# Patient Record
Sex: FEMALE | Race: BLACK OR AFRICAN AMERICAN | NOT HISPANIC OR LATINO | URBAN - METROPOLITAN AREA
[De-identification: names, ages, dates, MRNs, and addresses within clinical notes are randomized per-mention and may not be internally consistent; named-entity substitution may affect disease eponyms.]

---

## 2017-01-17 ENCOUNTER — OUTPATIENT (OUTPATIENT)
Dept: OUTPATIENT SERVICES | Facility: HOSPITAL | Age: 77
LOS: 1 days | Discharge: HOME | End: 2017-01-17
Payer: MEDICARE

## 2017-01-17 VITALS
RESPIRATION RATE: 18 BRPM | SYSTOLIC BLOOD PRESSURE: 123 MMHG | TEMPERATURE: 97 F | WEIGHT: 139.99 LBS | OXYGEN SATURATION: 99 % | HEIGHT: 67 IN | DIASTOLIC BLOOD PRESSURE: 51 MMHG | HEART RATE: 83 BPM

## 2017-01-17 VITALS
SYSTOLIC BLOOD PRESSURE: 140 MMHG | DIASTOLIC BLOOD PRESSURE: 74 MMHG | OXYGEN SATURATION: 100 % | RESPIRATION RATE: 18 BRPM | HEART RATE: 94 BPM

## 2017-01-17 DIAGNOSIS — Z98.890 OTHER SPECIFIED POSTPROCEDURAL STATES: Chronic | ICD-10-CM

## 2017-01-17 PROCEDURE — 88305 TISSUE EXAM BY PATHOLOGIST: CPT | Mod: 26

## 2017-01-17 RX ORDER — SODIUM CHLORIDE 9 MG/ML
1000 INJECTION, SOLUTION INTRAVENOUS
Qty: 0 | Refills: 0 | Status: DISCONTINUED | OUTPATIENT
Start: 2017-01-17 | End: 2017-01-17

## 2017-01-17 RX ADMIN — SODIUM CHLORIDE 50 MILLILITER(S): 9 INJECTION, SOLUTION INTRAVENOUS at 11:24

## 2017-01-18 LAB — SURGICAL PATHOLOGY FINAL REPORT - CH: SIGNIFICANT CHANGE UP

## 2017-01-19 DIAGNOSIS — D12.4 BENIGN NEOPLASM OF DESCENDING COLON: ICD-10-CM

## 2017-01-19 DIAGNOSIS — D12.0 BENIGN NEOPLASM OF CECUM: ICD-10-CM

## 2017-01-19 DIAGNOSIS — E78.00 PURE HYPERCHOLESTEROLEMIA, UNSPECIFIED: ICD-10-CM

## 2017-01-19 DIAGNOSIS — I10 ESSENTIAL (PRIMARY) HYPERTENSION: ICD-10-CM

## 2017-01-19 DIAGNOSIS — K64.8 OTHER HEMORRHOIDS: ICD-10-CM

## 2017-01-19 DIAGNOSIS — Z79.4 LONG TERM (CURRENT) USE OF INSULIN: ICD-10-CM

## 2017-01-19 DIAGNOSIS — Z79.82 LONG TERM (CURRENT) USE OF ASPIRIN: ICD-10-CM

## 2017-01-19 DIAGNOSIS — E11.9 TYPE 2 DIABETES MELLITUS WITHOUT COMPLICATIONS: ICD-10-CM

## 2017-01-19 DIAGNOSIS — K57.30 DIVERTICULOSIS OF LARGE INTESTINE WITHOUT PERFORATION OR ABSCESS WITHOUT BLEEDING: ICD-10-CM

## 2018-02-06 ENCOUNTER — EMERGENCY (EMERGENCY)
Facility: HOSPITAL | Age: 78
LOS: 1 days | Discharge: HOME | End: 2018-02-06
Attending: EMERGENCY MEDICINE | Admitting: EMERGENCY MEDICINE
Payer: MEDICARE

## 2018-02-06 VITALS
OXYGEN SATURATION: 100 % | RESPIRATION RATE: 18 BRPM | DIASTOLIC BLOOD PRESSURE: 81 MMHG | SYSTOLIC BLOOD PRESSURE: 205 MMHG | HEART RATE: 73 BPM

## 2018-02-06 VITALS
TEMPERATURE: 98 F | DIASTOLIC BLOOD PRESSURE: 78 MMHG | OXYGEN SATURATION: 99 % | RESPIRATION RATE: 19 BRPM | SYSTOLIC BLOOD PRESSURE: 162 MMHG | HEART RATE: 89 BPM

## 2018-02-06 DIAGNOSIS — Z98.890 OTHER SPECIFIED POSTPROCEDURAL STATES: Chronic | ICD-10-CM

## 2018-02-06 LAB
ALBUMIN SERPL ELPH-MCNC: 3.5 G/DL — SIGNIFICANT CHANGE UP (ref 3.3–5)
ALP SERPL-CCNC: 66 U/L — SIGNIFICANT CHANGE UP (ref 40–120)
ALT FLD-CCNC: 12 U/L — SIGNIFICANT CHANGE UP (ref 4–33)
AST SERPL-CCNC: 28 U/L — SIGNIFICANT CHANGE UP (ref 4–32)
BASE EXCESS BLDV CALC-SCNC: -1.5 MMOL/L — SIGNIFICANT CHANGE UP
BASOPHILS # BLD AUTO: 0.02 K/UL — SIGNIFICANT CHANGE UP (ref 0–0.2)
BASOPHILS NFR BLD AUTO: 0.3 % — SIGNIFICANT CHANGE UP (ref 0–2)
BILIRUB SERPL-MCNC: < 0.2 MG/DL — LOW (ref 0.2–1.2)
BLOOD GAS VENOUS - CREATININE: 3.83 MG/DL — HIGH (ref 0.5–1.3)
BUN SERPL-MCNC: 29 MG/DL — HIGH (ref 7–23)
CALCIUM SERPL-MCNC: 7.6 MG/DL — LOW (ref 8.4–10.5)
CHLORIDE BLDV-SCNC: 106 MMOL/L — SIGNIFICANT CHANGE UP (ref 96–108)
CHLORIDE SERPL-SCNC: 103 MMOL/L — SIGNIFICANT CHANGE UP (ref 98–107)
CK MB BLD-MCNC: 1.3 — SIGNIFICANT CHANGE UP (ref 0–2.5)
CK MB BLD-MCNC: 2.19 NG/ML — SIGNIFICANT CHANGE UP (ref 1–4.7)
CK SERPL-CCNC: 164 U/L — SIGNIFICANT CHANGE UP (ref 25–170)
CO2 SERPL-SCNC: 23 MMOL/L — SIGNIFICANT CHANGE UP (ref 22–31)
CREAT SERPL-MCNC: 3.76 MG/DL — HIGH (ref 0.5–1.3)
EOSINOPHIL # BLD AUTO: 0.13 K/UL — SIGNIFICANT CHANGE UP (ref 0–0.5)
EOSINOPHIL NFR BLD AUTO: 2.1 % — SIGNIFICANT CHANGE UP (ref 0–6)
GAS PNL BLDV: 134 MMOL/L — LOW (ref 136–146)
GLUCOSE BLDV-MCNC: 161 — HIGH (ref 70–99)
GLUCOSE SERPL-MCNC: 148 MG/DL — HIGH (ref 70–99)
HCO3 BLDV-SCNC: 22 MMOL/L — SIGNIFICANT CHANGE UP (ref 20–27)
HCT VFR BLD CALC: 29.8 % — LOW (ref 34.5–45)
HCT VFR BLDV CALC: 27.3 % — LOW (ref 34.5–45)
HGB BLD-MCNC: 8.8 G/DL — LOW (ref 11.5–15.5)
HGB BLDV-MCNC: 8.8 G/DL — LOW (ref 11.5–15.5)
IMM GRANULOCYTES # BLD AUTO: 0.03 # — SIGNIFICANT CHANGE UP
IMM GRANULOCYTES NFR BLD AUTO: 0.5 % — SIGNIFICANT CHANGE UP (ref 0–1.5)
LACTATE BLDV-MCNC: 1.4 MMOL/L — SIGNIFICANT CHANGE UP (ref 0.5–2)
LYMPHOCYTES # BLD AUTO: 0.93 K/UL — LOW (ref 1–3.3)
LYMPHOCYTES # BLD AUTO: 15 % — SIGNIFICANT CHANGE UP (ref 13–44)
MCHC RBC-ENTMCNC: 21.6 PG — LOW (ref 27–34)
MCHC RBC-ENTMCNC: 29.5 % — LOW (ref 32–36)
MCV RBC AUTO: 73.2 FL — LOW (ref 80–100)
MONOCYTES # BLD AUTO: 0.26 K/UL — SIGNIFICANT CHANGE UP (ref 0–0.9)
MONOCYTES NFR BLD AUTO: 4.2 % — SIGNIFICANT CHANGE UP (ref 2–14)
NEUTROPHILS # BLD AUTO: 4.83 K/UL — SIGNIFICANT CHANGE UP (ref 1.8–7.4)
NEUTROPHILS NFR BLD AUTO: 77.9 % — HIGH (ref 43–77)
NRBC # FLD: 0 — SIGNIFICANT CHANGE UP
PCO2 BLDV: 52 MMHG — HIGH (ref 41–51)
PH BLDV: 7.29 PH — LOW (ref 7.32–7.43)
PLATELET # BLD AUTO: 234 K/UL — SIGNIFICANT CHANGE UP (ref 150–400)
PMV BLD: 10.9 FL — SIGNIFICANT CHANGE UP (ref 7–13)
PO2 BLDV: < 24 MMHG — LOW (ref 35–40)
POTASSIUM BLDV-SCNC: 7.8 MMOL/L — CRITICAL HIGH (ref 3.4–4.5)
POTASSIUM SERPL-MCNC: 4.1 MMOL/L — SIGNIFICANT CHANGE UP (ref 3.5–5.3)
POTASSIUM SERPL-SCNC: 4.1 MMOL/L — SIGNIFICANT CHANGE UP (ref 3.5–5.3)
PROT SERPL-MCNC: 7.8 G/DL — SIGNIFICANT CHANGE UP (ref 6–8.3)
RBC # BLD: 4.07 M/UL — SIGNIFICANT CHANGE UP (ref 3.8–5.2)
RBC # FLD: 14.9 % — HIGH (ref 10.3–14.5)
SAO2 % BLDV: 25.4 % — LOW (ref 60–85)
SODIUM SERPL-SCNC: 140 MMOL/L — SIGNIFICANT CHANGE UP (ref 135–145)
TROPONIN T SERPL-MCNC: 0.11 NG/ML — HIGH (ref 0–0.06)
WBC # BLD: 6.2 K/UL — SIGNIFICANT CHANGE UP (ref 3.8–10.5)
WBC # FLD AUTO: 6.2 K/UL — SIGNIFICANT CHANGE UP (ref 3.8–10.5)

## 2018-02-06 PROCEDURE — 99285 EMERGENCY DEPT VISIT HI MDM: CPT

## 2018-02-06 PROCEDURE — 71046 X-RAY EXAM CHEST 2 VIEWS: CPT | Mod: 26

## 2018-02-06 RX ORDER — MECLIZINE HCL 12.5 MG
1 TABLET ORAL
Qty: 0 | Refills: 0 | COMMUNITY
Start: 2018-02-06 | End: 2018-02-10

## 2018-02-06 RX ORDER — MECLIZINE HCL 12.5 MG
1 TABLET ORAL
Qty: 10 | Refills: 0 | OUTPATIENT
Start: 2018-02-06 | End: 2018-02-10

## 2018-02-06 RX ORDER — MECLIZINE HCL 12.5 MG
25 TABLET ORAL ONCE
Qty: 0 | Refills: 0 | Status: COMPLETED | OUTPATIENT
Start: 2018-02-06 | End: 2018-02-06

## 2018-02-06 RX ADMIN — Medication 25 MILLIGRAM(S): at 11:04

## 2018-02-06 NOTE — ED ADULT TRIAGE NOTE - CHIEF COMPLAINT QUOTE
Patient brought to ER by EMS for intermittent vertigo for the past 6 months. Started about 6 this morning and she vomited times one.

## 2018-02-06 NOTE — ED PROVIDER NOTE - MEDICAL DECISION MAKING DETAILS
77 yoF with dizziness x hours. Likely BPPV  also cough x 1 week. will r/o pna.  Plan: ekg, cxr, cbc, cmp, bren, vbg, meclizine for symptom control, reassess.

## 2018-02-06 NOTE — ED PROVIDER NOTE - ATTENDING CONTRIBUTION TO CARE
NING Attending Note - Dr. Hua  77 yoF with PMHx of HTN, DM, CKD, h/o vertigo intermittently x 1 year previous treated with meclizine presents to the ED complaining of dizziness ( room spinning/ also mildly lightheaded in nature) occurred at 6 am this associated with nausea and 2 episodes of vomiting.   PE: pt is alert and oriented, perrl, ent normal, membranes are moist, neck supple. no lymphadenopathy or thyroid enlargement, No JVD.  Chest clear to P&A, Heart- reg rhythm without murmur, rubs or gallops, radial pulses equal bilaterally.  Abd is soft, non-tender, Bowel sounds are active. no mass or organomegaly. : No CVA tenderness. Neuro:  Pt alert and oriented x 3. Perrl    Distal neurosensory is intact. Motor function is 5/5 strength bilaterally.  No focal deficits. Extremities:  No edema.  Skin: warm and dry. Pt was asymptomatic with negative kavin-hallpike test.  Vertigo was evoked by Nylan-Barany test.   Impression:  Vertigo  Plan: labs, meclizine.

## 2018-02-06 NOTE — ED PROVIDER NOTE - OBJECTIVE STATEMENT
77 yoF with PMHx of HTN, DM, CKD, h/o vertigo intermittently x 1 year previous treated with meclizine presents to the ED complaining of dizziness ( room spinning/ also mildly lightheaded in nature) occurred at 6 am this associated with nausea and 2 episodes of vomiting. Pt reports that over the past few day shes had a mild non productive cough. Pt denies fever, chills, nasal congestion, sorethroat, chest pain, sob, abdominal pain, diarrhea, constipation, urinary symptoms, swelling in legs, change in medications, recent travel, change in mental status, weakness, confusion, syncope, trouble speaking, headache, social hx.   As per son at bedside pt has been having vertigo previously more frequent, has not had these symptoms in a while, but these are similar to previous symptoms.    PCP: Dr Susy Arevalo-[facility] [phone] Pt reports that over the past few day shes had a mild non productive cough. Pt denies fever, chills, nasal congestion, sorethroat, chest pain, sob, abdominal pain, diarrhea, constipation, urinary symptoms, swelling in legs, change in medications, recent travel, change in mental status, weakness, confusion, syncope, trouble speaking, headache, social hx.   As per son at bedside pt has been having vertigo previously more frequent, has not had these symptoms in a while, but these are similar to previous symptoms.    PCP: Dr Susy Arevalo-\[facility]\"" [phone] 77 yoF with PMHx of HTN, DM, CKD, h/o vertigo intermittently x 1 year previous treated with meclizine presents to the ED complaining of dizziness ( room spinning/ also mildly lightheaded in nature) occurred at 6 am this associated with nausea and 2 episodes of vomiting. Pt reports that over the past few day shes had a mild non productive cough. Pt denies fever, chills, nasal congestion, sorethroat, chest pain, sob, abdominal pain, diarrhea, constipation, urinary symptoms, swelling in legs, change in medications, recent travel, change in mental status, weakness, confusion, syncope, trouble speaking, headache, social hx.   As per son at bedside pt has been having vertigo previously more frequent, has not had these symptoms in a while, but these are similar to previous symptoms.    PCP: Dr Susy Arevalo-[facility] [phone]

## 2018-02-06 NOTE — ED PROVIDER NOTE - CARE PLAN
Principal Discharge DX:	Vertigo, benign paroxysmal  Assessment and plan of treatment:	Follow up with your Primary Medical Doctor Dr. Clayton Garcia within 2-3days/ week. Follow up with your nephrologist within the week. If results or reports were given to you, show copies of your reports given to you. Take all of your medications as previously prescribed. If worsening dizziness, difficulty walking/ speaking, numbness/ tingling, fever, uncontrolled vomiting, or any other new or concerning symptoms return to the ED.  Secondary Diagnosis:	Vomiting  Secondary Diagnosis:	Dizziness

## 2018-02-06 NOTE — ED PROVIDER NOTE - PLAN OF CARE
Follow up with your Primary Medical Doctor Dr. Clayton Garcia within 2-3days/ week. Follow up with your nephrologist within the week. If results or reports were given to you, show copies of your reports given to you. Take all of your medications as previously prescribed. If worsening dizziness, difficulty walking/ speaking, numbness/ tingling, fever, uncontrolled vomiting, or any other new or concerning symptoms return to the ED.

## 2018-02-06 NOTE — ED ADULT NURSE NOTE - OBJECTIVE STATEMENT
Pt presents to ED R# 3 AOx4, in NAD, c/o dizziness- room spinning- this AM followed by 1 episode vomiting. As per son, episodes of dizziness followed by vomiting occur intermittently, pt diagnosed with vertigo. As per son, pt has CKD, not yet on HD. Son also reports increased confusion and unsteadiness of gait, ambulates independently at baseline. Pt denies dizziness/ N/V/ SOB/ CP/ abd pain/ fevers/ chills/ other acute complaints at this time. Son denies recent falls/ syncope. IV inserted, BW collected and sent to lab. Pt is in NAD. VSS. Respirations appear even and unlabored. Bed in locked position with side rails up, call bell within reach. Awaiting test results. Family at bedside. Will continue to monitor.

## 2018-02-06 NOTE — ED PROVIDER NOTE - PROGRESS NOTE DETAILS
Call to patient nephrologist, Dr. Julius Black. at [phone] message left with name and number, awaiting call back to discuss labs results. MALINDA Vasquez:Call to patient nephrologist, Dr. Julius Black. at [phone] message left with name and number, awaiting call back to discuss labs results. MALINDA Vasquez: Spoke with Pt pcp Dr. Susy Garcia, previoud lab work done in Nov 2017 was significant for anemia which patient has chronically Hemoglobin was 8.8 and Creatinine was 3.4 MALINDA Vasquez: Spoke with Pt pcp Dr. Susy Garcia, previous lab work done in Nov 2017 was significant for anemia which patient has chronically Hemoglobin was 8.8 and Creatinine was 3.4, pt was then referred to Dr. uJlius Black. Dr. Garcia states pt can follow up with her upon dc. Waiting call back from Dr. Black. Pt feels well. no complaints of dizziness. no current complaints.

## 2018-12-05 ENCOUNTER — INPATIENT (INPATIENT)
Facility: HOSPITAL | Age: 78
LOS: 14 days | Discharge: HOME | End: 2018-12-20
Attending: INTERNAL MEDICINE | Admitting: INTERNAL MEDICINE
Payer: MEDICARE

## 2018-12-05 VITALS
HEART RATE: 82 BPM | RESPIRATION RATE: 22 BRPM | TEMPERATURE: 98 F | OXYGEN SATURATION: 98 % | DIASTOLIC BLOOD PRESSURE: 86 MMHG | SYSTOLIC BLOOD PRESSURE: 187 MMHG

## 2018-12-05 DIAGNOSIS — I77.0 ARTERIOVENOUS FISTULA, ACQUIRED: Chronic | ICD-10-CM

## 2018-12-05 DIAGNOSIS — D64.9 ANEMIA, UNSPECIFIED: ICD-10-CM

## 2018-12-05 DIAGNOSIS — E78.00 PURE HYPERCHOLESTEROLEMIA, UNSPECIFIED: ICD-10-CM

## 2018-12-05 DIAGNOSIS — E11.9 TYPE 2 DIABETES MELLITUS WITHOUT COMPLICATIONS: ICD-10-CM

## 2018-12-05 DIAGNOSIS — N18.9 CHRONIC KIDNEY DISEASE, UNSPECIFIED: ICD-10-CM

## 2018-12-05 DIAGNOSIS — J81.1 CHRONIC PULMONARY EDEMA: ICD-10-CM

## 2018-12-05 DIAGNOSIS — N17.9 ACUTE KIDNEY FAILURE, UNSPECIFIED: ICD-10-CM

## 2018-12-05 DIAGNOSIS — R06.01 ORTHOPNEA: ICD-10-CM

## 2018-12-05 DIAGNOSIS — Z29.9 ENCOUNTER FOR PROPHYLACTIC MEASURES, UNSPECIFIED: ICD-10-CM

## 2018-12-05 DIAGNOSIS — Z98.890 OTHER SPECIFIED POSTPROCEDURAL STATES: Chronic | ICD-10-CM

## 2018-12-05 DIAGNOSIS — I10 ESSENTIAL (PRIMARY) HYPERTENSION: ICD-10-CM

## 2018-12-05 LAB
ALBUMIN SERPL ELPH-MCNC: 3.7 G/DL — SIGNIFICANT CHANGE UP (ref 3.3–5)
ALBUMIN SERPL ELPH-MCNC: 4.1 G/DL — SIGNIFICANT CHANGE UP (ref 3.3–5)
ALP SERPL-CCNC: 60 U/L — SIGNIFICANT CHANGE UP (ref 40–120)
ALP SERPL-CCNC: 65 U/L — SIGNIFICANT CHANGE UP (ref 40–120)
ALT FLD-CCNC: 7 U/L — SIGNIFICANT CHANGE UP (ref 4–33)
ALT FLD-CCNC: 9 U/L — SIGNIFICANT CHANGE UP (ref 4–33)
AST SERPL-CCNC: 18 U/L — SIGNIFICANT CHANGE UP (ref 4–32)
AST SERPL-CCNC: 19 U/L — SIGNIFICANT CHANGE UP (ref 4–32)
BASOPHILS # BLD AUTO: 0.03 K/UL — SIGNIFICANT CHANGE UP (ref 0–0.2)
BASOPHILS NFR BLD AUTO: 0.4 % — SIGNIFICANT CHANGE UP (ref 0–2)
BILIRUB SERPL-MCNC: 0.7 MG/DL — SIGNIFICANT CHANGE UP (ref 0.2–1.2)
BILIRUB SERPL-MCNC: < 0.2 MG/DL — LOW (ref 0.2–1.2)
BLD GP AB SCN SERPL QL: NEGATIVE — SIGNIFICANT CHANGE UP
BUN SERPL-MCNC: 34 MG/DL — HIGH (ref 7–23)
BUN SERPL-MCNC: 36 MG/DL — HIGH (ref 7–23)
CALCIUM SERPL-MCNC: 6.8 MG/DL — LOW (ref 8.4–10.5)
CALCIUM SERPL-MCNC: 6.9 MG/DL — LOW (ref 8.4–10.5)
CHLORIDE SERPL-SCNC: 107 MMOL/L — SIGNIFICANT CHANGE UP (ref 98–107)
CHLORIDE SERPL-SCNC: 109 MMOL/L — HIGH (ref 98–107)
CK SERPL-CCNC: 571 U/L — HIGH (ref 25–170)
CO2 SERPL-SCNC: 13 MMOL/L — LOW (ref 22–31)
CO2 SERPL-SCNC: 16 MMOL/L — LOW (ref 22–31)
CREAT SERPL-MCNC: 6.26 MG/DL — HIGH (ref 0.5–1.3)
CREAT SERPL-MCNC: 6.46 MG/DL — HIGH (ref 0.5–1.3)
EOSINOPHIL # BLD AUTO: 0.1 K/UL — SIGNIFICANT CHANGE UP (ref 0–0.5)
EOSINOPHIL NFR BLD AUTO: 1.4 % — SIGNIFICANT CHANGE UP (ref 0–6)
FERRITIN SERPL-MCNC: 120.3 NG/ML — SIGNIFICANT CHANGE UP (ref 15–150)
GLUCOSE BLDC GLUCOMTR-MCNC: 109 MG/DL — HIGH (ref 70–99)
GLUCOSE BLDC GLUCOMTR-MCNC: 178 MG/DL — HIGH (ref 70–99)
GLUCOSE SERPL-MCNC: 134 MG/DL — HIGH (ref 70–99)
GLUCOSE SERPL-MCNC: 181 MG/DL — HIGH (ref 70–99)
HCT VFR BLD CALC: 23.2 % — LOW (ref 34.5–45)
HCT VFR BLD CALC: 25.2 % — LOW (ref 34.5–45)
HGB BLD-MCNC: 6.8 G/DL — CRITICAL LOW (ref 11.5–15.5)
HGB BLD-MCNC: 7.8 G/DL — LOW (ref 11.5–15.5)
IMM GRANULOCYTES # BLD AUTO: 0.04 # — SIGNIFICANT CHANGE UP
IMM GRANULOCYTES NFR BLD AUTO: 0.6 % — SIGNIFICANT CHANGE UP (ref 0–1.5)
IRON SATN MFR SERPL: 232 UG/DL — SIGNIFICANT CHANGE UP (ref 140–530)
IRON SATN MFR SERPL: 34 UG/DL — SIGNIFICANT CHANGE UP (ref 30–160)
LYMPHOCYTES # BLD AUTO: 0.65 K/UL — LOW (ref 1–3.3)
LYMPHOCYTES # BLD AUTO: 9.3 % — LOW (ref 13–44)
MAGNESIUM SERPL-MCNC: 1.8 MG/DL — SIGNIFICANT CHANGE UP (ref 1.6–2.6)
MCHC RBC-ENTMCNC: 21.7 PG — LOW (ref 27–34)
MCHC RBC-ENTMCNC: 23.5 PG — LOW (ref 27–34)
MCHC RBC-ENTMCNC: 29.3 % — LOW (ref 32–36)
MCHC RBC-ENTMCNC: 31 % — LOW (ref 32–36)
MCV RBC AUTO: 74.1 FL — LOW (ref 80–100)
MCV RBC AUTO: 75.9 FL — LOW (ref 80–100)
MONOCYTES # BLD AUTO: 0.24 K/UL — SIGNIFICANT CHANGE UP (ref 0–0.9)
MONOCYTES NFR BLD AUTO: 3.4 % — SIGNIFICANT CHANGE UP (ref 2–14)
NEUTROPHILS # BLD AUTO: 5.93 K/UL — SIGNIFICANT CHANGE UP (ref 1.8–7.4)
NEUTROPHILS NFR BLD AUTO: 84.9 % — HIGH (ref 43–77)
NRBC # FLD: 0 — SIGNIFICANT CHANGE UP
NRBC # FLD: 0 — SIGNIFICANT CHANGE UP
NT-PROBNP SERPL-SCNC: SIGNIFICANT CHANGE UP PG/ML
OB PNL STL: NEGATIVE — SIGNIFICANT CHANGE UP
PHOSPHATE SERPL-MCNC: 4.9 MG/DL — HIGH (ref 2.5–4.5)
PLATELET # BLD AUTO: 178 K/UL — SIGNIFICANT CHANGE UP (ref 150–400)
PLATELET # BLD AUTO: 182 K/UL — SIGNIFICANT CHANGE UP (ref 150–400)
PMV BLD: SIGNIFICANT CHANGE UP FL (ref 7–13)
PMV BLD: SIGNIFICANT CHANGE UP FL (ref 7–13)
POTASSIUM SERPL-MCNC: 3.5 MMOL/L — SIGNIFICANT CHANGE UP (ref 3.5–5.3)
POTASSIUM SERPL-MCNC: 3.6 MMOL/L — SIGNIFICANT CHANGE UP (ref 3.5–5.3)
POTASSIUM SERPL-SCNC: 3.5 MMOL/L — SIGNIFICANT CHANGE UP (ref 3.5–5.3)
POTASSIUM SERPL-SCNC: 3.6 MMOL/L — SIGNIFICANT CHANGE UP (ref 3.5–5.3)
PROT SERPL-MCNC: 7.2 G/DL — SIGNIFICANT CHANGE UP (ref 6–8.3)
PROT SERPL-MCNC: 7.7 G/DL — SIGNIFICANT CHANGE UP (ref 6–8.3)
RBC # BLD: 3.13 M/UL — LOW (ref 3.8–5.2)
RBC # BLD: 3.32 M/UL — LOW (ref 3.8–5.2)
RBC # FLD: 16 % — HIGH (ref 10.3–14.5)
RBC # FLD: 17.1 % — HIGH (ref 10.3–14.5)
RH IG SCN BLD-IMP: POSITIVE — SIGNIFICANT CHANGE UP
RH IG SCN BLD-IMP: POSITIVE — SIGNIFICANT CHANGE UP
SODIUM SERPL-SCNC: 140 MMOL/L — SIGNIFICANT CHANGE UP (ref 135–145)
SODIUM SERPL-SCNC: 141 MMOL/L — SIGNIFICANT CHANGE UP (ref 135–145)
TROPONIN T, HIGH SENSITIVITY: 58 NG/L — CRITICAL HIGH (ref ?–14)
TROPONIN T, HIGH SENSITIVITY: 61 NG/L — CRITICAL HIGH (ref ?–14)
TROPONIN T, HIGH SENSITIVITY: 63 NG/L — CRITICAL HIGH (ref ?–14)
UIBC SERPL-MCNC: 197.6 UG/DL — SIGNIFICANT CHANGE UP (ref 110–370)
WBC # BLD: 5.07 K/UL — SIGNIFICANT CHANGE UP (ref 3.8–10.5)
WBC # BLD: 6.99 K/UL — SIGNIFICANT CHANGE UP (ref 3.8–10.5)
WBC # FLD AUTO: 5.07 K/UL — SIGNIFICANT CHANGE UP (ref 3.8–10.5)
WBC # FLD AUTO: 6.99 K/UL — SIGNIFICANT CHANGE UP (ref 3.8–10.5)

## 2018-12-05 PROCEDURE — 71045 X-RAY EXAM CHEST 1 VIEW: CPT | Mod: 26

## 2018-12-05 RX ORDER — MECLIZINE HCL 12.5 MG
25 TABLET ORAL DAILY
Qty: 0 | Refills: 0 | Status: DISCONTINUED | OUTPATIENT
Start: 2018-12-05 | End: 2018-12-09

## 2018-12-05 RX ORDER — FUROSEMIDE 40 MG
40 TABLET ORAL
Qty: 0 | Refills: 0 | Status: DISCONTINUED | OUTPATIENT
Start: 2018-12-05 | End: 2018-12-06

## 2018-12-05 RX ORDER — SODIUM CHLORIDE 9 MG/ML
1000 INJECTION, SOLUTION INTRAVENOUS
Qty: 0 | Refills: 0 | Status: DISCONTINUED | OUTPATIENT
Start: 2018-12-05 | End: 2018-12-20

## 2018-12-05 RX ORDER — ONDANSETRON 8 MG/1
4 TABLET, FILM COATED ORAL EVERY 6 HOURS
Qty: 0 | Refills: 0 | Status: DISCONTINUED | OUTPATIENT
Start: 2018-12-05 | End: 2018-12-20

## 2018-12-05 RX ORDER — ASPIRIN/CALCIUM CARB/MAGNESIUM 324 MG
81 TABLET ORAL DAILY
Qty: 0 | Refills: 0 | Status: DISCONTINUED | OUTPATIENT
Start: 2018-12-05 | End: 2018-12-07

## 2018-12-05 RX ORDER — FUROSEMIDE 40 MG
20 TABLET ORAL ONCE
Qty: 0 | Refills: 0 | Status: COMPLETED | OUTPATIENT
Start: 2018-12-05 | End: 2018-12-05

## 2018-12-05 RX ORDER — DEXTROSE 50 % IN WATER 50 %
12.5 SYRINGE (ML) INTRAVENOUS ONCE
Qty: 0 | Refills: 0 | Status: DISCONTINUED | OUTPATIENT
Start: 2018-12-05 | End: 2018-12-20

## 2018-12-05 RX ORDER — DEXTROSE 50 % IN WATER 50 %
15 SYRINGE (ML) INTRAVENOUS ONCE
Qty: 0 | Refills: 0 | Status: DISCONTINUED | OUTPATIENT
Start: 2018-12-05 | End: 2018-12-20

## 2018-12-05 RX ORDER — INSULIN LISPRO 100/ML
VIAL (ML) SUBCUTANEOUS
Qty: 0 | Refills: 0 | Status: DISCONTINUED | OUTPATIENT
Start: 2018-12-05 | End: 2018-12-20

## 2018-12-05 RX ORDER — SODIUM BICARBONATE 1 MEQ/ML
1300 SYRINGE (ML) INTRAVENOUS
Qty: 0 | Refills: 0 | Status: DISCONTINUED | OUTPATIENT
Start: 2018-12-05 | End: 2018-12-10

## 2018-12-05 RX ORDER — HEPARIN SODIUM 5000 [USP'U]/ML
5000 INJECTION INTRAVENOUS; SUBCUTANEOUS EVERY 8 HOURS
Qty: 0 | Refills: 0 | Status: DISCONTINUED | OUTPATIENT
Start: 2018-12-05 | End: 2018-12-20

## 2018-12-05 RX ORDER — SERTRALINE 25 MG/1
50 TABLET, FILM COATED ORAL DAILY
Qty: 0 | Refills: 0 | Status: DISCONTINUED | OUTPATIENT
Start: 2018-12-05 | End: 2018-12-20

## 2018-12-05 RX ORDER — AMLODIPINE BESYLATE 2.5 MG/1
10 TABLET ORAL DAILY
Qty: 0 | Refills: 0 | Status: DISCONTINUED | OUTPATIENT
Start: 2018-12-05 | End: 2018-12-09

## 2018-12-05 RX ORDER — DEXTROSE 50 % IN WATER 50 %
25 SYRINGE (ML) INTRAVENOUS ONCE
Qty: 0 | Refills: 0 | Status: DISCONTINUED | OUTPATIENT
Start: 2018-12-05 | End: 2018-12-20

## 2018-12-05 RX ORDER — PANTOPRAZOLE SODIUM 20 MG/1
40 TABLET, DELAYED RELEASE ORAL
Qty: 0 | Refills: 0 | Status: DISCONTINUED | OUTPATIENT
Start: 2018-12-05 | End: 2018-12-20

## 2018-12-05 RX ORDER — FENOFIBRATE,MICRONIZED 130 MG
145 CAPSULE ORAL DAILY
Qty: 0 | Refills: 0 | Status: DISCONTINUED | OUTPATIENT
Start: 2018-12-05 | End: 2018-12-20

## 2018-12-05 RX ORDER — GLUCAGON INJECTION, SOLUTION 0.5 MG/.1ML
1 INJECTION, SOLUTION SUBCUTANEOUS ONCE
Qty: 0 | Refills: 0 | Status: DISCONTINUED | OUTPATIENT
Start: 2018-12-05 | End: 2018-12-20

## 2018-12-05 RX ORDER — ERYTHROPOIETIN 10000 [IU]/ML
10000 INJECTION, SOLUTION INTRAVENOUS; SUBCUTANEOUS
Qty: 0 | Refills: 0 | Status: DISCONTINUED | OUTPATIENT
Start: 2018-12-05 | End: 2018-12-08

## 2018-12-05 RX ORDER — ATORVASTATIN CALCIUM 80 MG/1
80 TABLET, FILM COATED ORAL AT BEDTIME
Qty: 0 | Refills: 0 | Status: DISCONTINUED | OUTPATIENT
Start: 2018-12-05 | End: 2018-12-20

## 2018-12-05 RX ORDER — HYDRALAZINE HCL 50 MG
50 TABLET ORAL THREE TIMES A DAY
Qty: 0 | Refills: 0 | Status: DISCONTINUED | OUTPATIENT
Start: 2018-12-05 | End: 2018-12-11

## 2018-12-05 RX ADMIN — AMLODIPINE BESYLATE 10 MILLIGRAM(S): 2.5 TABLET ORAL at 12:30

## 2018-12-05 RX ADMIN — HEPARIN SODIUM 5000 UNIT(S): 5000 INJECTION INTRAVENOUS; SUBCUTANEOUS at 22:18

## 2018-12-05 RX ADMIN — PANTOPRAZOLE SODIUM 40 MILLIGRAM(S): 20 TABLET, DELAYED RELEASE ORAL at 12:30

## 2018-12-05 RX ADMIN — SERTRALINE 50 MILLIGRAM(S): 25 TABLET, FILM COATED ORAL at 12:30

## 2018-12-05 RX ADMIN — HEPARIN SODIUM 5000 UNIT(S): 5000 INJECTION INTRAVENOUS; SUBCUTANEOUS at 14:25

## 2018-12-05 RX ADMIN — Medication 81 MILLIGRAM(S): at 12:30

## 2018-12-05 RX ADMIN — Medication 1300 MILLIGRAM(S): at 22:18

## 2018-12-05 RX ADMIN — Medication 40 MILLIGRAM(S): at 17:46

## 2018-12-05 RX ADMIN — ONDANSETRON 4 MILLIGRAM(S): 8 TABLET, FILM COATED ORAL at 15:29

## 2018-12-05 RX ADMIN — Medication 50 MILLIGRAM(S): at 14:25

## 2018-12-05 RX ADMIN — ATORVASTATIN CALCIUM 80 MILLIGRAM(S): 80 TABLET, FILM COATED ORAL at 22:20

## 2018-12-05 RX ADMIN — Medication 1: at 18:40

## 2018-12-05 RX ADMIN — Medication 50 MILLIGRAM(S): at 22:18

## 2018-12-05 RX ADMIN — Medication 145 MILLIGRAM(S): at 12:30

## 2018-12-05 RX ADMIN — Medication 20 MILLIGRAM(S): at 10:13

## 2018-12-05 NOTE — H&P ADULT - NSHPSOCIALHISTORY_GEN_ALL_CORE
Never smoker. Denies etoh or illicit drug use. Lives in a house with her grandson in [facility]. No HHA. Well rounded diet. Never smoker. Denies etoh or illicit drug use. Lives in a house with her grandson in [place]. No HHA. Well rounded diet.

## 2018-12-05 NOTE — H&P ADULT - HISTORY OF PRESENT ILLNESS
77 y/o  Female, with a PMHx of CKD (fistula June 2018, s/p 2 balloons, last one 2 weeks ago, no dialysis yet), DM, HTN, HLD, anemia presents to the [facility] ED with constant, nonpleuritic SOB, dry cough and clear rhinorrhea x 2 days. Pt reports SOB that worsens when laying down (uses 3-4 pillows with no relief). Pt also reports dyspnea on exertion (after 1/2 block) although pt states she can still go up the stairs in her home without taking a break (13 steps). Pt states she has not slept the last 2 days. Denies having this before. Pt states she has had decreased appetite for the last 6 months, associated with a 50lb weight loss (currently weighs 134lbs.) Pt states she has had a little leg swelling "recently." Denies any calf tenderness or pain. Pt reports 1 episode of vomiting earlier today while in the hospital as well as darkening of stool. Stool sample taken in ED (negative). Pt also reports increased urinary frequency although family reports she only used the bathroom once since 12:30am. Denies chest pain, palpitations, fever, chills, nausea, diarrhea, constipation, visual changes, hearing changes, HA or abdominal pain. 77 y/o  Female, with a PMHx of CKD (fistula placed June 2018, s/p 2 balloons, last one 2 weeks ago, not on dialysis yet), DM, HTN, HLD, anemia presents to the [facility] ED with constant  SOB, dry cough and clear rhinorrhea x 2 days. Pt reports SOB that worsens when laying down (uses 3-4 pillows with no relief). Pt states she has not slept the last 2 days. Pt also reports dyspnea on exertion (after 1/2 block) although pt states she can still go up the stairs in her home without taking a break (13 steps). Denies having these symptoms before. Pt states she has had decreased appetite for the last 6 months, associated with a 50lb weight loss. Pt states she has had a little leg swelling "recently." Denies any calf tenderness or pain. Pt reports 1 episode of vomiting earlier today while in the hospital as well as darkening of stool recently. Stool sample taken in ED (negative). Pt also reports increased urinary frequency although family reports she only used the bathroom once since 12:30am. Denies chest pain, pleuritic CP, palpitations, fever, chills, nausea, diarrhea, constipation, visual changes, hearing changes, HA or abdominal pain.

## 2018-12-05 NOTE — ED ADULT TRIAGE NOTE - CHIEF COMPLAINT QUOTE
pt. c/o trouble breathing while laying down, states she woke up coughing and was unable to return to sleep.  Denies CP/SOB.  Hypertensive in triage, respirations even and unlabored, pt. appears anxious.  L AV fistula, not yet on dialysis.  PMHx HTN, DM, HLD, CKD. pt. c/o trouble breathing while laying down, states she woke up coughing and was unable to return to sleep.  Denies CP/SOB.  Hypertensive and tachypneic in triage, respirations even and unlabored, pt. appears anxious.  L AV fistula, not yet on dialysis.  PMHx HTN, DM, HLD, CKD.  .

## 2018-12-05 NOTE — ED ADULT NURSE NOTE - CHIEF COMPLAINT QUOTE
pt. c/o trouble breathing while laying down, states she woke up coughing and was unable to return to sleep.  Denies CP/SOB.  Hypertensive and tachypneic in triage, respirations even and unlabored, pt. appears anxious.  L AV fistula, not yet on dialysis.  PMHx HTN, DM, HLD, CKD.  .

## 2018-12-05 NOTE — H&P ADULT - NEGATIVE GASTROINTESTINAL SYMPTOMS
no constipation/no diarrhea/no abdominal pain/no hematochezia/no nausea no diarrhea/no constipation/no hematochezia/no nausea/no abdominal pain/no melena

## 2018-12-05 NOTE — ED PROVIDER NOTE - PROGRESS NOTE DETAILS
pt found to have a positive trop and elevated BNP however in the setting of CKD and Cr of 6.  WIll repeat.  Not consistent with an ACS.  Could be an element of CHF in presentation.  HgB is 2 points lower than baseline.  Will transfuse.  Consent obtained and placed in chart.  Discussed with hospitalist.  Will admit to tele on their service.  They are request 20mg lasix post transfusion

## 2018-12-05 NOTE — H&P ADULT - NEGATIVE NEUROLOGICAL SYMPTOMS
no loss of consciousness/no headache/no vertigo/no loss of sensation/no transient paralysis/no weakness/no paresthesias/no generalized seizures/no syncope/no focal seizures/no difficulty walking/no tremors

## 2018-12-05 NOTE — PHYSICAL THERAPY INITIAL EVALUATION ADULT - ADDITIONAL COMMENTS
Patient lives with grandson in a home with bedroom on 2nd floor; patient uses a rolling walker or Single Jackson Cane prior to admission

## 2018-12-05 NOTE — H&P ADULT - NEUROLOGICAL DETAILS
normal strength/responds to verbal commands/alert and oriented x 3/no spontaneous movement/cranial nerves intact

## 2018-12-05 NOTE — ED PROVIDER NOTE - PHYSICAL EXAMINATION
Gen: Well appearing in NAD  Head: NC/AT  Neck: trachea midline  Resp:  No distress CTAB  CV: RRR  Ext: no deformities no edema  Neuro:  A&O appears non focal  Skin:  Warm and dry as visualized

## 2018-12-05 NOTE — H&P ADULT - RS GEN PE MLT RESP DETAILS PC
normal/breath sounds equal/no rhonchi/clear to auscultation bilaterally/no wheezes/no chest wall tenderness/no rales no rhonchi/no wheezes/normal/rales/clear to auscultation bilaterally/good air movement/diminished breath sounds, R/breath sounds equal/no chest wall tenderness/diminished breath sounds, L

## 2018-12-05 NOTE — H&P ADULT - PROBLEM SELECTOR PLAN 1
CXR reviewed. Fluid overload, Continue Lasix as prescribed.  Monitor BMP. Nephrology consult. CE x 2 ( 63 --> 58), TTE Admit to Telemetry, serial CE's, EKG's, FLP, continue IV Lasix, monitor daily weights, strict I's & O's. F/U MD note, Renal c/s, Echo

## 2018-12-05 NOTE — H&P ADULT - NEGATIVE OPHTHALMOLOGIC SYMPTOMS
no blurred vision R/no diplopia/no blurred vision L/no pain L/no pain R/no loss of vision L/no loss of vision R/no photophobia

## 2018-12-05 NOTE — ED ADULT NURSE NOTE - NSIMPLEMENTINTERV_GEN_ALL_ED
Implemented All Universal Safety Interventions:  [place] to call system. Call bell, personal items and telephone within reach. Instruct patient to call for assistance. Room bathroom lighting operational. Non-slip footwear when patient is off stretcher. Physically safe environment: no spills, clutter or unnecessary equipment. Stretcher in lowest position, wheels locked, appropriate side rails in place.

## 2018-12-05 NOTE — H&P ADULT - NEGATIVE ALLERGY TYPES
no reactions to animals/no reactions to insect bites/no outdoor environmental allergies/no indoor environmental allergies/no reactions to medicines/no reactions to food

## 2018-12-05 NOTE — H&P ADULT - NSHPOUTPATIENTPROVIDERS_GEN_ALL_CORE
PCP: Dr. Perez   Nephrologist: Dr. Martin Black   Vascular:    Cardiologist: Dr. Mike Amador PCP: Dr. Perez   Nephrologist: Dr. Martin Black   Vascular: Dr. Garcia  Cardiologist: Dr. Tahir Greenberg

## 2018-12-05 NOTE — H&P ADULT - ASSESSMENT
79 y/o  Female, with a PMHx of CKD (fistula June 2018, s/p 2 balloons, last one 2 weeks ago, no dialysis yet), DM, HTN, HLD, anemia presents to the [facility] ED constant, nonpleuritic SOB, dry cough and clear rhinorrhea x 2 days associated with one episode of vomiting, darken stools and increased urinary frequency admitted to telemetry for SOB. 77 y/o  Female, with a PMHx of CKD (fistula June 2018, s/p 2 balloons, last one 2 weeks ago, no dialysis yet), DM, HTN, HLD, anemia presents to the [facility] ED constant SOB, dry cough and clear rhinorrhea x 2 days associated with one episode of vomiting, darken stools and increased urinary frequency admitted to telemetry for Pulmonary Edema/ Anemia/ Acute on Chronic Renal Failure.    EKG- NSR @ 84 TWI I, L, v5-6  Tele monitor- run of SVT @ 168

## 2018-12-05 NOTE — H&P ADULT - PROBLEM SELECTOR PLAN 3
Monitor BMP, Continue Lasix as prescribed, Strict I & O's, Fluid restriction, Nephrology consult GFR 7%, Monitor BMP's, F/U Renal c/s

## 2018-12-05 NOTE — H&P ADULT - NEGATIVE ENMT SYMPTOMS
no ear pain/no tinnitus/no vertigo/no throat pain/no sinus symptoms/no nasal congestion/no hearing difficulty

## 2018-12-05 NOTE — ED ADULT NURSE REASSESSMENT NOTE - NS ED NURSE REASSESS COMMENT FT1
Pt reports nausea and is actively vomiting.  Tele PA made aware.  no further interventions at current time.  will continue to monitor.

## 2018-12-05 NOTE — ED ADULT NURSE NOTE - OBJECTIVE STATEMENT
A&Ox3 complaining of SOB when laying flat. R arm fistula present, not matured or in use at this time. Placed on cardiac monitor, no pain or fevers, +dry cough "for a few days." No other complaints, respirations even and unlabored, speaking clearly in full sentences, will continue to monitor

## 2018-12-05 NOTE — H&P ADULT - PSH
AV fistula  June 2018, 2 ballooning (last one 2 weeks ago), following up on Dec 15th  H/O colonoscopy with polypectomy    Status post right foot surgery  hammer toe repair

## 2018-12-05 NOTE — ED PROVIDER NOTE - OBJECTIVE STATEMENT
77 yo with CKD, HTN and DM presenting with a few days of progressive SOB worse when laying flat.  Last night so bad could not sleep.  Also now with a non productive cough for two days.  No CP and no fever.  Worsening BOLAÑOS last few days as well.    No history of same.  Reports a recent ECHO at cardiologist office but not sure when or findings

## 2018-12-05 NOTE — ED PROVIDER NOTE - MEDICAL DECISION MAKING DETAILS
pt with orthopnea and non productive cough.  Need to consider new onset CHF or fluid overload in setting of known CKD.  WIll get labs and xray.  Will likely require admission for further work up

## 2018-12-05 NOTE — ED ADULT NURSE REASSESSMENT NOTE - NS ED NURSE REASSESS COMMENT FT1
0550 Patient awake and alert, HR found to be in 160s - TELE PA notified, MD at bedside, TELE PA at bedside, patient states "I do not feel good." Began vomiting, HR returned to 70s BPM after approx 30 seconds, re-peat EKG complete. Will continue to monitor

## 2018-12-05 NOTE — H&P ADULT - FAMILY HISTORY
Family history of diabetes mellitus     Family history of malignant neoplasm of gastrointestinal tract     Sibling  Still living? Unknown  Family history of hypertension, Age at diagnosis: Age Unknown  Family history of lung cancer, Age at diagnosis: Age Unknown

## 2018-12-05 NOTE — H&P ADULT - PMH
Anemia    CKD (chronic kidney disease)    DM (diabetes mellitus)    HTN (hypertension)    Hypercholesteremia

## 2018-12-06 LAB
ALBUMIN SERPL ELPH-MCNC: 3.5 G/DL — SIGNIFICANT CHANGE UP (ref 3.3–5)
ALP SERPL-CCNC: 57 U/L — SIGNIFICANT CHANGE UP (ref 40–120)
ALT FLD-CCNC: 6 U/L — SIGNIFICANT CHANGE UP (ref 4–33)
AST SERPL-CCNC: 16 U/L — SIGNIFICANT CHANGE UP (ref 4–32)
BASOPHILS # BLD AUTO: 0.01 K/UL — SIGNIFICANT CHANGE UP (ref 0–0.2)
BASOPHILS NFR BLD AUTO: 0.2 % — SIGNIFICANT CHANGE UP (ref 0–2)
BILIRUB SERPL-MCNC: 0.4 MG/DL — SIGNIFICANT CHANGE UP (ref 0.2–1.2)
BUN SERPL-MCNC: 39 MG/DL — HIGH (ref 7–23)
CALCIUM SERPL-MCNC: 7.2 MG/DL — LOW (ref 8.4–10.5)
CHLORIDE SERPL-SCNC: 108 MMOL/L — HIGH (ref 98–107)
CHOLEST SERPL-MCNC: 167 MG/DL — SIGNIFICANT CHANGE UP (ref 120–199)
CO2 SERPL-SCNC: 15 MMOL/L — LOW (ref 22–31)
CREAT SERPL-MCNC: 7.17 MG/DL — HIGH (ref 0.5–1.3)
EOSINOPHIL # BLD AUTO: 0.01 K/UL — SIGNIFICANT CHANGE UP (ref 0–0.5)
EOSINOPHIL NFR BLD AUTO: 0.2 % — SIGNIFICANT CHANGE UP (ref 0–6)
GLUCOSE BLDC GLUCOMTR-MCNC: 137 MG/DL — HIGH (ref 70–99)
GLUCOSE BLDC GLUCOMTR-MCNC: 149 MG/DL — HIGH (ref 70–99)
GLUCOSE BLDC GLUCOMTR-MCNC: 155 MG/DL — HIGH (ref 70–99)
GLUCOSE BLDC GLUCOMTR-MCNC: 94 MG/DL — SIGNIFICANT CHANGE UP (ref 70–99)
GLUCOSE SERPL-MCNC: 100 MG/DL — HIGH (ref 70–99)
HBA1C BLD-MCNC: 5.7 % — HIGH (ref 4–5.6)
HCT VFR BLD CALC: 23.9 % — LOW (ref 34.5–45)
HDLC SERPL-MCNC: 77 MG/DL — HIGH (ref 45–65)
HGB BLD-MCNC: 7.4 G/DL — LOW (ref 11.5–15.5)
IMM GRANULOCYTES # BLD AUTO: 0.04 # — SIGNIFICANT CHANGE UP
IMM GRANULOCYTES NFR BLD AUTO: 0.6 % — SIGNIFICANT CHANGE UP (ref 0–1.5)
LIPID PNL WITH DIRECT LDL SERPL: 77 MG/DL — SIGNIFICANT CHANGE UP
LYMPHOCYTES # BLD AUTO: 0.72 K/UL — LOW (ref 1–3.3)
LYMPHOCYTES # BLD AUTO: 11.7 % — LOW (ref 13–44)
MAGNESIUM SERPL-MCNC: 1.9 MG/DL — SIGNIFICANT CHANGE UP (ref 1.6–2.6)
MCHC RBC-ENTMCNC: 23 PG — LOW (ref 27–34)
MCHC RBC-ENTMCNC: 31 % — LOW (ref 32–36)
MCV RBC AUTO: 74.2 FL — LOW (ref 80–100)
MONOCYTES # BLD AUTO: 0.28 K/UL — SIGNIFICANT CHANGE UP (ref 0–0.9)
MONOCYTES NFR BLD AUTO: 4.5 % — SIGNIFICANT CHANGE UP (ref 2–14)
NEUTROPHILS # BLD AUTO: 5.1 K/UL — SIGNIFICANT CHANGE UP (ref 1.8–7.4)
NEUTROPHILS NFR BLD AUTO: 82.8 % — HIGH (ref 43–77)
NRBC # FLD: 0 — SIGNIFICANT CHANGE UP
PHOSPHATE SERPL-MCNC: 5.6 MG/DL — HIGH (ref 2.5–4.5)
PLATELET # BLD AUTO: 196 K/UL — SIGNIFICANT CHANGE UP (ref 150–400)
PMV BLD: 12.5 FL — SIGNIFICANT CHANGE UP (ref 7–13)
POTASSIUM SERPL-MCNC: 3.6 MMOL/L — SIGNIFICANT CHANGE UP (ref 3.5–5.3)
POTASSIUM SERPL-SCNC: 3.6 MMOL/L — SIGNIFICANT CHANGE UP (ref 3.5–5.3)
PROT SERPL-MCNC: 7 G/DL — SIGNIFICANT CHANGE UP (ref 6–8.3)
PTH-INTACT SERPL-MCNC: 483.5 PG/ML — HIGH (ref 15–65)
RBC # BLD: 3.22 M/UL — LOW (ref 3.8–5.2)
RBC # FLD: 16.5 % — HIGH (ref 10.3–14.5)
SODIUM SERPL-SCNC: 141 MMOL/L — SIGNIFICANT CHANGE UP (ref 135–145)
TRIGL SERPL-MCNC: 83 MG/DL — SIGNIFICANT CHANGE UP (ref 10–149)
WBC # BLD: 6.16 K/UL — SIGNIFICANT CHANGE UP (ref 3.8–10.5)
WBC # FLD AUTO: 6.16 K/UL — SIGNIFICANT CHANGE UP (ref 3.8–10.5)

## 2018-12-06 PROCEDURE — 93306 TTE W/DOPPLER COMPLETE: CPT | Mod: 26

## 2018-12-06 PROCEDURE — 99223 1ST HOSP IP/OBS HIGH 75: CPT

## 2018-12-06 PROCEDURE — 93990 DOPPLER FLOW TESTING: CPT | Mod: 26

## 2018-12-06 RX ORDER — LANOLIN ALCOHOL/MO/W.PET/CERES
3 CREAM (GRAM) TOPICAL AT BEDTIME
Qty: 0 | Refills: 0 | Status: DISCONTINUED | OUTPATIENT
Start: 2018-12-06 | End: 2018-12-20

## 2018-12-06 RX ORDER — FUROSEMIDE 40 MG
80 TABLET ORAL
Qty: 0 | Refills: 0 | Status: DISCONTINUED | OUTPATIENT
Start: 2018-12-06 | End: 2018-12-07

## 2018-12-06 RX ADMIN — ERYTHROPOIETIN 10000 UNIT(S): 10000 INJECTION, SOLUTION INTRAVENOUS; SUBCUTANEOUS at 13:01

## 2018-12-06 RX ADMIN — HEPARIN SODIUM 5000 UNIT(S): 5000 INJECTION INTRAVENOUS; SUBCUTANEOUS at 22:06

## 2018-12-06 RX ADMIN — Medication 145 MILLIGRAM(S): at 13:01

## 2018-12-06 RX ADMIN — Medication 1300 MILLIGRAM(S): at 17:44

## 2018-12-06 RX ADMIN — Medication 80 MILLIGRAM(S): at 17:43

## 2018-12-06 RX ADMIN — Medication 50 MILLIGRAM(S): at 22:06

## 2018-12-06 RX ADMIN — AMLODIPINE BESYLATE 10 MILLIGRAM(S): 2.5 TABLET ORAL at 05:22

## 2018-12-06 RX ADMIN — HEPARIN SODIUM 5000 UNIT(S): 5000 INJECTION INTRAVENOUS; SUBCUTANEOUS at 13:01

## 2018-12-06 RX ADMIN — Medication 50 MILLIGRAM(S): at 05:22

## 2018-12-06 RX ADMIN — Medication 3 MILLIGRAM(S): at 22:26

## 2018-12-06 RX ADMIN — Medication 40 MILLIGRAM(S): at 05:22

## 2018-12-06 RX ADMIN — Medication 1: at 17:43

## 2018-12-06 RX ADMIN — SERTRALINE 50 MILLIGRAM(S): 25 TABLET, FILM COATED ORAL at 13:01

## 2018-12-06 RX ADMIN — Medication 1300 MILLIGRAM(S): at 05:22

## 2018-12-06 RX ADMIN — HEPARIN SODIUM 5000 UNIT(S): 5000 INJECTION INTRAVENOUS; SUBCUTANEOUS at 05:22

## 2018-12-06 RX ADMIN — PANTOPRAZOLE SODIUM 40 MILLIGRAM(S): 20 TABLET, DELAYED RELEASE ORAL at 05:22

## 2018-12-06 RX ADMIN — Medication 50 MILLIGRAM(S): at 13:01

## 2018-12-06 RX ADMIN — Medication 81 MILLIGRAM(S): at 13:01

## 2018-12-06 RX ADMIN — ATORVASTATIN CALCIUM 80 MILLIGRAM(S): 80 TABLET, FILM COATED ORAL at 22:06

## 2018-12-06 NOTE — PROGRESS NOTE ADULT - SUBJECTIVE AND OBJECTIVE BOX
Patient is a 78y old  Female who presents with a chief complaint of Orthopnea x 2 days (06 Dec 2018 13:58)      SUBJECTIVE / OVERNIGHT EVENTS:    Events noted.  Feels better.  CONSTITUTIONAL: No fever,  or fatigue  NECK: No pain or stiffness  RESPIRATORY: No cough, wheezing, chills or hemoptysis; No shortness of breath  CARDIOVASCULAR: No chest pain, palpitations, dizziness, or leg swelling  GASTROINTESTINAL: No abdominal or epigastric pain. No nausea, vomiting, or hematemesis; No diarrhea or constipation.   NEUROLOGICAL: No headaches,     MEDICATIONS  (STANDING):  amLODIPine   Tablet 10 milliGRAM(s) Oral daily  aspirin enteric coated 81 milliGRAM(s) Oral daily  atorvastatin 80 milliGRAM(s) Oral at bedtime  dextrose 5%. 1000 milliLiter(s) (50 mL/Hr) IV Continuous <Continuous>  dextrose 50% Injectable 12.5 Gram(s) IV Push once  dextrose 50% Injectable 25 Gram(s) IV Push once  dextrose 50% Injectable 25 Gram(s) IV Push once  epoetin raheem Injectable 50227 Unit(s) SubCutaneous every 7 days  fenofibrate Tablet 145 milliGRAM(s) Oral daily  furosemide   Injectable 80 milliGRAM(s) IV Push two times a day  heparin  Injectable 5000 Unit(s) SubCutaneous every 8 hours  hydrALAZINE 50 milliGRAM(s) Oral three times a day  insulin lispro (HumaLOG) corrective regimen sliding scale   SubCutaneous three times a day before meals  pantoprazole    Tablet 40 milliGRAM(s) Oral before breakfast  sertraline 50 milliGRAM(s) Oral daily  sodium bicarbonate 1300 milliGRAM(s) Oral two times a day    MEDICATIONS  (PRN):  dextrose 40% Gel 15 Gram(s) Oral once PRN Blood Glucose LESS THAN 70 milliGRAM(s)/deciliter  glucagon  Injectable 1 milliGRAM(s) IntraMuscular once PRN Glucose LESS THAN 70 milligrams/deciliter  meclizine 25 milliGRAM(s) Oral daily PRN for dizziness  melatonin 3 milliGRAM(s) Oral at bedtime PRN Insomnia  ondansetron Injectable 4 milliGRAM(s) IV Push every 6 hours PRN Nausea and/or Vomiting        CAPILLARY BLOOD GLUCOSE      POCT Blood Glucose.: 94 mg/dL (06 Dec 2018 21:35)  POCT Blood Glucose.: 155 mg/dL (06 Dec 2018 17:40)  POCT Blood Glucose.: 149 mg/dL (06 Dec 2018 12:56)  POCT Blood Glucose.: 137 mg/dL (06 Dec 2018 08:57)    I&O's Summary    05 Dec 2018 07:01  -  06 Dec 2018 07:00  --------------------------------------------------------  IN: 240 mL / OUT: 100 mL / NET: 140 mL    06 Dec 2018 07:01  -  07 Dec 2018 01:12  --------------------------------------------------------  IN: 120 mL / OUT: 125 mL / NET: -5 mL        PHYSICAL EXAM:  GENERAL: NAD  NECK: Supple, No JVD  CHEST/LUNG: Clear to auscultation bilaterally; No wheezing.  HEART: Regular rate and rhythm; No murmurs, rubs, or gallops  ABDOMEN: Soft, Nontender, Nondistended; Bowel sounds present  EXTREMITIES:   No clubbing, cyanosis, or edema  NEUROLOGY: AAO X 3      LABS:                        7.4    6.16  )-----------( 196      ( 06 Dec 2018 05:10 )             23.9     12-06    141  |  108<H>  |  39<H>  ----------------------------<  100<H>  3.6   |  15<L>  |  7.17<H>    Ca    7.2<L>      06 Dec 2018 05:10  Phos  5.6     12-06  Mg     1.9     12-06    TPro  7.0  /  Alb  3.5  /  TBili  0.4  /  DBili  x   /  AST  16  /  ALT  6   /  AlkPhos  57  12-06      CARDIAC MARKERS ( 05 Dec 2018 16:35 )  x     / x     / 571 u/L / x     / x            CAPILLARY BLOOD GLUCOSE      POCT Blood Glucose.: 94 mg/dL (06 Dec 2018 21:35)  POCT Blood Glucose.: 155 mg/dL (06 Dec 2018 17:40)  POCT Blood Glucose.: 149 mg/dL (06 Dec 2018 12:56)  POCT Blood Glucose.: 137 mg/dL (06 Dec 2018 08:57)                RADIOLOGY & ADDITIONAL TESTS:    Imaging Personally Reviewed:    Consultant(s) Notes Reviewed:      Care Discussed with Consultants/Other Providers:

## 2018-12-06 NOTE — PROGRESS NOTE ADULT - SUBJECTIVE AND OBJECTIVE BOX
Nephrology Followup Note - [phone] - Dr Hicks / Dr Hughes / Dr Storey / Dr Avina / Dr Patel / Dr Brannon / Dr Madrigal / Dr Kolb  Pt seen and examined at bedside  No acute events overnight. Pt with poor appetite. Denies nausea. Still with dyspnea while laying flat. Otherwise feeling well, no SOB while at rest or BOLAÑOS.     Allergies:  No Known Allergies    Hospital Medications:   MEDICATIONS  (STANDING):  amLODIPine   Tablet 10 milliGRAM(s) Oral daily  aspirin enteric coated 81 milliGRAM(s) Oral daily  atorvastatin 80 milliGRAM(s) Oral at bedtime  dextrose 5%. 1000 milliLiter(s) (50 mL/Hr) IV Continuous <Continuous>  dextrose 50% Injectable 12.5 Gram(s) IV Push once  dextrose 50% Injectable 25 Gram(s) IV Push once  dextrose 50% Injectable 25 Gram(s) IV Push once  epoetin raheem Injectable 29233 Unit(s) SubCutaneous every 7 days  fenofibrate Tablet 145 milliGRAM(s) Oral daily  furosemide   Injectable 40 milliGRAM(s) IV Push two times a day  heparin  Injectable 5000 Unit(s) SubCutaneous every 8 hours  hydrALAZINE 50 milliGRAM(s) Oral three times a day  insulin lispro (HumaLOG) corrective regimen sliding scale   SubCutaneous three times a day before meals  pantoprazole    Tablet 40 milliGRAM(s) Oral before breakfast  sertraline 50 milliGRAM(s) Oral daily  sodium bicarbonate 1300 milliGRAM(s) Oral two times a day      VITALS:  T(F): 97.7 (12-06-18 @ 05:19), Max: 97.7 (12-06-18 @ 05:19)  HR: 88 (12-06-18 @ 05:19)  BP: 159/61 (12-06-18 @ 05:19)  RR: 17 (12-06-18 @ 05:19)  SpO2: 100% (12-06-18 @ 05:19)  Wt(kg): --    12-05 @ 07:01  -  12-06 @ 07:00  --------------------------------------------------------  IN: 240 mL / OUT: 100 mL / NET: 140 mL        PHYSICAL EXAM:  Constitutional: NAD  HEENT: anicteric sclera, oropharynx clear, MMM  Neck: No JVD  Respiratory: CTAB, no wheezes, rales or rhonchi  Cardiovascular: S1, S2, RRR, no rub  Gastrointestinal: BS+, soft, NT/ND  Extremities: No cyanosis or clubbing. No peripheral edema  Neurological: A/O x 3, no focal deficits, no axsterixis.   Psychiatric: Normal mood, normal affect  : No CVA tenderness. No calvin.   Skin: No rashes  Vascular Access: left wrist avf +thrill and bruit     LABS:  12-06    141  |  108<H>  |  39<H>  ----------------------------<  100<H>  3.6   |  15<L>  |  7.17<H>    Ca    7.2<L>      06 Dec 2018 05:10  Phos  5.6     12-06  Mg     1.9     12-06    TPro  7.0  /  Alb  3.5  /  TBili  0.4  /  DBili      /  AST  16  /  ALT  6   /  AlkPhos  57  12-06    Creatinine Trend: 7.17 <--, 6.46 <--, 6.26 <--                        7.4    6.16  )-----------( 196      ( 06 Dec 2018 05:10 )             23.9     Urine Studies:      RADIOLOGY & ADDITIONAL STUDIES:

## 2018-12-06 NOTE — PROGRESS NOTE ADULT - PROBLEM SELECTOR PLAN 1
Advanced renal failure in setting of CKD, unclear baseline cr, but as pt with AV access, suggestive of pt nearing HD initiation.   Rising cr noted, with acidosis, malaise and loss of appetite. K acceptable level.   Discussed extensively with pt, her son, and sister regarding starting HD at this time.   Discussed risks and benefits of HD, and while pts family agreeable to starting HD, the pt is hesitant. Tried reaching out to pts primary nephrologist Dr Julius Black, but he is away until next week.   Recommend vasc consult to evaluate AVF, if adequate for cannulation.   Otherwise pt will need catheter placement.     Can treat with IV lasix, increase dose to 80mg BID   Strict I/O.  Continue Na Bicarb for CKD related acidosis.

## 2018-12-06 NOTE — CONSULT NOTE ADULT - ATTENDING COMMENTS
Patient was seen and examined,interim events noted,labs and radiology studies reviewed.  Mumtaz Martin MD,FACC.  [address] [place] [address].  [facility] [id number].  [phone]

## 2018-12-07 DIAGNOSIS — I31.3 PERICARDIAL EFFUSION (NONINFLAMMATORY): ICD-10-CM

## 2018-12-07 LAB
APTT BLD: 35.6 SEC — SIGNIFICANT CHANGE UP (ref 27.5–36.3)
BLD GP AB SCN SERPL QL: NEGATIVE — SIGNIFICANT CHANGE UP
BUN SERPL-MCNC: 47 MG/DL — HIGH (ref 7–23)
CALCIUM SERPL-MCNC: 7.7 MG/DL — LOW (ref 8.4–10.5)
CHLORIDE SERPL-SCNC: 107 MMOL/L — SIGNIFICANT CHANGE UP (ref 98–107)
CO2 SERPL-SCNC: 16 MMOL/L — LOW (ref 22–31)
CREAT SERPL-MCNC: 7.91 MG/DL — HIGH (ref 0.5–1.3)
GLUCOSE BLDC GLUCOMTR-MCNC: 105 MG/DL — HIGH (ref 70–99)
GLUCOSE BLDC GLUCOMTR-MCNC: 111 MG/DL — HIGH (ref 70–99)
GLUCOSE BLDC GLUCOMTR-MCNC: 136 MG/DL — HIGH (ref 70–99)
GLUCOSE SERPL-MCNC: 97 MG/DL — SIGNIFICANT CHANGE UP (ref 70–99)
GRAM STN WND: SIGNIFICANT CHANGE UP
GRAM STN WND: SIGNIFICANT CHANGE UP
HCT VFR BLD CALC: 26.2 % — LOW (ref 34.5–45)
HGB BLD-MCNC: 7.9 G/DL — LOW (ref 11.5–15.5)
INR BLD: 0.99 — SIGNIFICANT CHANGE UP (ref 0.88–1.17)
MAGNESIUM SERPL-MCNC: 2.1 MG/DL — SIGNIFICANT CHANGE UP (ref 1.6–2.6)
MCHC RBC-ENTMCNC: 22.4 PG — LOW (ref 27–34)
MCHC RBC-ENTMCNC: 30.2 % — LOW (ref 32–36)
MCV RBC AUTO: 74.2 FL — LOW (ref 80–100)
NRBC # FLD: 0.02 — SIGNIFICANT CHANGE UP
PLATELET # BLD AUTO: 222 K/UL — SIGNIFICANT CHANGE UP (ref 150–400)
PMV BLD: SIGNIFICANT CHANGE UP FL (ref 7–13)
POTASSIUM SERPL-MCNC: 3.6 MMOL/L — SIGNIFICANT CHANGE UP (ref 3.5–5.3)
POTASSIUM SERPL-SCNC: 3.6 MMOL/L — SIGNIFICANT CHANGE UP (ref 3.5–5.3)
PROTHROM AB SERPL-ACNC: 11.3 SEC — SIGNIFICANT CHANGE UP (ref 9.8–13.1)
RBC # BLD: 3.53 M/UL — LOW (ref 3.8–5.2)
RBC # FLD: 17 % — HIGH (ref 10.3–14.5)
RH IG SCN BLD-IMP: POSITIVE — SIGNIFICANT CHANGE UP
SODIUM SERPL-SCNC: 143 MMOL/L — SIGNIFICANT CHANGE UP (ref 135–145)
SPECIMEN SOURCE: SIGNIFICANT CHANGE UP
SPECIMEN SOURCE: SIGNIFICANT CHANGE UP
WBC # BLD: 5.83 K/UL — SIGNIFICANT CHANGE UP (ref 3.8–10.5)
WBC # FLD AUTO: 5.83 K/UL — SIGNIFICANT CHANGE UP (ref 3.8–10.5)

## 2018-12-07 PROCEDURE — 33025 INCISION OF HEART SAC: CPT

## 2018-12-07 PROCEDURE — 88305 TISSUE EXAM BY PATHOLOGIST: CPT | Mod: 26,59

## 2018-12-07 PROCEDURE — 71045 X-RAY EXAM CHEST 1 VIEW: CPT | Mod: 26

## 2018-12-07 PROCEDURE — 88305 TISSUE EXAM BY PATHOLOGIST: CPT | Mod: 26

## 2018-12-07 PROCEDURE — 88112 CYTOPATH CELL ENHANCE TECH: CPT | Mod: 26

## 2018-12-07 PROCEDURE — 71250 CT THORAX DX C-: CPT | Mod: 26

## 2018-12-07 RX ORDER — HYDROMORPHONE HYDROCHLORIDE 2 MG/ML
0.5 INJECTION INTRAMUSCULAR; INTRAVENOUS; SUBCUTANEOUS
Qty: 0 | Refills: 0 | Status: DISCONTINUED | OUTPATIENT
Start: 2018-12-07 | End: 2018-12-08

## 2018-12-07 RX ORDER — SODIUM CHLORIDE 9 MG/ML
1000 INJECTION INTRAMUSCULAR; INTRAVENOUS; SUBCUTANEOUS
Qty: 0 | Refills: 0 | Status: DISCONTINUED | OUTPATIENT
Start: 2018-12-07 | End: 2018-12-13

## 2018-12-07 RX ORDER — NALOXONE HYDROCHLORIDE 4 MG/.1ML
0.1 SPRAY NASAL
Qty: 0 | Refills: 0 | Status: DISCONTINUED | OUTPATIENT
Start: 2018-12-07 | End: 2018-12-10

## 2018-12-07 RX ORDER — HYDROMORPHONE HYDROCHLORIDE 2 MG/ML
30 INJECTION INTRAMUSCULAR; INTRAVENOUS; SUBCUTANEOUS
Qty: 0 | Refills: 0 | Status: DISCONTINUED | OUTPATIENT
Start: 2018-12-07 | End: 2018-12-08

## 2018-12-07 RX ORDER — ONDANSETRON 8 MG/1
4 TABLET, FILM COATED ORAL EVERY 6 HOURS
Qty: 0 | Refills: 0 | Status: DISCONTINUED | OUTPATIENT
Start: 2018-12-07 | End: 2018-12-09

## 2018-12-07 RX ADMIN — HYDROMORPHONE HYDROCHLORIDE 30 MILLILITER(S): 2 INJECTION INTRAMUSCULAR; INTRAVENOUS; SUBCUTANEOUS at 17:33

## 2018-12-07 RX ADMIN — HEPARIN SODIUM 5000 UNIT(S): 5000 INJECTION INTRAVENOUS; SUBCUTANEOUS at 05:43

## 2018-12-07 RX ADMIN — Medication 80 MILLIGRAM(S): at 05:43

## 2018-12-07 RX ADMIN — AMLODIPINE BESYLATE 10 MILLIGRAM(S): 2.5 TABLET ORAL at 05:42

## 2018-12-07 RX ADMIN — SODIUM CHLORIDE 10 MILLILITER(S): 9 INJECTION INTRAMUSCULAR; INTRAVENOUS; SUBCUTANEOUS at 16:44

## 2018-12-07 RX ADMIN — PANTOPRAZOLE SODIUM 40 MILLIGRAM(S): 20 TABLET, DELAYED RELEASE ORAL at 05:44

## 2018-12-07 RX ADMIN — Medication 50 MILLIGRAM(S): at 21:43

## 2018-12-07 RX ADMIN — Medication 81 MILLIGRAM(S): at 12:16

## 2018-12-07 RX ADMIN — HEPARIN SODIUM 5000 UNIT(S): 5000 INJECTION INTRAVENOUS; SUBCUTANEOUS at 21:34

## 2018-12-07 RX ADMIN — Medication 50 MILLIGRAM(S): at 13:44

## 2018-12-07 RX ADMIN — Medication 145 MILLIGRAM(S): at 12:16

## 2018-12-07 RX ADMIN — Medication 1300 MILLIGRAM(S): at 05:42

## 2018-12-07 RX ADMIN — Medication 50 MILLIGRAM(S): at 05:42

## 2018-12-07 RX ADMIN — Medication 1300 MILLIGRAM(S): at 21:45

## 2018-12-07 RX ADMIN — ATORVASTATIN CALCIUM 80 MILLIGRAM(S): 80 TABLET, FILM COATED ORAL at 21:34

## 2018-12-07 RX ADMIN — HYDROMORPHONE HYDROCHLORIDE 30 MILLILITER(S): 2 INJECTION INTRAMUSCULAR; INTRAVENOUS; SUBCUTANEOUS at 23:05

## 2018-12-07 RX ADMIN — SERTRALINE 50 MILLIGRAM(S): 25 TABLET, FILM COATED ORAL at 13:44

## 2018-12-07 NOTE — DIETITIAN INITIAL EVALUATION ADULT. - PROBLEM SELECTOR PLAN 1
Admit to Telemetry, serial CE's, EKG's, FLP, continue IV Lasix, monitor daily weights, strict I's & O's. F/U MD note, Renal c/s, Echo

## 2018-12-07 NOTE — CHART NOTE - NSCHARTNOTEFT_GEN_A_CORE
Age: 78  Gender: Female  Procedure: charles   Diagnose: ANNIE with acidosis, continued worsen Cr  Interventional Radiologist: Dr Huynh  Ordering Physician; Dr Hicks  Pertinent medical history: 77 yo F c/o orthopnea x few days, found to be anemic and pulmonary edema, worsen renal function now require HD   Pertinent labs:                          7.9    5.83  )-----------( 222      ( 07 Dec 2018 06:20 )             26.2   12-07    143  |  107  |  47<H>  ----------------------------<  97  3.6   |  16<L>  |  7.91<H>    Ca    7.7<L>      07 Dec 2018 06:20  Phos  5.6     12-06  Mg     2.1     12-07    TPro  7.0  /  Alb  3.5  /  TBili  0.4  /  DBili  x   /  AST  16  /  ALT  6   /  AlkPhos  57  12-06  PT/INR - ( 07 Dec 2018 06:20 )   PT: 11.3 SEC;   INR: 0.99          PTT - ( 07 Dec 2018 06:20 )  PTT:35.6 SEC    Patient and Family aware? Yes    Contact: Miguel PETTY, pager [phone]  12/7/18 5:00PM

## 2018-12-07 NOTE — DIETITIAN INITIAL EVALUATION ADULT. - PERTINENT LABORATORY DATA
(12/7) H/H 7.9/26.2 L BUN 47 H, Creat 7.91 H;             (12/6) Albumin 3.5, HDL 77 H, HbA1c 5.7% H, phosphorus 5.6 H

## 2018-12-07 NOTE — PROGRESS NOTE ADULT - SUBJECTIVE AND OBJECTIVE BOX
PRESENTING CC:Dyspnea    SUBJ: 77 y/o  Female, with a PMHx of CKD (fistula placed June 2018, s/p 2 balloons, last one 2 weeks ago, not on dialysis yet), T2DM, HTN, HLD, anemia presents to the [facility] ED with constant  SOB, dry cough An echocardiogram showed a large pericardial effusion with signs of early tamponade.Scheduled for pericardial window today-Dr Bullard      [facility] -reviewed admission note, no change since admission  Heart failure: acute [ ] chronic [ ] acute or chronic [ ] diastolic [ ] systolic [ ] combined systolic and diastolic[ ]  ANNIE: ATN[ ] renal medullary necrosis [ ] CKD I [ ]CKDII [ ]CKD III [ ]CKD IV [ ]CKD V [ ]Other pathological lesions [ ]    MEDICATIONS  (STANDING):  amLODIPine   Tablet 10 milliGRAM(s) Oral daily  aspirin enteric coated 81 milliGRAM(s) Oral daily  atorvastatin 80 milliGRAM(s) Oral at bedtime  epoetin raheem Injectable 46874 Unit(s) SubCutaneous every 7 days  fenofibrate Tablet 145 milliGRAM(s) Oral daily  furosemide   Injectable 80 milliGRAM(s) IV Push two times a day  heparin  Injectable 5000 Unit(s) SubCutaneous every 8 hours  hydrALAZINE 50 milliGRAM(s) Oral three times a day  insulin lispro (HumaLOG) corrective regimen sliding scale   SubCutaneous three times a day before meals  pantoprazole    Tablet 40 milliGRAM(s) Oral before breakfast  sertraline 50 milliGRAM(s) Oral daily  sodium bicarbonate 1300 milliGRAM(s) Oral two times a day    MEDICATIONS  (PRN):  dextrose 40% Gel 15 Gram(s) Oral once PRN Blood Glucose LESS THAN 70 milliGRAM(s)/deciliter  glucagon  Injectable 1 milliGRAM(s) IntraMuscular once PRN Glucose LESS THAN 70 milligrams/deciliter  meclizine 25 milliGRAM(s) Oral daily PRN for dizziness  melatonin 3 milliGRAM(s) Oral at bedtime PRN Insomnia  ondansetron Injectable 4 milliGRAM(s) IV Push every 6 hours PRN Nausea and/or Vomiting          FAMILY HISTORY:  Family history of malignant neoplasm of gastrointestinal tract  Family history of lung cancer (Sibling)  Family history of diabetes mellitus  Family history of hypertension (Sibling)    No family history of premature coronary artery disease or sudden cardiac death      REVIEW OF SYSTEMS:  Constitutional: [ ] fever, [ ]weight loss,  [ ]fatigue  Eyes: [ ] visual changes  Respiratory: [x ]shortness of breath;  [ ] cough, [ ]wheezing, [ ]chills, [ ]hemoptysis  Cardiovascular: [ ] chest pain, [ ]palpitations, [ ]dizziness,  [ ]leg swelling[x ]orthopnea[ ]PND  Gastrointestinal: [ ] abdominal pain, [ ]nausea, [ ]vomiting,  [ ]diarrhea   Genitourinary: [ ] dysuria, [ ] hematuria  Neurologic: [ ] headaches [ ] tremors[ ]weakness  Skin: [ ] itching, [ ]burning, [ ] rashes  Endocrine: [ ] heat or cold intolerance  Musculoskeletal: [ ] joint pain or swelling; [ ] muscle, back, or extremity pain  Psychiatric: [ ] depression, [ ]anxiety, [ ]mood swings, or [ ]difficulty sleeping  Hematologic: [ ] easy bruising, [ ] bleeding gums    [x] All remaining systems negative except as per above.   [ ]Unable to obtain.    Vital Signs Last 24 Hrs  T(C): 36.7 (07 Dec 2018 05:40), Max: 37.1 (06 Dec 2018 12:37)  T(F): 98.1 (07 Dec 2018 05:40), Max: 98.8 (06 Dec 2018 12:37)  HR: 82 (07 Dec 2018 05:40) (78 - 86)  BP: 158/64 (07 Dec 2018 05:40) (148/57 - 158/64)  RR: 17 (07 Dec 2018 05:40) (16 - 18)  SpO2: 100% (07 Dec 2018 05:40) (99% - 100%)  I&O's Summary    06 Dec 2018 07:01  -  07 Dec 2018 07:00  --------------------------------------------------------  IN: 120 mL / OUT: 125 mL / NET: -5 mL    07 Dec 2018 07:01  -  07 Dec 2018 08:33  --------------------------------------------------------  IN: 0 mL / OUT: 150 mL / NET: -150 mL        PHYSICAL EXAM:  General: No acute distress BMI-21  HEENT: EOMI, PERRL  Neck: Supple, [x] JVD  Lungs: Equal air entry bilaterally; [ ] rales [ ] wheezing [ ] rhonchi  Heart: Regular rate and rhythm; [x ] murmur   2/6 [x ] systolic [ ] diastolic [ ] radiation[ ] rubs [ ]  gallops  Abdomen: Nontender, bowel sounds present  Extremities: No clubbing, cyanosis, [x ] edema  Nervous system:  Alert & Oriented X3, no focal deficits  Psychiatric: Normal affect  Skin: No rashes or lesions    LABS:  12-07    143  |  107  |  47<H>  ----------------------------<  97  3.6   |  16<L>  |  7.91<H>    Ca    7.7<L>      07 Dec 2018 06:20  Phos  5.6     12-06  Mg     2.1     12-07    TPro  7.0  /  Alb  3.5  /  TBili  0.4  /  DBili  x   /  AST  16  /  ALT  6   /  AlkPhos  57  12-06    Creatinine Trend: 7.91<--, 7.17<--, 6.46<--, 6.26<--                        7.9    5.83  )-----------( 222      ( 07 Dec 2018 06:20 )             26.2     PT/INR - ( 07 Dec 2018 06:20 )   PT: 11.3 SEC;   INR: 0.99          PTT - ( 07 Dec 2018 06:20 )  PTT:35.6 SEC  Lipid Panel:   Cardiac Enzymes: CARDIAC MARKERS ( 05 Dec 2018 16:35 )  x     / x     / 571 u/L / x     / x                RADIOLOGY: XR CHEST APFINDINGS: -Small bilateral pleural effusions.   Moderate interstitial edema is present.  The cardiomediastinal silhouette is limited in evaluation     ECG [my interpretation]:Normal sinus rhythm  at 84 BPM  Nonspecific T wave abnormality    TELEMETRY:Sinus rhythm no arrhythmias    ECHO:Study Date: 12/6/2018  Normal left ventricular systolic function.  EF-65 %  No segmental wall motion abnormalities.   Moderate concentric left ventricular hypertrophy.  Mild mitral regurgitation.  Mildly dilated left atrium.  Mild aortic stenosis.  Large pericardial effusion, measuring about  3.1 cm inferolateral to the LV.    Large pericardial effusion (about  2.7 cm) lateral to the LV.    Moderate pericardial effusion (about  1.7 cm) posterior to the left ventricle.   Significant respirophasic variability in the transmitral and transtricuspid spectral Doppler signals isseen.    These findings may be consistent with early tamponade physiology.      IMPRESSION AND PLAN:    77 y/o  Female, with a PMHx of CKD (fistula placed June 2018, s/p 2 balloons, last one 2 weeks ago, not on dialysis yet), DM, HTN, HLD, anemia presents to the [facility] ED with constant  SOB, dry cough and clear rhinorrhea x 2 days.   Problem/Plan - 1:  ·  Problem: Pulmonary edema.  Plan:Likely fluid overload 2/2 ESRD  Continue diuresis  TTE-Large pericardial effusion with early tamponade physiology-For pericardial window today.    Problem/Plan - 2:  ·  Problem: Symptomatic anemia.  Plan: s/p 1 unit PRBC, monitor H/H, anemia w/u.     Problem/Plan - 3:  ·  Problem: Acute on chronic renal failure.  Plan: GFR 7%, Eventual HD  Hold diuretics.    Problem/Plan - 4:  ·  Problem: DM (diabetes mellitus).  Plan: RISS    Problem/Plan - 5:  ·  Problem: HTN (hypertension).  Plan: Monitor BPs, continue home medications, Amlodipine, Hydralazine,     Problem/Plan - 6:  Problem: Hypercholesteremia. Plan: Continue home medication, Rosuvastatin, Fenofibrate as prescribed.  Lipid Profile in AM (12.06.18 @ 05:10)    Cholesterol, Serum: 167 mg/dL    Triglycerides, Serum: 83 mg/dL    HDL Cholesterol, Serum: 77 mg/dL    Direct LDL: 77:         Problem/Plan - 7:  ·  Problem: Need for prophylactic measure.  Plan: Heparin SC TID.

## 2018-12-07 NOTE — CONSULT NOTE ADULT - ASSESSMENT
Pericardial effusion; early tamponade
77 y/o  Female, with a PMHx of CKD (fistula placed June 2018, s/p 2 balloons, last one 2 weeks ago, not on dialysis yet), DM, HTN, HLD, anemia presents to the [facility] ED with constant  SOB, dry cough and clear rhinorrhea x 2 days.   Problem/Plan - 1:  ·  Problem: Pulmonary edema.  Plan:Likely fluid overload 2/2 ESRD  Continue diuresis  TTE.  Will need ischemic work-up    Problem/Plan - 2:  ·  Problem: Symptomatic anemia.  Plan: s/p 1 unit PRBC, monitor H/H, anemia w/u.     Problem/Plan - 3:  ·  Problem: Acute on chronic renal failure.  Plan: GFR 7%, Monitor BMP's,     Problem/Plan - 4:  ·  Problem: DM (diabetes mellitus).  Plan: RISS    Problem/Plan - 5:  ·  Problem: HTN (hypertension).  Plan: Monitor BPs, continue home medications, Amlodipine, Hydralazine, Furosemide as prescribed.     Problem/Plan - 6:  Problem: Hypercholesteremia. Plan: FLP, continue home medication, Rosuvastatin, Fenofibrate as prescribed.    Problem/Plan - 7:  ·  Problem: Need for prophylactic measure.  Plan: Heparin SC TID.
77 yo woman with a PMH of CKD stage V s/p left radiocephalic fistula placement in June 2018 s/p 2 balloon angioplasties (most recently 2 weeks ago to 6 mm). Vascular surgery consulted to assess readiness of fistula for access.    RECS:  -please obtain [facility] duplex of left radiocephalic fistula  -depending on fistula diameter, the patient may be able to undergo HD via the fistula or may require temporary HD access via permacath by IR  -care per primary team  -C team will cont to follow, [phone]    Patient discussed with Dr. Garcia
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

## 2018-12-07 NOTE — PRE-OP CHECKLIST - SELECT TESTS ORDERED
Results in MD note INR/111 @ 13 :00/CXR/CBC/EKG/POCT Blood Glucose/PT/PTT/Results in MD note/CMP/Type and Cross/Type and Screen

## 2018-12-07 NOTE — PROGRESS NOTE ADULT - SUBJECTIVE AND OBJECTIVE BOX
Nephrology Followup Note - [phone] - Dr Hicks / Dr Hughes / Dr Storey / Dr Avina / Dr Patel / Dr Brannon / Dr Madrigal / Dr Kolb  Pt seen and examined at bedside  Pt feeling comfortable, denies SOB. Pericardial effusion seen on ECHO yesterday.     Allergies:  No Known Allergies    Hospital Medications:   MEDICATIONS  (STANDING):  amLODIPine   Tablet 10 milliGRAM(s) Oral daily  aspirin enteric coated 81 milliGRAM(s) Oral daily  atorvastatin 80 milliGRAM(s) Oral at bedtime  dextrose 5%. 1000 milliLiter(s) (50 mL/Hr) IV Continuous <Continuous>  dextrose 50% Injectable 12.5 Gram(s) IV Push once  dextrose 50% Injectable 25 Gram(s) IV Push once  dextrose 50% Injectable 25 Gram(s) IV Push once  epoetin raheem Injectable 92814 Unit(s) SubCutaneous every 7 days  fenofibrate Tablet 145 milliGRAM(s) Oral daily  heparin  Injectable 5000 Unit(s) SubCutaneous every 8 hours  hydrALAZINE 50 milliGRAM(s) Oral three times a day  insulin lispro (HumaLOG) corrective regimen sliding scale   SubCutaneous three times a day before meals  pantoprazole    Tablet 40 milliGRAM(s) Oral before breakfast  sertraline 50 milliGRAM(s) Oral daily  sodium bicarbonate 1300 milliGRAM(s) Oral two times a day    VITALS:  T(F): 98.1 (12-07-18 @ 05:40), Max: 98.5 (12-06-18 @ 22:03)  HR: 82 (12-07-18 @ 05:40)  BP: 158/64 (12-07-18 @ 05:40)  RR: 17 (12-07-18 @ 05:40)  SpO2: 100% (12-07-18 @ 05:40)  Wt(kg): --    12-06 @ 07:01  -  12-07 @ 07:00  --------------------------------------------------------  IN: 120 mL / OUT: 125 mL / NET: -5 mL    12-07 @ 07:01  -  12-07 @ 13:14  --------------------------------------------------------  IN: 0 mL / OUT: 150 mL / NET: -150 mL    PHYSICAL EXAM:  Constitutional: NAD  HEENT: anicteric sclera, oropharynx clear, MMM  Neck: No JVD  Respiratory: CTAB, no wheezes, rales or rhonchi  Cardiovascular: S1, S2, RRR  Gastrointestinal: BS+, soft, NT/ND  Extremities: No cyanosis or clubbing. No peripheral edema  Neurological: A/O x 3, no focal deficits  Psychiatric: Normal mood, normal affect  : No CVA tenderness. No calvin.   Skin: No rashes  Vascular Access: L wrist AVF +thrill     LABS:  12-07    143  |  107  |  47<H>  ----------------------------<  97  3.6   |  16<L>  |  7.91<H>    Ca    7.7<L>      07 Dec 2018 06:20  Phos  5.6     12-06  Mg     2.1     12-07    TPro  7.0  /  Alb  3.5  /  TBili  0.4  /  DBili      /  AST  16  /  ALT  6   /  AlkPhos  57  12-06    Creatinine Trend: 7.91 <--, 7.17 <--, 6.46 <--, 6.26 <--                        7.9    5.83  )-----------( 222      ( 07 Dec 2018 06:20 )             26.2     Urine Studies:      RADIOLOGY & ADDITIONAL STUDIES:  < from: CT Chest No Cont (12.07.18 @ 09:40) >  IMPRESSION:    Large pericardial effusion, increased since 12/3/2015.    Moderate right and small left layering pleural effusions. Mild pulmonary   edema.    Mid to upper lungs peribronchovascular fibrosis and tractional   bronchiectasis associated with mildly enlarged calcified mediastinal and   pericardial lymph nodes suggestive of sarcoidosis.    < end of copied text >

## 2018-12-07 NOTE — CONSULT NOTE ADULT - PROBLEM SELECTOR RECOMMENDATION 9
CT chest non-contrast  Subxiphoid pericardial window 12/7
Advanced renal failure in setting of CKD, unclear baseline cr, but as pt with AV access, suggestive of pt nearing HD initiation.   K not elevated. No absolute indication for HD at this time.  Can treat with IV lasix and monitor for now.   Strict I/O.  Start Na Bicarb for CKD related acidosis.

## 2018-12-07 NOTE — DIETITIAN INITIAL EVALUATION ADULT. - NS AS NUTRI INTERV MEALS SNACK
Other (specify)/1. Suggest: Once/when clinically indicated resume PO diet: Renal Replacement (no prot restr, no conc K, no conc phos, low sodium); Consistent Carbohydrate (no snacks); PO supplement: Nepro 1 can x 2 daily (provides additional ~850 Kcal, ~38 gm Protein);           2. Once PO diet resumed encourage & assist Pt with meals; Monitor PO diet tolerance; Honor food preferences;                 3. Monitor labs, daily weights, hydration status;/Diets modified for specific foods and ingredients

## 2018-12-07 NOTE — PROGRESS NOTE ADULT - PROBLEM SELECTOR PLAN 1
ANNIE in setting of  advanced CKD.  Rising cr noted, with acidosis, malaise and loss of appetite. K acceptable level.   Again discussed with pt, her son, and sister regarding starting HD at this time.   Discussed risks and benefits of HD, Pt agreeable to starting HD.   Also discussed with Dr Julius Black (outpt nephrologist) yesterday, who agrees with plan.   Vasc consult appreciated. AV access doppler suggestive of immature AVF.   Pt will need HD cath placement.   IV lasix held due to pericardial effusion.  Strict I/O.  Continue Na Bicarb for CKD related acidosis. ANNIE in setting of  advanced CKD.  Rising cr noted, with acidosis, malaise and loss of appetite. K acceptable level.   Again discussed with pt, her son, and sister regarding starting HD at this time.   Discussed risks and benefits of HD, Pt agreeable to starting HD.   Also discussed with Dr Julius Black (outpt nephrologist) yesterday, who agrees with plan.   Vasc consult appreciated. AV access doppler suggestive of immature AVF.   Pt will need HD cath placement. Due to acidosis, markedly elevated cr, and uremia related effusion, would recommend HD today.   IV lasix held due to pericardial effusion.  Strict I/O.  Continue Na Bicarb for CKD related acidosis.

## 2018-12-07 NOTE — BRIEF OPERATIVE NOTE - PROCEDURE
<<-----Click on this checkbox to enter Procedure Pericardial window operation  12/07/2018  Subxyphoid Pericardial Window with drainage of pericardial effusion  Active  MANFRED

## 2018-12-07 NOTE — PRE-OP CHECKLIST - 2.
Vital Signs done in ASU T-       ,P-          ,O2-          ,BP-       /         and R-       . Vital Signs done in ASU T-  37     ,P-   82       ,O2-   98       ,BP-   139    /    36     and R-       .16

## 2018-12-07 NOTE — PROGRESS NOTE ADULT - ASSESSMENT
· Assessment	  77 y/o  Female, with a PMHx of CKD (fistula June 2018, s/p 2 balloons, last one 2 weeks ago, no dialysis yet), DM, HTN, HLD, anemia presents to the [facility] ED constant SOB, dry cough and clear rhinorrhea x 2 days associated with one episode of vomiting, darken stools and increased urinary frequency admitted to telemetry for Pulmonary Edema/ Anemia/ Acute on Chronic Renal Failure.    EKG- NSR @ 84 TWI I, L, v5-6  Tele monitor- run of SVT @ 168      Problem/Plan - 1:  ·  Problem: Pericardial Effusion:    S/p pericardial window/ 1000 cc fluids removed.     Problem/Plan - 2:  ·  Problem: Symptomatic anemia.  Plan: Hb 7.9.     Problem/Plan - 3:  ·  Problem: ANNIE on Advanced CKD:   Plan: For HD. Nephro f/up noted.     Problem/Plan - 4:  ·  Problem: DM (diabetes mellitus).  Plan: FSSS     Problem/Plan - 5:  ·  Problem: HTN (hypertension).  Plan:  Cont. Amlodipine, Hydralazine, Furosemide.      Problem/Plan - 6:  Problem: Hypercholesteremia. Plan:  Continue home medication, Rosuvastatin, Fenofibrate.    Dw family.

## 2018-12-07 NOTE — PROGRESS NOTE ADULT - ASSESSMENT
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 Found to have large pericardial effusion. Planning for pericardial window.

## 2018-12-07 NOTE — DIETITIAN INITIAL EVALUATION ADULT. - OTHER INFO
Nutrition Consult X Registered Dietitian. Pt 77 yo female with CKD, may need to start on dialysis. Pt asleep @ time of visit; Pt's son @ bed side answered questions during interview. At time of visit, Pt NPO for scheduled procedure today. Per son Pt's appetite has not been well for a while. Son also stated Pt lost weight ~20# in ~1 year. Pt may benefit from adding PO supplement: Nepro – 2x daily (once PO diet resumed). No report of chewing/swallowing difficulty; no report of nausea/vomiting/diarrhea @ present. RDN answered questions/concerns related to diet; Better food choices discussed. RDN remains available, Pt’s son made aware. Case discussed with nurse.

## 2018-12-07 NOTE — PROGRESS NOTE ADULT - SUBJECTIVE AND OBJECTIVE BOX
Patient is a 78y old  Female who presents with a chief complaint of Orthopnea x 2 days (07 Dec 2018 13:14)      SUBJECTIVE / OVERNIGHT EVENTS:    Events noted.  S/p pericardial window - 1000 cc of fluid removed.        MEDICATIONS  (STANDING):  amLODIPine   Tablet 10 milliGRAM(s) Oral daily  atorvastatin 80 milliGRAM(s) Oral at bedtime  dextrose 5%. 1000 milliLiter(s) (50 mL/Hr) IV Continuous <Continuous>  dextrose 50% Injectable 12.5 Gram(s) IV Push once  dextrose 50% Injectable 25 Gram(s) IV Push once  dextrose 50% Injectable 25 Gram(s) IV Push once  epoetin raheem Injectable 15032 Unit(s) SubCutaneous every 7 days  fenofibrate Tablet 145 milliGRAM(s) Oral daily  heparin  Injectable 5000 Unit(s) SubCutaneous every 8 hours  hydrALAZINE 50 milliGRAM(s) Oral three times a day  HYDROmorphone PCA (1 mG/mL) 30 milliLiter(s) PCA Continuous PCA Continuous  insulin lispro (HumaLOG) corrective regimen sliding scale   SubCutaneous three times a day before meals  pantoprazole    Tablet 40 milliGRAM(s) Oral before breakfast  sertraline 50 milliGRAM(s) Oral daily  sodium bicarbonate 1300 milliGRAM(s) Oral two times a day  sodium chloride 0.9%. 1000 milliLiter(s) (10 mL/Hr) IV Continuous <Continuous>    MEDICATIONS  (PRN):  dextrose 40% Gel 15 Gram(s) Oral once PRN Blood Glucose LESS THAN 70 milliGRAM(s)/deciliter  glucagon  Injectable 1 milliGRAM(s) IntraMuscular once PRN Glucose LESS THAN 70 milligrams/deciliter  HYDROmorphone PCA (1 mG/mL) Rescue Clinician Bolus 0.5 milliGRAM(s) IV Push every 15 minutes PRN for Pain Scale GREATER THAN 6  meclizine 25 milliGRAM(s) Oral daily PRN for dizziness  melatonin 3 milliGRAM(s) Oral at bedtime PRN Insomnia  naloxone Injectable 0.1 milliGRAM(s) IV Push every 3 minutes PRN For ANY of the following changes in patient status:  A. RR LESS THAN 10 breaths per minute, B. Oxygen saturation LESS THAN 90%, C. Sedation score of 6  ondansetron Injectable 4 milliGRAM(s) IV Push every 6 hours PRN Nausea  ondansetron Injectable 4 milliGRAM(s) IV Push every 6 hours PRN Nausea and/or Vomiting        CAPILLARY BLOOD GLUCOSE      POCT Blood Glucose.: 136 mg/dL (07 Dec 2018 16:23)  POCT Blood Glucose.: 111 mg/dL (07 Dec 2018 13:15)  POCT Blood Glucose.: 105 mg/dL (07 Dec 2018 07:22)  POCT Blood Glucose.: 94 mg/dL (06 Dec 2018 21:35)    I&O's Summary    06 Dec 2018 07:01  -  07 Dec 2018 07:00  --------------------------------------------------------  IN: 120 mL / OUT: 125 mL / NET: -5 mL    07 Dec 2018 07:01  -  07 Dec 2018 21:33  --------------------------------------------------------  IN: 420 mL / OUT: 256 mL / NET: 164 mL        PHYSICAL EXAM:  GENERAL: NAD  NECK: Supple, No JVD  CHEST/LUNG: Clear to auscultation bilaterally; No wheezing.  HEART: Regular rate and rhythm; No murmurs, rubs, or gallops  ABDOMEN: Soft, Nontender, Nondistended; Bowel sounds present  EXTREMITIES:   No clubbing, cyanosis, or edema  NEUROLOGY: AAO       LABS:                        7.9    5.83  )-----------( 222      ( 07 Dec 2018 06:20 )             26.2     12-07    143  |  107  |  47<H>  ----------------------------<  97  3.6   |  16<L>  |  7.91<H>    Ca    7.7<L>      07 Dec 2018 06:20  Phos  5.6     12-06  Mg     2.1     12-07    TPro  7.0  /  Alb  3.5  /  TBili  0.4  /  DBili  x   /  AST  16  /  ALT  6   /  AlkPhos  57  12-06    PT/INR - ( 07 Dec 2018 06:20 )   PT: 11.3 SEC;   INR: 0.99          PTT - ( 07 Dec 2018 06:20 )  PTT:35.6 SEC        CAPILLARY BLOOD GLUCOSE      POCT Blood Glucose.: 136 mg/dL (07 Dec 2018 16:23)  POCT Blood Glucose.: 111 mg/dL (07 Dec 2018 13:15)  POCT Blood Glucose.: 105 mg/dL (07 Dec 2018 07:22)  POCT Blood Glucose.: 94 mg/dL (06 Dec 2018 21:35)    12-07 @ 16:28  Culture-urine --  Culture results --  method type --  Organism --  Organism Identification --  Specimen source OTHER  12-07 @ 16:23  Culture-urine --  Culture results --  method type --  Organism --  Organism Identification --  Specimen source PERICARDIAL FLUID      12-07 @ 16:28  Culture-CSF --  Culture results --  Gram stain --  Gram stain spinal fluid --  Method type --  Organism --  Organism identification --  Specimen source OTHER  12-07 @ 16:23  Culture-CSF --  Culture results --  Gram stain --  Gram stain spinal fluid --  Method type --  Organism --  Organism identification --  Specimen source PERICARDIAL FLUID       12-07 @ 16:28  Culture blood --  Culture results --  Gram stain --  Gram stain blood --  Method type --  Organism --  Organism identification --  Specimen source OTHER   12-07 @ 16:23  Culture blood --  Culture results --  Gram stain --  Gram stain blood --  Method type --  Organism --  Organism identification --  Specimen source PERICARDIAL FLUID      RADIOLOGY & ADDITIONAL TESTS:    Imaging Personally Reviewed:    Consultant(s) Notes Reviewed:      Care Discussed with Consultants/Other Providers:

## 2018-12-07 NOTE — CHART NOTE - NSCHARTNOTEFT_GEN_A_CORE
Spoke with Dr Hicks pt would require HD today, Spoke with IR and vascular for shiley. Per IR they will place shiley post pericardial window procedure today. Recommended by IR to dc asa today in prep of permacath next week. no HSQ 1 day prior to procedure. Per Dr Mario lobo to dc asa. will continue to monitor

## 2018-12-08 LAB
BUN SERPL-MCNC: 64 MG/DL — HIGH (ref 7–23)
CALCIUM SERPL-MCNC: 7.6 MG/DL — LOW (ref 8.4–10.5)
CHLORIDE SERPL-SCNC: 106 MMOL/L — SIGNIFICANT CHANGE UP (ref 98–107)
CO2 SERPL-SCNC: 14 MMOL/L — LOW (ref 22–31)
CREAT SERPL-MCNC: 9.14 MG/DL — HIGH (ref 0.5–1.3)
CULTURE - ACID FAST SMEAR CONCENTRATED: SIGNIFICANT CHANGE UP
CULTURE - ACID FAST SMEAR CONCENTRATED: SIGNIFICANT CHANGE UP
GLUCOSE BLDC GLUCOMTR-MCNC: 120 MG/DL — HIGH (ref 70–99)
GLUCOSE BLDC GLUCOMTR-MCNC: 140 MG/DL — HIGH (ref 70–99)
GLUCOSE BLDC GLUCOMTR-MCNC: 143 MG/DL — HIGH (ref 70–99)
GLUCOSE BLDC GLUCOMTR-MCNC: 159 MG/DL — HIGH (ref 70–99)
GLUCOSE BLDC GLUCOMTR-MCNC: 227 MG/DL — HIGH (ref 70–99)
GLUCOSE SERPL-MCNC: 228 MG/DL — HIGH (ref 70–99)
HBV SURFACE AG SER-ACNC: NEGATIVE — SIGNIFICANT CHANGE UP
HCT VFR BLD CALC: 24.6 % — LOW (ref 34.5–45)
HCT VFR BLD CALC: 26.7 % — LOW (ref 34.5–45)
HGB BLD-MCNC: 7.2 G/DL — LOW (ref 11.5–15.5)
HGB BLD-MCNC: 8.1 G/DL — LOW (ref 11.5–15.5)
MAGNESIUM SERPL-MCNC: 2.1 MG/DL — SIGNIFICANT CHANGE UP (ref 1.6–2.6)
MCHC RBC-ENTMCNC: 22.9 PG — LOW (ref 27–34)
MCHC RBC-ENTMCNC: 22.9 PG — LOW (ref 27–34)
MCHC RBC-ENTMCNC: 29.3 % — LOW (ref 32–36)
MCHC RBC-ENTMCNC: 30.3 % — LOW (ref 32–36)
MCV RBC AUTO: 75.6 FL — LOW (ref 80–100)
MCV RBC AUTO: 78.3 FL — LOW (ref 80–100)
NRBC # FLD: 0 — SIGNIFICANT CHANGE UP
NRBC # FLD: 0 — SIGNIFICANT CHANGE UP
PLATELET # BLD AUTO: 207 K/UL — SIGNIFICANT CHANGE UP (ref 150–400)
PLATELET # BLD AUTO: 231 K/UL — SIGNIFICANT CHANGE UP (ref 150–400)
PMV BLD: 11.9 FL — SIGNIFICANT CHANGE UP (ref 7–13)
PMV BLD: SIGNIFICANT CHANGE UP FL (ref 7–13)
POTASSIUM SERPL-MCNC: 4 MMOL/L — SIGNIFICANT CHANGE UP (ref 3.5–5.3)
POTASSIUM SERPL-SCNC: 4 MMOL/L — SIGNIFICANT CHANGE UP (ref 3.5–5.3)
RBC # BLD: 3.14 M/UL — LOW (ref 3.8–5.2)
RBC # BLD: 3.53 M/UL — LOW (ref 3.8–5.2)
RBC # FLD: 17.1 % — HIGH (ref 10.3–14.5)
RBC # FLD: 17.4 % — HIGH (ref 10.3–14.5)
SODIUM SERPL-SCNC: 144 MMOL/L — SIGNIFICANT CHANGE UP (ref 135–145)
SPECIMEN SOURCE: SIGNIFICANT CHANGE UP
SPECIMEN SOURCE: SIGNIFICANT CHANGE UP
WBC # BLD: 11.28 K/UL — HIGH (ref 3.8–10.5)
WBC # BLD: 12.82 K/UL — HIGH (ref 3.8–10.5)
WBC # FLD AUTO: 11.28 K/UL — HIGH (ref 3.8–10.5)
WBC # FLD AUTO: 12.82 K/UL — HIGH (ref 3.8–10.5)

## 2018-12-08 PROCEDURE — 36556 INSERT NON-TUNNEL CV CATH: CPT | Mod: RT

## 2018-12-08 PROCEDURE — 76937 US GUIDE VASCULAR ACCESS: CPT | Mod: 26

## 2018-12-08 PROCEDURE — 71045 X-RAY EXAM CHEST 1 VIEW: CPT | Mod: 26

## 2018-12-08 PROCEDURE — 77001 FLUOROGUIDE FOR VEIN DEVICE: CPT | Mod: 26

## 2018-12-08 RX ORDER — ACETAMINOPHEN 500 MG
650 TABLET ORAL EVERY 6 HOURS
Qty: 0 | Refills: 0 | Status: DISCONTINUED | OUTPATIENT
Start: 2018-12-08 | End: 2018-12-20

## 2018-12-08 RX ORDER — ERYTHROPOIETIN 10000 [IU]/ML
20000 INJECTION, SOLUTION INTRAVENOUS; SUBCUTANEOUS
Qty: 0 | Refills: 0 | Status: DISCONTINUED | OUTPATIENT
Start: 2018-12-08 | End: 2018-12-20

## 2018-12-08 RX ADMIN — Medication 1300 MILLIGRAM(S): at 21:17

## 2018-12-08 RX ADMIN — Medication 1300 MILLIGRAM(S): at 11:03

## 2018-12-08 RX ADMIN — ATORVASTATIN CALCIUM 80 MILLIGRAM(S): 80 TABLET, FILM COATED ORAL at 21:18

## 2018-12-08 RX ADMIN — HEPARIN SODIUM 5000 UNIT(S): 5000 INJECTION INTRAVENOUS; SUBCUTANEOUS at 14:24

## 2018-12-08 RX ADMIN — Medication 650 MILLIGRAM(S): at 19:56

## 2018-12-08 RX ADMIN — PANTOPRAZOLE SODIUM 40 MILLIGRAM(S): 20 TABLET, DELAYED RELEASE ORAL at 05:32

## 2018-12-08 RX ADMIN — Medication 50 MILLIGRAM(S): at 21:18

## 2018-12-08 RX ADMIN — Medication 1: at 17:38

## 2018-12-08 RX ADMIN — SERTRALINE 50 MILLIGRAM(S): 25 TABLET, FILM COATED ORAL at 12:11

## 2018-12-08 RX ADMIN — Medication 50 MILLIGRAM(S): at 14:24

## 2018-12-08 RX ADMIN — HEPARIN SODIUM 5000 UNIT(S): 5000 INJECTION INTRAVENOUS; SUBCUTANEOUS at 05:32

## 2018-12-08 RX ADMIN — Medication 650 MILLIGRAM(S): at 20:40

## 2018-12-08 RX ADMIN — HEPARIN SODIUM 5000 UNIT(S): 5000 INJECTION INTRAVENOUS; SUBCUTANEOUS at 21:18

## 2018-12-08 RX ADMIN — Medication 145 MILLIGRAM(S): at 12:11

## 2018-12-08 NOTE — PROGRESS NOTE ADULT - SUBJECTIVE AND OBJECTIVE BOX
Patient is a 78y old  Female who presents with a chief complaint of Orthopnea x 2 days (08 Dec 2018 09:06)      SUBJECTIVE / OVERNIGHT EVENTS:    Events noted.  Feels better.  CONSTITUTIONAL: No fever,  or fatigue  NECK: No pain or stiffness  RESPIRATORY: No cough, wheezing, chills or hemoptysis; No shortness of breath  CARDIOVASCULAR: No chest pain, palpitations, dizziness, or leg swelling  GASTROINTESTINAL: No abdominal or epigastric pain. No nausea, vomiting, or hematemesis; No diarrhea or constipation.   NEUROLOGICAL: No headaches,     MEDICATIONS  (STANDING):  amLODIPine   Tablet 10 milliGRAM(s) Oral daily  atorvastatin 80 milliGRAM(s) Oral at bedtime  dextrose 5%. 1000 milliLiter(s) (50 mL/Hr) IV Continuous <Continuous>  dextrose 50% Injectable 12.5 Gram(s) IV Push once  dextrose 50% Injectable 25 Gram(s) IV Push once  dextrose 50% Injectable 25 Gram(s) IV Push once  epoetin raheem Injectable 07210 Unit(s) SubCutaneous every 7 days  fenofibrate Tablet 145 milliGRAM(s) Oral daily  heparin  Injectable 5000 Unit(s) SubCutaneous every 8 hours  hydrALAZINE 50 milliGRAM(s) Oral three times a day  insulin lispro (HumaLOG) corrective regimen sliding scale   SubCutaneous three times a day before meals  pantoprazole    Tablet 40 milliGRAM(s) Oral before breakfast  sertraline 50 milliGRAM(s) Oral daily  sodium bicarbonate 1300 milliGRAM(s) Oral two times a day  sodium chloride 0.9%. 1000 milliLiter(s) (10 mL/Hr) IV Continuous <Continuous>    MEDICATIONS  (PRN):  dextrose 40% Gel 15 Gram(s) Oral once PRN Blood Glucose LESS THAN 70 milliGRAM(s)/deciliter  glucagon  Injectable 1 milliGRAM(s) IntraMuscular once PRN Glucose LESS THAN 70 milligrams/deciliter  meclizine 25 milliGRAM(s) Oral daily PRN for dizziness  melatonin 3 milliGRAM(s) Oral at bedtime PRN Insomnia  naloxone Injectable 0.1 milliGRAM(s) IV Push every 3 minutes PRN For ANY of the following changes in patient status:  A. RR LESS THAN 10 breaths per minute, B. Oxygen saturation LESS THAN 90%, C. Sedation score of 6  ondansetron Injectable 4 milliGRAM(s) IV Push every 6 hours PRN Nausea  ondansetron Injectable 4 milliGRAM(s) IV Push every 6 hours PRN Nausea and/or Vomiting        CAPILLARY BLOOD GLUCOSE      POCT Blood Glucose.: 120 mg/dL (08 Dec 2018 12:24)  POCT Blood Glucose.: 143 mg/dL (08 Dec 2018 08:55)  POCT Blood Glucose.: 227 mg/dL (08 Dec 2018 05:45)  POCT Blood Glucose.: 136 mg/dL (07 Dec 2018 16:23)    I&O's Summary    07 Dec 2018 07:01  -  08 Dec 2018 07:00  --------------------------------------------------------  IN: 525 mL / OUT: 450 mL / NET: 75 mL    08 Dec 2018 07:01  -  08 Dec 2018 14:17  --------------------------------------------------------  IN: 510 mL / OUT: 570 mL / NET: -60 mL        PHYSICAL EXAM:  GENERAL: NAD  NECK: Supple, No JVD  CHEST/LUNG: Clear to auscultation bilaterally; No wheezing.  HEART: Regular rate and rhythm; No murmurs, rubs, or gallops  ABDOMEN: Soft, Nontender, Nondistended; Bowel sounds present  EXTREMITIES:   No clubbing, cyanosis, or edema  NEUROLOGY: AAO X 3      LABS:                        7.2    11.28 )-----------( 231      ( 08 Dec 2018 03:10 )             24.6     12-08    144  |  106  |  64<H>  ----------------------------<  228<H>  4.0   |  14<L>  |  9.14<H>    Ca    7.6<L>      08 Dec 2018 03:10  Mg     2.1     12-08      PT/INR - ( 07 Dec 2018 06:20 )   PT: 11.3 SEC;   INR: 0.99          PTT - ( 07 Dec 2018 06:20 )  PTT:35.6 SEC        CAPILLARY BLOOD GLUCOSE      POCT Blood Glucose.: 120 mg/dL (08 Dec 2018 12:24)  POCT Blood Glucose.: 143 mg/dL (08 Dec 2018 08:55)  POCT Blood Glucose.: 227 mg/dL (08 Dec 2018 05:45)  POCT Blood Glucose.: 136 mg/dL (07 Dec 2018 16:23)    12-07 @ 16:28  Culture-urine --  Culture results --  method type --  Organism --  Organism Identification --  Specimen source OTHER  12-07 @ 16:23  Culture-urine --  Culture results --  method type --  Organism --  Organism Identification --  Specimen source PERICARDIAL FLUID      12-07 @ 16:28  Culture-CSF --  Culture results --  Gram stain --  Gram stain spinal fluid --  Method type --  Organism --  Organism identification --  Specimen source OTHER  12-07 @ 16:23  Culture-CSF --  Culture results --  Gram stain --  Gram stain spinal fluid --  Method type --  Organism --  Organism identification --  Specimen source PERICARDIAL FLUID       12-07 @ 16:28  Culture blood --  Culture results --  Gram stain --  Gram stain blood --  Method type --  Organism --  Organism identification --  Specimen source OTHER   12-07 @ 16:23  Culture blood --  Culture results --  Gram stain --  Gram stain blood --  Method type --  Organism --  Organism identification --  Specimen source PERICARDIAL FLUID      RADIOLOGY & ADDITIONAL TESTS:    Imaging Personally Reviewed:    Consultant(s) Notes Reviewed:      Care Discussed with Consultants/Other Providers:

## 2018-12-08 NOTE — PROGRESS NOTE ADULT - PROBLEM SELECTOR PLAN 1
babak on advanced ckd now likely esrd with dialysis dependence  s/p 1st HD session today via right IJ charles- flowsheet reviewed- tolerated well; hemodynamically stable  plan for 2nd session monday and 3rd session tuesday- will inc bfr and time accordingly  f/u vascular for immature avf  output hd set up during weekday  monitor

## 2018-12-08 NOTE — PROGRESS NOTE ADULT - ASSESSMENT
· Assessment	  77 y/o  Female, with a PMHx of CKD (fistula June 2018, s/p 2 balloons, last one 2 weeks ago, no dialysis yet), DM, HTN, HLD, anemia presents to the [facility] ED constant SOB, dry cough and clear rhinorrhea x 2 days associated with one episode of vomiting, darken stools and increased urinary frequency admitted to telemetry for Pulmonary Edema/ Anemia/ Acute on Chronic Renal Failure.    EKG- NSR @ 84 TWI I, L, v5-6  Tele monitor- run of SVT @ 168      Problem/Plan - 1:  ·  Problem: Pericardial Effusion:    S/p pericardial window/ 1000 cc fluids removed.     Problem/Plan - 2:  ·  Problem: Symptomatic anemia.  Plan: Hb 7.9.     Problem/Plan - 3:  ·  Problem: ANNIE on Advanced CKD:   Plan: For HD. Nephro f/up noted.     Problem/Plan - 4:  ·  Problem: DM (diabetes mellitus).  Plan: FSSS     Problem/Plan - 5:  ·  Problem: HTN (hypertension).  Plan:  Cont. Amlodipine, Hydralazine, Furosemide.      Problem/Plan - 6:  Problem: Hypercholesteremia. Plan:  Continue home medication, Rosuvastatin, Fenofibrate.    Dw family.

## 2018-12-08 NOTE — PROGRESS NOTE ADULT - SUBJECTIVE AND OBJECTIVE BOX
Pt seen and examined this morning. Resting in bed. Receiving HD. Family at bedside  Pt denies CP or SOB. Feels better since OR yesterday.     Vital Signs Last 24 Hrs  T(C): 36.6 (08 Dec 2018 16:11), Max: 37.1 (07 Dec 2018 22:00)  T(F): 97.9 (08 Dec 2018 16:11), Max: 98.8 (07 Dec 2018 22:00)  HR: 81 (08 Dec 2018 16:11) (80 - 99)  BP: 152/50 (08 Dec 2018 16:11) (112/41 - 170/80)  BP(mean): 64 (07 Dec 2018 22:00) (62 - 66)  RR: 18 (08 Dec 2018 16:11) (10 - 19)  SpO2: 100% (08 Dec 2018 16:11) (92% - 100%)    A+O x 3  Getting HD  Lungs CTA  Subxyphoid incision c/d/i  Fernando 170cc since OR, on suction.     CXR stable    A/P 79yo F with large pericardial effusion s/p Subxyphoid window by Dr. Bullard POD #1    -Cont Fernando to suction. Will remove once output meets criteria  -Cont HD  -Care per primary team  -OOB, ambulation, pulm toilet  Will cont to follow

## 2018-12-08 NOTE — PROGRESS NOTE ADULT - ASSESSMENT
77 yo woman with a PMH of CKD stage V s/p left radiocephalic fistula placement in June 2018 s/p 2 balloon angioplasties (most recently 2 weeks ago to 6 mm). Duplex showing immature AVF.     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 Duplex showing immature AVF.     - Continue HD through [facility] for now, recommend switching to tunneled catheter (permacath by IR) when able  - Patient will need a fistulogram as an outpatient. Please have pt follow up with Dr. Garcia after discharge. - [phone] for an appointment. No plans for vascular surgery intervention on this admission.  - Please call back with any questions or acute changes in clinical status  - Discussed with attending Dr. Jose Sosa PGY2  Vascular surgery  [id number]

## 2018-12-08 NOTE — PROGRESS NOTE ADULT - SUBJECTIVE AND OBJECTIVE BOX
Patient seen and examined  no complaints  s/p shiley- with 1st HD today    No Known Allergies    Hospital Medications:   MEDICATIONS  (STANDING):  amLODIPine   Tablet 10 milliGRAM(s) Oral daily  atorvastatin 80 milliGRAM(s) Oral at bedtime  dextrose 5%. 1000 milliLiter(s) (50 mL/Hr) IV Continuous <Continuous>  dextrose 50% Injectable 12.5 Gram(s) IV Push once  dextrose 50% Injectable 25 Gram(s) IV Push once  dextrose 50% Injectable 25 Gram(s) IV Push once  epoetin raheem Injectable 09399 Unit(s) SubCutaneous every 7 days  fenofibrate Tablet 145 milliGRAM(s) Oral daily  heparin  Injectable 5000 Unit(s) SubCutaneous every 8 hours  hydrALAZINE 50 milliGRAM(s) Oral three times a day  insulin lispro (HumaLOG) corrective regimen sliding scale   SubCutaneous three times a day before meals  pantoprazole    Tablet 40 milliGRAM(s) Oral before breakfast  sertraline 50 milliGRAM(s) Oral daily  sodium bicarbonate 1300 milliGRAM(s) Oral two times a day  sodium chloride 0.9%. 1000 milliLiter(s) (10 mL/Hr) IV Continuous <Continuous>    REVIEW OF SYSTEMS:  CONSTITUTIONAL: No weakness, fevers or chills  EYES/ENT: No visual changes;  No vertigo or throat pain   NECK: No pain or stiffness  RESPIRATORY: No cough, wheezing, hemoptysis; No shortness of breath  CARDIOVASCULAR: No chest pain or palpitations.  GASTROINTESTINAL: No abdominal or epigastric pain. No nausea, vomiting, or hematemesis; No diarrhea or constipation. No melena or hematochezia.  GENITOURINARY: No dysuria, frequency, foamy urine, urinary urgency, incontinence or hematuria  NEUROLOGICAL: No numbness or weakness  SKIN: No itching, burning, rashes, or lesions   VASCULAR: No bilateral lower extremity edema.   All other review of systems is negative unless indicated above.    VITALS:  T(F): 97.9 (12-08-18 @ 16:11), Max: 98.8 (12-07-18 @ 22:00)  HR: 81 (12-08-18 @ 16:11)  BP: 152/50 (12-08-18 @ 16:11)  RR: 18 (12-08-18 @ 16:11)  SpO2: 100% (12-08-18 @ 16:11)  Wt(kg): --    12-07 @ 07:01  -  12-08 @ 07:00  --------------------------------------------------------  IN: 525 mL / OUT: 450 mL / NET: 75 mL    12-08 @ 07:01  -  12-08 @ 16:30  --------------------------------------------------------  IN: 690 mL / OUT: 610 mL / NET: 80 mL      PHYSICAL EXAM:  Constitutional: NAD  HEENT: anicteric sclera, oropharynx clear, MMM  Neck: No JVD  Respiratory: CTAB, no wheezes, rales or rhonchi  Cardiovascular: S1, S2, RRR  Gastrointestinal: BS+, soft, NT/ND  Extremities: No cyanosis or clubbing. No peripheral edema  Neurological: A/O x 3, no focal deficits  Psychiatric: Normal mood, normal affect  : No CVA tenderness. No calvin.   Skin: No rashes  Vascular Access: L wrist AVF +thrill ; right IJ [facility]    LABS:  12-08    144  |  106  |  64<H>  ----------------------------<  228<H>  4.0   |  14<L>  |  9.14<H>    Ca    7.6<L>      08 Dec 2018 03:10  Mg     2.1     12-08      Creatinine Trend: 9.14 <--, 7.91 <--, 7.17 <--, 6.46 <--, 6.26 <--                        7.2    11.28 )-----------( 231      ( 08 Dec 2018 03:10 )             24.6     Urine Studies:      RADIOLOGY & ADDITIONAL STUDIES:

## 2018-12-08 NOTE — PROGRESS NOTE ADULT - SUBJECTIVE AND OBJECTIVE BOX
Anesthesia Pain Management Service    SUBJECTIVE: Patient is doing well with IV PCA and no significant problems reported.    Pain Scale Score	At rest: ___ 	With Activity: ___ 	[X ] Refer to charted pain scores    THERAPY:    [ ] IV PCA Morphine		[ ] 5 mg/mL	[ ] 1 mg/mL  [X ] IV PCA Hydromorphone	[ ] 5 mg/mL	[X ] 1 mg/mL  [ ] IV PCA Fentanyl		[ ] 50 micrograms/mL    Demand dose __0.2_ lockout __6_ (minutes) Continuous Rate _0__ Total: 0.2mg___  Daily      MEDICATIONS  (STANDING):  amLODIPine   Tablet 10 milliGRAM(s) Oral daily  atorvastatin 80 milliGRAM(s) Oral at bedtime  dextrose 5%. 1000 milliLiter(s) (50 mL/Hr) IV Continuous <Continuous>  dextrose 50% Injectable 12.5 Gram(s) IV Push once  dextrose 50% Injectable 25 Gram(s) IV Push once  dextrose 50% Injectable 25 Gram(s) IV Push once  epoetin raheem Injectable 96629 Unit(s) SubCutaneous every 7 days  fenofibrate Tablet 145 milliGRAM(s) Oral daily  heparin  Injectable 5000 Unit(s) SubCutaneous every 8 hours  hydrALAZINE 50 milliGRAM(s) Oral three times a day  HYDROmorphone PCA (1 mG/mL) 30 milliLiter(s) PCA Continuous PCA Continuous  insulin lispro (HumaLOG) corrective regimen sliding scale   SubCutaneous three times a day before meals  pantoprazole    Tablet 40 milliGRAM(s) Oral before breakfast  sertraline 50 milliGRAM(s) Oral daily  sodium bicarbonate 1300 milliGRAM(s) Oral two times a day  sodium chloride 0.9%. 1000 milliLiter(s) (10 mL/Hr) IV Continuous <Continuous>    MEDICATIONS  (PRN):  dextrose 40% Gel 15 Gram(s) Oral once PRN Blood Glucose LESS THAN 70 milliGRAM(s)/deciliter  glucagon  Injectable 1 milliGRAM(s) IntraMuscular once PRN Glucose LESS THAN 70 milligrams/deciliter  HYDROmorphone PCA (1 mG/mL) Rescue Clinician Bolus 0.5 milliGRAM(s) IV Push every 15 minutes PRN for Pain Scale GREATER THAN 6  meclizine 25 milliGRAM(s) Oral daily PRN for dizziness  melatonin 3 milliGRAM(s) Oral at bedtime PRN Insomnia  naloxone Injectable 0.1 milliGRAM(s) IV Push every 3 minutes PRN For ANY of the following changes in patient status:  A. RR LESS THAN 10 breaths per minute, B. Oxygen saturation LESS THAN 90%, C. Sedation score of 6  ondansetron Injectable 4 milliGRAM(s) IV Push every 6 hours PRN Nausea  ondansetron Injectable 4 milliGRAM(s) IV Push every 6 hours PRN Nausea and/or Vomiting      OBJECTIVE:    Sedation Score:	[ X] Alert	[ ] Drowsy 	[ ] Arousable	[ ] Asleep	[ ] Unresponsive    Side Effects:	[X ] None	[ ] Nausea	[ ] Vomiting	[ ] Pruritus  		[ ] Other:    Vital Signs Last 24 Hrs  T(C): 36.3 (08 Dec 2018 06:35), Max: 37.1 (07 Dec 2018 13:43)  T(F): 97.3 (08 Dec 2018 06:35), Max: 98.8 (07 Dec 2018 13:43)  HR: 83 (08 Dec 2018 06:35) (83 - 99)  BP: 137/72 (08 Dec 2018 06:35) (112/41 - 153/55)  BP(mean): 64 (07 Dec 2018 22:00) (62 - 79)  RR: 18 (08 Dec 2018 06:35) (10 - 24)  SpO2: 100% (08 Dec 2018 05:31) (92% - 100%)    ASSESSMENT/ PLAN    Therapy to  be:	[ X] Continue   [ ] Discontinued   [ ] Change to prn Analgesics    Documentation and Verification of current medications:   [X] Done	[ ] Not done, not elligible    Comments:

## 2018-12-08 NOTE — PROGRESS NOTE ADULT - SUBJECTIVE AND OBJECTIVE BOX
ANESTHESIA POSTOP CHECK    78y Female POSTOP DAY 1 S/P     [ X] NO APPARENT ANESTHESIA COMPLICATIONS      Comments:

## 2018-12-08 NOTE — CHART NOTE - NSCHARTNOTEFT_GEN_A_CORE
Patient is a 78y old  Female who presents with a chief complaint of Orthopnea x 2 days (07 Dec 2018 18:32). Pt feels better after drain. No acute issues      Vital Signs Last 24 Hrs  T(C): 36.9 (08 Dec 2018 01:26), Max: 37.1 (07 Dec 2018 13:43)  T(F): 98.4 (08 Dec 2018 01:26), Max: 98.8 (07 Dec 2018 13:43)  HR: 88 (08 Dec 2018 01:26) (82 - 99)  BP: 116/47 (08 Dec 2018 01:26) (112/41 - 158/64)  BP(mean): 64 (07 Dec 2018 22:00) (62 - 79)  RR: 18 (08 Dec 2018 01:26) (10 - 24)  SpO2: 100% (08 Dec 2018 01:26) (92% - 100%)    General: WN/WD NAD  Neurology: A&Ox3, nonfocal, CUTLER x 4  Respiratory: CTA B/L  CV: RRR, S1S2, no murmurs, rubs or gallops  Abdominal: Soft, NT, ND +BS, Last BM  Extremities: No edema, + peripheral pulses  Incisions:   Tubes: andres to sxn                          7.9    5.83  )-----------( 222      ( 07 Dec 2018 06:20 )             26.2     12-07    143  |  107  |  47<H>  ----------------------------<  97  3.6   |  16<L>  |  7.91<H>    Ca    7.7<L>      07 Dec 2018 06:20  Phos  5.6     12-06  Mg     2.1     12-07    TPro  7.0  /  Alb  3.5  /  TBili  0.4  /  DBili  x   /  AST  16  /  ALT  6   /  AlkPhos  57  12-06    PT/INR - ( 07 Dec 2018 06:20 )   PT: 11.3 SEC;   INR: 0.99          PTT - ( 07 Dec 2018 06:20 )  PTT:35.6 SEC    CXR    A/P  -c/w drain to suction

## 2018-12-08 NOTE — PROGRESS NOTE ADULT - SUBJECTIVE AND OBJECTIVE BOX
VASCULAR SURGERY PROGRESS NOTE      Subjective:  Patient underwent pericardial window overnight. Also s/p R IJ shiley placement by IR. Recieving HD via shiley this morning.         Objective:    PE:  GEN: NAD, resting quietly  PULM: symmetric chest rise bilaterally, no increased WOB  CV: regular rate, peripheral pulses intact  ABD: soft, NTND  EXTR: left radiocephalic fistula with palpable thrill, radial pulses 2+ bilaterally      Vital Signs Last 24 Hrs  T(C): 36.3 (08 Dec 2018 06:35), Max: 37.1 (07 Dec 2018 13:43)  T(F): 97.3 (08 Dec 2018 06:35), Max: 98.8 (07 Dec 2018 13:43)  HR: 83 (08 Dec 2018 06:35) (83 - 99)  BP: 137/72 (08 Dec 2018 06:35) (112/41 - 153/55)  BP(mean): 64 (07 Dec 2018 22:00) (62 - 79)  RR: 18 (08 Dec 2018 06:35) (10 - 24)  SpO2: 100% (08 Dec 2018 05:31) (92% - 100%)    I&O's Detail    07 Dec 2018 07:01  -  08 Dec 2018 07:00  --------------------------------------------------------  IN:    Oral Fluid: 375 mL    sodium chloride 0.9%.: 150 mL  Total IN: 525 mL    OUT:    Chest Tube: 300 mL    Voided: 150 mL  Total OUT: 450 mL    Total NET: 75 mL          Daily Height in cm: 170.18 (07 Dec 2018 13:50)    Daily Weight in k (08 Dec 2018 06:35)    MEDICATIONS  (STANDING):  amLODIPine   Tablet 10 milliGRAM(s) Oral daily  atorvastatin 80 milliGRAM(s) Oral at bedtime  dextrose 5%. 1000 milliLiter(s) (50 mL/Hr) IV Continuous <Continuous>  dextrose 50% Injectable 12.5 Gram(s) IV Push once  dextrose 50% Injectable 25 Gram(s) IV Push once  dextrose 50% Injectable 25 Gram(s) IV Push once  epoetin raheem Injectable 12480 Unit(s) SubCutaneous every 7 days  fenofibrate Tablet 145 milliGRAM(s) Oral daily  heparin  Injectable 5000 Unit(s) SubCutaneous every 8 hours  hydrALAZINE 50 milliGRAM(s) Oral three times a day  HYDROmorphone PCA (1 mG/mL) 30 milliLiter(s) PCA Continuous PCA Continuous  insulin lispro (HumaLOG) corrective regimen sliding scale   SubCutaneous three times a day before meals  pantoprazole    Tablet 40 milliGRAM(s) Oral before breakfast  sertraline 50 milliGRAM(s) Oral daily  sodium bicarbonate 1300 milliGRAM(s) Oral two times a day  sodium chloride 0.9%. 1000 milliLiter(s) (10 mL/Hr) IV Continuous <Continuous>    MEDICATIONS  (PRN):  dextrose 40% Gel 15 Gram(s) Oral once PRN Blood Glucose LESS THAN 70 milliGRAM(s)/deciliter  glucagon  Injectable 1 milliGRAM(s) IntraMuscular once PRN Glucose LESS THAN 70 milligrams/deciliter  HYDROmorphone PCA (1 mG/mL) Rescue Clinician Bolus 0.5 milliGRAM(s) IV Push every 15 minutes PRN for Pain Scale GREATER THAN 6  meclizine 25 milliGRAM(s) Oral daily PRN for dizziness  melatonin 3 milliGRAM(s) Oral at bedtime PRN Insomnia  naloxone Injectable 0.1 milliGRAM(s) IV Push every 3 minutes PRN For ANY of the following changes in patient status:  A. RR LESS THAN 10 breaths per minute, B. Oxygen saturation LESS THAN 90%, C. Sedation score of 6  ondansetron Injectable 4 milliGRAM(s) IV Push every 6 hours PRN Nausea  ondansetron Injectable 4 milliGRAM(s) IV Push every 6 hours PRN Nausea and/or Vomiting      LABS:                        7.2    11.28 )-----------( 231      ( 08 Dec 2018 03:10 )             24.6     12-08    144  |  106  |  64<H>  ----------------------------<  228<H>  4.0   |  14<L>  |  9.14<H>    Ca    7.6<L>      08 Dec 2018 03:10  Mg     2.1     12-08      PT/INR - ( 07 Dec 2018 06:20 )   PT: 11.3 SEC;   INR: 0.99          PTT - ( 07 Dec 2018 06:20 )  PTT:35.6 SEC      RADIOLOGY & ADDITIONAL STUDIES:    VA Duplex Hemodialysis Access, Left. (18 @ 15:12)  Patent leftupper extremity radiocephalic arteriovenous  fistula. Patent inflow and outflow vessels.  However, the maximal volume flow velocity obtained within  the fistula is 220 ml/min within the mid segment,  suggesting that this is an immature AVF.

## 2018-12-08 NOTE — PROGRESS NOTE ADULT - SUBJECTIVE AND OBJECTIVE BOX
ANESTHESIA POSTOP CHECK    78y Female POSTOP DAY 1 S/P Subxiphoid pericardial window    Vital Signs Last 24 Hrs  T(C): 36.3 (08 Dec 2018 06:35), Max: 37.1 (07 Dec 2018 13:43)  T(F): 97.3 (08 Dec 2018 06:35), Max: 98.8 (07 Dec 2018 13:43)  HR: 83 (08 Dec 2018 06:35) (83 - 99)  BP: 137/72 (08 Dec 2018 06:35) (112/41 - 153/55)  BP(mean): 64 (07 Dec 2018 22:00) (62 - 79)  RR: 18 (08 Dec 2018 06:35) (10 - 24)  SpO2: 100% (08 Dec 2018 05:31) (92% - 100%)  I&O's Summary    07 Dec 2018 07:01  -  08 Dec 2018 07:00  --------------------------------------------------------  IN: 525 mL / OUT: 450 mL / NET: 75 mL        [X] NO APPARENT ANESTHESIA COMPLICATIONS

## 2018-12-09 LAB
APTT BLD: 29.6 SEC — SIGNIFICANT CHANGE UP (ref 27.5–36.3)
BUN SERPL-MCNC: 48 MG/DL — HIGH (ref 7–23)
CALCIUM SERPL-MCNC: 8.1 MG/DL — LOW (ref 8.4–10.5)
CHLORIDE SERPL-SCNC: 102 MMOL/L — SIGNIFICANT CHANGE UP (ref 98–107)
CO2 SERPL-SCNC: 20 MMOL/L — LOW (ref 22–31)
CREAT SERPL-MCNC: 7.02 MG/DL — HIGH (ref 0.5–1.3)
GLUCOSE BLDC GLUCOMTR-MCNC: 116 MG/DL — HIGH (ref 70–99)
GLUCOSE BLDC GLUCOMTR-MCNC: 118 MG/DL — HIGH (ref 70–99)
GLUCOSE BLDC GLUCOMTR-MCNC: 147 MG/DL — HIGH (ref 70–99)
GLUCOSE BLDC GLUCOMTR-MCNC: 169 MG/DL — HIGH (ref 70–99)
GLUCOSE SERPL-MCNC: 132 MG/DL — HIGH (ref 70–99)
HCT VFR BLD CALC: 26.3 % — LOW (ref 34.5–45)
HGB BLD-MCNC: 8 G/DL — LOW (ref 11.5–15.5)
INR BLD: 1.07 — SIGNIFICANT CHANGE UP (ref 0.88–1.17)
MAGNESIUM SERPL-MCNC: 2.2 MG/DL — SIGNIFICANT CHANGE UP (ref 1.6–2.6)
MCHC RBC-ENTMCNC: 22.6 PG — LOW (ref 27–34)
MCHC RBC-ENTMCNC: 30.4 % — LOW (ref 32–36)
MCV RBC AUTO: 74.3 FL — LOW (ref 80–100)
NRBC # FLD: 0 — SIGNIFICANT CHANGE UP
PLATELET # BLD AUTO: 223 K/UL — SIGNIFICANT CHANGE UP (ref 150–400)
PMV BLD: SIGNIFICANT CHANGE UP FL (ref 7–13)
POTASSIUM SERPL-MCNC: 3.4 MMOL/L — LOW (ref 3.5–5.3)
POTASSIUM SERPL-SCNC: 3.4 MMOL/L — LOW (ref 3.5–5.3)
PROTHROM AB SERPL-ACNC: 12.2 SEC — SIGNIFICANT CHANGE UP (ref 9.8–13.1)
RBC # BLD: 3.54 M/UL — LOW (ref 3.8–5.2)
RBC # FLD: 16.8 % — HIGH (ref 10.3–14.5)
SODIUM SERPL-SCNC: 143 MMOL/L — SIGNIFICANT CHANGE UP (ref 135–145)
WBC # BLD: 10.23 K/UL — SIGNIFICANT CHANGE UP (ref 3.8–10.5)
WBC # FLD AUTO: 10.23 K/UL — SIGNIFICANT CHANGE UP (ref 3.8–10.5)

## 2018-12-09 PROCEDURE — 93010 ELECTROCARDIOGRAM REPORT: CPT

## 2018-12-09 RX ORDER — METOPROLOL TARTRATE 50 MG
25 TABLET ORAL
Qty: 0 | Refills: 0 | Status: DISCONTINUED | OUTPATIENT
Start: 2018-12-09 | End: 2018-12-09

## 2018-12-09 RX ORDER — POTASSIUM CHLORIDE 20 MEQ
10 PACKET (EA) ORAL ONCE
Qty: 0 | Refills: 0 | Status: COMPLETED | OUTPATIENT
Start: 2018-12-09 | End: 2018-12-09

## 2018-12-09 RX ORDER — METOPROLOL TARTRATE 50 MG
50 TABLET ORAL
Qty: 0 | Refills: 0 | Status: DISCONTINUED | OUTPATIENT
Start: 2018-12-09 | End: 2018-12-11

## 2018-12-09 RX ORDER — METOPROLOL TARTRATE 50 MG
25 TABLET ORAL ONCE
Qty: 0 | Refills: 0 | Status: COMPLETED | OUTPATIENT
Start: 2018-12-09 | End: 2018-12-09

## 2018-12-09 RX ORDER — METOPROLOL TARTRATE 50 MG
5 TABLET ORAL ONCE
Qty: 0 | Refills: 0 | Status: COMPLETED | OUTPATIENT
Start: 2018-12-09 | End: 2018-12-09

## 2018-12-09 RX ADMIN — SERTRALINE 50 MILLIGRAM(S): 25 TABLET, FILM COATED ORAL at 12:41

## 2018-12-09 RX ADMIN — Medication 50 MILLIGRAM(S): at 21:44

## 2018-12-09 RX ADMIN — AMLODIPINE BESYLATE 10 MILLIGRAM(S): 2.5 TABLET ORAL at 04:46

## 2018-12-09 RX ADMIN — Medication 1300 MILLIGRAM(S): at 21:44

## 2018-12-09 RX ADMIN — Medication 100 MILLIEQUIVALENT(S): at 03:18

## 2018-12-09 RX ADMIN — Medication 50 MILLIGRAM(S): at 17:52

## 2018-12-09 RX ADMIN — Medication 1: at 12:41

## 2018-12-09 RX ADMIN — Medication 50 MILLIGRAM(S): at 12:42

## 2018-12-09 RX ADMIN — Medication 50 MILLIGRAM(S): at 04:46

## 2018-12-09 RX ADMIN — HEPARIN SODIUM 5000 UNIT(S): 5000 INJECTION INTRAVENOUS; SUBCUTANEOUS at 21:44

## 2018-12-09 RX ADMIN — Medication 650 MILLIGRAM(S): at 22:30

## 2018-12-09 RX ADMIN — Medication 5 MILLIGRAM(S): at 02:23

## 2018-12-09 RX ADMIN — HEPARIN SODIUM 5000 UNIT(S): 5000 INJECTION INTRAVENOUS; SUBCUTANEOUS at 12:41

## 2018-12-09 RX ADMIN — Medication 650 MILLIGRAM(S): at 04:48

## 2018-12-09 RX ADMIN — Medication 25 MILLIGRAM(S): at 14:08

## 2018-12-09 RX ADMIN — PANTOPRAZOLE SODIUM 40 MILLIGRAM(S): 20 TABLET, DELAYED RELEASE ORAL at 04:46

## 2018-12-09 RX ADMIN — Medication 1300 MILLIGRAM(S): at 12:42

## 2018-12-09 RX ADMIN — ATORVASTATIN CALCIUM 80 MILLIGRAM(S): 80 TABLET, FILM COATED ORAL at 21:43

## 2018-12-09 RX ADMIN — Medication 650 MILLIGRAM(S): at 04:47

## 2018-12-09 RX ADMIN — Medication 145 MILLIGRAM(S): at 12:41

## 2018-12-09 RX ADMIN — Medication 650 MILLIGRAM(S): at 21:44

## 2018-12-09 RX ADMIN — HEPARIN SODIUM 5000 UNIT(S): 5000 INJECTION INTRAVENOUS; SUBCUTANEOUS at 04:46

## 2018-12-09 RX ADMIN — Medication 25 MILLIGRAM(S): at 04:46

## 2018-12-09 NOTE — PROVIDER CONTACT NOTE (OTHER) - BACKGROUND
pt s/p pericardial window with drain placement on 12/7.  pt also s/p shiley placement and first HD on 12/8 am.

## 2018-12-09 NOTE — PROGRESS NOTE ADULT - SUBJECTIVE AND OBJECTIVE BOX
Patient is a 78y old  Female who presents with a chief complaint of Orthopnea x 2 days (09 Dec 2018 14:37)      SUBJECTIVE / OVERNIGHT EVENTS:    Events noted.  Feels better.  CONSTITUTIONAL: No fever,  or fatigue  NECK: No pain or stiffness  RESPIRATORY: No cough, wheezing, chills or hemoptysis; No shortness of breath  CARDIOVASCULAR: No chest pain, palpitations, dizziness, or leg swelling  GASTROINTESTINAL: No abdominal or epigastric pain. No nausea, vomiting, or hematemesis; No diarrhea or constipation.   NEUROLOGICAL: No headaches,     MEDICATIONS  (STANDING):  atorvastatin 80 milliGRAM(s) Oral at bedtime  dextrose 5%. 1000 milliLiter(s) (50 mL/Hr) IV Continuous <Continuous>  dextrose 50% Injectable 12.5 Gram(s) IV Push once  dextrose 50% Injectable 25 Gram(s) IV Push once  dextrose 50% Injectable 25 Gram(s) IV Push once  epoetin raheem Injectable 91519 Unit(s) IV Push <User Schedule>  fenofibrate Tablet 145 milliGRAM(s) Oral daily  heparin  Injectable 5000 Unit(s) SubCutaneous every 8 hours  hydrALAZINE 50 milliGRAM(s) Oral three times a day  insulin lispro (HumaLOG) corrective regimen sliding scale   SubCutaneous three times a day before meals  metoprolol tartrate 50 milliGRAM(s) Oral two times a day  pantoprazole    Tablet 40 milliGRAM(s) Oral before breakfast  sertraline 50 milliGRAM(s) Oral daily  sodium bicarbonate 1300 milliGRAM(s) Oral two times a day  sodium chloride 0.9%. 1000 milliLiter(s) (10 mL/Hr) IV Continuous <Continuous>    MEDICATIONS  (PRN):  acetaminophen   Tablet .. 650 milliGRAM(s) Oral every 6 hours PRN Mild Pain (1 - 3)  dextrose 40% Gel 15 Gram(s) Oral once PRN Blood Glucose LESS THAN 70 milliGRAM(s)/deciliter  glucagon  Injectable 1 milliGRAM(s) IntraMuscular once PRN Glucose LESS THAN 70 milligrams/deciliter  melatonin 3 milliGRAM(s) Oral at bedtime PRN Insomnia  naloxone Injectable 0.1 milliGRAM(s) IV Push every 3 minutes PRN For ANY of the following changes in patient status:  A. RR LESS THAN 10 breaths per minute, B. Oxygen saturation LESS THAN 90%, C. Sedation score of 6  ondansetron Injectable 4 milliGRAM(s) IV Push every 6 hours PRN Nausea and/or Vomiting        CAPILLARY BLOOD GLUCOSE      POCT Blood Glucose.: 118 mg/dL (09 Dec 2018 21:41)  POCT Blood Glucose.: 116 mg/dL (09 Dec 2018 17:26)  POCT Blood Glucose.: 169 mg/dL (09 Dec 2018 12:10)  POCT Blood Glucose.: 147 mg/dL (09 Dec 2018 08:51)    I&O's Summary    08 Dec 2018 07:01  -  09 Dec 2018 07:00  --------------------------------------------------------  IN: 890 mL / OUT: 900 mL / NET: -10 mL    09 Dec 2018 07:01  -  09 Dec 2018 23:16  --------------------------------------------------------  IN: 0 mL / OUT: 260 mL / NET: -260 mL        PHYSICAL EXAM:  GENERAL: NAD  NECK: Supple, No JVD  CHEST/LUNG: Clear to auscultation bilaterally; No wheezing.  HEART: Regular rate and rhythm; No murmurs, rubs, or gallops  ABDOMEN: Soft, Nontender, Nondistended; Bowel sounds present  EXTREMITIES:   No clubbing, cyanosis, or edema  NEUROLOGY: AAO X 3      LABS:                        8.0    10.23 )-----------( 223      ( 09 Dec 2018 02:10 )             26.3     12-09    143  |  102  |  48<H>  ----------------------------<  132<H>  3.4<L>   |  20<L>  |  7.02<H>    Ca    8.1<L>      09 Dec 2018 02:10  Mg     2.2     12-09      PT/INR - ( 09 Dec 2018 02:10 )   PT: 12.2 SEC;   INR: 1.07          PTT - ( 09 Dec 2018 02:10 )  PTT:29.6 SEC        CAPILLARY BLOOD GLUCOSE      POCT Blood Glucose.: 118 mg/dL (09 Dec 2018 21:41)  POCT Blood Glucose.: 116 mg/dL (09 Dec 2018 17:26)  POCT Blood Glucose.: 169 mg/dL (09 Dec 2018 12:10)  POCT Blood Glucose.: 147 mg/dL (09 Dec 2018 08:51)    12-07 @ 16:28  Culture-urine --  Culture results --  method type --  Organism --  Organism Identification --  Specimen source OTHER  12-07 @ 16:23  Culture-urine --  Culture results --  method type --  Organism --  Organism Identification --  Specimen source PERICARDIAL FLUID           12-07 @ 16:28  Culture blood --  Culture results --  Gram stain --  Gram stain blood --  Method type --  Organism --  Organism identification --  Specimen source OTHER   12-07 @ 16:23  Culture blood --  Culture results --  Gram stain --  Gram stain blood --  Method type --  Organism --  Organism identification --  Specimen source PERICARDIAL FLUID      RADIOLOGY & ADDITIONAL TESTS:    Imaging Personally Reviewed:    Consultant(s) Notes Reviewed:      Care Discussed with Consultants/Other Providers:

## 2018-12-09 NOTE — PROGRESS NOTE ADULT - SUBJECTIVE AND OBJECTIVE BOX
Patient seen and examined   no complaints  s/p first HD 12/8/18  No Known Allergies    Hospital Medications:   MEDICATIONS  (STANDING):  atorvastatin 80 milliGRAM(s) Oral at bedtime  dextrose 5%. 1000 milliLiter(s) (50 mL/Hr) IV Continuous <Continuous>  dextrose 50% Injectable 12.5 Gram(s) IV Push once  dextrose 50% Injectable 25 Gram(s) IV Push once  dextrose 50% Injectable 25 Gram(s) IV Push once  epoetin raheem Injectable 87275 Unit(s) IV Push <User Schedule>  fenofibrate Tablet 145 milliGRAM(s) Oral daily  heparin  Injectable 5000 Unit(s) SubCutaneous every 8 hours  hydrALAZINE 50 milliGRAM(s) Oral three times a day  insulin lispro (HumaLOG) corrective regimen sliding scale   SubCutaneous three times a day before meals  metoprolol tartrate 50 milliGRAM(s) Oral two times a day  pantoprazole    Tablet 40 milliGRAM(s) Oral before breakfast  sertraline 50 milliGRAM(s) Oral daily  sodium bicarbonate 1300 milliGRAM(s) Oral two times a day  sodium chloride 0.9%. 1000 milliLiter(s) (10 mL/Hr) IV Continuous <Continuous>        VITALS:  T(F): 98 (12-09-18 @ 07:49), Max: 98.6 (12-09-18 @ 02:00)  HR: 69 (12-09-18 @ 07:49)  BP: 147/64 (12-09-18 @ 07:49)  RR: 18 (12-09-18 @ 07:49)  SpO2: 95% (12-09-18 @ 07:49)  Wt(kg): --    12-08 @ 07:01  -  12-09 @ 07:00  --------------------------------------------------------  IN: 890 mL / OUT: 900 mL / NET: -10 mL    12-09 @ 07:01  -  12-09 @ 11:32  --------------------------------------------------------  IN: 0 mL / OUT: 240 mL / NET: -240 mL      PHYSICAL EXAM:  Constitutional: NAD  HEENT: anicteric sclera, oropharynx clear, MMM  Neck: No JVD  Respiratory: CTAB, no wheezes, rales or rhonchi  Cardiovascular: S1, S2, RRR  Gastrointestinal: BS+, soft, NT/ND  Extremities: No cyanosis or clubbing. No peripheral edema  Neurological: A/O x 3, no focal deficits  Psychiatric: Normal mood, normal affect  : No CVA tenderness. No calvin.   Skin: No rashes  Vascular Access: L wrist AVF +thrill ; right IJ SHiley    LABS:  12-09    143  |  102  |  48<H>  ----------------------------<  132<H>  3.4<L>   |  20<L>  |  7.02<H>    Ca    8.1<L>      09 Dec 2018 02:10  Mg     2.2     12-09      Creatinine Trend: 7.02 <--, 9.14 <--, 7.91 <--, 7.17 <--, 6.46 <--, 6.26 <--                        8.0    10.23 )-----------( 223      ( 09 Dec 2018 02:10 )             26.3     Urine Studies:      RADIOLOGY & ADDITIONAL STUDIES:

## 2018-12-09 NOTE — CHART NOTE - NSCHARTNOTEFT_GEN_A_CORE
Pt developed Rapid Afib up to 160's, asymptomatic    ICU Vital Signs Last 24 Hrs  T(C): 37 (09 Dec 2018 02:00), Max: 37 (09 Dec 2018 02:00)  T(F): 98.6 (09 Dec 2018 02:00), Max: 98.6 (09 Dec 2018 02:00)  HR: 160 (09 Dec 2018 02:00) (80 - 160)  BP: 135/57 (09 Dec 2018 02:00) (129/48 - 170/80)  BP(mean): --  ABP: --  ABP(mean): --  RR: 18 (09 Dec 2018 02:00) (17 - 18)  SpO2: 98% (09 Dec 2018 02:00) (97% - 100%)    A/P:  EKG   IV Lopressor 5mg IV  cbc, BMP, Mg  PTT/PT/INR

## 2018-12-09 NOTE — PROGRESS NOTE ADULT - SUBJECTIVE AND OBJECTIVE BOX
Subjective: pt had rapid afib episode last night, no in SR on beta blocker  no acute complaints    Vital Signs:  Vital Signs Last 24 Hrs  T(C): 36.9 (12-09-18 @ 12:31), Max: 37 (12-09-18 @ 02:00)  T(F): 98.4 (12-09-18 @ 12:31), Max: 98.6 (12-09-18 @ 02:00)  HR: 155 (12-09-18 @ 13:53) (68 - 160)  BP: 150/61 (12-09-18 @ 12:31) (135/57 - 152/50)  RR: 18 (12-09-18 @ 12:31) (18 - 18)  SpO2: 100% (12-09-18 @ 12:31) (95% - 100%) on (O2)    Telemetry/Alarms:  General: WN/WD NAD  Neurology: Awake, nonfocal, CUTLER x 4  Eyes: Scleras clear, PERRLA/ EOMI, Gross vision intact  ENT:Gross hearing intact, grossly patent pharynx, no stridor  Neck: Neck supple, trachea midline, No JVD,   Respiratory: CTA B/L, No wheezing, rales, rhonchi  CV: RRR, S1S2, no murmurs, rubs or gallops  Abdominal: Soft, NT, ND +BS,   Extremities: No edema, + peripheral pulses  Skin: No Rashes, Hematoma, Ecchymosis  Lymphatic: No Neck, axilla, groin LAD  Psych: Oriented x 3, normal affect  Incisions: c,d,i  Tubes: pericardial andres to suction drained 200cc/24h  Relevant labs, radiology and Medications reviewed                        8.0    10.23 )-----------( 223      ( 09 Dec 2018 02:10 )             26.3     12-09    143  |  102  |  48<H>  ----------------------------<  132<H>  3.4<L>   |  20<L>  |  7.02<H>    Ca    8.1<L>      09 Dec 2018 02:10  Mg     2.2     12-09      PT/INR - ( 09 Dec 2018 02:10 )   PT: 12.2 SEC;   INR: 1.07          PTT - ( 09 Dec 2018 02:10 )  PTT:29.6 SEC  MEDICATIONS  (STANDING):  atorvastatin 80 milliGRAM(s) Oral at bedtime  dextrose 5%. 1000 milliLiter(s) (50 mL/Hr) IV Continuous <Continuous>  dextrose 50% Injectable 12.5 Gram(s) IV Push once  dextrose 50% Injectable 25 Gram(s) IV Push once  dextrose 50% Injectable 25 Gram(s) IV Push once  epoetin raheem Injectable 81128 Unit(s) IV Push <User Schedule>  fenofibrate Tablet 145 milliGRAM(s) Oral daily  heparin  Injectable 5000 Unit(s) SubCutaneous every 8 hours  hydrALAZINE 50 milliGRAM(s) Oral three times a day  insulin lispro (HumaLOG) corrective regimen sliding scale   SubCutaneous three times a day before meals  metoprolol tartrate 50 milliGRAM(s) Oral two times a day  pantoprazole    Tablet 40 milliGRAM(s) Oral before breakfast  sertraline 50 milliGRAM(s) Oral daily  sodium bicarbonate 1300 milliGRAM(s) Oral two times a day  sodium chloride 0.9%. 1000 milliLiter(s) (10 mL/Hr) IV Continuous <Continuous>    MEDICATIONS  (PRN):  acetaminophen   Tablet .. 650 milliGRAM(s) Oral every 6 hours PRN Mild Pain (1 - 3)  dextrose 40% Gel 15 Gram(s) Oral once PRN Blood Glucose LESS THAN 70 milliGRAM(s)/deciliter  glucagon  Injectable 1 milliGRAM(s) IntraMuscular once PRN Glucose LESS THAN 70 milligrams/deciliter  melatonin 3 milliGRAM(s) Oral at bedtime PRN Insomnia  naloxone Injectable 0.1 milliGRAM(s) IV Push every 3 minutes PRN For ANY of the following changes in patient status:  A. RR LESS THAN 10 breaths per minute, B. Oxygen saturation LESS THAN 90%, C. Sedation score of 6  ondansetron Injectable 4 milliGRAM(s) IV Push every 6 hours PRN Nausea and/or Vomiting    Pertinent Physical Exam  I&O's Summary    08 Dec 2018 07:01  -  09 Dec 2018 07:00  --------------------------------------------------------  IN: 890 mL / OUT: 900 mL / NET: -10 mL    09 Dec 2018 07:01  -  09 Dec 2018 14:37  --------------------------------------------------------  IN: 0 mL / OUT: 240 mL / NET: -240 mL        Assessment  78y Female  w/ PAST MEDICAL & SURGICAL HISTORY:  Anemia  CKD (chronic kidney disease)  Hypercholesteremia  HTN (hypertension)  DM (diabetes mellitus)  H/O colonoscopy with polypectomy  AV fistula: June 2018, 2 ballooning (last one 2 weeks ago), following up on Dec 15th  Status post right foot surgery: hammer toe repair  admitted with complaints of Patient is a 78y old  Female who presents with a chief complaint of Orthopnea x 2 days (09 Dec 2018 11:31)  .  A/P 77yo F with large pericardial effusion s/p Subxyphoid window by Dr. Bullard POD #2    -Cont Andres to suction. Will remove once output meets criteria  -Cont HD  -Care per primary team  -OOB, ambulation, pulm toilet  Will cont to follow    Zahida PETTY [id number]

## 2018-12-09 NOTE — PROGRESS NOTE ADULT - PROBLEM SELECTOR PLAN 1
babak on advanced ckd now likely esrd with dialysis dependence  s/p 1st HD session 12/8/18 via right IJ charles- flowsheet reviewed- tolerated well; hemodynamically stable  plan for 2nd session monday and 3rd session tuesday- will inc bfr and time accordingly  f/u vascular for immature avf  output hd set up during weekday  monitor

## 2018-12-09 NOTE — PROGRESS NOTE ADULT - SUBJECTIVE AND OBJECTIVE BOX
PRESENTING CC:Orthopnea    SUBJ: 77 y/o  Female, with a PMHx of CKD (fistula placed June 2018, s/p 2 balloons, last one 2 weeks ago, not on dialysis yet), T2DM, HTN, HLD, anemia presents to the [facility] ED with constant  SOB, dry cough An echocardiogram showed a large pericardial effusion with signs of early tamponade.Had pericardial window-1000cc drained still draining-had HD yesterday-noted periods disorientataion-Telemetry episode AF at 170's now in sinus rhythm no dyspnea.    PMH -reviewed admission note, no change since admission  Heart failure: acute [ ] chronic [ ] acute or chronic [ ] diastolic [ ] systolic [ ] combined systolic and diastolic[ ]  ANNIE: ATN[ ] renal medullary necrosis [ ] CKD I [ ]CKDII [ ]CKD III [ ]CKD IV [ ]CKD V [ ]Other pathological lesions [ ]    MEDICATIONS  (STANDING):  amLODIPine   Tablet 10 milliGRAM(s) Oral daily  atorvastatin 80 milliGRAM(s) Oral at bedtime  epoetin raheem Injectable 68926 Unit(s) IV Push <User Schedule>  fenofibrate Tablet 145 milliGRAM(s) Oral daily  heparin  Injectable 5000 Unit(s) SubCutaneous every 8 hours  hydrALAZINE 50 milliGRAM(s) Oral three times a day  insulin lispro (HumaLOG) corrective regimen sliding scale   SubCutaneous three times a day before meals  metoprolol tartrate 25 milliGRAM(s) Oral two times a day  pantoprazole    Tablet 40 milliGRAM(s) Oral before breakfast  sertraline 50 milliGRAM(s) Oral daily  sodium bicarbonate 1300 milliGRAM(s) Oral two times a day  sodium chloride 0.9%. 1000 milliLiter(s) (10 mL/Hr) IV Continuous <Continuous>    MEDICATIONS  (PRN):  acetaminophen   Tablet .. 650 milliGRAM(s) Oral every 6 hours PRN Mild Pain (1 - 3)  dextrose 40% Gel 15 Gram(s) Oral once PRN Blood Glucose LESS THAN 70 milliGRAM(s)/deciliter  glucagon  Injectable 1 milliGRAM(s) IntraMuscular once PRN Glucose LESS THAN 70 milligrams/deciliter  melatonin 3 milliGRAM(s) Oral at bedtime PRN Insomnia  naloxone Injectable 0.1 milliGRAM(s) IV Push every 3 minutes PRN For ANY of the following changes in patient status:  A. RR LESS THAN 10 breaths per minute, B. Oxygen saturation LESS THAN 90%, C. Sedation score of 6  ondansetron Injectable 4 milliGRAM(s) IV Push every 6 hours PRN Nausea  ondansetron Injectable 4 milliGRAM(s) IV Push every 6 hours PRN Nausea and/or Vomiting          FAMILY HISTORY:  Family history of malignant neoplasm of gastrointestinal tract  Family history of lung cancer (Sibling)  Family history of diabetes mellitus  Family history of hypertension (Sibling)    No family history of premature coronary artery disease or sudden cardiac death      REVIEW OF SYSTEMS:  Constitutional: [ ] fever, [ ]weight loss,  [ ]fatigue  Eyes: [ ] visual changes  Respiratory: [ ]shortness of breath;  [ ] cough, [ ]wheezing, [ ]chills, [ ]hemoptysis  Cardiovascular: [ ] chest pain, [ ]palpitations, [ ]dizziness,  [ ]leg swelling[ ]orthopnea[ ]PND  Gastrointestinal: [ ] abdominal pain, [ ]nausea, [ ]vomiting,  [ ]diarrhea   Genitourinary: [ ] dysuria, [ ] hematuria  Neurologic: [ ] headaches [ ] tremors[ ]weakness  Skin: [ ] itching, [ ]burning, [ ] rashes  Endocrine: [ ] heat or cold intolerance  Musculoskeletal: [ ] joint pain or swelling; [ ] muscle, back, or extremity pain  Psychiatric: [ ] depression, [ ]anxiety, [ ]mood swings, or [ ]difficulty sleeping  Hematologic: [ ] easy bruising, [ ] bleeding gums    [x] All remaining systems negative except as per above.   [ ]Unable to obtain.    Vital Signs Last 24 Hrs  T(C): 36.7 (09 Dec 2018 07:49), Max: 37 (09 Dec 2018 02:00)  T(F): 98 (09 Dec 2018 07:49), Max: 98.6 (09 Dec 2018 02:00)  HR: 69 (09 Dec 2018 07:49) (69 - 160)  BP: 147/64 (09 Dec 2018 07:49) (135/57 - 170/80)  RR: 18 (09 Dec 2018 07:49) (17 - 18)  SpO2: 95% (09 Dec 2018 07:49) (95% - 100%)  I&O's Summary    08 Dec 2018 07:01  -  09 Dec 2018 07:00  --------------------------------------------------------  IN: 890 mL / OUT: 900 mL / NET: -10 mL        PHYSICAL EXAM:  General: No acute distress BMI-  HEENT: EOMI, PERRL  Neck: Supple, [ ] JVD  Lungs: Equal air entry bilaterally; [ ] rales [ ] wheezing [ ] rhonchi  Heart: Regular rate and rhythm; [x ] murmur  2 /6 [x ] systolic [ ] diastolic [ ] radiation[ ] rubs [ ]  gallops  Abdomen: Nontender, bowel sounds present  Extremities: No clubbing, cyanosis, [ ] edema  Nervous system:  Alert & Oriented X3, no focal deficits  Psychiatric: Normal affect  Skin: No rashes or lesions    LABS:  12-09    143  |  102  |  48<H>  ----------------------------<  132<H>  3.4<L>   |  20<L>  |  7.02<H>    Ca    8.1<L>      09 Dec 2018 02:10  Mg     2.2     12-09      Creatinine Trend: 7.02<--, 9.14<--, 7.91<--, 7.17<--, 6.46<--, 6.26<--                        8.0    10.23 )-----------( 223      ( 09 Dec 2018 02:10 )             26.3     PT/INR - ( 09 Dec 2018 02:10 )   PT: 12.2 SEC;   INR: 1.07          PTT - ( 09 Dec 2018 02:10 )  PTT:29.6 SEC  Lipid Panel:   Cardiac Enzymes:             IMPRESSION AND PLAN:    77 y/o  Female, with a PMHx of CKD (fistula placed June 2018, s/p 2 balloons, last one 2 weeks ago, not on dialysis yet), DM, HTN, HLD, anemia presents to the [facility] ED with constant  SOB, dry cough and clear rhinorrhea x 2 days.   Problem/Plan - 1:  ·  Problem: Pulmonary edema.  Plan:Likely fluid overload 2/2 ESRD  Continue diuresis  TTE-Large pericardial effusion with early tamponade physiology-s/p pericardial window  PAF-now in sinus rhythm increase Metoprolol 50mg BID.  EP consult Dr Tavo childress    Problem/Plan - 2:  ·  Problem: Symptomatic anemia.  Plan: s/p 1 unit PRBC, monitor H/H, anemia w/u.     Problem/Plan - 3:  ·  Problem: Acute on chronic renal failure.  Plan: GFR 7%, Started on HD  Problem/Plan - 4:  ·  Problem: DM (diabetes mellitus).  Plan: RISS    Problem/Plan - 5:  ·  Problem: HTN (hypertension).  Plan: Monitor BPs, continue home medications, Amlodipine, Hydralazine,

## 2018-12-09 NOTE — PROGRESS NOTE ADULT - ASSESSMENT
· Assessment	  77 y/o  Female, with a PMHx of CKD (fistula June 2018, s/p 2 balloons, last one 2 weeks ago, no dialysis yet), DM, HTN, HLD, anemia presents to the [facility] ED constant SOB, dry cough and clear rhinorrhea x 2 days associated with one episode of vomiting, darken stools and increased urinary frequency admitted to telemetry for Pulmonary Edema/ Anemia/ Acute on Chronic Renal Failure.    EKG- NSR @ 84 TWI I, L, v5-6  Tele monitor- run of SVT @ 168      Problem/Plan - 1:  ·  Problem: Pericardial Effusion:    S/p pericardial window/ 1000 cc fluids removed.     Problem/Plan - 2:  ·  Problem: Symptomatic anemia.  Plan: Hb 7.9.     Problem/Plan - 3:  ·  Problem: ANNIE on Advanced CKD:   Plan: For HD. Nephro f/up noted. IR for permacath     Problem/Plan - 4:  ·  Problem: DM (diabetes mellitus).  Plan: FSSS     Problem/Plan - 5:  ·  Problem: HTN (hypertension).  Plan:  Cont. Amlodipine, Hydralazine, Furosemide.      Problem/Plan - 6:  Problem: Hypercholesteremia. Plan:  Continue home medication, Rosuvastatin, Fenofibrate.    Dw family.

## 2018-12-10 LAB
ALBUMIN SERPL ELPH-MCNC: 3.5 G/DL — SIGNIFICANT CHANGE UP (ref 3.3–5)
ALP SERPL-CCNC: 48 U/L — SIGNIFICANT CHANGE UP (ref 40–120)
ALT FLD-CCNC: < 4 U/L — LOW (ref 4–33)
AST SERPL-CCNC: 23 U/L — SIGNIFICANT CHANGE UP (ref 4–32)
BILIRUB DIRECT SERPL-MCNC: 0.1 MG/DL — SIGNIFICANT CHANGE UP (ref 0.1–0.2)
BILIRUB SERPL-MCNC: 0.2 MG/DL — SIGNIFICANT CHANGE UP (ref 0.2–1.2)
BUN SERPL-MCNC: 61 MG/DL — HIGH (ref 7–23)
CALCIUM SERPL-MCNC: 7.9 MG/DL — LOW (ref 8.4–10.5)
CHLORIDE SERPL-SCNC: 101 MMOL/L — SIGNIFICANT CHANGE UP (ref 98–107)
CO2 SERPL-SCNC: 20 MMOL/L — LOW (ref 22–31)
CREAT SERPL-MCNC: 7.55 MG/DL — HIGH (ref 0.5–1.3)
GLUCOSE BLDC GLUCOMTR-MCNC: 102 MG/DL — HIGH (ref 70–99)
GLUCOSE BLDC GLUCOMTR-MCNC: 107 MG/DL — HIGH (ref 70–99)
GLUCOSE BLDC GLUCOMTR-MCNC: 116 MG/DL — HIGH (ref 70–99)
GLUCOSE BLDC GLUCOMTR-MCNC: 154 MG/DL — HIGH (ref 70–99)
GLUCOSE BLDC GLUCOMTR-MCNC: 99 MG/DL — SIGNIFICANT CHANGE UP (ref 70–99)
GLUCOSE SERPL-MCNC: 120 MG/DL — HIGH (ref 70–99)
HCT VFR BLD CALC: 27 % — LOW (ref 34.5–45)
HGB BLD-MCNC: 8.3 G/DL — LOW (ref 11.5–15.5)
MAGNESIUM SERPL-MCNC: 2.3 MG/DL — SIGNIFICANT CHANGE UP (ref 1.6–2.6)
MCHC RBC-ENTMCNC: 22.8 PG — LOW (ref 27–34)
MCHC RBC-ENTMCNC: 30.7 % — LOW (ref 32–36)
MCV RBC AUTO: 74.2 FL — LOW (ref 80–100)
NON-GYNECOLOGICAL CYTOLOGY STUDY: SIGNIFICANT CHANGE UP
NRBC # FLD: 0.02 — SIGNIFICANT CHANGE UP
PLATELET # BLD AUTO: 267 K/UL — SIGNIFICANT CHANGE UP (ref 150–400)
PMV BLD: 12.8 FL — SIGNIFICANT CHANGE UP (ref 7–13)
POTASSIUM SERPL-MCNC: 3.4 MMOL/L — LOW (ref 3.5–5.3)
POTASSIUM SERPL-SCNC: 3.4 MMOL/L — LOW (ref 3.5–5.3)
PROT SERPL-MCNC: 6.8 G/DL — SIGNIFICANT CHANGE UP (ref 6–8.3)
RBC # BLD: 3.64 M/UL — LOW (ref 3.8–5.2)
RBC # FLD: 16.9 % — HIGH (ref 10.3–14.5)
SODIUM SERPL-SCNC: 141 MMOL/L — SIGNIFICANT CHANGE UP (ref 135–145)
WBC # BLD: 9 K/UL — SIGNIFICANT CHANGE UP (ref 3.8–10.5)
WBC # FLD AUTO: 9 K/UL — SIGNIFICANT CHANGE UP (ref 3.8–10.5)

## 2018-12-10 RX ORDER — POTASSIUM CHLORIDE 20 MEQ
40 PACKET (EA) ORAL ONCE
Qty: 0 | Refills: 0 | Status: COMPLETED | OUTPATIENT
Start: 2018-12-10 | End: 2018-12-10

## 2018-12-10 RX ORDER — POTASSIUM CHLORIDE 20 MEQ
40 PACKET (EA) ORAL ONCE
Qty: 0 | Refills: 0 | Status: DISCONTINUED | OUTPATIENT
Start: 2018-12-10 | End: 2018-12-10

## 2018-12-10 RX ORDER — METOPROLOL TARTRATE 50 MG
12.5 TABLET ORAL ONCE
Qty: 0 | Refills: 0 | Status: DISCONTINUED | OUTPATIENT
Start: 2018-12-10 | End: 2018-12-11

## 2018-12-10 RX ORDER — METOPROLOL TARTRATE 50 MG
25 TABLET ORAL ONCE
Qty: 0 | Refills: 0 | Status: COMPLETED | OUTPATIENT
Start: 2018-12-10 | End: 2018-12-10

## 2018-12-10 RX ADMIN — Medication 145 MILLIGRAM(S): at 13:00

## 2018-12-10 RX ADMIN — Medication 25 MILLIGRAM(S): at 13:00

## 2018-12-10 RX ADMIN — HEPARIN SODIUM 5000 UNIT(S): 5000 INJECTION INTRAVENOUS; SUBCUTANEOUS at 05:01

## 2018-12-10 RX ADMIN — ERYTHROPOIETIN 20000 UNIT(S): 10000 INJECTION, SOLUTION INTRAVENOUS; SUBCUTANEOUS at 09:01

## 2018-12-10 RX ADMIN — HEPARIN SODIUM 5000 UNIT(S): 5000 INJECTION INTRAVENOUS; SUBCUTANEOUS at 13:00

## 2018-12-10 RX ADMIN — Medication 50 MILLIGRAM(S): at 13:00

## 2018-12-10 RX ADMIN — ATORVASTATIN CALCIUM 80 MILLIGRAM(S): 80 TABLET, FILM COATED ORAL at 21:04

## 2018-12-10 RX ADMIN — HEPARIN SODIUM 5000 UNIT(S): 5000 INJECTION INTRAVENOUS; SUBCUTANEOUS at 21:04

## 2018-12-10 RX ADMIN — SERTRALINE 50 MILLIGRAM(S): 25 TABLET, FILM COATED ORAL at 12:59

## 2018-12-10 RX ADMIN — Medication 3 MILLIGRAM(S): at 21:08

## 2018-12-10 RX ADMIN — Medication 40 MILLIEQUIVALENT(S): at 12:58

## 2018-12-10 RX ADMIN — Medication 1: at 17:47

## 2018-12-10 RX ADMIN — Medication 50 MILLIGRAM(S): at 17:48

## 2018-12-10 RX ADMIN — Medication 1300 MILLIGRAM(S): at 13:00

## 2018-12-10 RX ADMIN — Medication 50 MILLIGRAM(S): at 21:04

## 2018-12-10 RX ADMIN — PANTOPRAZOLE SODIUM 40 MILLIGRAM(S): 20 TABLET, DELAYED RELEASE ORAL at 05:01

## 2018-12-10 RX ADMIN — Medication 50 MILLIGRAM(S): at 05:39

## 2018-12-10 NOTE — CHART NOTE - NSCHARTNOTEFT_GEN_A_CORE
Patient noted to be rapid Afib to 140s while asleep, will order a dose of 12.5mg Metoprolol PO and monitor for reaction to Rx.    T(C): 36.8 (12-10-18 @ 23:07), Max: 37 (12-10-18 @ 05:00)  HR: 130 (12-10-18 @ 23:07) (64 - 140)  BP: 107/57 (12-10-18 @ 23:07) (107/57 - 158/48)  RR: 18 (12-10-18 @ 23:07) (18 - 18)  SpO2: 100% (12-10-18 @ 23:07) (95% - 100%)  Wt(kg): -- Patient noted to be rapid Afib to 140s while asleep, ordered a dose of 12.5mg Metoprolol PO but patient converted to Sinus shortly after so BB was not given.  Continue to monitor.    T(C): 36.8 (12-10-18 @ 23:07), Max: 37 (12-10-18 @ 05:00)  HR: 130 (12-10-18 @ 23:07) (64 - 140)  BP: 107/57 (12-10-18 @ 23:07) (107/57 - 158/48)  RR: 18 (12-10-18 @ 23:07) (18 - 18)  SpO2: 100% (12-10-18 @ 23:07) (95% - 100%)  Wt(kg): --

## 2018-12-10 NOTE — PROGRESS NOTE ADULT - SUBJECTIVE AND OBJECTIVE BOX
PRESENTING CC:Dyspnea-ESRD    SUBJ: 77 y/o  Female, with a PMHx of CKD (fistula placed June 2018, s/p 2 balloons, last one 2 weeks ago, not on dialysis yet), T2DM, HTN, HLD, anemia presents to the [facility] ED with constant  SOB, dry cough An echocardiogram showed a large pericardial effusion with signs of early tamponade.Had pericardial window-1000cc drained still draining-undergoing HD-no dyspnea-had episode PAF earlier.    PMH -reviewed admission note, no change since admission  Heart failure: acute [ ] chronic [ ] acute or chronic [ ] diastolic [ ] systolic [ ] combined systolic and diastolic[ ]  ANNIE: ATN[ ] renal medullary necrosis [ ] CKD I [ ]CKDII [ ]CKD III [ ]CKD IV [ ]CKD V [ ]Other pathological lesions [ ]    MEDICATIONS  (STANDING):  atorvastatin 80 milliGRAM(s) Oral at bedtime  epoetin raheem Injectable 16334 Unit(s) IV Push <User Schedule>  fenofibrate Tablet 145 milliGRAM(s) Oral daily  heparin  Injectable 5000 Unit(s) SubCutaneous every 8 hours  hydrALAZINE 50 milliGRAM(s) Oral three times a day  insulin lispro (HumaLOG) corrective regimen sliding scale   SubCutaneous three times a day before meals  metoprolol tartrate 50 milliGRAM(s) Oral two times a day  pantoprazole    Tablet 40 milliGRAM(s) Oral before breakfast  sertraline 50 milliGRAM(s) Oral daily  sodium bicarbonate 1300 milliGRAM(s) Oral two times a day  sodium chloride 0.9%. 1000 milliLiter(s) (10 mL/Hr) IV Continuous <Continuous>    MEDICATIONS  (PRN):  acetaminophen   Tablet .. 650 milliGRAM(s) Oral every 6 hours PRN Mild Pain (1 - 3)  dextrose 40% Gel 15 Gram(s) Oral once PRN Blood Glucose LESS THAN 70 milliGRAM(s)/deciliter  glucagon  Injectable 1 milliGRAM(s) IntraMuscular once PRN Glucose LESS THAN 70 milligrams/deciliter  melatonin 3 milliGRAM(s) Oral at bedtime PRN Insomnia  naloxone Injectable 0.1 milliGRAM(s) IV Push every 3 minutes PRN For ANY of the following changes in patient status:  A. RR LESS THAN 10 breaths per minute, B. Oxygen saturation LESS THAN 90%, C. Sedation score of 6  ondansetron Injectable 4 milliGRAM(s) IV Push every 6 hours PRN Nausea and/or Vomiting          FAMILY HISTORY:  Family history of malignant neoplasm of gastrointestinal tract  Family history of lung cancer (Sibling)  Family history of diabetes mellitus  Family history of hypertension (Sibling)  No family history of premature coronary artery disease or sudden cardiac death      REVIEW OF SYSTEMS:  Constitutional: [ ] fever, [ ]weight loss,  [ ]fatigue  Eyes: [ ] visual changes  Respiratory: [ ]shortness of breath;  [ ] cough, [ ]wheezing, [ ]chills, [ ]hemoptysis  Cardiovascular: [ ] chest pain, [ ]palpitations, [ ]dizziness,  [ ]leg swelling[x ]orthopnea[ ]PND  Gastrointestinal: [ ] abdominal pain, [ ]nausea, [ ]vomiting,  [ ]diarrhea   Genitourinary: [ ] dysuria, [ ] hematuria  Neurologic: [ ] headaches [ ] tremors[ ]weakness  Skin: [ ] itching, [ ]burning, [ ] rashes  Endocrine: [ ] heat or cold intolerance  Musculoskeletal: [ ] joint pain or swelling; [ ] muscle, back, or extremity pain  Psychiatric: [ ] depression, [ ]anxiety, [ ]mood swings, or [ ]difficulty sleeping  Hematologic: [ ] easy bruising, [ ] bleeding gums    [x] All remaining systems negative except as per above.   [ ]Unable to obtain.    Vital Signs Last 24 Hrs  T(C): 37 (10 Dec 2018 05:00), Max: 37.1 (09 Dec 2018 19:37)  T(F): 98.6 (10 Dec 2018 05:00), Max: 98.8 (09 Dec 2018 19:37)  HR: 89 (10 Dec 2018 05:50) (68 - 155)  BP: 145/57 (10 Dec 2018 05:37) (144/56 - 156/62)  RR: 18 (10 Dec 2018 05:00) (18 - 18)  SpO2: 100% (10 Dec 2018 05:00) (95% - 100%)  I&O's Summary    09 Dec 2018 07:01  -  10 Dec 2018 07:00  --------------------------------------------------------  IN: 0 mL / OUT: 480 mL / NET: -480 mL        PHYSICAL EXAM:  General: No acute distress BMI-20.7  HEENT: EOMI, PERRL  Neck: Supple, [ ] JVD  Lungs: Equal air entry bilaterally; [ ] rales [ ] wheezing [ ] rhonchi  Heart: Regular rate and rhythm; [x ] murmur   2/6 [x ] systolic [ ] diastolic [ ] radiation[ ] rubs [ ]  gallops  Abdomen: Nontender, bowel sounds present  Extremities: No clubbing, cyanosis, [ ] edema  Nervous system:  Alert & Oriented X3, no focal deficits  Psychiatric: Normal affect  Skin: No rashes or lesions    LABS:  12-09    143  |  102  |  48<H>  ----------------------------<  132<H>  3.4<L>   |  20<L>  |  7.02<H>    Ca    8.1<L>      09 Dec 2018 02:10  Mg     2.2     12-09      Creatinine Trend: 7.02<--, 9.14<--, 7.91<--, 7.17<--, 6.46<--, 6.26<--                        8.0    10.23 )-----------( 223      ( 09 Dec 2018 02:10 )             26.3     PT/INR - ( 09 Dec 2018 02:10 )   PT: 12.2 SEC;   INR: 1.07     PTT - ( 09 Dec 2018 02:10 )  PTT:29.6 SEC    Pleural Fluid-No growth    RADIOLOGY: XR CHEST PORTABLE   Dec  8 2018   IMPRESSION:    1. Status post pericardial window with drain in place.  2. Post hemodialysis catheter placement.  3. Clear lungs.      TELEMETRY:Sinus Rhythm- PAF      IMPRESSION AND PLAN:    77 y/o  Female, with a PMHx of CKD (fistula placed June 2018, s/p 2 balloons, last one 2 weeks ago, not on dialysis yet), DM, HTN, HLD, anemia presents to the [facility] ED with constant  SOB, dry cough and clear rhinorrhea x 2 days.Noted large pericardial effusion s/p window-still with drainage        Problem/Plan - 1:  ·  Problem: Pulmonary edema.  Plan:Likely fluid overload 2/2 ESRD  Continue diuresis  TTE-Large pericardial effusion with early tamponade physiology-s/p pericardial window  PAF-now in sinus rhythm increase Metoprolol 50mg BID.  EP consult     Problem/Plan - 2:  ·  Problem: Symptomatic anemia.  Plan: s/p 1 unit PRBC, monitor H/H      Problem/Plan - 3:  ·  Problem: Acute on chronic renal failure.  Plan:ESRD-- GFR 7%, Started on HD      Problem/Plan - 4:  ·  Problem: DM (diabetes mellitus).  Plan:  Hemoglobin A1C, Whole Blood: 5.7%  RISS      Problem/Plan - 5:  ·  Problem: HTN (hypertension).  Plan: Monitor BPs, continue home medications, Hydralazine, Metoprolol

## 2018-12-10 NOTE — PROGRESS NOTE ADULT - ASSESSMENT
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 Found to have large pericardial effusion. s/p pericardial window.

## 2018-12-10 NOTE — PROGRESS NOTE ADULT - PROBLEM SELECTOR PLAN 1
babak on advanced ckd now likely esrd with dialysis dependence  s/p 1st HD session 12/8/18 via right IJ shiley  HD today uneventful, plan for repeat HD tomorrow with 0.5kg UF planned.  Can dc sodium bicarb tabs   f/u vascular for immature avf  output hd set up during weekday, preferably at [facility], as her primary outpt nephrologist is there.

## 2018-12-10 NOTE — CONSULT NOTE ADULT - ATTENDING COMMENTS
EP ATTENDING    Patient seen and examined. Agree with above. Has PAF with elevated chads-vasc, but is currently off a/c given large pericardial effusion. Given no a/c can't pursue a rhythm control strategy at this time (i.e. amio, etc.). Therefore simply recommend continuing beta blockers (she's currently in NSR). Hopefully a/c can be started prior to discharge, and if not then she's a good candidate for left atrial appendage occlusion with Watchman.

## 2018-12-10 NOTE — PROGRESS NOTE ADULT - SUBJECTIVE AND OBJECTIVE BOX
Nephrology Followup Note - [phone] - Dr Hicks / Dr Hughes / Dr Storey / Dr Avina / Dr Patel / Dr Brannon / Dr Madrigal / Dr Kolb  Pt seen and examined at bedside  No acute events overnight. Pt without new complaints. Came from HD earlier today.     Allergies:  No Known Allergies    Hospital Medications:   MEDICATIONS  (STANDING):  atorvastatin 80 milliGRAM(s) Oral at bedtime  dextrose 5%. 1000 milliLiter(s) (50 mL/Hr) IV Continuous <Continuous>  dextrose 50% Injectable 12.5 Gram(s) IV Push once  dextrose 50% Injectable 25 Gram(s) IV Push once  dextrose 50% Injectable 25 Gram(s) IV Push once  epoetin raheem Injectable 52147 Unit(s) IV Push <User Schedule>  fenofibrate Tablet 145 milliGRAM(s) Oral daily  heparin  Injectable 5000 Unit(s) SubCutaneous every 8 hours  hydrALAZINE 50 milliGRAM(s) Oral three times a day  insulin lispro (HumaLOG) corrective regimen sliding scale   SubCutaneous three times a day before meals  metoprolol tartrate 50 milliGRAM(s) Oral two times a day  pantoprazole    Tablet 40 milliGRAM(s) Oral before breakfast  sertraline 50 milliGRAM(s) Oral daily  sodium bicarbonate 1300 milliGRAM(s) Oral two times a day  sodium chloride 0.9%. 1000 milliLiter(s) (10 mL/Hr) IV Continuous <Continuous    VITALS:  T(F): 98.4 (12-10-18 @ 12:24), Max: 98.8 (12-09-18 @ 19:37)  HR: 140 (12-10-18 @ 12:24)  BP: 136/66 (12-10-18 @ 12:24)  RR: 18 (12-10-18 @ 12:24)  SpO2: 100% (12-10-18 @ 12:24)  Wt(kg): --    12-09 @ 07:01  -  12-10 @ 07:00  --------------------------------------------------------  IN: 0 mL / OUT: 480 mL / NET: -480 mL    12-10 @ 07:01  -  12-10 @ 15:50  --------------------------------------------------------  IN: 400 mL / OUT: 420 mL / NET: -20 mL    PHYSICAL EXAM:  Constitutional: NAD  HEENT: anicteric sclera, oropharynx clear, MMM  Neck: No JVD  Respiratory: CTAB, no wheezes, rales or rhonchi  Cardiovascular: S1, S2, RRR, pericardial drain   Gastrointestinal: BS+, soft, NT/ND  Extremities: No cyanosis or clubbing. No peripheral edema  Neurological: A/O x 3, no focal deficits  Psychiatric: Normal mood, normal affect  : No CVA tenderness. No calvin.   Skin: No rashes  Vascular Access: IJ Tunneled cath     LABS:  12-10    141  |  101  |  61<H>  ----------------------------<  120<H>  3.4<L>   |  20<L>  |  7.55<H>    Ca    7.9<L>      10 Dec 2018 06:45  Mg     2.3     12-10      Creatinine Trend: 7.55 <--, 7.02 <--, 9.14 <--, 7.91 <--, 7.17 <--, 6.46 <--, 6.26 <--                        8.3    9.00  )-----------( 267      ( 10 Dec 2018 06:45 )             27.0     Urine Studies:      RADIOLOGY & ADDITIONAL STUDIES:

## 2018-12-10 NOTE — PROGRESS NOTE ADULT - SUBJECTIVE AND OBJECTIVE BOX
Patient is a 78y old  Female who presents with a chief complaint of Orthopnea x 2 days (10 Dec 2018 15:50)      SUBJECTIVE / OVERNIGHT EVENTS:    Events noted.  Feels better.  CONSTITUTIONAL: No fever,  or fatigue  NECK: No pain or stiffness  RESPIRATORY: No cough, wheezing, chills or hemoptysis; No shortness of breath  CARDIOVASCULAR: No chest pain, palpitations, dizziness, or leg swelling  GASTROINTESTINAL: No abdominal or epigastric pain. No nausea, vomiting, or hematemesis; No diarrhea or constipation.       MEDICATIONS  (STANDING):  atorvastatin 80 milliGRAM(s) Oral at bedtime  dextrose 5%. 1000 milliLiter(s) (50 mL/Hr) IV Continuous <Continuous>  dextrose 50% Injectable 12.5 Gram(s) IV Push once  dextrose 50% Injectable 25 Gram(s) IV Push once  dextrose 50% Injectable 25 Gram(s) IV Push once  epoetin raheem Injectable 33947 Unit(s) IV Push <User Schedule>  fenofibrate Tablet 145 milliGRAM(s) Oral daily  heparin  Injectable 5000 Unit(s) SubCutaneous every 8 hours  hydrALAZINE 50 milliGRAM(s) Oral three times a day  insulin lispro (HumaLOG) corrective regimen sliding scale   SubCutaneous three times a day before meals  metoprolol tartrate 50 milliGRAM(s) Oral two times a day  pantoprazole    Tablet 40 milliGRAM(s) Oral before breakfast  sertraline 50 milliGRAM(s) Oral daily  sodium chloride 0.9%. 1000 milliLiter(s) (10 mL/Hr) IV Continuous <Continuous>    MEDICATIONS  (PRN):  acetaminophen   Tablet .. 650 milliGRAM(s) Oral every 6 hours PRN Mild Pain (1 - 3)  dextrose 40% Gel 15 Gram(s) Oral once PRN Blood Glucose LESS THAN 70 milliGRAM(s)/deciliter  glucagon  Injectable 1 milliGRAM(s) IntraMuscular once PRN Glucose LESS THAN 70 milligrams/deciliter  melatonin 3 milliGRAM(s) Oral at bedtime PRN Insomnia  ondansetron Injectable 4 milliGRAM(s) IV Push every 6 hours PRN Nausea and/or Vomiting        CAPILLARY BLOOD GLUCOSE      POCT Blood Glucose.: 107 mg/dL (10 Dec 2018 21:48)  POCT Blood Glucose.: 154 mg/dL (10 Dec 2018 17:42)  POCT Blood Glucose.: 102 mg/dL (10 Dec 2018 12:48)  POCT Blood Glucose.: 99 mg/dL (10 Dec 2018 09:05)  POCT Blood Glucose.: 116 mg/dL (10 Dec 2018 05:47)    I&O's Summary    09 Dec 2018 07:01  -  10 Dec 2018 07:00  --------------------------------------------------------  IN: 0 mL / OUT: 480 mL / NET: -480 mL    10 Dec 2018 07:01  -  10 Dec 2018 22:28  --------------------------------------------------------  IN: 400 mL / OUT: 640 mL / NET: -240 mL        PHYSICAL EXAM:  GENERAL: NAD  NECK: Supple, No JVD  CHEST/LUNG: Clear to auscultation bilaterally; No wheezing.  HEART: Regular rate and rhythm; No murmurs, rubs, or gallops  ABDOMEN: Soft, Nontender, Nondistended; Bowel sounds present  EXTREMITIES:   No clubbing, cyanosis, or edema  NEUROLOGY: AAO X 3      LABS:                        8.3    9.00  )-----------( 267      ( 10 Dec 2018 06:45 )             27.0     12-10    141  |  101  |  61<H>  ----------------------------<  120<H>  3.4<L>   |  20<L>  |  7.55<H>    Ca    7.9<L>      10 Dec 2018 06:45  Mg     2.3     12-10    TPro  6.8  /  Alb  3.5  /  TBili  0.2  /  DBili  0.1  /  AST  23  /  ALT  < 4<L>  /  AlkPhos  48  12-10    PT/INR - ( 09 Dec 2018 02:10 )   PT: 12.2 SEC;   INR: 1.07          PTT - ( 09 Dec 2018 02:10 )  PTT:29.6 SEC        CAPILLARY BLOOD GLUCOSE      POCT Blood Glucose.: 107 mg/dL (10 Dec 2018 21:48)  POCT Blood Glucose.: 154 mg/dL (10 Dec 2018 17:42)  POCT Blood Glucose.: 102 mg/dL (10 Dec 2018 12:48)  POCT Blood Glucose.: 99 mg/dL (10 Dec 2018 09:05)  POCT Blood Glucose.: 116 mg/dL (10 Dec 2018 05:47)    12-07 @ 16:28  Culture-urine --  Culture results --  method type --  Organism --  Organism Identification --  Specimen source OTHER  12-07 @ 16:23  Culture-urine --  Culture results --  method type --  Organism --  Organism Identification --  Specimen source PERICARDIAL FLUID           12-07 @ 16:28  Culture blood --  Culture results --  Gram stain --  Gram stain blood --  Method type --  Organism --  Organism identification --  Specimen source OTHER   12-07 @ 16:23  Culture blood --  Culture results --  Gram stain --  Gram stain blood --  Method type --  Organism --  Organism identification --  Specimen source PERICARDIAL FLUID      RADIOLOGY & ADDITIONAL TESTS:    Imaging Personally Reviewed:    Consultant(s) Notes Reviewed:      Care Discussed with Consultants/Other Providers:

## 2018-12-10 NOTE — PROGRESS NOTE ADULT - ASSESSMENT
· Assessment	  77 y/o  Female, with a PMHx of CKD (fistula June 2018, s/p 2 balloons, last one 2 weeks ago, no dialysis yet), DM, HTN, HLD, anemia presents to the [facility] ED constant SOB, dry cough and clear rhinorrhea x 2 days associated with one episode of vomiting, darken stools and increased urinary frequency admitted to telemetry for Pulmonary Edema/ Anemia/ Acute on Chronic Renal Failure.      PAF: Tele  EP eval noted./Metoprolol     Problem/Plan - 1:  ·  Problem: Pericardial Effusion:    S/p pericardial window/ 1000 cc fluids removed.     Problem/Plan - 2:  ·  Problem: Symptomatic anemia.  Plan: Hb 7.9.     Problem/Plan - 3:  ·  Problem: ANNIE on Advanced CKD:   Plan: For HD. Nephro f/up noted. IR for permacath     Problem/Plan - 4:  ·  Problem: DM (diabetes mellitus).  Plan: FSSS     Problem/Plan - 5:  ·  Problem: HTN (hypertension).  Plan:  Cont. Amlodipine, Hydralazine, Furosemide.      Problem/Plan - 6:  Problem: Hypercholesteremia. Plan:  Continue home medication, Rosuvastatin, Fenofibrate.    Dw family.

## 2018-12-10 NOTE — CONSULT NOTE ADULT - SUBJECTIVE AND OBJECTIVE BOX
HPI: This 78 year old female, with a PMHx of CKD (AVF placed 6/2018; not on HD yet), DM, HTN, HLD, anemia was admitted 12/5 from the [facility] ED with c/o  SOB, dry cough and clear rhinorrhea x 2 days. The SOB worsened when lying down and the pt had been using 3-4 pillows with no improvement. Pt also reports BOLAÑOS (after 1/2 block) She denies having had these symptoms previously. An echocardiogram showed a large pericardial effusion with signs of early tamponade.  She had also had 1 u PRBCs for anemia during this admission.    PAST MEDICAL & SURGICAL HISTORY:  Anemia  CKD (chronic kidney disease)  Hypercholesteremia  HTN (hypertension)  DM (diabetes mellitus)  H/O colonoscopy with polypectomy  AV fistula: June 2018, 2 ballooning (last one 2 weeks ago), following up on Dec 15th  Status post right foot surgery: hammer toe repair      REVIEW OF SYSTEMS  General: Recent weight change due to decreased appetite; No HA/ Dizziness	  Skin/Breast: No Rashes/ Lesions/ Masses  Ophthalmologic: No Blurry vision/ Glaucoma/ Blindness  ENMT: No Hearing loss/ Drainage/ Lesions	  Respiratory and Thorax: No Cough/ Wheezing/ +SOB/ +BOLAÑOS/ No Hemoptysis/ Sputum production  Cardiovascular: No Chest pain/ Palpitations/ Diaphoresis	  Gastrointestinal: No Nausea/ Vomiting/ Constipation/ + Appetite Change	  Genitourinary: No Hematuria/ Dysuria/ Frequency change/ Impotence	  Musculoskeletal: No Pain/ Weakness/ Claudication	  Neurological: No Seizures/ TIA/ CVA/ Paresthesias	  Psychiatric: No Dementia/ Depression/ SI/ HI	  Hematology/Lymphatics: No hx of bleeding/ Edema	  Endocrine: No Hyperglycemia/ Hypoglycemia  Allergic/Immunologic: No Anaphylaxis/ Intolerance/ Recent illnesses    MEDICATIONS  (STANDING):  amLODIPine   Tablet 10 milliGRAM(s) Oral daily  aspirin enteric coated 81 milliGRAM(s) Oral daily  atorvastatin 80 milliGRAM(s) Oral at bedtime  dextrose 5%. 1000 milliLiter(s) (50 mL/Hr) IV Continuous <Continuous>  dextrose 50% Injectable 12.5 Gram(s) IV Push once  dextrose 50% Injectable 25 Gram(s) IV Push once  dextrose 50% Injectable 25 Gram(s) IV Push once  epoetin raheem Injectable 19476 Unit(s) SubCutaneous every 7 days  fenofibrate Tablet 145 milliGRAM(s) Oral daily  furosemide   Injectable 80 milliGRAM(s) IV Push two times a day  heparin  Injectable 5000 Unit(s) SubCutaneous every 8 hours  hydrALAZINE 50 milliGRAM(s) Oral three times a day  insulin lispro (HumaLOG) corrective regimen sliding scale   SubCutaneous three times a day before meals  pantoprazole    Tablet 40 milliGRAM(s) Oral before breakfast  sertraline 50 milliGRAM(s) Oral daily  sodium bicarbonate 1300 milliGRAM(s) Oral two times a day    MEDICATIONS  (PRN):  dextrose 40% Gel 15 Gram(s) Oral once PRN Blood Glucose LESS THAN 70 milliGRAM(s)/deciliter  glucagon  Injectable 1 milliGRAM(s) IntraMuscular once PRN Glucose LESS THAN 70 milligrams/deciliter  meclizine 25 milliGRAM(s) Oral daily PRN for dizziness  melatonin 3 milliGRAM(s) Oral at bedtime PRN Insomnia  ondansetron Injectable 4 milliGRAM(s) IV Push every 6 hours PRN Nausea and/or Vomiting      Allergies  No Known Allergies    FAMILY HISTORY:  Family history of malignant neoplasm of gastrointestinal tract  Family history of lung cancer (Sibling)  Family history of diabetes mellitus  Family history of hypertension (Sibling)      Vital Signs Last 24 Hrs  T(C): 36.9 (06 Dec 2018 22:03), Max: 37.1 (06 Dec 2018 12:37)  T(F): 98.5 (06 Dec 2018 22:03), Max: 98.8 (06 Dec 2018 12:37)  HR: 78 (06 Dec 2018 22:03) (78 - 88)  BP: 151/68 (06 Dec 2018 22:03) (148/57 - 159/61)  RR: 16 (06 Dec 2018 22:03) (16 - 18)  SpO2: 99% (06 Dec 2018 22:03) (99% - 100%)    General: WN/WD NAD; Thin  Neurology: Awake, nonfocal, CUTLER x 4  Eyes: Scleras clear, PERRLA/ EOMI, Gross vision intact  ENT: Gross hearing intact, grossly patent pharynx, no stridor  Neck: Neck supple, trachea midline, No JVD,   Respiratory: CTA B/L, No wheezing, rales, rhonchi; No dyspnea; No use of accessory muscles  CV: RR, S1 S2 audible but faint  Abdominal: Soft, NT, ND +BS,   Extremities: No edema, + peripheral pulses; AVF  Skin: No Rashes, Hematoma, Ecchymosis  Lymphatic: No Neck, axilla, groin LAD  Psych: Oriented x 3, normal affect      LABS:                        7.4    6.16  )-----------( 196      ( 06 Dec 2018 05:10 )             23.9     12-06    141  |  108<H>  |  39<H>  ----------------------------<  100<H>  3.6   |  15<L>  |  7.17<H>    Ca    7.2<L>      06 Dec 2018 05:10  Phos  5.6     12-06  Mg     1.9     12-06    TPro  7.0  /  Alb  3.5  /  TBili  0.4  /  DBili  x   /  AST  16  /  ALT  6   /  AlkPhos  57  12-06      Echo: Large pericardial effusion, measuring about  3.1 cm  inferolateral to the LV.  Large pericardial effusion (about   2.7 cm) lateral to the LV.  Moderate pericardial effusion  (about  1.7 cm) posterior to the left ventricle.  Small  pericardial effusion anterior to the RV and superior to the  right atrium.  The right ventricle appears somewhat  compressed in the subcostal view.  In addition, significant  respirophasic variability in the transmitral and  transtricuspid spectral Doppler signals is seen.  These  findings may be consistent with early tamponade physiology
CHIEF COMPLAINT:Dyspnea    HPI:-79 y/o  Female, with a PMHx of CKD (fistula placed June 2018, s/p 2 balloons, last one 2 weeks ago, not on dialysis yet), T2DM, HTN, HLD, anemia presents to the [facility] ED with constant  SOB, dry cough and clear rhinorrhea x 2 days. Pt reports SOB that worsens when laying down (uses 3-4 pillows with no relief). Pt states she has not slept the last 2 days. Pt also reports dyspnea on exertion (after 1/2 block) although pt states she can still go up the stairs in her home without taking a break (13 steps). Denies having these symptoms before. Pt states she has had decreased appetite for the last 6 months, associated with a 50lb weight loss. Pt states she has had a little leg swelling "recently." Denies any calf tenderness or pain. Pt reports 1 episode of vomiting earlier today while in the hospital as well as darkening of stool recently. Stool sample taken in ED (negative). Pt also reports increased urinary frequency although family reports she only used the bathroom once since 12:30am. Denies chest pain, pleuritic CP, palpitations, fever, chills, nausea, diarrhea, constipation, visual changes, hearing changes, HA or abdominal pain. Had episode SVT at 168bpm,no overnight events-Hx pericardial effusion 2015,no recent cardiac work-up          PAST MEDICAL & SURGICAL HISTORY:  Anemia  CKD (chronic kidney disease)  Hypercholesteremia  HTN (hypertension)  DM (diabetes mellitus)  H/O colonoscopy with polypectomy  AV fistula: June 2018, 2 ballooning (last one 2 weeks ago), following up on Dec 15th  Status post right foot surgery: hammer toe repair      MEDICATIONS  (STANDING):  amLODIPine   Tablet 10 milliGRAM(s) Oral daily  aspirin enteric coated 81 milliGRAM(s) Oral daily  atorvastatin 80 milliGRAM(s) Oral at bedtime  epoetin raheem Injectable 58215 Unit(s) SubCutaneous every 7 days  fenofibrate Tablet 145 milliGRAM(s) Oral daily  furosemide   Injectable 40 milliGRAM(s) IV Push two times a day  heparin  Injectable 5000 Unit(s) SubCutaneous every 8 hours  hydrALAZINE 50 milliGRAM(s) Oral three times a day  insulin lispro (HumaLOG) corrective regimen sliding scale   SubCutaneous three times a day before meals  pantoprazole    Tablet 40 milliGRAM(s) Oral before breakfast  sertraline 50 milliGRAM(s) Oral daily  sodium bicarbonate 1300 milliGRAM(s) Oral two times a day    MEDICATIONS  (PRN):  dextrose 40% Gel 15 Gram(s) Oral once PRN Blood Glucose LESS THAN 70 milliGRAM(s)/deciliter  glucagon  Injectable 1 milliGRAM(s) IntraMuscular once PRN Glucose LESS THAN 70 milligrams/deciliter  meclizine 25 milliGRAM(s) Oral daily PRN for dizziness  ondansetron Injectable 4 milliGRAM(s) IV Push every 6 hours PRN Nausea and/or Vomiting      FAMILY HISTORY:  Family history of malignant neoplasm of gastrointestinal tract  Family history of lung cancer (Sibling)  Family history of diabetes mellitus  Family history of hypertension (Sibling)  No family history of premature coronary artery disease or sudden cardiac death    SOCIAL HISTORY:  Smoking-Non Smoker  Alcohol-Denies  Ilicit Drug use-Denies    REVIEW OF SYSTEMS:  Constitutional: [ ] fever, [x ]weight loss, [x ]fatigue Activity [ ] Bedbound,[x ] Ambulates [ ] Unassisted[ ] Cane/Walker [x ] Assistence.  Eyes: [ ] visual changes  Respiratory: [x ]shortness of breath;  [ ] cough, [ ]wheezing, [ ]chills, [ ]hemoptysis  Cardiovascular: [ ] chest pain, [ ]palpitations, [ ]dizziness,  [ ]leg swelling[x ]orthopnea [ ]PND  Gastrointestinal: [ ] abdominal pain, [ ]nausea, [ ]vomiting,  [ ]diarrhea,[ ]constipation  Genitourinary: [ ] dysuria, [ ] hematuria  Neurologic: [ ] headaches [ ] tremors[ ] weakness  Skin: [ ] itching, [ ]burning, [ ] rashes  Endocrine: [ ] heat or cold intolerance  Musculoskeletal: [ ] joint pain or swelling; [ ] muscle, back, or extremity pain  Psychiatric: [ ] depression, [ ]anxiety, [ ]mood swings, or [ ]difficulty sleeping  Hematologic: [ ] easy bruising, [ ] bleeding gums       [ x] All others negative	  [ ] Unable to obtain    Vital Signs Last 24 Hrs  T(C): 36.5 (06 Dec 2018 05:19), Max: 36.9 (05 Dec 2018 08:18)  T(F): 97.7 (06 Dec 2018 05:19), Max: 98.4 (05 Dec 2018 08:18)  HR: 88 (06 Dec 2018 05:19) (73 - 88)  BP: 159/61 (06 Dec 2018 05:19) (125/48 - 173/69)  RR: 17 (06 Dec 2018 05:19) (16 - 20)  SpO2: 100% (06 Dec 2018 05:19) (98% - 100%)  I&O's Summary    05 Dec 2018 07:01  -  06 Dec 2018 06:38  --------------------------------------------------------  IN: 240 mL / OUT: 100 mL / NET: 140 mL        PHYSICAL EXAM:  General: No acute distress BMI-21  HEENT: EOMI, PERRL[ ] Icteric  Neck: Supple, No JVD  Lungs: Fair air entry bilaterally; [ ] Rales [ ] Rhonchi [ ] Wheezing  Heart: Regular rate and rhythm;[x ] Murmurs-  2 /6 [x ] Systolic [ ] Diastolic [ ] Radiation,No rubs, or gallops  Abdomen: Nontender, bowel sounds present  Extremities: No clubbing, cyanosis, or edema[ ] Calf tenderness  Nervous system:  Alert & Oriented X3, no focal deficits  Psychiatric: Normal affect  Skin: No rashes or lesions      LABS:  12-05    141  |  109<H>  |  36<H>  ----------------------------<  181<H>  3.5   |  13<L>  |  6.46<H>    Ca    6.8<L>      05 Dec 2018 16:35  Phos  4.9     12-05  Mg     1.8     12-05    TPro  7.2  /  Alb  3.7  /  TBili  0.7  /  DBili  x   /  AST  18  /  ALT  7   /  AlkPhos  60  12-05    Creatinine Trend: 6.46<--, 6.26<--                        7.8    6.99  )-----------( 178      ( 05 Dec 2018 16:35 )             25.2         Lipid Panel:   Cardiac Enzymes: CARDIAC MARKERS ( 05 Dec 2018 16:35 )  x     / x     / 571 u/L / x     / x          Serum Pro-Brain Natriuretic Peptide: 30046 pg/mL (12-05-18 @ 02:55)        RADIOLOGY:-XR CHEST AP IMPRESSION: Cardiomegaly with interstitial edema.      ECG [my interpretation]:Sinus rhythm at 84bpm,TWI lateral leads
CONSULT RESULT - VASCULAR SURGERY	    Consulting surgical team: C Team  Consulting attending: Dr. Garcia    HPI:  79 yo woman with a PMH of CKD stage V s/p left radiocephalic fistula placement in June 2018 s/p 2 balloon angioplasties (most recently 2 weeks ago to 6 mm), DM, HTN, HLD, anemia, admitted due to orthopnea and found to have pulmonary edema. Per nephrology, the patient does not currently require HD but is nearing the point of requiring HD, and would like to assess the readiness of the fistula for access. No access has yet been attempted. She denies any pain, numbness, or weakness in the left hand. She is scheduled to see Dr. Garcia as an outpatient next Thursday.      PAST MEDICAL HISTORY:  Anemia  CKD (chronic kidney disease)  Hypercholesteremia  HTN (hypertension)  DM (diabetes mellitus)      PAST SURGICAL HISTORY:  H/O colonoscopy with polypectomy  AV fistula  Status post right foot surgery      MEDICATIONS:  amLODIPine   Tablet 10 milliGRAM(s) Oral daily  aspirin enteric coated 81 milliGRAM(s) Oral daily  atorvastatin 80 milliGRAM(s) Oral at bedtime  dextrose 40% Gel 15 Gram(s) Oral once PRN  dextrose 5%. 1000 milliLiter(s) IV Continuous <Continuous>  dextrose 50% Injectable 12.5 Gram(s) IV Push once  dextrose 50% Injectable 25 Gram(s) IV Push once  dextrose 50% Injectable 25 Gram(s) IV Push once  epoetin raheem Injectable 47035 Unit(s) SubCutaneous every 7 days  fenofibrate Tablet 145 milliGRAM(s) Oral daily  furosemide   Injectable 80 milliGRAM(s) IV Push two times a day  glucagon  Injectable 1 milliGRAM(s) IntraMuscular once PRN  heparin  Injectable 5000 Unit(s) SubCutaneous every 8 hours  hydrALAZINE 50 milliGRAM(s) Oral three times a day  insulin lispro (HumaLOG) corrective regimen sliding scale   SubCutaneous three times a day before meals  meclizine 25 milliGRAM(s) Oral daily PRN  ondansetron Injectable 4 milliGRAM(s) IV Push every 6 hours PRN  pantoprazole    Tablet 40 milliGRAM(s) Oral before breakfast  sertraline 50 milliGRAM(s) Oral daily  sodium bicarbonate 1300 milliGRAM(s) Oral two times a day      ALLERGIES:  No Known Allergies      VITALS & I/Os:  Vital Signs Last 24 Hrs  T(C): 37.1 (06 Dec 2018 12:37), Max: 37.1 (06 Dec 2018 12:37)  T(F): 98.8 (06 Dec 2018 12:37), Max: 98.8 (06 Dec 2018 12:37)  HR: 86 (06 Dec 2018 12:37) (77 - 88)  BP: 148/57 (06 Dec 2018 12:37) (125/48 - 159/61)  BP(mean): --  RR: 18 (06 Dec 2018 12:37) (16 - 18)  SpO2: 100% (06 Dec 2018 12:37) (98% - 100%)    I&O's Summary    05 Dec 2018 07:01  -  06 Dec 2018 07:00  --------------------------------------------------------  IN: 240 mL / OUT: 100 mL / NET: 140 mL      PHYSICAL EXAM:  GEN: NAD, resting quietly  PULM: symmetric chest rise bilaterally, no increased WOB  CV: regular rate, peripheral pulses intact  ABD: soft, NTND  EXTR: left radiocephalic fistula with palpable thrill, radial pulses 2+ bilaterally    LABS:                        7.4    6.16  )-----------( 196      ( 06 Dec 2018 05:10 )             23.9     12-06    141  |  108<H>  |  39<H>  ----------------------------<  100<H>  3.6   |  15<L>  |  7.17<H>    Ca    7.2<L>      06 Dec 2018 05:10  Phos  5.6     12-06  Mg     1.9     12-06    TPro  7.0  /  Alb  3.5  /  TBili  0.4  /  DBili  x   /  AST  16  /  ALT  6   /  AlkPhos  57  12-06    Lactate:        CARDIAC MARKERS ( 05 Dec 2018 16:35 )  x     / x     / 571 u/L / x     / x        IMAGING:  no new imaging
HPI:  79 y/o  Female, with a PMHx of CKD (fistula placed June 2018, s/p 2 balloons, last one 2 weeks ago, not on dialysis yet), DM, HTN, HLD, anemia presents to the [facility] ED with constant  SOB, dry cough and clear rhinorrhea x 2 days. Echo revealed large pericardial effusion with early tamponade so a pericardial drain was placed.  She has been started on HD with improvement of her SOB.    EP consulted for PAF.  Noted with episodes of rapid PAF on tele, which is responding to increased metoprolol dose.  Pt/daughter deny hx of arryhtmia, CAD, CHF, TIA, CVA, GI bleed.  Report chronic anemia s/p PRBC transfused on admit.  She denies palpitations, near syncope or syncope.    PAST MEDICAL & SURGICAL HISTORY:  Anemia  CKD (chronic kidney disease)  Hypercholesteremia  HTN (hypertension)  DM (diabetes mellitus)  H/O colonoscopy with polypectomy  AV fistula: June 2018, 2 ballooning (last one 2 weeks ago), following up on Dec 15th  Status post right foot surgery: hammer toe repair    MEDICATIONS  (STANDING):  atorvastatin 80 milliGRAM(s) Oral at bedtime  epoetin raheem Injectable 15372 Unit(s) IV Push <User Schedule>  fenofibrate Tablet 145 milliGRAM(s) Oral daily  heparin  Injectable 5000 Unit(s) SubCutaneous every 8 hours  hydrALAZINE 50 milliGRAM(s) Oral three times a day  insulin lispro (HumaLOG) corrective regimen sliding scale   SubCutaneous three times a day before meals  metoprolol tartrate 50 milliGRAM(s) Oral two times a day  pantoprazole    Tablet 40 milliGRAM(s) Oral before breakfast  potassium chloride    Tablet ER 40 milliEquivalent(s) Oral once  sertraline 50 milliGRAM(s) Oral daily  sodium bicarbonate 1300 milliGRAM(s) Oral two times a day  sodium chloride 0.9%. 1000 milliLiter(s) (10 mL/Hr) IV Continuous <Continuous>      Allergies  No Known Allergies    FAMILY HISTORY:  Family history of malignant neoplasm of gastrointestinal tract  Family history of lung cancer (Sibling)  Family history of diabetes mellitus  Family history of hypertension (Sibling)  Noncontributory for premature coronary disease or sudden cardiac death    SOCIAL HISTORY:    [x ] Non-smoker  [ ] Smoker  [ ] Alcohol      REVIEW OF SYSTEMS:  [ ]chest pain  [ x ]shortness of breath  [  ]palpitations  [  ]syncope  [ ]near syncope [ ]upper extremity weakness   [ ] lower extremity weakness  [  ]diplopia  [  ]altered mental status   [  ]fevers  [ ]chills [ ]nausea  [ ]vomitting  [  ]dysphagia    [ ]abdominal pain  [ ]melena  [ ]BRBPR    [  ]epistaxis  [  ]rash  [ ]lower extremity edema      [x ] All others negative	  [ ] Unable to obtain    PHYSICAL EXAM:  T(C): 36.7 (12-10-18 @ 09:51), Max: 37.1 (12-09-18 @ 19:37)  HR: 103 (12-10-18 @ 09:51) (68 - 155)  BP: 138/56 (12-10-18 @ 09:51) (138/56 - 157/63)  RR: 18 (12-10-18 @ 09:51) (18 - 18)  SpO2: 100% (12-10-18 @ 09:51) (95% - 100%)  	  HEENT:   Normal oral mucosa, PERRL, EOMI	  Lymphatic: No lymphadenopathy , no edema  Cardiovascular: Normal S1 S2, No JVD, No murmurs  Respiratory: Lungs clear to auscultation, normal effort 	  Gastrointestinal:  Soft, Non-tender, + BS	  Psychiatry:  Mood & affect appropriate    DIAGNOSTIC DATA:    TELEMETRY: SR,  overnight brief	      < from: Transthoracic Echocardiogram (12.06.18 @ 16:49) >  CONCLUSIONS:  1. Mitral annular calcification, otherwise normal mitral  valve. Mild mitral regurgitation.  2. Calcified trileaflet aortic valve with mildly decreased  opening. Peak transaortic valve gradient equals 22 mm Hg,  mean transaortic valve gradient equals 13 mm Hg, consistent  with mild aortic stenosis.  3. Mildly dilated left atrium.  LA volume index = 37 cc/m2.  4. Moderate concentric left ventricular hypertrophy.  5. Normal left ventricular systolic function. No segmental  wall motion abnormalities.  6. Normalright ventricular size and function.  7. Large pericardial effusion, measuring about  3.1 cm  inferolateral to the LV.  Large pericardial effusion (about   2.7 cm) lateral to the LV.  Moderate pericardial effusion  (about  1.7 cm) posterior to the left ventricle.  Small  pericardial effusion anterior to the RV and superior to the  right atrium.  The right ventricle appears somewhat  compressed in the subcostal view.  In addition, significant  respirophasic variability in the transmitral and  transtricuspid spectral Doppler signals is seen.  These  findings may be consistent with early tamponade physiology.  ------------------------------------------------------------------------  Confirmed on  12/6/2018 - 18:13:22 by Julius Poe M.D.    < end of copied text >    	  LABS:	 	                       8.3    9.00  )-----------( 267      ( 10 Dec 2018 06:45 )             27.0     141  |  101  |  61<H>  ----------------------------<  120<H>  3.4<L>   |  20<L>  |  7.55<H>    Ca    7.9<L>      10 Dec 2018 06:45  Mg     2.3     12-10      ASSESSMENT/PLAN:     79 y/o  Female, with a PMHx of CKD, DM, HTN, HLD, anemia admitted with SOB, Echo with large pericardial effusion with early tamponade s/p pericardial drain and started on HD with improvement of her SOB.  EP consulted for PAF.  Pt with CHADSVasc of atleast 5.    --PAF, being managed on metoprolol.  Given her anemia requiring PRBCs, will pursue rate control at this time.  It is unclear if she will be able to tolerate AC once her drain is removed, but given her elevated chadsvasc, recommend AC for CVA prevention.   --can eval as OP for PETE closure device  --check TSH, cont tele
[facility] Consult Note Nephrology - CONSULTATION NOTE - [phone] - Dr Hicks / Dr Hughes / Dr Storey / Dr Avina / Dr Patel / Dr Brannon / Dr Madrigal / Dr Kolb    Patient is a 78y Female with CKD IV/V, s/p AVF placement, not yet on HD, HTN, DM a/w feeling suffocated when laying flat. Symptom began about 3 days ago. No associated complaints. Denies LE swelling, or BOLAÑOS. Denies cough, F/C/N/V. Appetite at baseline. No urinary complaints. Denies dysuria. Denies recent NSAID use. Pt follows with Dr Gamez?, outpt nephrologist. Pt not aware of baseline renal function. AV access placed ~july.      PAST MEDICAL & SURGICAL HISTORY:  Anemia  CKD (chronic kidney disease)  Hypercholesteremia  HTN (hypertension)  DM (diabetes mellitus)  H/O colonoscopy with polypectomy  AV fistula: June 2018, 2 ballooning (last one 2 weeks ago), following up on Dec 15th  Status post right foot surgery: hammer toe repair    Allergies:  No Known Allergies    Home Medications Reviewed  Hospital Medications:   MEDICATIONS  (STANDING):  amLODIPine   Tablet 10 milliGRAM(s) Oral daily  aspirin enteric coated 81 milliGRAM(s) Oral daily  atorvastatin 80 milliGRAM(s) Oral at bedtime  dextrose 5%. 1000 milliLiter(s) (50 mL/Hr) IV Continuous <Continuous>  dextrose 50% Injectable 12.5 Gram(s) IV Push once  dextrose 50% Injectable 25 Gram(s) IV Push once  dextrose 50% Injectable 25 Gram(s) IV Push once  fenofibrate Tablet 145 milliGRAM(s) Oral daily  furosemide   Injectable 40 milliGRAM(s) IV Push two times a day  heparin  Injectable 5000 Unit(s) SubCutaneous every 8 hours  hydrALAZINE 50 milliGRAM(s) Oral three times a day  insulin lispro (HumaLOG) corrective regimen sliding scale   SubCutaneous three times a day before meals  pantoprazole    Tablet 40 milliGRAM(s) Oral before breakfast  sertraline 50 milliGRAM(s) Oral daily    SOCIAL HISTORY:  Denies ETOh,Smoking,   FAMILY HISTORY:  Family history of malignant neoplasm of gastrointestinal tract  Family history of lung cancer (Sibling)  Family history of diabetes mellitus  Family history of hypertension (Sibling)    REVIEW OF SYSTEMS:  CONSTITUTIONAL: No weakness, fevers or chills  EYES/ENT: No visual changes;  No vertigo or throat pain   NECK: No pain or stiffness  RESPIRATORY: No cough, wheezing, hemoptysis; No shortness of breath, +orthopnea   CARDIOVASCULAR: No chest pain or palpitations.  GASTROINTESTINAL: No abdominal or epigastric pain. No nausea, vomiting, or hematemesis; No diarrhea or constipation. No melena or hematochezia.  GENITOURINARY: No dysuria, frequency, foamy urine, urinary urgency, incontinence or hematuria  NEUROLOGICAL: No numbness or weakness  SKIN: No itching, burning, rashes, or lesions   VASCULAR: No bilateral lower extremity edema.   All other review of systems is negative unless indicated above.    VITALS:  T(F): 97.3 (12-05-18 @ 17:43), Max: 98.5 (12-05-18 @ 02:00)  HR: 81 (12-05-18 @ 17:43)  BP: 125/48 (12-05-18 @ 17:43)  RR: 18 (12-05-18 @ 17:43)  SpO2: 100% (12-05-18 @ 17:43)  Wt(kg): --    Height (cm): 170.18 (12-05 @ 11:05)  Weight (kg): 60.8 (12-05 @ 11:05)  BMI (kg/m2): 21 (12-05 @ 11:05)  BSA (m2): 1.71 (12-05 @ 11:05)  PHYSICAL EXAM:  Constitutional: NAD  HEENT: anicteric sclera, oropharynx clear, MMM  Neck: No JVD  Respiratory: CTAB, no wheezes, rales or rhonchi  Cardiovascular: S1, S2, RRR  Gastrointestinal: BS+, soft, NT/ND  Extremities: No cyanosis or clubbing. No peripheral edema  Neurological: A/O x 3, no focal deficits  Psychiatric: Normal mood, normal affect  : No CVA tenderness. No calvin.   Skin: No rashes  Vascular Access: L wrist AVF +thrill and bruit     LABS:  12-05    141  |  109<H>  |  36<H>  ----------------------------<  181<H>  3.5   |  13<L>  |  6.46<H>    Ca    6.8<L>      05 Dec 2018 16:35  Phos  4.9     12-05  Mg     1.8     12-05    TPro  7.2  /  Alb  3.7  /  TBili  0.7  /  DBili      /  AST  18  /  ALT  7   /  AlkPhos  60  12-05    Creatinine Trend: 6.46 <--, 6.26 <--                        7.8    6.99  )-----------( 178      ( 05 Dec 2018 16:35 )             25.2     Urine Studies:      RADIOLOGY & ADDITIONAL STUDIES:  < from: Xray Chest 1 View AP/PA (12.05.18 @ 03:14) >    IMPRESSION: Cardiomegaly with interstitial edema.    < end of copied text >

## 2018-12-10 NOTE — PROGRESS NOTE ADULT - SUBJECTIVE AND OBJECTIVE BOX
Subjective: 79 y/o female presents lying down in bed in NAD, pt states "I feel really tired". Denies SOB, CP, nvd.    Vital Signs:  Vital Signs Last 24 Hrs  T(C): 36.9 (12-10-18 @ 12:24), Max: 37.1 (12-09-18 @ 19:37)  T(F): 98.4 (12-10-18 @ 12:24), Max: 98.8 (12-09-18 @ 19:37)  HR: 140 (12-10-18 @ 12:24) (68 - 140)  BP: 136/66 (12-10-18 @ 12:24) (136/66 - 158/48)  RR: 18 (12-10-18 @ 12:24) (18 - 18)  SpO2: 100% (12-10-18 @ 12:24) (95% - 100%) on (O2)    Telemetry/Alarms: Sinus rhythm with intermittent PVC's  General: WN/WD NAD  Neck: Neck supple, trachea midline, No JVD,   Respiratory: CTA B/L, No wheezing, rales, rhonchi  CV: RRR, S1S2, no murmurs, rubs or gallops  Extremities: No edema, + peripheral pulses  Skin: No Rashes, Hematoma, Ecchymosis  Tubes: Pericardial drain to Pleur-evac to WS serosanguinous fluid, 80cc drained overnight)    Relevant labs, radiology and Medications reviewed                         8.3    9.00  )-----------( 267      ( 10 Dec 2018 06:45 )             27.0     12-10    141  |  101  |  61<H>  ----------------------------<  120<H>  3.4<L>   |  20<L>  |  7.55<H>    Ca    7.9<L>      10 Dec 2018 06:45  Mg     2.3     12-10      PT/INR - ( 09 Dec 2018 02:10 )   PT: 12.2 SEC;   INR: 1.07          PTT - ( 09 Dec 2018 02:10 )  PTT:29.6 SEC  MEDICATIONS  (STANDING):  atorvastatin 80 milliGRAM(s) Oral at bedtime  dextrose 5%. 1000 milliLiter(s) (50 mL/Hr) IV Continuous <Continuous>  dextrose 50% Injectable 12.5 Gram(s) IV Push once  dextrose 50% Injectable 25 Gram(s) IV Push once  dextrose 50% Injectable 25 Gram(s) IV Push once  epoetin raheem Injectable 21498 Unit(s) IV Push <User Schedule>  fenofibrate Tablet 145 milliGRAM(s) Oral daily  heparin  Injectable 5000 Unit(s) SubCutaneous every 8 hours  hydrALAZINE 50 milliGRAM(s) Oral three times a day  insulin lispro (HumaLOG) corrective regimen sliding scale   SubCutaneous three times a day before meals  metoprolol tartrate 50 milliGRAM(s) Oral two times a day  pantoprazole    Tablet 40 milliGRAM(s) Oral before breakfast  sertraline 50 milliGRAM(s) Oral daily  sodium bicarbonate 1300 milliGRAM(s) Oral two times a day  sodium chloride 0.9%. 1000 milliLiter(s) (10 mL/Hr) IV Continuous <Continuous>    MEDICATIONS  (PRN):  acetaminophen   Tablet .. 650 milliGRAM(s) Oral every 6 hours PRN Mild Pain (1 - 3)  dextrose 40% Gel 15 Gram(s) Oral once PRN Blood Glucose LESS THAN 70 milliGRAM(s)/deciliter  glucagon  Injectable 1 milliGRAM(s) IntraMuscular once PRN Glucose LESS THAN 70 milligrams/deciliter  melatonin 3 milliGRAM(s) Oral at bedtime PRN Insomnia  ondansetron Injectable 4 milliGRAM(s) IV Push every 6 hours PRN Nausea and/or Vomiting    Pertinent Physical Exam  I&O's Summary    09 Dec 2018 07:01  -  10 Dec 2018 07:00  --------------------------------------------------------  IN: 0 mL / OUT: 480 mL / NET: -480 mL    10 Dec 2018 07:01  -  10 Dec 2018 14:35  --------------------------------------------------------  IN: 400 mL / OUT: 420 mL / NET: -20 mL        Assessment  78y Female  w/ PAST MEDICAL & SURGICAL HISTORY:  Anemia  CKD (chronic kidney disease)  Hypercholesteremia  HTN (hypertension)  DM (diabetes mellitus)  H/O colonoscopy with polypectomy  AV fistula: June 2018, 2 ballooning (last one 2 weeks ago), following up on Dec 15th  Status post right foot surgery: hammer toe repair  admitted with complaints of Patient is a 78y old  Female who presents with a chief complaint of Orthopnea x 2 days (10 Dec 2018 11:12)  .  On (Date), patient underwent Pericardial window operation  . Postoperative course/issues:    79 y/o F with large pericardial effusion s/p Subxyphoid window by Dr. Bullard on 12/7 now with pericardial drain in place. Today pt underwent HD, no more afib noted overnight now sinus rhythm.    Plan:   -Continue care per primary team  -Place Fernando pericardial tube to water-seal > monitor output  -OOB, ambulation, pulm toilet  -Will cont to follow    Discussed with Cardiothoracic Team at AM rounds.

## 2018-12-11 LAB
BASOPHILS # BLD AUTO: 0.02 K/UL — SIGNIFICANT CHANGE UP (ref 0–0.2)
BASOPHILS NFR BLD AUTO: 0.3 % — SIGNIFICANT CHANGE UP (ref 0–2)
BUN SERPL-MCNC: 43 MG/DL — HIGH (ref 7–23)
CALCIUM SERPL-MCNC: 8.5 MG/DL — SIGNIFICANT CHANGE UP (ref 8.4–10.5)
CHLORIDE SERPL-SCNC: 99 MMOL/L — SIGNIFICANT CHANGE UP (ref 98–107)
CO2 SERPL-SCNC: 22 MMOL/L — SIGNIFICANT CHANGE UP (ref 22–31)
CREAT SERPL-MCNC: 5.29 MG/DL — HIGH (ref 0.5–1.3)
EOSINOPHIL # BLD AUTO: 0.02 K/UL — SIGNIFICANT CHANGE UP (ref 0–0.5)
EOSINOPHIL NFR BLD AUTO: 0.3 % — SIGNIFICANT CHANGE UP (ref 0–6)
GLUCOSE BLDC GLUCOMTR-MCNC: 144 MG/DL — HIGH (ref 70–99)
GLUCOSE BLDC GLUCOMTR-MCNC: 145 MG/DL — HIGH (ref 70–99)
GLUCOSE BLDC GLUCOMTR-MCNC: 174 MG/DL — HIGH (ref 70–99)
GLUCOSE BLDC GLUCOMTR-MCNC: 244 MG/DL — HIGH (ref 70–99)
GLUCOSE SERPL-MCNC: 105 MG/DL — HIGH (ref 70–99)
HCT VFR BLD CALC: 32.3 % — LOW (ref 34.5–45)
HGB BLD-MCNC: 9.9 G/DL — LOW (ref 11.5–15.5)
IMM GRANULOCYTES # BLD AUTO: 0.08 # — SIGNIFICANT CHANGE UP
IMM GRANULOCYTES NFR BLD AUTO: 1 % — SIGNIFICANT CHANGE UP (ref 0–1.5)
LYMPHOCYTES # BLD AUTO: 1.09 K/UL — SIGNIFICANT CHANGE UP (ref 1–3.3)
LYMPHOCYTES # BLD AUTO: 13.8 % — SIGNIFICANT CHANGE UP (ref 13–44)
MAGNESIUM SERPL-MCNC: 2.3 MG/DL — SIGNIFICANT CHANGE UP (ref 1.6–2.6)
MCHC RBC-ENTMCNC: 23.3 PG — LOW (ref 27–34)
MCHC RBC-ENTMCNC: 30.7 % — LOW (ref 32–36)
MCV RBC AUTO: 76 FL — LOW (ref 80–100)
MONOCYTES # BLD AUTO: 0.62 K/UL — SIGNIFICANT CHANGE UP (ref 0–0.9)
MONOCYTES NFR BLD AUTO: 7.8 % — SIGNIFICANT CHANGE UP (ref 2–14)
NEUTROPHILS # BLD AUTO: 6.07 K/UL — SIGNIFICANT CHANGE UP (ref 1.8–7.4)
NEUTROPHILS NFR BLD AUTO: 76.8 % — SIGNIFICANT CHANGE UP (ref 43–77)
NRBC # FLD: 0.09 — SIGNIFICANT CHANGE UP
NRBC FLD-RTO: 1.1 — SIGNIFICANT CHANGE UP
PLATELET # BLD AUTO: 230 K/UL — SIGNIFICANT CHANGE UP (ref 150–400)
PMV BLD: SIGNIFICANT CHANGE UP FL (ref 7–13)
POTASSIUM SERPL-MCNC: 4.2 MMOL/L — SIGNIFICANT CHANGE UP (ref 3.5–5.3)
POTASSIUM SERPL-SCNC: 4.2 MMOL/L — SIGNIFICANT CHANGE UP (ref 3.5–5.3)
RBC # BLD: 4.25 M/UL — SIGNIFICANT CHANGE UP (ref 3.8–5.2)
RBC # FLD: 16.8 % — HIGH (ref 10.3–14.5)
SODIUM SERPL-SCNC: 140 MMOL/L — SIGNIFICANT CHANGE UP (ref 135–145)
WBC # BLD: 7.9 K/UL — SIGNIFICANT CHANGE UP (ref 3.8–10.5)
WBC # FLD AUTO: 7.9 K/UL — SIGNIFICANT CHANGE UP (ref 3.8–10.5)

## 2018-12-11 RX ORDER — HYDRALAZINE HCL 50 MG
25 TABLET ORAL EVERY 8 HOURS
Qty: 0 | Refills: 0 | Status: DISCONTINUED | OUTPATIENT
Start: 2018-12-11 | End: 2018-12-16

## 2018-12-11 RX ORDER — HYDRALAZINE HCL 50 MG
25 TABLET ORAL EVERY 8 HOURS
Qty: 0 | Refills: 0 | Status: DISCONTINUED | OUTPATIENT
Start: 2018-12-11 | End: 2018-12-11

## 2018-12-11 RX ORDER — METOPROLOL TARTRATE 50 MG
50 TABLET ORAL ONCE
Qty: 0 | Refills: 0 | Status: COMPLETED | OUTPATIENT
Start: 2018-12-11 | End: 2018-12-11

## 2018-12-11 RX ORDER — METOPROLOL TARTRATE 50 MG
12.5 TABLET ORAL ONCE
Qty: 0 | Refills: 0 | Status: DISCONTINUED | OUTPATIENT
Start: 2018-12-11 | End: 2018-12-11

## 2018-12-11 RX ORDER — METOPROLOL TARTRATE 50 MG
100 TABLET ORAL
Qty: 0 | Refills: 0 | Status: DISCONTINUED | OUTPATIENT
Start: 2018-12-11 | End: 2018-12-20

## 2018-12-11 RX ADMIN — HEPARIN SODIUM 5000 UNIT(S): 5000 INJECTION INTRAVENOUS; SUBCUTANEOUS at 14:26

## 2018-12-11 RX ADMIN — Medication 2: at 12:18

## 2018-12-11 RX ADMIN — HEPARIN SODIUM 5000 UNIT(S): 5000 INJECTION INTRAVENOUS; SUBCUTANEOUS at 21:07

## 2018-12-11 RX ADMIN — Medication 145 MILLIGRAM(S): at 12:18

## 2018-12-11 RX ADMIN — PANTOPRAZOLE SODIUM 40 MILLIGRAM(S): 20 TABLET, DELAYED RELEASE ORAL at 06:02

## 2018-12-11 RX ADMIN — Medication 1: at 18:02

## 2018-12-11 RX ADMIN — Medication 25 MILLIGRAM(S): at 14:26

## 2018-12-11 RX ADMIN — SERTRALINE 50 MILLIGRAM(S): 25 TABLET, FILM COATED ORAL at 12:18

## 2018-12-11 RX ADMIN — Medication 50 MILLIGRAM(S): at 06:02

## 2018-12-11 RX ADMIN — Medication 50 MILLIGRAM(S): at 03:33

## 2018-12-11 RX ADMIN — Medication 100 MILLIGRAM(S): at 19:08

## 2018-12-11 RX ADMIN — ATORVASTATIN CALCIUM 80 MILLIGRAM(S): 80 TABLET, FILM COATED ORAL at 21:07

## 2018-12-11 RX ADMIN — HEPARIN SODIUM 5000 UNIT(S): 5000 INJECTION INTRAVENOUS; SUBCUTANEOUS at 06:02

## 2018-12-11 RX ADMIN — Medication 25 MILLIGRAM(S): at 21:06

## 2018-12-11 NOTE — PROGRESS NOTE ADULT - SUBJECTIVE AND OBJECTIVE BOX
Patient is a 78y old  Female who presents with a chief complaint of Orthopnea x 2 days (11 Dec 2018 15:15)      SUBJECTIVE / OVERNIGHT EVENTS:    Events noted.  Feels better.  CONSTITUTIONAL: No fever,  or fatigue  NECK: No pain or stiffness  RESPIRATORY: No cough, wheezing, chills or hemoptysis; No shortness of breath  CARDIOVASCULAR: No chest pain, palpitations, dizziness, or leg swelling  GASTROINTESTINAL: No abdominal or epigastric pain. No nausea, vomiting, or hematemesis; No diarrhea or constipation.   NEUROLOGICAL: No headaches,     MEDICATIONS  (STANDING):  atorvastatin 80 milliGRAM(s) Oral at bedtime  dextrose 5%. 1000 milliLiter(s) (50 mL/Hr) IV Continuous <Continuous>  dextrose 50% Injectable 12.5 Gram(s) IV Push once  dextrose 50% Injectable 25 Gram(s) IV Push once  dextrose 50% Injectable 25 Gram(s) IV Push once  epoetin raheem Injectable 86966 Unit(s) IV Push <User Schedule>  fenofibrate Tablet 145 milliGRAM(s) Oral daily  heparin  Injectable 5000 Unit(s) SubCutaneous every 8 hours  hydrALAZINE 25 milliGRAM(s) Oral every 8 hours  insulin lispro (HumaLOG) corrective regimen sliding scale   SubCutaneous three times a day before meals  metoprolol tartrate 100 milliGRAM(s) Oral two times a day  pantoprazole    Tablet 40 milliGRAM(s) Oral before breakfast  sertraline 50 milliGRAM(s) Oral daily  sodium chloride 0.9%. 1000 milliLiter(s) (10 mL/Hr) IV Continuous <Continuous>    MEDICATIONS  (PRN):  acetaminophen   Tablet .. 650 milliGRAM(s) Oral every 6 hours PRN Mild Pain (1 - 3)  dextrose 40% Gel 15 Gram(s) Oral once PRN Blood Glucose LESS THAN 70 milliGRAM(s)/deciliter  glucagon  Injectable 1 milliGRAM(s) IntraMuscular once PRN Glucose LESS THAN 70 milligrams/deciliter  melatonin 3 milliGRAM(s) Oral at bedtime PRN Insomnia  ondansetron Injectable 4 milliGRAM(s) IV Push every 6 hours PRN Nausea and/or Vomiting        CAPILLARY BLOOD GLUCOSE      POCT Blood Glucose.: 144 mg/dL (11 Dec 2018 21:06)  POCT Blood Glucose.: 174 mg/dL (11 Dec 2018 17:21)  POCT Blood Glucose.: 244 mg/dL (11 Dec 2018 11:40)  POCT Blood Glucose.: 145 mg/dL (11 Dec 2018 08:12)    I&O's Summary    10 Dec 2018 07:01  -  11 Dec 2018 07:00  --------------------------------------------------------  IN: 400 mL / OUT: 640 mL / NET: -240 mL    11 Dec 2018 07:01  -  12 Dec 2018 00:09  --------------------------------------------------------  IN: 400 mL / OUT: 1020 mL / NET: -620 mL        PHYSICAL EXAM:  GENERAL: NAD  NECK: Supple, No JVD  CHEST/LUNG: Clear to auscultation bilaterally; No wheezing.  HEART: Regular rate and rhythm; No murmurs, rubs, or gallops  ABDOMEN: Soft, Nontender, Nondistended; Bowel sounds present  EXTREMITIES:   No clubbing, cyanosis, or edema  NEUROLOGY: AAO X 3      LABS:                        9.9    7.90  )-----------( 230      ( 11 Dec 2018 06:45 )             32.3     12-11    140  |  99  |  43<H>  ----------------------------<  105<H>  4.2   |  22  |  5.29<H>    Ca    8.5      11 Dec 2018 06:45  Mg     2.3     12-11    TPro  6.8  /  Alb  3.5  /  TBili  0.2  /  DBili  0.1  /  AST  23  /  ALT  < 4<L>  /  AlkPhos  48  12-10            CAPILLARY BLOOD GLUCOSE      POCT Blood Glucose.: 144 mg/dL (11 Dec 2018 21:06)  POCT Blood Glucose.: 174 mg/dL (11 Dec 2018 17:21)  POCT Blood Glucose.: 244 mg/dL (11 Dec 2018 11:40)  POCT Blood Glucose.: 145 mg/dL (11 Dec 2018 08:12)    12-07 @ 16:28  Culture-urine --  Culture results --  method type --  Organism --  Organism Identification --  Specimen source OTHER  12-07 @ 16:23  Culture-urine --  Culture results --  method type --  Organism --  Organism Identification --  Specimen source PERICARDIAL FLUID           12-07 @ 16:28  Culture blood --  Culture results --  Gram stain --  Gram stain blood --  Method type --  Organism --  Organism identification --  Specimen source OTHER   12-07 @ 16:23  Culture blood --  Culture results --  Gram stain --  Gram stain blood --  Method type --  Organism --  Organism identification --  Specimen source PERICARDIAL FLUID      RADIOLOGY & ADDITIONAL TESTS:    Imaging Personally Reviewed:    Consultant(s) Notes Reviewed:      Care Discussed with Consultants/Other Providers:

## 2018-12-11 NOTE — PROGRESS NOTE ADULT - PROBLEM SELECTOR PLAN 1
babak on advanced ckd now likely esrd with dialysis dependence  s/p 1st HD session 12/8/18 via right IJ shiley  HD yesterday uneventful, plan for repeat HD today with 0.5kg UF planned.  Can dc sodium bicarb tabs   f/u vascular for immature avf, IR f/u for conversion from temp catheter to tunneled cath.   output hd set up during weekday, preferably at [facility], as her primary outpt nephrologist is there. babak on advanced ckd now likely esrd with dialysis dependence  s/p 1st HD session 12/8/18 via right IJ shiley  HD yesterday uneventful, plan for repeat HD today with 0.5kg UF planned.  f/u vascular for immature avf;  IR f/u for conversion from temp catheter to tunneled cath.   output hd set up during weekday, preferably at [facility], as her primary outpt nephrologist is there.

## 2018-12-11 NOTE — PROGRESS NOTE ADULT - SUBJECTIVE AND OBJECTIVE BOX
EP ATTENDING    tele: NSR with short bursts of AF    no palpitations, no syncope, no angina    acetaminophen   Tablet .. 650 milliGRAM(s) Oral every 6 hours PRN  atorvastatin 80 milliGRAM(s) Oral at bedtime  dextrose 40% Gel 15 Gram(s) Oral once PRN  dextrose 5%. 1000 milliLiter(s) IV Continuous <Continuous>  dextrose 50% Injectable 12.5 Gram(s) IV Push once  dextrose 50% Injectable 25 Gram(s) IV Push once  dextrose 50% Injectable 25 Gram(s) IV Push once  epoetin raheem Injectable 32542 Unit(s) IV Push <User Schedule>  fenofibrate Tablet 145 milliGRAM(s) Oral daily  glucagon  Injectable 1 milliGRAM(s) IntraMuscular once PRN  heparin  Injectable 5000 Unit(s) SubCutaneous every 8 hours  hydrALAZINE 25 milliGRAM(s) Oral every 8 hours  insulin lispro (HumaLOG) corrective regimen sliding scale   SubCutaneous three times a day before meals  melatonin 3 milliGRAM(s) Oral at bedtime PRN  metoprolol tartrate 100 milliGRAM(s) Oral two times a day  ondansetron Injectable 4 milliGRAM(s) IV Push every 6 hours PRN  pantoprazole    Tablet 40 milliGRAM(s) Oral before breakfast  sertraline 50 milliGRAM(s) Oral daily  sodium chloride 0.9%. 1000 milliLiter(s) IV Continuous <Continuous>                            9.9    7.90  )-----------( 230      ( 11 Dec 2018 06:45 )             32.3       12-10    141  |  101  |  61<H>  ----------------------------<  120<H>  3.4<L>   |  20<L>  |  7.55<H>    Ca    7.9<L>      10 Dec 2018 06:45  Mg     2.3     12-10    TPro  6.8  /  Alb  3.5  /  TBili  0.2  /  DBili  0.1  /  AST  23  /  ALT  < 4<L>  /  AlkPhos  48  12-10            T(C): 36.5 (12-11-18 @ 05:58), Max: 36.9 (12-10-18 @ 11:55)  HR: 60 (12-11-18 @ 05:58) (60 - 140)  BP: 147/57 (12-11-18 @ 05:58) (107/57 - 158/48)  RR: 18 (12-11-18 @ 05:58) (18 - 18)  SpO2: 100% (12-11-18 @ 05:58) (98% - 100%)  Wt(kg): --    no JVD  RRR, no murmurs  CTAB  soft nt/nd  no c/c/e    echo: normal LVEF  TSH pending      A/P) 79 y/o female PMH ESRD on HD, HTN, DM, hyperlipidemia admitted with a large pericardial effusion requiring pericardiocentesis. Found to have PAF. Echo LVEF unremarkable, TSH pending. Responding well to beta blockers. CHADS-VASC 5    -f/u TSH  -continue metoprolol 100 bid  -is currently off a/c given large pericardial effusion. Given no a/c can't pursue a rhythm control strategy at this time (i.e. amio, etc.).   -Therefore simply recommend continuing beta blockers (she's currently in NSR).   -Hopefully a/c can be started prior to discharge, and if not then she's a good candidate for left atrial appendage occlusion with Watchman.

## 2018-12-11 NOTE — PROGRESS NOTE ADULT - SUBJECTIVE AND OBJECTIVE BOX
Nephrology Followup Note - [phone] - Dr Hicks / Dr Hughes / Dr Storey / Dr Avina / Dr Patel / Dr Brannon / Dr Madrigal / Dr Kolb  Pt seen and examined at bedside  No acute events overnight. Pt comfortable, denies pain or SOB.     Allergies:  No Known Allergies    Hospital Medications:   MEDICATIONS  (STANDING):  atorvastatin 80 milliGRAM(s) Oral at bedtime  dextrose 5%. 1000 milliLiter(s) (50 mL/Hr) IV Continuous <Continuous>  dextrose 50% Injectable 12.5 Gram(s) IV Push once  dextrose 50% Injectable 25 Gram(s) IV Push once  dextrose 50% Injectable 25 Gram(s) IV Push once  epoetin raheem Injectable 24451 Unit(s) IV Push <User Schedule>  fenofibrate Tablet 145 milliGRAM(s) Oral daily  heparin  Injectable 5000 Unit(s) SubCutaneous every 8 hours  hydrALAZINE 25 milliGRAM(s) Oral every 8 hours  insulin lispro (HumaLOG) corrective regimen sliding scale   SubCutaneous three times a day before meals  metoprolol tartrate 50 milliGRAM(s) Oral once  metoprolol tartrate 100 milliGRAM(s) Oral two times a day  pantoprazole    Tablet 40 milliGRAM(s) Oral before breakfast  sertraline 50 milliGRAM(s) Oral daily  sodium chloride 0.9%. 1000 milliLiter(s) (10 mL/Hr) IV Continuous <Continuous>    VITALS:  T(F): 98 (12-11-18 @ 08:18), Max: 98.5 (12-10-18 @ 17:00)  HR: 59 (12-11-18 @ 08:25)  BP: 150/41 (12-11-18 @ 08:18)  RR: 18 (12-11-18 @ 08:18)  SpO2: 97% (12-11-18 @ 08:18)  Wt(kg): --    12-10 @ 07:01  -  12-11 @ 07:00  --------------------------------------------------------  IN: 400 mL / OUT: 640 mL / NET: -240 mL    PHYSICAL EXAM:  Constitutional: NAD  HEENT: anicteric sclera, oropharynx clear, MMM  Neck: No JVD  Respiratory: CTAB, no wheezes, rales or rhonchi  Cardiovascular: S1, S2, RRR  Gastrointestinal: BS+, soft, NT/ND  Extremities: No cyanosis or clubbing. No peripheral edema  Neurological: A/O x 3, no focal deficits  Psychiatric: Normal mood, normal affect  : No CVA tenderness. No calvin.   Skin: No rashes  Vascular Access: KERI soto. LUE AVF +thrill and bruit     LABS:  12-11    140  |  99  |  43<H>  ----------------------------<  105<H>  4.2   |  22  |  5.29<H>    Ca    8.5      11 Dec 2018 06:45  Mg     2.3     12-11    TPro  6.8  /  Alb  3.5  /  TBili  0.2  /  DBili  0.1  /  AST  23  /  ALT  < 4<L>  /  AlkPhos  48  12-10    Creatinine Trend: 5.29 <--, 7.55 <--, 7.02 <--, 9.14 <--, 7.91 <--, 7.17 <--, 6.46 <--, 6.26 <--                        9.9    7.90  )-----------( 230      ( 11 Dec 2018 06:45 )             32.3     Urine Studies:      RADIOLOGY & ADDITIONAL STUDIES:

## 2018-12-11 NOTE — PROGRESS NOTE ADULT - ASSESSMENT
· Assessment	  77 y/o  Female, with a PMHx of CKD (fistula June 2018, s/p 2 balloons, last one 2 weeks ago, no dialysis yet), DM, HTN, HLD, anemia presents to the [facility] ED constant SOB, dry cough and clear rhinorrhea x 2 days associated with one episode of vomiting, darken stools and increased urinary frequency admitted to telemetry for Pulmonary Edema/ Anemia/ Acute on Chronic Renal Failure.      PAF: Tele  EP f/up noted./Metoprolol     Problem/Plan - 1:  ·  Problem: Pericardial Effusion:    S/p pericardial window/ CT     Problem/Plan - 2:  ·  Problem: Symptomatic anemia.  Plan: Hb 9.9     Problem/Plan - 3:  ·  Problem: ANNIE on Advanced CKD:   Plan: For HD. Nephro f/up noted. IR for permacath     Problem/Plan - 4:  ·  Problem: DM (diabetes mellitus).  Plan: FSSS     Problem/Plan - 5:  ·  Problem: HTN (hypertension).  Plan:  Cont. Amlodipine, Hydralazine, Furosemide.      Problem/Plan - 6:  Problem: Hypercholesteremia. Plan:  Continue home medication, Rosuvastatin, Fenofibrate.    Dw family.

## 2018-12-11 NOTE — PROGRESS NOTE ADULT - SUBJECTIVE AND OBJECTIVE BOX
no acute complaints    T(C): 36.6 (11 Dec 2018 11:10), Max: 36.9 (10 Dec 2018 17:00)  T(F): 97.9 (11 Dec 2018 11:10), Max: 98.5 (10 Dec 2018 17:00)  HR: 57 (11 Dec 2018 11:10) (51 - 133)  BP: 158/47 (11 Dec 2018 11:10) (107/57 - 158/47)  BP(mean): --  RR: 18 (11 Dec 2018 11:10) (18 - 18)  SpO2: 100% (11 Dec 2018 11:10) (97% - 100%)    pericardial drain output 40cc/24h waterseal    POD 4 s/p pericardial window  - continue pericardial drain until less than 30cc/24h  - continue care per primary team    Zahida PETTY [phone]

## 2018-12-12 LAB
APTT BLD: 29.3 SEC — SIGNIFICANT CHANGE UP (ref 27.5–36.3)
BUN SERPL-MCNC: 28 MG/DL — HIGH (ref 7–23)
CALCIUM SERPL-MCNC: 8.4 MG/DL — SIGNIFICANT CHANGE UP (ref 8.4–10.5)
CHLORIDE SERPL-SCNC: 97 MMOL/L — LOW (ref 98–107)
CO2 SERPL-SCNC: 28 MMOL/L — SIGNIFICANT CHANGE UP (ref 22–31)
CREAT SERPL-MCNC: 3.72 MG/DL — HIGH (ref 0.5–1.3)
GLUCOSE BLDC GLUCOMTR-MCNC: 126 MG/DL — HIGH (ref 70–99)
GLUCOSE BLDC GLUCOMTR-MCNC: 217 MG/DL — HIGH (ref 70–99)
GLUCOSE SERPL-MCNC: 125 MG/DL — HIGH (ref 70–99)
HAV IGM SER-ACNC: NONREACTIVE — SIGNIFICANT CHANGE UP
HBV CORE IGM SER-ACNC: NONREACTIVE — SIGNIFICANT CHANGE UP
HBV SURFACE AG SER-ACNC: NONREACTIVE — SIGNIFICANT CHANGE UP
HCT VFR BLD CALC: 28.8 % — LOW (ref 34.5–45)
HCV AB S/CO SERPL IA: 0.06 S/CO — SIGNIFICANT CHANGE UP
HCV AB SERPL-IMP: SIGNIFICANT CHANGE UP
HGB BLD-MCNC: 8.8 G/DL — LOW (ref 11.5–15.5)
INR BLD: 1 — SIGNIFICANT CHANGE UP (ref 0.88–1.17)
MAGNESIUM SERPL-MCNC: 2.1 MG/DL — SIGNIFICANT CHANGE UP (ref 1.6–2.6)
MCHC RBC-ENTMCNC: 22.9 PG — LOW (ref 27–34)
MCHC RBC-ENTMCNC: 30.6 % — LOW (ref 32–36)
MCV RBC AUTO: 75 FL — LOW (ref 80–100)
NRBC # FLD: 0.08 — SIGNIFICANT CHANGE UP
NRBC FLD-RTO: 1.2 — SIGNIFICANT CHANGE UP
PLATELET # BLD AUTO: 235 K/UL — SIGNIFICANT CHANGE UP (ref 150–400)
PMV BLD: SIGNIFICANT CHANGE UP FL (ref 7–13)
POTASSIUM SERPL-MCNC: 4.1 MMOL/L — SIGNIFICANT CHANGE UP (ref 3.5–5.3)
POTASSIUM SERPL-SCNC: 4.1 MMOL/L — SIGNIFICANT CHANGE UP (ref 3.5–5.3)
PROTHROM AB SERPL-ACNC: 11.4 SEC — SIGNIFICANT CHANGE UP (ref 9.8–13.1)
RBC # BLD: 3.84 M/UL — SIGNIFICANT CHANGE UP (ref 3.8–5.2)
RBC # FLD: 15.9 % — HIGH (ref 10.3–14.5)
SODIUM SERPL-SCNC: 138 MMOL/L — SIGNIFICANT CHANGE UP (ref 135–145)
TSH SERPL-MCNC: 1.92 UIU/ML — SIGNIFICANT CHANGE UP (ref 0.27–4.2)
WBC # BLD: 6.85 K/UL — SIGNIFICANT CHANGE UP (ref 3.8–10.5)
WBC # FLD AUTO: 6.85 K/UL — SIGNIFICANT CHANGE UP (ref 3.8–10.5)

## 2018-12-12 PROCEDURE — 36558 INSERT TUNNELED CV CATH: CPT

## 2018-12-12 PROCEDURE — 77001 FLUOROGUIDE FOR VEIN DEVICE: CPT | Mod: 26,GC

## 2018-12-12 RX ORDER — NYSTATIN 500MM UNIT
500000 POWDER (EA) MISCELLANEOUS THREE TIMES A DAY
Qty: 0 | Refills: 0 | Status: DISCONTINUED | OUTPATIENT
Start: 2018-12-12 | End: 2018-12-13

## 2018-12-12 RX ADMIN — ATORVASTATIN CALCIUM 80 MILLIGRAM(S): 80 TABLET, FILM COATED ORAL at 22:31

## 2018-12-12 RX ADMIN — HEPARIN SODIUM 5000 UNIT(S): 5000 INJECTION INTRAVENOUS; SUBCUTANEOUS at 13:29

## 2018-12-12 RX ADMIN — Medication 25 MILLIGRAM(S): at 12:39

## 2018-12-12 RX ADMIN — HEPARIN SODIUM 5000 UNIT(S): 5000 INJECTION INTRAVENOUS; SUBCUTANEOUS at 22:31

## 2018-12-12 RX ADMIN — Medication 25 MILLIGRAM(S): at 22:31

## 2018-12-12 RX ADMIN — Medication 500000 UNIT(S): at 22:32

## 2018-12-12 RX ADMIN — Medication 100 MILLIGRAM(S): at 19:41

## 2018-12-12 RX ADMIN — Medication 2: at 17:15

## 2018-12-12 RX ADMIN — Medication 145 MILLIGRAM(S): at 12:39

## 2018-12-12 RX ADMIN — SERTRALINE 50 MILLIGRAM(S): 25 TABLET, FILM COATED ORAL at 12:39

## 2018-12-12 RX ADMIN — PANTOPRAZOLE SODIUM 40 MILLIGRAM(S): 20 TABLET, DELAYED RELEASE ORAL at 12:43

## 2018-12-12 NOTE — PROGRESS NOTE ADULT - ASSESSMENT
· Assessment	  79 y/o  Female, with a PMHx of CKD (fistula June 2018, s/p 2 balloons, last one 2 weeks ago, no dialysis yet), DM, HTN, HLD, anemia presents to the [facility] ED constant SOB, dry cough and clear rhinorrhea x 2 days associated with one episode of vomiting, darken stools and increased urinary frequency admitted to telemetry for Pulmonary Edema/ Anemia/ Acute on Chronic Renal Failure.      PAF: Tele  EP f/up noted./Metoprolol     Problem/Plan - 1:  ·  Problem: Pericardial Effusion:    S/p pericardial window/ Chest tube.       Problem/Plan - 3:  ·  Problem: ANNIE on Advanced CKD:   Plan: For HD. Nephro f/up noted.      Problem/Plan - 4:  ·  Problem: DM (diabetes mellitus).  Plan: FSSS     Problem/Plan - 5:  ·  Problem: HTN (hypertension).  Plan:  Cont. Amlodipine, Hydralazine, Furosemide.       Dw family.

## 2018-12-12 NOTE — CHART NOTE - NSCHARTNOTEFT_GEN_A_CORE
Patient Age: 78  Patient Gender: Female  Procedure (including site / side if known): Conversion from temporary catheter to tunneled permacath (R side)  Diagnosis / Indication: ESRD on HD  Interventional Radiology Attending Physician: Dr. Chinchilla   Ordering Attending Physician: Dr. Rojas   Pertinent medical history: 77 yo F PMH CKD now progressed to ESRD p/w fluid overload, pericardial effusion with signs of early tamponade s/p pericardial window on 12/7. Started on HD 12/8 via   Pertinent labs: 12/12    Patient and Family aware? Yes    Charisse PETTY, Pager #[phone]

## 2018-12-12 NOTE — PROGRESS NOTE ADULT - PROBLEM SELECTOR PLAN 1
likely esrd now with dialysis dependence  s/p 1st HD session 12/8/18 via right IJ charles  had HD yesterday uneventful,   plan for repeat HD tomorrow with 0.5-1kg UF as tolearted  f/u vascular for immature avf;  s/p conversion of temp catheter to tunneled cath.   output hd set up, preferably at [facility] per pt, as her primary outpt nephrologist is there. f/u w/SW

## 2018-12-12 NOTE — PROGRESS NOTE ADULT - SUBJECTIVE AND OBJECTIVE BOX
Patient is a 78y old  Female who presents with a chief complaint of Orthopnea x 2 days (12 Dec 2018 15:27)      SUBJECTIVE / OVERNIGHT EVENTS:    Events noted.  Feels better.  CONSTITUTIONAL: No fever,  or fatigue  NECK: No pain or stiffness  RESPIRATORY: No cough, wheezing, chills or hemoptysis; No shortness of breath  CARDIOVASCULAR: No chest pain, palpitations, dizziness, or leg swelling  GASTROINTESTINAL: No abdominal or epigastric pain. No nausea, vomiting, or hematemesis; No diarrhea or constipation.   NEUROLOGICAL: No headaches,     MEDICATIONS  (STANDING):  atorvastatin 80 milliGRAM(s) Oral at bedtime  dextrose 5%. 1000 milliLiter(s) (50 mL/Hr) IV Continuous <Continuous>  dextrose 50% Injectable 12.5 Gram(s) IV Push once  dextrose 50% Injectable 25 Gram(s) IV Push once  dextrose 50% Injectable 25 Gram(s) IV Push once  epoetin raheem Injectable 22257 Unit(s) IV Push <User Schedule>  fenofibrate Tablet 145 milliGRAM(s) Oral daily  heparin  Injectable 5000 Unit(s) SubCutaneous every 8 hours  hydrALAZINE 25 milliGRAM(s) Oral every 8 hours  insulin lispro (HumaLOG) corrective regimen sliding scale   SubCutaneous three times a day before meals  metoprolol tartrate 100 milliGRAM(s) Oral two times a day  nystatin    Suspension 685765 Unit(s) Oral three times a day  pantoprazole    Tablet 40 milliGRAM(s) Oral before breakfast  sertraline 50 milliGRAM(s) Oral daily  sodium chloride 0.9%. 1000 milliLiter(s) (10 mL/Hr) IV Continuous <Continuous>    MEDICATIONS  (PRN):  acetaminophen   Tablet .. 650 milliGRAM(s) Oral every 6 hours PRN Mild Pain (1 - 3)  dextrose 40% Gel 15 Gram(s) Oral once PRN Blood Glucose LESS THAN 70 milliGRAM(s)/deciliter  glucagon  Injectable 1 milliGRAM(s) IntraMuscular once PRN Glucose LESS THAN 70 milligrams/deciliter  melatonin 3 milliGRAM(s) Oral at bedtime PRN Insomnia  ondansetron Injectable 4 milliGRAM(s) IV Push every 6 hours PRN Nausea and/or Vomiting        CAPILLARY BLOOD GLUCOSE      POCT Blood Glucose.: 217 mg/dL (12 Dec 2018 16:22)  POCT Blood Glucose.: 126 mg/dL (12 Dec 2018 12:54)    I&O's Summary    11 Dec 2018 07:01  -  12 Dec 2018 07:00  --------------------------------------------------------  IN: 400 mL / OUT: 1080 mL / NET: -680 mL    12 Dec 2018 07:01  -  13 Dec 2018 00:20  --------------------------------------------------------  IN: 0 mL / OUT: 20 mL / NET: -20 mL        PHYSICAL EXAM:  GENERAL: NAD  NECK: Supple, No JVD  CHEST/LUNG: Clear to auscultation bilaterally; No wheezing.  HEART: Regular rate and rhythm; No murmurs, rubs, or gallops  ABDOMEN: Soft, Nontender, Nondistended; Bowel sounds present  EXTREMITIES:   No clubbing, cyanosis, or edema  NEUROLOGY: AAO X 3      LABS:                        8.8    6.85  )-----------( 235      ( 12 Dec 2018 05:30 )             28.8     12-12    138  |  97<L>  |  28<H>  ----------------------------<  125<H>  4.1   |  28  |  3.72<H>    Ca    8.4      12 Dec 2018 05:30  Mg     2.1     12-12      PT/INR - ( 12 Dec 2018 05:30 )   PT: 11.4 SEC;   INR: 1.00          PTT - ( 12 Dec 2018 05:30 )  PTT:29.3 SEC        CAPILLARY BLOOD GLUCOSE      POCT Blood Glucose.: 217 mg/dL (12 Dec 2018 16:22)  POCT Blood Glucose.: 126 mg/dL (12 Dec 2018 12:54)    12-07 @ 16:28  Culture-urine --  Culture results --  method type --  Organism --  Organism Identification --  Specimen source OTHER  12-07 @ 16:23  Culture-urine --  Culture results --  method type --  Organism --  Organism Identification --  Specimen source PERICARDIAL FLUID           12-07 @ 16:28  Culture blood --  Culture results --  Gram stain --  Gram stain blood --  Method type --  Organism --  Organism identification --  Specimen source OTHER   12-07 @ 16:23  Culture blood --  Culture results --  Gram stain --  Gram stain blood --  Method type --  Organism --  Organism identification --  Specimen source PERICARDIAL FLUID      RADIOLOGY & ADDITIONAL TESTS:    Imaging Personally Reviewed:    Consultant(s) Notes Reviewed:      Care Discussed with Consultants/Other Providers:

## 2018-12-12 NOTE — PROGRESS NOTE ADULT - SUBJECTIVE AND OBJECTIVE BOX
EP ATTENDING    tele: NSR with short bursts of AF    no palpitations, no syncope, no angina      MEDICATIONS  (STANDING):  atorvastatin 80 milliGRAM(s) Oral at bedtime  dextrose 5%. 1000 milliLiter(s) (50 mL/Hr) IV Continuous <Continuous>  dextrose 50% Injectable 12.5 Gram(s) IV Push once  dextrose 50% Injectable 25 Gram(s) IV Push once  dextrose 50% Injectable 25 Gram(s) IV Push once  epoetin raheem Injectable 98746 Unit(s) IV Push <User Schedule>  fenofibrate Tablet 145 milliGRAM(s) Oral daily  heparin  Injectable 5000 Unit(s) SubCutaneous every 8 hours  hydrALAZINE 25 milliGRAM(s) Oral every 8 hours  insulin lispro (HumaLOG) corrective regimen sliding scale   SubCutaneous three times a day before meals  metoprolol tartrate 100 milliGRAM(s) Oral two times a day  pantoprazole    Tablet 40 milliGRAM(s) Oral before breakfast  sertraline 50 milliGRAM(s) Oral daily  sodium chloride 0.9%. 1000 milliLiter(s) (10 mL/Hr) IV Continuous <Continuous>    MEDICATIONS  (PRN):  acetaminophen   Tablet .. 650 milliGRAM(s) Oral every 6 hours PRN Mild Pain (1 - 3)  dextrose 40% Gel 15 Gram(s) Oral once PRN Blood Glucose LESS THAN 70 milliGRAM(s)/deciliter  glucagon  Injectable 1 milliGRAM(s) IntraMuscular once PRN Glucose LESS THAN 70 milligrams/deciliter  melatonin 3 milliGRAM(s) Oral at bedtime PRN Insomnia  ondansetron Injectable 4 milliGRAM(s) IV Push every 6 hours PRN Nausea and/or Vomiting      LABS:                        8.8    6.85  )-----------( 235      ( 12 Dec 2018 05:30 )             28.8     138  |  97<L>  |  28<H>  ----------------------------<  125<H>  4.1   |  28  |  3.72<H>    Ca    8.4      12 Dec 2018 05:30  Mg     2.1     12-12    Creatinine Trend: 3.72<--, 5.29<--, 7.55<--, 7.02<--, 9.14<--, 7.91<--   PT/INR - ( 12 Dec 2018 05:30 )   PT: 11.4 SEC;   INR: 1.00     PTT - ( 12 Dec 2018 05:30 )  PTT:29.3 SEC    PHYSICAL EXAM  Vital Signs Last 24 Hrs  T(C): 36.6 (12 Dec 2018 12:43), Max: 36.7 (11 Dec 2018 20:30)  T(F): 97.8 (12 Dec 2018 12:43), Max: 98 (11 Dec 2018 20:30)  HR: 61 (12 Dec 2018 13:32) (57 - 71)  BP: 156/51 (12 Dec 2018 13:32) (129/68 - 198/78)  RR: 17 (12 Dec 2018 12:43) (16 - 18)  SpO2: 100% (12 Dec 2018 12:43) (96% - 100%)    no JVD  RRR, no murmurs  CTAB  soft nt/nd  no c/c/e    echo: normal LVEF  TSH pending      A/P) 79 y/o female PMH ESRD on HD, HTN, DM, hyperlipidemia admitted with a large pericardial effusion requiring pericardiocentesis. Found to have PAF. Echo LVEF unremarkable, TSH pending. Responding well to beta blockers. CHADS-VASC 5    -f/u TSH  -continue metoprolol 100 bid  -is currently off a/c given large pericardial effusion. Given no a/c can't pursue a rhythm control strategy at this time (i.e. amio, etc.).   -Therefore simply recommend continuing beta blockers (she's currently in NSR).   -Hopefully a/c can be started prior to discharge, and if not then she's a good candidate for left atrial appendage occlusion with Watchman. EP     tele: NSR     no palpitations, no syncope, no angina      MEDICATIONS  (STANDING):  atorvastatin 80 milliGRAM(s) Oral at bedtime  dextrose 5%. 1000 milliLiter(s) (50 mL/Hr) IV Continuous <Continuous>  dextrose 50% Injectable 12.5 Gram(s) IV Push once  dextrose 50% Injectable 25 Gram(s) IV Push once  dextrose 50% Injectable 25 Gram(s) IV Push once  epoetin raheem Injectable 91058 Unit(s) IV Push <User Schedule>  fenofibrate Tablet 145 milliGRAM(s) Oral daily  heparin  Injectable 5000 Unit(s) SubCutaneous every 8 hours  hydrALAZINE 25 milliGRAM(s) Oral every 8 hours  insulin lispro (HumaLOG) corrective regimen sliding scale   SubCutaneous three times a day before meals  metoprolol tartrate 100 milliGRAM(s) Oral two times a day  pantoprazole    Tablet 40 milliGRAM(s) Oral before breakfast  sertraline 50 milliGRAM(s) Oral daily  sodium chloride 0.9%. 1000 milliLiter(s) (10 mL/Hr) IV Continuous <Continuous>    MEDICATIONS  (PRN):  acetaminophen   Tablet .. 650 milliGRAM(s) Oral every 6 hours PRN Mild Pain (1 - 3)  dextrose 40% Gel 15 Gram(s) Oral once PRN Blood Glucose LESS THAN 70 milliGRAM(s)/deciliter  glucagon  Injectable 1 milliGRAM(s) IntraMuscular once PRN Glucose LESS THAN 70 milligrams/deciliter  melatonin 3 milliGRAM(s) Oral at bedtime PRN Insomnia  ondansetron Injectable 4 milliGRAM(s) IV Push every 6 hours PRN Nausea and/or Vomiting      LABS:                        8.8    6.85  )-----------( 235      ( 12 Dec 2018 05:30 )             28.8     138  |  97<L>  |  28<H>  ----------------------------<  125<H>  4.1   |  28  |  3.72<H>    Ca    8.4      12 Dec 2018 05:30  Mg     2.1     12-12    Creatinine Trend: 3.72<--, 5.29<--, 7.55<--, 7.02<--, 9.14<--, 7.91<--   PT/INR - ( 12 Dec 2018 05:30 )   PT: 11.4 SEC;   INR: 1.00     PTT - ( 12 Dec 2018 05:30 )  PTT:29.3 SEC    PHYSICAL EXAM  Vital Signs Last 24 Hrs  T(C): 36.6 (12 Dec 2018 12:43), Max: 36.7 (11 Dec 2018 20:30)  T(F): 97.8 (12 Dec 2018 12:43), Max: 98 (11 Dec 2018 20:30)  HR: 61 (12 Dec 2018 13:32) (57 - 71)  BP: 156/51 (12 Dec 2018 13:32) (129/68 - 198/78)  RR: 17 (12 Dec 2018 12:43) (16 - 18)  SpO2: 100% (12 Dec 2018 12:43) (96% - 100%)    no JVD  RRR, no murmurs  CTAB  soft nt/nd  no c/c/e    echo: normal LVEF  TSH normal      A/P) 79 y/o female PMH ESRD on HD, HTN, DM, hyperlipidemia admitted with a large pericardial effusion requiring pericardiocentesis. Found to have PAF. Echo LVEF and TSH unremarkable. Responding well to beta blockers. CHADS-VASC 5    -continue metoprolol 100 bid  -is currently off a/c given large pericardial effusion. Given no anticoagulation, can't pursue a rhythm control strategy at this time (i.e. amio, etc.).   -Therefore simply recommend continuing beta blockers (she's currently in NSR).   -Hopefully a/c can be started prior to discharge, and if not then she's a good candidate for left atrial appendage occlusion with Watchman.

## 2018-12-12 NOTE — PROGRESS NOTE ADULT - ASSESSMENT
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 Found to have large pericardial effusion. s/p pericardial window. Renal following for ESRD management    labs, chart reviewed

## 2018-12-12 NOTE — PROGRESS NOTE ADULT - SUBJECTIVE AND OBJECTIVE BOX
[facility] - Saul / Kurt S /Fabrice/ EMMETT Patel/ EMMETT Storey/ Branden Madrigal / HANH Njeru  ---------------------------------------------------------------------------------------------------------------    Patient seen and examined bedside    Subjective and Objective: No overnight events, sob resolved. No complaints today. feeling better    Allergies: No Known Allergies      Hospital Medications:   MEDICATIONS  (STANDING):  atorvastatin 80 milliGRAM(s) Oral at bedtime  dextrose 5%. 1000 milliLiter(s) (50 mL/Hr) IV Continuous <Continuous>  dextrose 50% Injectable 12.5 Gram(s) IV Push once  dextrose 50% Injectable 25 Gram(s) IV Push once  dextrose 50% Injectable 25 Gram(s) IV Push once  epoetin raheem Injectable 26315 Unit(s) IV Push <User Schedule>  fenofibrate Tablet 145 milliGRAM(s) Oral daily  heparin  Injectable 5000 Unit(s) SubCutaneous every 8 hours  hydrALAZINE 25 milliGRAM(s) Oral every 8 hours  insulin lispro (HumaLOG) corrective regimen sliding scale   SubCutaneous three times a day before meals  metoprolol tartrate 100 milliGRAM(s) Oral two times a day  pantoprazole    Tablet 40 milliGRAM(s) Oral before breakfast  sertraline 50 milliGRAM(s) Oral daily  sodium chloride 0.9%. 1000 milliLiter(s) (10 mL/Hr) IV Continuous <Continuous>      REVIEW OF SYSTEMS:  CONSTITUTIONAL: No weakness, fevers or chills  EYES/ENT: No visual changes;  No vertigo or throat pain   NECK: No pain or stiffness  RESPIRATORY: No cough, wheezing, hemoptysis; No shortness of breath  CARDIOVASCULAR: No chest pain or palpitations.  GASTROINTESTINAL: No abdominal or epigastric pain. No nausea, vomiting, or hematemesis; No diarrhea or constipation. No melena or hematochezia.  GENITOURINARY: No dysuria, frequency, foamy urine, urinary urgency, incontinence or hematuria  NEUROLOGICAL: No numbness or weakness  SKIN: No itching, burning, rashes, or lesions   VASCULAR: No bilateral lower extremity edema.   All other review of systems is negative unless indicated above.    VITALS:  T(F): 97.8 (12-12-18 @ 12:43), Max: 98 (12-11-18 @ 20:30)  HR: 61 (12-12-18 @ 13:32)  BP: 156/51 (12-12-18 @ 13:32)  RR: 17 (12-12-18 @ 12:43)  SpO2: 100% (12-12-18 @ 12:43)  Wt(kg): --    12-11 @ 07:01  -  12-12 @ 07:00  --------------------------------------------------------  IN: 400 mL / OUT: 1080 mL / NET: -680 mL    12-12 @ 07:01  -  12-12 @ 13:47  --------------------------------------------------------  IN: 0 mL / OUT: 20 mL / NET: -20 mL          PHYSICAL EXAM:  Constitutional: NAD  HEENT: anicteric sclera, oropharynx clear  Neck: No JVD  Respiratory: CTAB, no wheezes, rales or rhonchi  Cardiovascular: S1, S2, RRR  Gastrointestinal: BS+, soft, NT/ND  Extremities: No cyanosis or clubbing. No peripheral edema  Neurological: A/O x 3, no focal deficits  Psychiatric: Normal mood, normal affect  : No CVA tenderness. No calvin.   Skin: No rashes  Vascular Access: rt IJ PC    LABS:  12-12    138  |  97<L>  |  28<H>  ----------------------------<  125<H>  4.1   |  28  |  3.72<H>    Ca    8.4      12 Dec 2018 05:30  Mg     2.1     12-12    TPro  6.8  /  Alb  3.5  /  TBili  0.2  /  DBili  0.1  /  AST  23  /  ALT  < 4<L>  /  AlkPhos  48  12-10    Creatinine Trend: 3.72 <--, 5.29 <--, 7.55 <--, 7.02 <--, 9.14 <--, 7.91 <--, 7.17 <--, 6.46 <--                        8.8    6.85  )-----------( 235      ( 12 Dec 2018 05:30 )             28.8     Urine Studies:        RADIOLOGY & ADDITIONAL STUDIES: [facility] - Saul / Kurt CHRISTINE /Fabrice/ EMMETT Patel/ EMMETT Storey/ Branden Madrigal / HANH Njazamu  ---------------------------------------------------------------------------------------------------------------    Patient seen and examined bedside    Subjective and Objective: No overnight events, denied sob. No complaints today.   s/p rt LEOPOLDO soto changed to PC earlier today by IR  tolerated hd well yesterday    Allergies: No Known Allergies      Hospital Medications:   MEDICATIONS  (STANDING):  atorvastatin 80 milliGRAM(s) Oral at bedtime  dextrose 5%. 1000 milliLiter(s) (50 mL/Hr) IV Continuous <Continuous>  dextrose 50% Injectable 12.5 Gram(s) IV Push once  dextrose 50% Injectable 25 Gram(s) IV Push once  dextrose 50% Injectable 25 Gram(s) IV Push once  epoetin raheem Injectable 24449 Unit(s) IV Push <User Schedule>  fenofibrate Tablet 145 milliGRAM(s) Oral daily  heparin  Injectable 5000 Unit(s) SubCutaneous every 8 hours  hydrALAZINE 25 milliGRAM(s) Oral every 8 hours  insulin lispro (HumaLOG) corrective regimen sliding scale   SubCutaneous three times a day before meals  metoprolol tartrate 100 milliGRAM(s) Oral two times a day  pantoprazole    Tablet 40 milliGRAM(s) Oral before breakfast  sertraline 50 milliGRAM(s) Oral daily  sodium chloride 0.9%. 1000 milliLiter(s) (10 mL/Hr) IV Continuous <Continuous>      VITALS:  T(F): 97.8 (12-12-18 @ 12:43), Max: 98 (12-11-18 @ 20:30)  HR: 61 (12-12-18 @ 13:32)  BP: 156/51 (12-12-18 @ 13:32)  RR: 17 (12-12-18 @ 12:43)  SpO2: 100% (12-12-18 @ 12:43)  Wt(kg): --    12-11 @ 07:01  -  12-12 @ 07:00  --------------------------------------------------------  IN: 400 mL / OUT: 1080 mL / NET: -680 mL    12-12 @ 07:01  -  12-12 @ 13:47  --------------------------------------------------------  IN: 0 mL / OUT: 20 mL / NET: -20 mL    PHYSICAL EXAM:  Constitutional: NAD  HEENT: anicteric sclera, oropharynx clear  Neck: No JVD  Respiratory: CTAB, no wheezes, rales or rhonchi  Cardiovascular: S1, S2, RRR  Gastrointestinal: BS+, soft, NT/ND  Extremities: No cyanosis or clubbing. No peripheral edema  Neurological: A/O x 3, no focal deficits  Psychiatric: Normal mood, normal affect  : No CVA tenderness. No calvin.   Skin: No rashes  Vascular Access: rt IJ PC    LABS:  12-12    138  |  97<L>  |  28<H>  ----------------------------<  125<H>  4.1   |  28  |  3.72<H>    Ca    8.4      12 Dec 2018 05:30  Mg     2.1     12-12    TPro  6.8  /  Alb  3.5  /  TBili  0.2  /  DBili  0.1  /  AST  23  /  ALT  < 4<L>  /  AlkPhos  48  12-10    Creatinine Trend: 3.72 <--, 5.29 <--, 7.55 <--, 7.02 <--, 9.14 <--, 7.91 <--, 7.17 <--, 6.46 <--                        8.8    6.85  )-----------( 235      ( 12 Dec 2018 05:30 )             28.8     Urine Studies:        RADIOLOGY & ADDITIONAL STUDIES:

## 2018-12-13 LAB
BACTERIA SKIN AEROBE CULT: SIGNIFICANT CHANGE UP
BUN SERPL-MCNC: 39 MG/DL — HIGH (ref 7–23)
CALCIUM SERPL-MCNC: 8.2 MG/DL — LOW (ref 8.4–10.5)
CHLORIDE SERPL-SCNC: 98 MMOL/L — SIGNIFICANT CHANGE UP (ref 98–107)
CO2 SERPL-SCNC: 27 MMOL/L — SIGNIFICANT CHANGE UP (ref 22–31)
CREAT SERPL-MCNC: 4.61 MG/DL — HIGH (ref 0.5–1.3)
CULTURE - SURGICAL SITE: SIGNIFICANT CHANGE UP
GLUCOSE BLDC GLUCOMTR-MCNC: 121 MG/DL — HIGH (ref 70–99)
GLUCOSE BLDC GLUCOMTR-MCNC: 137 MG/DL — HIGH (ref 70–99)
GLUCOSE BLDC GLUCOMTR-MCNC: 145 MG/DL — HIGH (ref 70–99)
GLUCOSE BLDC GLUCOMTR-MCNC: 180 MG/DL — HIGH (ref 70–99)
GLUCOSE SERPL-MCNC: 111 MG/DL — HIGH (ref 70–99)
HCT VFR BLD CALC: 31.3 % — LOW (ref 34.5–45)
HGB BLD-MCNC: 9 G/DL — LOW (ref 11.5–15.5)
MAGNESIUM SERPL-MCNC: 2.1 MG/DL — SIGNIFICANT CHANGE UP (ref 1.6–2.6)
MCHC RBC-ENTMCNC: 22.5 PG — LOW (ref 27–34)
MCHC RBC-ENTMCNC: 28.8 % — LOW (ref 32–36)
MCV RBC AUTO: 78.3 FL — LOW (ref 80–100)
NRBC # FLD: 0.04 — SIGNIFICANT CHANGE UP
PHOSPHATE SERPL-MCNC: 3 MG/DL — SIGNIFICANT CHANGE UP (ref 2.5–4.5)
PLATELET # BLD AUTO: 224 K/UL — SIGNIFICANT CHANGE UP (ref 150–400)
PMV BLD: SIGNIFICANT CHANGE UP FL (ref 7–13)
POTASSIUM SERPL-MCNC: 4.1 MMOL/L — SIGNIFICANT CHANGE UP (ref 3.5–5.3)
POTASSIUM SERPL-SCNC: 4.1 MMOL/L — SIGNIFICANT CHANGE UP (ref 3.5–5.3)
RBC # BLD: 4 M/UL — SIGNIFICANT CHANGE UP (ref 3.8–5.2)
RBC # FLD: 15.8 % — HIGH (ref 10.3–14.5)
SODIUM SERPL-SCNC: 137 MMOL/L — SIGNIFICANT CHANGE UP (ref 135–145)
SPECIMEN SOURCE: SIGNIFICANT CHANGE UP
SPECIMEN SOURCE: SIGNIFICANT CHANGE UP
SURGICAL PATHOLOGY STUDY: SIGNIFICANT CHANGE UP
WBC # BLD: 6.88 K/UL — SIGNIFICANT CHANGE UP (ref 3.8–10.5)
WBC # FLD AUTO: 6.88 K/UL — SIGNIFICANT CHANGE UP (ref 3.8–10.5)

## 2018-12-13 RX ORDER — NYSTATIN 500MM UNIT
500000 POWDER (EA) MISCELLANEOUS THREE TIMES A DAY
Qty: 0 | Refills: 0 | Status: DISCONTINUED | OUTPATIENT
Start: 2018-12-13 | End: 2018-12-13

## 2018-12-13 RX ORDER — NYSTATIN 500MM UNIT
500000 POWDER (EA) MISCELLANEOUS
Qty: 0 | Refills: 0 | Status: DISCONTINUED | OUTPATIENT
Start: 2018-12-13 | End: 2018-12-20

## 2018-12-13 RX ADMIN — Medication 25 MILLIGRAM(S): at 21:28

## 2018-12-13 RX ADMIN — Medication 1: at 12:42

## 2018-12-13 RX ADMIN — Medication 25 MILLIGRAM(S): at 06:00

## 2018-12-13 RX ADMIN — Medication 100 MILLIGRAM(S): at 06:00

## 2018-12-13 RX ADMIN — PANTOPRAZOLE SODIUM 40 MILLIGRAM(S): 20 TABLET, DELAYED RELEASE ORAL at 06:00

## 2018-12-13 RX ADMIN — HEPARIN SODIUM 5000 UNIT(S): 5000 INJECTION INTRAVENOUS; SUBCUTANEOUS at 06:00

## 2018-12-13 RX ADMIN — SERTRALINE 50 MILLIGRAM(S): 25 TABLET, FILM COATED ORAL at 12:29

## 2018-12-13 RX ADMIN — HEPARIN SODIUM 5000 UNIT(S): 5000 INJECTION INTRAVENOUS; SUBCUTANEOUS at 12:28

## 2018-12-13 RX ADMIN — Medication 500000 UNIT(S): at 21:29

## 2018-12-13 RX ADMIN — ATORVASTATIN CALCIUM 80 MILLIGRAM(S): 80 TABLET, FILM COATED ORAL at 21:27

## 2018-12-13 RX ADMIN — Medication 25 MILLIGRAM(S): at 12:29

## 2018-12-13 RX ADMIN — Medication 500000 UNIT(S): at 12:29

## 2018-12-13 RX ADMIN — Medication 500000 UNIT(S): at 06:00

## 2018-12-13 RX ADMIN — Medication 3 MILLIGRAM(S): at 21:28

## 2018-12-13 RX ADMIN — Medication 145 MILLIGRAM(S): at 12:29

## 2018-12-13 RX ADMIN — HEPARIN SODIUM 5000 UNIT(S): 5000 INJECTION INTRAVENOUS; SUBCUTANEOUS at 21:29

## 2018-12-13 RX ADMIN — ERYTHROPOIETIN 20000 UNIT(S): 10000 INJECTION, SOLUTION INTRAVENOUS; SUBCUTANEOUS at 17:19

## 2018-12-13 NOTE — PROGRESS NOTE ADULT - ASSESSMENT
· Assessment	  79 y/o  Female, with a PMHx of CKD (fistula June 2018, s/p 2 balloons, last one 2 weeks ago, no dialysis yet), DM, HTN, HLD, anemia presents to the [facility] ED constant SOB, dry cough and clear rhinorrhea x 2 days associated with one episode of vomiting, darken stools and increased urinary frequency admitted to telemetry for Pulmonary Edema/ Anemia/ Acute on Chronic Renal Failure.      PAF: Tele  EP f/up noted./Metoprolol     Problem/Plan - 1:  ·  Problem: Pericardial Effusion:    S/p pericardial window/ Chest tube.       Problem/Plan - 3:  ·  Problem: ANNIE on Advanced CKD:   Plan: For HD. Nephro f/up noted.      Problem/Plan - 4:  ·  Problem: DM (diabetes mellitus).  Plan: FSSS     Problem/Plan - 5:  ·  Problem: HTN (hypertension).  Plan:  Cont. Amlodipine, Hydralazine, Furosemide.       Dw family.

## 2018-12-13 NOTE — PROGRESS NOTE ADULT - SUBJECTIVE AND OBJECTIVE BOX
EP     tele: NSR     no palpitations, no syncope, no angina        MEDICATIONS  (STANDING):  atorvastatin 80 milliGRAM(s) Oral at bedtime  dextrose 5%. 1000 milliLiter(s) (50 mL/Hr) IV Continuous <Continuous>  dextrose 50% Injectable 12.5 Gram(s) IV Push once  dextrose 50% Injectable 25 Gram(s) IV Push once  dextrose 50% Injectable 25 Gram(s) IV Push once  epoetin raheem Injectable 77120 Unit(s) IV Push <User Schedule>  fenofibrate Tablet 145 milliGRAM(s) Oral daily  heparin  Injectable 5000 Unit(s) SubCutaneous every 8 hours  hydrALAZINE 25 milliGRAM(s) Oral every 8 hours  insulin lispro (HumaLOG) corrective regimen sliding scale   SubCutaneous three times a day before meals  metoprolol tartrate 100 milliGRAM(s) Oral two times a day  nystatin    Suspension 410235 Unit(s) Oral four times a day  pantoprazole    Tablet 40 milliGRAM(s) Oral before breakfast  sertraline 50 milliGRAM(s) Oral daily    MEDICATIONS  (PRN):  acetaminophen   Tablet .. 650 milliGRAM(s) Oral every 6 hours PRN Mild Pain (1 - 3)  dextrose 40% Gel 15 Gram(s) Oral once PRN Blood Glucose LESS THAN 70 milliGRAM(s)/deciliter  glucagon  Injectable 1 milliGRAM(s) IntraMuscular once PRN Glucose LESS THAN 70 milligrams/deciliter  melatonin 3 milliGRAM(s) Oral at bedtime PRN Insomnia  ondansetron Injectable 4 milliGRAM(s) IV Push every 6 hours PRN Nausea and/or Vomiting      LABS:                        9.0    6.88  )-----------( 224      ( 13 Dec 2018 06:27 )             31.3     137  |  98  |  39<H>  ----------------------------<  111<H>  4.1   |  27  |  4.61<H>    Ca    8.2<L>      13 Dec 2018 06:30  Phos  3.0     12-13  Mg     2.1     12-13    Creatinine Trend: 4.61<--, 3.72<--, 5.29<--, 7.55<--, 7.02<--, 9.14<--     PHYSICAL EXAM  Vital Signs Last 24 Hrs  T(C): 36.7 (13 Dec 2018 12:22), Max: 36.9 (12 Dec 2018 16:00)  T(F): 98.1 (13 Dec 2018 12:22), Max: 98.4 (12 Dec 2018 16:00)  HR: 62 (13 Dec 2018 12:22) (62 - 73)  BP: 158/54 (13 Dec 2018 12:22) (129/44 - 158/54)  RR: 18 (13 Dec 2018 12:22) (17 - 18)  SpO2: 100% (13 Dec 2018 12:22) (98% - 100%)    no JVD  RRR, no murmurs  CTAB  soft nt/nd  no c/c/e    echo: normal LVEF  TSH normal    A/P) 79 y/o female PMH ESRD on HD, HTN, DM, hyperlipidemia admitted with a large pericardial effusion requiring pericardiocentesis. Found to have PAF. Echo LVEF and TSH unremarkable. Responding well to beta blockers. CHADS-VASC 5    -continue metoprolol 100 bid  -is currently off a/c given large pericardial effusion. Given no anticoagulation, can't pursue a rhythm control strategy at this time (i.e. amio, etc.).   -Therefore simply recommend continuing beta blockers (she's currently in NSR).   -Hopefully a/c can be started prior to discharge, and if not then she's a good candidate for left atrial appendage occlusion with Watchman.

## 2018-12-13 NOTE — PROGRESS NOTE ADULT - SUBJECTIVE AND OBJECTIVE BOX
Nephrology Followup Note - [phone] - Dr Hicks / Dr Hughes / Dr Storey / Dr Avina / Dr Patel / Dr Brannon / Dr Madrigal / Dr Kolb  Pt seen and examined at bedside  No acute events overnight. Pt comfortable, denies SOB     Allergies:  No Known Allergies    Hospital Medications:   MEDICATIONS  (STANDING):  atorvastatin 80 milliGRAM(s) Oral at bedtime  dextrose 5%. 1000 milliLiter(s) (50 mL/Hr) IV Continuous <Continuous>  dextrose 50% Injectable 12.5 Gram(s) IV Push once  dextrose 50% Injectable 25 Gram(s) IV Push once  dextrose 50% Injectable 25 Gram(s) IV Push once  epoetin raheem Injectable 67752 Unit(s) IV Push <User Schedule>  fenofibrate Tablet 145 milliGRAM(s) Oral daily  heparin  Injectable 5000 Unit(s) SubCutaneous every 8 hours  hydrALAZINE 25 milliGRAM(s) Oral every 8 hours  insulin lispro (HumaLOG) corrective regimen sliding scale   SubCutaneous three times a day before meals  metoprolol tartrate 100 milliGRAM(s) Oral two times a day  nystatin    Suspension [id number] Unit(s) Oral four times a day  pantoprazole    Tablet 40 milliGRAM(s) Oral before breakfast  sertraline 50 milliGRAM(s) Oral daily    VITALS:  T(F): 98.1 (12-13-18 @ 12:22), Max: 98.4 (12-12-18 @ 16:00)  HR: 62 (12-13-18 @ 12:22)  BP: 158/54 (12-13-18 @ 12:22)  RR: 18 (12-13-18 @ 12:22)  SpO2: 100% (12-13-18 @ 12:22)  Wt(kg): --    12-12 @ 07:01  -  12-13 @ 07:00  --------------------------------------------------------  IN: 0 mL / OUT: 70 mL / NET: -70 mL    12-13 @ 07:01  -  12-13 @ 13:42  --------------------------------------------------------  IN: 0 mL / OUT: 50 mL / NET: -50 mL    PHYSICAL EXAM:  Constitutional: NAD  HEENT: anicteric sclera, oropharynx clear, MMM  Neck: No JVD  Respiratory: CTAB, no wheezes, rales or rhonchi  Cardiovascular: S1, S2, RRR, cardiac window   Gastrointestinal: BS+, soft, NT/ND  Extremities: No cyanosis or clubbing. No peripheral edema  Neurological: A/O x 3, no focal deficits  Psychiatric: Normal mood, normal affect  : No CVA tenderness. No calvin.   Skin: No rashes  Vascular Access: RIJ Tunneled cath, LUE AVF +thrill      LABS:  12-13    137  |  98  |  39<H>  ----------------------------<  111<H>  4.1   |  27  |  4.61<H>    Ca    8.2<L>      13 Dec 2018 06:30  Phos  3.0     12-13  Mg     2.1     12-13      Creatinine Trend: 4.61 <--, 3.72 <--, 5.29 <--, 7.55 <--, 7.02 <--, 9.14 <--, 7.91 <--                        9.0    6.88  )-----------( 224      ( 13 Dec 2018 06:27 )             31.3     Urine Studies:      RADIOLOGY & ADDITIONAL STUDIES:

## 2018-12-13 NOTE — PROGRESS NOTE ADULT - PROBLEM SELECTOR PLAN 1
esrd now with dialysis dependence  s/p 1st HD session 12/8/18 via right IJ charles, s/p RIJ tunneled cath  plan for repeat HD today with 0.5-1kg UF as tolearted  f/u vascular for immature avf;  Pt accepted to [facility] on MWF schedule. Will plan for HD MWF next week.

## 2018-12-13 NOTE — PROGRESS NOTE ADULT - SUBJECTIVE AND OBJECTIVE BOX
Patient is a 78y old  Female who presents with a chief complaint of Orthopnea x 2 days (13 Dec 2018 14:33)      SUBJECTIVE / OVERNIGHT EVENTS:    Events noted.  Feels better.  CONSTITUTIONAL: No fever,  or fatigue  NECK: No pain or stiffness  RESPIRATORY: No cough, wheezing, chills or hemoptysis; No shortness of breath  CARDIOVASCULAR: No chest pain, palpitations, dizziness, or leg swelling  GASTROINTESTINAL: No abdominal or epigastric pain. No nausea, vomiting, or hematemesis; No diarrhea or constipation.   NEUROLOGICAL: No headaches,     MEDICATIONS  (STANDING):  atorvastatin 80 milliGRAM(s) Oral at bedtime  dextrose 5%. 1000 milliLiter(s) (50 mL/Hr) IV Continuous <Continuous>  dextrose 50% Injectable 12.5 Gram(s) IV Push once  dextrose 50% Injectable 25 Gram(s) IV Push once  dextrose 50% Injectable 25 Gram(s) IV Push once  epoetin raheem Injectable 75456 Unit(s) IV Push <User Schedule>  fenofibrate Tablet 145 milliGRAM(s) Oral daily  heparin  Injectable 5000 Unit(s) SubCutaneous every 8 hours  hydrALAZINE 25 milliGRAM(s) Oral every 8 hours  insulin lispro (HumaLOG) corrective regimen sliding scale   SubCutaneous three times a day before meals  metoprolol tartrate 100 milliGRAM(s) Oral two times a day  nystatin    Suspension [id number] Unit(s) Oral four times a day  pantoprazole    Tablet 40 milliGRAM(s) Oral before breakfast  sertraline 50 milliGRAM(s) Oral daily    MEDICATIONS  (PRN):  acetaminophen   Tablet .. 650 milliGRAM(s) Oral every 6 hours PRN Mild Pain (1 - 3)  dextrose 40% Gel 15 Gram(s) Oral once PRN Blood Glucose LESS THAN 70 milliGRAM(s)/deciliter  glucagon  Injectable 1 milliGRAM(s) IntraMuscular once PRN Glucose LESS THAN 70 milligrams/deciliter  melatonin 3 milliGRAM(s) Oral at bedtime PRN Insomnia  ondansetron Injectable 4 milliGRAM(s) IV Push every 6 hours PRN Nausea and/or Vomiting        CAPILLARY BLOOD GLUCOSE      POCT Blood Glucose.: 121 mg/dL (13 Dec 2018 21:27)  POCT Blood Glucose.: 137 mg/dL (13 Dec 2018 17:16)  POCT Blood Glucose.: 180 mg/dL (13 Dec 2018 12:36)  POCT Blood Glucose.: 145 mg/dL (13 Dec 2018 08:51)    I&O's Summary    12 Dec 2018 07:01  -  13 Dec 2018 07:00  --------------------------------------------------------  IN: 0 mL / OUT: 70 mL / NET: -70 mL    13 Dec 2018 07:01  -  13 Dec 2018 23:55  --------------------------------------------------------  IN: 400 mL / OUT: 1450 mL / NET: -1050 mL        PHYSICAL EXAM:  GENERAL: NAD  NECK: Supple, No JVD  CHEST/LUNG: Clear to auscultation bilaterally; No wheezing.  HEART: Regular rate and rhythm; No murmurs, rubs, or gallops  ABDOMEN: Soft, Nontender, Nondistended; Bowel sounds present  EXTREMITIES:   No clubbing, cyanosis, or edema  NEUROLOGY: AAO X 3      LABS:                        9.0    6.88  )-----------( 224      ( 13 Dec 2018 06:27 )             31.3     12-13    137  |  98  |  39<H>  ----------------------------<  111<H>  4.1   |  27  |  4.61<H>    Ca    8.2<L>      13 Dec 2018 06:30  Phos  3.0     12-13  Mg     2.1     12-13      PT/INR - ( 12 Dec 2018 05:30 )   PT: 11.4 SEC;   INR: 1.00          PTT - ( 12 Dec 2018 05:30 )  PTT:29.3 SEC        CAPILLARY BLOOD GLUCOSE      POCT Blood Glucose.: 121 mg/dL (13 Dec 2018 21:27)  POCT Blood Glucose.: 137 mg/dL (13 Dec 2018 17:16)  POCT Blood Glucose.: 180 mg/dL (13 Dec 2018 12:36)  POCT Blood Glucose.: 145 mg/dL (13 Dec 2018 08:51)    12-07 @ 16:28  Culture-urine --  Culture results --  method type --  Organism --  Organism Identification --  Specimen source OTHER  12-07 @ 16:23  Culture-urine --  Culture results --  method type --  Organism --  Organism Identification --  Specimen source PERICARDIAL FLUID           12-07 @ 16:28  Culture blood --  Culture results --  Gram stain --  Gram stain blood --  Method type --  Organism --  Organism identification --  Specimen source OTHER   12-07 @ 16:23  Culture blood --  Culture results --  Gram stain --  Gram stain blood --  Method type --  Organism --  Organism identification --  Specimen source PERICARDIAL FLUID      RADIOLOGY & ADDITIONAL TESTS:    Imaging Personally Reviewed:    Consultant(s) Notes Reviewed:      Care Discussed with Consultants/Other Providers:

## 2018-12-13 NOTE — PROVIDER CONTACT NOTE (OTHER) - SITUATION
Pt's BP elevated above parameter towards the end of treatment and post dialysis treatment with also HR below parameter

## 2018-12-13 NOTE — PROGRESS NOTE ADULT - SUBJECTIVE AND OBJECTIVE BOX
Pt seen. Sitting OOB in chair. No complaints.  Denies CP or SOB.   Vital Signs Last 24 Hrs  T(C): 36.7 (13 Dec 2018 12:22), Max: 36.9 (12 Dec 2018 16:00)  T(F): 98.1 (13 Dec 2018 12:22), Max: 98.4 (12 Dec 2018 16:00)  HR: 62 (13 Dec 2018 12:22) (62 - 73)  BP: 158/54 (13 Dec 2018 12:22) (129/44 - 158/54)  BP(mean): --  RR: 18 (13 Dec 2018 12:22) (17 - 18)  SpO2: 100% (13 Dec 2018 12:22) (98% - 100%)    A+O x 3  MAEE  Lungs CTA  SUbxyphoid incision c/d/i  Fernando to WS, 70cc/24hrs    A/p: 79yo s/p Subxyphoid Pericardial Window POD #6  -Continue Fernando to water seal. Will plan to remove drain once output < 30cc/24hrs.   -Care per primary team  -Will cont to follow.

## 2018-12-14 LAB
BUN SERPL-MCNC: 16 MG/DL — SIGNIFICANT CHANGE UP (ref 7–23)
CALCIUM SERPL-MCNC: 8.4 MG/DL — SIGNIFICANT CHANGE UP (ref 8.4–10.5)
CHLORIDE SERPL-SCNC: 98 MMOL/L — SIGNIFICANT CHANGE UP (ref 98–107)
CO2 SERPL-SCNC: 27 MMOL/L — SIGNIFICANT CHANGE UP (ref 22–31)
CREAT SERPL-MCNC: 3.04 MG/DL — HIGH (ref 0.5–1.3)
GLUCOSE BLDC GLUCOMTR-MCNC: 138 MG/DL — HIGH (ref 70–99)
GLUCOSE BLDC GLUCOMTR-MCNC: 173 MG/DL — HIGH (ref 70–99)
GLUCOSE BLDC GLUCOMTR-MCNC: 241 MG/DL — HIGH (ref 70–99)
GLUCOSE BLDC GLUCOMTR-MCNC: 254 MG/DL — HIGH (ref 70–99)
GLUCOSE SERPL-MCNC: 123 MG/DL — HIGH (ref 70–99)
HCT VFR BLD CALC: 28.2 % — LOW (ref 34.5–45)
HGB BLD-MCNC: 8.4 G/DL — LOW (ref 11.5–15.5)
MAGNESIUM SERPL-MCNC: 2 MG/DL — SIGNIFICANT CHANGE UP (ref 1.6–2.6)
MCHC RBC-ENTMCNC: 22.8 PG — LOW (ref 27–34)
MCHC RBC-ENTMCNC: 29.8 % — LOW (ref 32–36)
MCV RBC AUTO: 76.6 FL — LOW (ref 80–100)
NRBC # FLD: 0.07 — SIGNIFICANT CHANGE UP
NRBC FLD-RTO: 1.4 — SIGNIFICANT CHANGE UP
PLATELET # BLD AUTO: 171 K/UL — SIGNIFICANT CHANGE UP (ref 150–400)
PMV BLD: SIGNIFICANT CHANGE UP FL (ref 7–13)
POTASSIUM SERPL-MCNC: 3.5 MMOL/L — SIGNIFICANT CHANGE UP (ref 3.5–5.3)
POTASSIUM SERPL-SCNC: 3.5 MMOL/L — SIGNIFICANT CHANGE UP (ref 3.5–5.3)
RBC # BLD: 3.68 M/UL — LOW (ref 3.8–5.2)
RBC # FLD: 15.8 % — HIGH (ref 10.3–14.5)
SODIUM SERPL-SCNC: 138 MMOL/L — SIGNIFICANT CHANGE UP (ref 135–145)
SPECIMEN SOURCE: SIGNIFICANT CHANGE UP
SPECIMEN SOURCE: SIGNIFICANT CHANGE UP
WBC # BLD: 5.18 K/UL — SIGNIFICANT CHANGE UP (ref 3.8–10.5)
WBC # FLD AUTO: 5.18 K/UL — SIGNIFICANT CHANGE UP (ref 3.8–10.5)

## 2018-12-14 RX ADMIN — PANTOPRAZOLE SODIUM 40 MILLIGRAM(S): 20 TABLET, DELAYED RELEASE ORAL at 05:39

## 2018-12-14 RX ADMIN — Medication 25 MILLIGRAM(S): at 22:00

## 2018-12-14 RX ADMIN — Medication 500000 UNIT(S): at 13:01

## 2018-12-14 RX ADMIN — ATORVASTATIN CALCIUM 80 MILLIGRAM(S): 80 TABLET, FILM COATED ORAL at 21:59

## 2018-12-14 RX ADMIN — Medication 3 MILLIGRAM(S): at 21:59

## 2018-12-14 RX ADMIN — Medication 100 MILLIGRAM(S): at 05:38

## 2018-12-14 RX ADMIN — Medication 25 MILLIGRAM(S): at 05:38

## 2018-12-14 RX ADMIN — Medication 1: at 17:50

## 2018-12-14 RX ADMIN — Medication 145 MILLIGRAM(S): at 13:02

## 2018-12-14 RX ADMIN — HEPARIN SODIUM 5000 UNIT(S): 5000 INJECTION INTRAVENOUS; SUBCUTANEOUS at 05:38

## 2018-12-14 RX ADMIN — Medication 25 MILLIGRAM(S): at 13:03

## 2018-12-14 RX ADMIN — Medication 500000 UNIT(S): at 22:01

## 2018-12-14 RX ADMIN — SERTRALINE 50 MILLIGRAM(S): 25 TABLET, FILM COATED ORAL at 13:02

## 2018-12-14 RX ADMIN — Medication 100 MILLIGRAM(S): at 17:51

## 2018-12-14 RX ADMIN — HEPARIN SODIUM 5000 UNIT(S): 5000 INJECTION INTRAVENOUS; SUBCUTANEOUS at 22:00

## 2018-12-14 RX ADMIN — Medication 500000 UNIT(S): at 05:38

## 2018-12-14 RX ADMIN — HEPARIN SODIUM 5000 UNIT(S): 5000 INJECTION INTRAVENOUS; SUBCUTANEOUS at 13:02

## 2018-12-14 RX ADMIN — Medication 500000 UNIT(S): at 17:51

## 2018-12-14 RX ADMIN — Medication 2: at 13:01

## 2018-12-14 NOTE — PROGRESS NOTE ADULT - SUBJECTIVE AND OBJECTIVE BOX
Patient is a 78y old  Female who presents with a chief complaint of Orthopnea x 2 days (14 Dec 2018 11:31)      SUBJECTIVE / OVERNIGHT EVENTS:    Events noted.  Feels better.  CONSTITUTIONAL: No fever,  or fatigue  NECK: No pain or stiffness  RESPIRATORY: No cough, wheezing, chills or hemoptysis; No shortness of breath  CARDIOVASCULAR: No chest pain, palpitations, dizziness, or leg swelling  GASTROINTESTINAL: No abdominal or epigastric pain. No nausea, vomiting, or hematemesis; No diarrhea or constipation.   NEUROLOGICAL: No headaches,     MEDICATIONS  (STANDING):  atorvastatin 80 milliGRAM(s) Oral at bedtime  dextrose 5%. 1000 milliLiter(s) (50 mL/Hr) IV Continuous <Continuous>  dextrose 50% Injectable 12.5 Gram(s) IV Push once  dextrose 50% Injectable 25 Gram(s) IV Push once  dextrose 50% Injectable 25 Gram(s) IV Push once  epoetin raheem Injectable 21509 Unit(s) IV Push <User Schedule>  fenofibrate Tablet 145 milliGRAM(s) Oral daily  heparin  Injectable 5000 Unit(s) SubCutaneous every 8 hours  hydrALAZINE 25 milliGRAM(s) Oral every 8 hours  insulin lispro (HumaLOG) corrective regimen sliding scale   SubCutaneous three times a day before meals  metoprolol tartrate 100 milliGRAM(s) Oral two times a day  nystatin    Suspension 448739 Unit(s) Oral four times a day  pantoprazole    Tablet 40 milliGRAM(s) Oral before breakfast  sertraline 50 milliGRAM(s) Oral daily    MEDICATIONS  (PRN):  acetaminophen   Tablet .. 650 milliGRAM(s) Oral every 6 hours PRN Mild Pain (1 - 3)  dextrose 40% Gel 15 Gram(s) Oral once PRN Blood Glucose LESS THAN 70 milliGRAM(s)/deciliter  glucagon  Injectable 1 milliGRAM(s) IntraMuscular once PRN Glucose LESS THAN 70 milligrams/deciliter  melatonin 3 milliGRAM(s) Oral at bedtime PRN Insomnia  ondansetron Injectable 4 milliGRAM(s) IV Push every 6 hours PRN Nausea and/or Vomiting        CAPILLARY BLOOD GLUCOSE      POCT Blood Glucose.: 173 mg/dL (14 Dec 2018 17:34)  POCT Blood Glucose.: 241 mg/dL (14 Dec 2018 12:02)  POCT Blood Glucose.: 138 mg/dL (14 Dec 2018 08:50)  POCT Blood Glucose.: 121 mg/dL (13 Dec 2018 21:27)    I&O's Summary    13 Dec 2018 07:01  -  14 Dec 2018 07:00  --------------------------------------------------------  IN: 400 mL / OUT: 1450 mL / NET: -1050 mL    14 Dec 2018 07:01  -  14 Dec 2018 18:30  --------------------------------------------------------  IN: 0 mL / OUT: 225 mL / NET: -225 mL        PHYSICAL EXAM:  GENERAL: NAD  NECK: Supple, No JVD  CHEST/LUNG: Clear to auscultation bilaterally; No wheezing.  HEART: Regular rate and rhythm; No murmurs, rubs, or gallops  ABDOMEN: Soft, Nontender, Nondistended; Bowel sounds present  EXTREMITIES:   No clubbing, cyanosis, or edema  NEUROLOGY: AAO X 3      LABS:                        8.4    5.18  )-----------( 171      ( 14 Dec 2018 06:30 )             28.2     12-14    138  |  98  |  16  ----------------------------<  123<H>  3.5   |  27  |  3.04<H>    Ca    8.4      14 Dec 2018 06:30  Phos  3.0     12-13  Mg     2.0     12-14              CAPILLARY BLOOD GLUCOSE      POCT Blood Glucose.: 173 mg/dL (14 Dec 2018 17:34)  POCT Blood Glucose.: 241 mg/dL (14 Dec 2018 12:02)  POCT Blood Glucose.: 138 mg/dL (14 Dec 2018 08:50)  POCT Blood Glucose.: 121 mg/dL (13 Dec 2018 21:27)                RADIOLOGY & ADDITIONAL TESTS:    Imaging Personally Reviewed:    Consultant(s) Notes Reviewed:      Care Discussed with Consultants/Other Providers:

## 2018-12-14 NOTE — PROGRESS NOTE ADULT - ASSESSMENT
· Assessment	  77 y/o  Female, with a PMHx of CKD (fistula June 2018, s/p 2 balloons, last one 2 weeks ago, no dialysis yet), DM, HTN, HLD, anemia presents to the [facility] ED constant SOB, dry cough and clear rhinorrhea x 2 days associated with one episode of vomiting, darken stools and increased urinary frequency admitted to telemetry for Pulmonary Edema/ Anemia/ Acute on Chronic Renal Failure.      PAF: Tele  EP f/up noted./Metoprolol     Problem/Plan - 1:  ·  Problem: Pericardial Effusion:    S/p pericardial window/ Chest tube.       Problem/Plan - 3:  ·  Problem: ANNIE on Advanced CKD:   Plan: For HD. Nephro f/up noted.      Problem/Plan - 4:  ·  Problem: DM (diabetes mellitus).  Plan: FSSS     Problem/Plan - 5:  ·  Problem: HTN (hypertension).  Plan:  Cont. Amlodipine, Hydralazine, Furosemide.       Dw family.

## 2018-12-14 NOTE — PROGRESS NOTE ADULT - SUBJECTIVE AND OBJECTIVE BOX
Subjective: resting comfortably, no acute complaints    Vital Signs:  Vital Signs Last 24 Hrs  T(C): 36.9 (12-14-18 @ 08:02), Max: 36.9 (12-14-18 @ 00:53)  T(F): 98.5 (12-14-18 @ 08:02), Max: 98.5 (12-14-18 @ 00:53)  HR: 57 (12-14-18 @ 08:02) (42 - 69)  BP: 146/44 (12-14-18 @ 08:02) (140/52 - 180/61)  RR: 18 (12-14-18 @ 08:02) (16 - 18)  SpO2: 100% (12-14-18 @ 08:02) (100% - 100%) on (O2)    A+O x 3  MAEE  Lungs CTA  SUbxyphoid incision c/d/i  Fernando to WS, 50cc/24hrs    A/p: 77yo s/p Subxyphoid Pericardial Window POD #7  -Continue Fernando to water seal. Will plan to remove drain once output < 30cc/24hrs.   -Care per primary team  -Will cont to follow.     Zahida PETTY [id number]

## 2018-12-14 NOTE — PROGRESS NOTE ADULT - PROBLEM SELECTOR PLAN 1
esrd now with dialysis dependence  s/p 1st HD session 12/8/18 via right IJ shiley, s/p RIJ tunneled cath  HD yesterday uneventful, with 1kg UF achieved. Repeat HD tomorrow.   f/u vascular for immature avf;  Pt accepted to [facility] on MWF schedule. Will plan for HD MWF next week.

## 2018-12-14 NOTE — PROGRESS NOTE ADULT - SUBJECTIVE AND OBJECTIVE BOX
Nephrology Followup Note - [phone] - Dr Hicks / Dr Hughes / Dr Storey / Dr Avina / Dr Patel / Dr Brannon / Dr Madrigal / Dr Kolb  Pt seen and examined at bedside  No acute events overnight. Pt comfortable, Denies SOB.     Allergies:  No Known Allergies    Hospital Medications:   MEDICATIONS  (STANDING):  atorvastatin 80 milliGRAM(s) Oral at bedtime  dextrose 5%. 1000 milliLiter(s) (50 mL/Hr) IV Continuous <Continuous>  dextrose 50% Injectable 12.5 Gram(s) IV Push once  dextrose 50% Injectable 25 Gram(s) IV Push once  dextrose 50% Injectable 25 Gram(s) IV Push once  epoetin raheem Injectable 12734 Unit(s) IV Push <User Schedule>  fenofibrate Tablet 145 milliGRAM(s) Oral daily  heparin  Injectable 5000 Unit(s) SubCutaneous every 8 hours  hydrALAZINE 25 milliGRAM(s) Oral every 8 hours  insulin lispro (HumaLOG) corrective regimen sliding scale   SubCutaneous three times a day before meals  metoprolol tartrate 100 milliGRAM(s) Oral two times a day  nystatin    Suspension 125898 Unit(s) Oral four times a day  pantoprazole    Tablet 40 milliGRAM(s) Oral before breakfast  sertraline 50 milliGRAM(s) Oral daily    VITALS:  T(F): 98.5 (12-14-18 @ 08:02), Max: 98.5 (12-14-18 @ 00:53)  HR: 57 (12-14-18 @ 08:02)  BP: 146/44 (12-14-18 @ 08:02)  RR: 18 (12-14-18 @ 08:02)  SpO2: 100% (12-14-18 @ 08:02)  Wt(kg): --    12-13 @ 07:01  -  12-14 @ 07:00  --------------------------------------------------------  IN: 400 mL / OUT: 1450 mL / NET: -1050 mL    12-14 @ 07:01  -  12-14 @ 11:31  --------------------------------------------------------  IN: 0 mL / OUT: 0 mL / NET: 0 mL    PHYSICAL EXAM:  Constitutional: NAD  HEENT: anicteric sclera, oropharynx clear, MMM  Neck: No JVD  Respiratory: CTAB, no wheezes, rales or rhonchi  Cardiovascular: S1, S2, RRR  Gastrointestinal: BS+, soft, NT/ND  Extremities: No cyanosis or clubbing. No peripheral edema  Neurological: A/O x 3, no focal deficits  Psychiatric: Normal mood, normal affect  : No CVA tenderness. No calvin.   Skin: No rashes  Vascular Access: RIJ tunneled cath, L wrist AV access, +thrill and bruit     LABS:  12-14    138  |  98  |  16  ----------------------------<  123<H>  3.5   |  27  |  3.04<H>    Ca    8.4      14 Dec 2018 06:30  Phos  3.0     12-13  Mg     2.0     12-14      Creatinine Trend: 3.04 <--, 4.61 <--, 3.72 <--, 5.29 <--, 7.55 <--, 7.02 <--, 9.14 <--                        8.4    5.18  )-----------( 171      ( 14 Dec 2018 06:30 )             28.2     Urine Studies:      RADIOLOGY & ADDITIONAL STUDIES:

## 2018-12-14 NOTE — CHART NOTE - NSCHARTNOTEFT_GEN_A_CORE
Source: Patient [x ]    Family [x ]     other [x ] EMR    Pt seen for malnutrition follow-up. Pt is a 79 y/o F s/p subxyphoid pericardial window POD 7, s/p AVF fistula first HD 12/8. Pt with poor PO intake of meals. 100% intake of Nepro. Pt states her appetite is improving. No nausea/ vomiting. Pt educated on increased protein and calorie needs for dialysis. Encouraged Nepro intake and reviewed high biological value protein sources.     Current Diet : consistent carbohydrate, renal. Nepro 2x daily (850 kcals, 38.2g protein).   Reported: No GI distress (nausea/vomiting/diarrhea/constipation.) +BM x2 12/13  PO intake:  < 50% [x ] 50-75% [ ]   % [ ]  other :  Current Weight: (12/11) 130.7 pounds, (12/6) 133.1 pounds   __________________ Pertinent Medications__________________   MEDICATIONS  (STANDING):  atorvastatin 80 milliGRAM(s) Oral at bedtime  dextrose 5%. 1000 milliLiter(s) (50 mL/Hr) IV Continuous <Continuous>  dextrose 50% Injectable 12.5 Gram(s) IV Push once  dextrose 50% Injectable 25 Gram(s) IV Push once  dextrose 50% Injectable 25 Gram(s) IV Push once  epoetin raheem Injectable 79974 Unit(s) IV Push <User Schedule>  fenofibrate Tablet 145 milliGRAM(s) Oral daily  heparin  Injectable 5000 Unit(s) SubCutaneous every 8 hours  hydrALAZINE 25 milliGRAM(s) Oral every 8 hours  insulin lispro (HumaLOG) corrective regimen sliding scale   SubCutaneous three times a day before meals  metoprolol tartrate 100 milliGRAM(s) Oral two times a day  nystatin    Suspension 324633 Unit(s) Oral four times a day  pantoprazole    Tablet 40 milliGRAM(s) Oral before breakfast  sertraline 50 milliGRAM(s) Oral daily    MEDICATIONS  (PRN):  acetaminophen   Tablet .. 650 milliGRAM(s) Oral every 6 hours PRN Mild Pain (1 - 3)  dextrose 40% Gel 15 Gram(s) Oral once PRN Blood Glucose LESS THAN 70 milliGRAM(s)/deciliter  glucagon  Injectable 1 milliGRAM(s) IntraMuscular once PRN Glucose LESS THAN 70 milligrams/deciliter  melatonin 3 milliGRAM(s) Oral at bedtime PRN Insomnia  ondansetron Injectable 4 milliGRAM(s) IV Push every 6 hours PRN Nausea and/or Vomiting      __________________ Pertinent Labs__________________   12-14 Na138 mmol/L Glu 123 mg/dL<H> K+ 3.5 mmol/L Cr  3.04 mg/dL<H> BUN 16 mg/dL 12-13 Phos 3.0 mg/dL 12-10 Alb 3.5 g/dL 12-06 WmulsiblgmO0S 5.7 %<H> 12-06 Chol 167 mg/dL LDL 77 mg/dL HDL 77 mg/dL<H> Trig 83 mg/dL        Skin: No edema, no pressure injuries.     Estimated Needs:   [ ] no change since previous assessment  [x ] recalculated:   Needs based on ABW (12/11) 130.7 pounds (59.4 kg)   Energy needs: 30-35 roxann/kg = 4798-0486 roxann/d  Protein needs: 1.5 gm/kg = 89 gm protein/d    Previous Nutrition Diagnosis: Moderate malnutrition, Altered Lab values    Nutrition Diagnosis is [ x] ongoing  [ ] resolved [ ] not applicable     New Nutrition Diagnosis: [ ] not applicable    [ ] Inadequate Protein Energy Intake   [ ]Inadequate Oral Intake   [ ] Excessive Energy Intake   [ ] Underweight   [x ] Increased Nutrient Needs   [ ] Overweight/Obesity   [ ] Altered GI Function   [ ] Unintended Weight Loss   [ ] Food & Nutrition Related Knowledge Deficit  [ ] Limited Adherence to nutrition related recommendations   [ ] Malnutrition    [ ] other:        Related to: increased demand for protein and energy to support HD   As evidenced by: ESRD on HD 3x weekly.     Interventions:       1. Continue current diet. Continue Nepro 2x daily (850 kcals, 38.2g protein).   2. Encourage PO intake and honor food preferences as able.   3. Nutrition education: protein sources and increased needs  4. Monitor weights, labs, BM's, skin integrity, p.o. intake.

## 2018-12-15 LAB
BUN SERPL-MCNC: 28 MG/DL — HIGH (ref 7–23)
CALCIUM SERPL-MCNC: 8.3 MG/DL — LOW (ref 8.4–10.5)
CHLORIDE SERPL-SCNC: 98 MMOL/L — SIGNIFICANT CHANGE UP (ref 98–107)
CO2 SERPL-SCNC: 29 MMOL/L — SIGNIFICANT CHANGE UP (ref 22–31)
CREAT SERPL-MCNC: 4.52 MG/DL — HIGH (ref 0.5–1.3)
GLUCOSE BLDC GLUCOMTR-MCNC: 124 MG/DL — HIGH (ref 70–99)
GLUCOSE BLDC GLUCOMTR-MCNC: 136 MG/DL — HIGH (ref 70–99)
GLUCOSE BLDC GLUCOMTR-MCNC: 138 MG/DL — HIGH (ref 70–99)
GLUCOSE BLDC GLUCOMTR-MCNC: 153 MG/DL — HIGH (ref 70–99)
GLUCOSE BLDC GLUCOMTR-MCNC: 202 MG/DL — HIGH (ref 70–99)
GLUCOSE SERPL-MCNC: 147 MG/DL — HIGH (ref 70–99)
HCT VFR BLD CALC: 28 % — LOW (ref 34.5–45)
HGB BLD-MCNC: 8.1 G/DL — LOW (ref 11.5–15.5)
MAGNESIUM SERPL-MCNC: 1.9 MG/DL — SIGNIFICANT CHANGE UP (ref 1.6–2.6)
MCHC RBC-ENTMCNC: 23.1 PG — LOW (ref 27–34)
MCHC RBC-ENTMCNC: 28.9 % — LOW (ref 32–36)
MCV RBC AUTO: 79.8 FL — LOW (ref 80–100)
NRBC # FLD: 0.05 — SIGNIFICANT CHANGE UP
NRBC FLD-RTO: 1 — SIGNIFICANT CHANGE UP
PHOSPHATE SERPL-MCNC: 2.1 MG/DL — LOW (ref 2.5–4.5)
PLATELET # BLD AUTO: 180 K/UL — SIGNIFICANT CHANGE UP (ref 150–400)
PMV BLD: SIGNIFICANT CHANGE UP FL (ref 7–13)
POTASSIUM SERPL-MCNC: 3.4 MMOL/L — LOW (ref 3.5–5.3)
POTASSIUM SERPL-SCNC: 3.4 MMOL/L — LOW (ref 3.5–5.3)
RBC # BLD: 3.51 M/UL — LOW (ref 3.8–5.2)
RBC # FLD: 16 % — HIGH (ref 10.3–14.5)
SODIUM SERPL-SCNC: 139 MMOL/L — SIGNIFICANT CHANGE UP (ref 135–145)
WBC # BLD: 5.08 K/UL — SIGNIFICANT CHANGE UP (ref 3.8–10.5)
WBC # FLD AUTO: 5.08 K/UL — SIGNIFICANT CHANGE UP (ref 3.8–10.5)

## 2018-12-15 RX ORDER — POTASSIUM CHLORIDE 20 MEQ
20 PACKET (EA) ORAL ONCE
Qty: 0 | Refills: 0 | Status: COMPLETED | OUTPATIENT
Start: 2018-12-15 | End: 2018-12-15

## 2018-12-15 RX ADMIN — HEPARIN SODIUM 5000 UNIT(S): 5000 INJECTION INTRAVENOUS; SUBCUTANEOUS at 13:14

## 2018-12-15 RX ADMIN — Medication 145 MILLIGRAM(S): at 13:14

## 2018-12-15 RX ADMIN — HEPARIN SODIUM 5000 UNIT(S): 5000 INJECTION INTRAVENOUS; SUBCUTANEOUS at 05:31

## 2018-12-15 RX ADMIN — Medication 25 MILLIGRAM(S): at 21:48

## 2018-12-15 RX ADMIN — ERYTHROPOIETIN 20000 UNIT(S): 10000 INJECTION, SOLUTION INTRAVENOUS; SUBCUTANEOUS at 09:08

## 2018-12-15 RX ADMIN — Medication 1: at 09:08

## 2018-12-15 RX ADMIN — Medication 100 MILLIGRAM(S): at 17:08

## 2018-12-15 RX ADMIN — Medication 25 MILLIGRAM(S): at 13:14

## 2018-12-15 RX ADMIN — Medication 500000 UNIT(S): at 13:15

## 2018-12-15 RX ADMIN — PANTOPRAZOLE SODIUM 40 MILLIGRAM(S): 20 TABLET, DELAYED RELEASE ORAL at 05:31

## 2018-12-15 RX ADMIN — Medication 20 MILLIEQUIVALENT(S): at 17:08

## 2018-12-15 RX ADMIN — ATORVASTATIN CALCIUM 80 MILLIGRAM(S): 80 TABLET, FILM COATED ORAL at 21:48

## 2018-12-15 RX ADMIN — SERTRALINE 50 MILLIGRAM(S): 25 TABLET, FILM COATED ORAL at 13:15

## 2018-12-15 RX ADMIN — Medication 500000 UNIT(S): at 05:31

## 2018-12-15 RX ADMIN — Medication 500000 UNIT(S): at 21:47

## 2018-12-15 RX ADMIN — Medication 500000 UNIT(S): at 17:09

## 2018-12-15 NOTE — PROGRESS NOTE ADULT - SUBJECTIVE AND OBJECTIVE BOX
[facility] - Saul / Kurt CHRISTINE /Fabrice/ EMMETT Patel/ EMMETT Storey/ Branden Madrigal / HANH Njeru  ---------------------------------------------------------------------------------------------------------------    Patient seen and examined bedside    Subjective and Objective: No overnight events, denied sob. No complaints today. feeling better  had HD earlier today, tolerated well    Allergies: No Known Allergies      Hospital Medications:   MEDICATIONS  (STANDING):  atorvastatin 80 milliGRAM(s) Oral at bedtime  dextrose 5%. 1000 milliLiter(s) (50 mL/Hr) IV Continuous <Continuous>  dextrose 50% Injectable 12.5 Gram(s) IV Push once  dextrose 50% Injectable 25 Gram(s) IV Push once  dextrose 50% Injectable 25 Gram(s) IV Push once  epoetin raheem Injectable 47898 Unit(s) IV Push <User Schedule>  fenofibrate Tablet 145 milliGRAM(s) Oral daily  heparin  Injectable 5000 Unit(s) SubCutaneous every 8 hours  hydrALAZINE 25 milliGRAM(s) Oral every 8 hours  insulin lispro (HumaLOG) corrective regimen sliding scale   SubCutaneous three times a day before meals  metoprolol tartrate 100 milliGRAM(s) Oral two times a day  nystatin    Suspension 696854 Unit(s) Oral four times a day  pantoprazole    Tablet 40 milliGRAM(s) Oral before breakfast  potassium chloride    Tablet ER 20 milliEquivalent(s) Oral once  sertraline 50 milliGRAM(s) Oral daily      VITALS:  T(F): 98.1 (12-15-18 @ 11:03), Max: 98.3 (12-14-18 @ 21:08)  HR: 63 (12-15-18 @ 13:14)  BP: 163/63 (12-15-18 @ 13:14)  RR: 16 (12-15-18 @ 11:03)  SpO2: 100% (12-15-18 @ 11:03)  Wt(kg): --    12-14 @ 07:01  -  12-15 @ 07:00  --------------------------------------------------------  IN: 0 mL / OUT: 245 mL / NET: -245 mL    12-15 @ 07:01  -  12-15 @ 16:49  --------------------------------------------------------  IN: 625 mL / OUT: 1520 mL / NET: -895 mL      PHYSICAL EXAM:  Constitutional: NAD  HEENT: anicteric sclera, oropharynx clear  Neck: No JVD  Respiratory: CTAB, no wheezes, rales or rhonchi  Cardiovascular: S1, S2, RRR  Gastrointestinal: BS+, soft, NT/ND  Extremities: No cyanosis or clubbing. No peripheral edema  Neurological: A/O x 3, no focal deficits  Psychiatric: Normal mood, normal affect  : No CVA tenderness. No calvin.   Skin: No rashes  Vascular Access: rt IJ PC    LABS:  12-15    139  |  98  |  28<H>  ----------------------------<  147<H>  3.4<L>   |  29  |  4.52<H>    Ca    8.3<L>      15 Dec 2018 07:30  Phos  2.1     12-15  Mg     1.9     12-15      Creatinine Trend: 4.52 <--, 3.04 <--, 4.61 <--, 3.72 <--, 5.29 <--, 7.55 <--, 7.02 <--                        8.1    5.08  )-----------( 180      ( 15 Dec 2018 07:30 )             28.0     Urine Studies:        RADIOLOGY & ADDITIONAL STUDIES:

## 2018-12-15 NOTE — PROGRESS NOTE ADULT - SUBJECTIVE AND OBJECTIVE BOX
Patient is a 78y old  Female who presents with a chief complaint of Orthopnea x 2 days (15 Dec 2018 16:49)      SUBJECTIVE / OVERNIGHT EVENTS:    Events noted.  Feels better.  CONSTITUTIONAL: No fever,  or fatigue  NECK: No pain or stiffness  RESPIRATORY: No cough, wheezing, chills or hemoptysis; No shortness of breath  CARDIOVASCULAR: No chest pain, palpitations, dizziness, or leg swelling  GASTROINTESTINAL: No abdominal or epigastric pain. No nausea, vomiting, or hematemesis; No diarrhea or constipation.   NEUROLOGICAL: No headaches,     MEDICATIONS  (STANDING):  atorvastatin 80 milliGRAM(s) Oral at bedtime  dextrose 5%. 1000 milliLiter(s) (50 mL/Hr) IV Continuous <Continuous>  dextrose 50% Injectable 12.5 Gram(s) IV Push once  dextrose 50% Injectable 25 Gram(s) IV Push once  dextrose 50% Injectable 25 Gram(s) IV Push once  epoetin raheem Injectable 64936 Unit(s) IV Push <User Schedule>  fenofibrate Tablet 145 milliGRAM(s) Oral daily  heparin  Injectable 5000 Unit(s) SubCutaneous every 8 hours  hydrALAZINE 25 milliGRAM(s) Oral every 8 hours  insulin lispro (HumaLOG) corrective regimen sliding scale   SubCutaneous three times a day before meals  metoprolol tartrate 100 milliGRAM(s) Oral two times a day  nystatin    Suspension 788424 Unit(s) Oral four times a day  pantoprazole    Tablet 40 milliGRAM(s) Oral before breakfast  sertraline 50 milliGRAM(s) Oral daily    MEDICATIONS  (PRN):  acetaminophen   Tablet .. 650 milliGRAM(s) Oral every 6 hours PRN Mild Pain (1 - 3)  dextrose 40% Gel 15 Gram(s) Oral once PRN Blood Glucose LESS THAN 70 milliGRAM(s)/deciliter  glucagon  Injectable 1 milliGRAM(s) IntraMuscular once PRN Glucose LESS THAN 70 milligrams/deciliter  melatonin 3 milliGRAM(s) Oral at bedtime PRN Insomnia  ondansetron Injectable 4 milliGRAM(s) IV Push every 6 hours PRN Nausea and/or Vomiting        CAPILLARY BLOOD GLUCOSE      POCT Blood Glucose.: 202 mg/dL (15 Dec 2018 21:53)  POCT Blood Glucose.: 138 mg/dL (15 Dec 2018 17:31)  POCT Blood Glucose.: 124 mg/dL (15 Dec 2018 12:47)  POCT Blood Glucose.: 153 mg/dL (15 Dec 2018 08:45)  POCT Blood Glucose.: 136 mg/dL (15 Dec 2018 05:39)    I&O's Summary    14 Dec 2018 07:01  -  15 Dec 2018 07:00  --------------------------------------------------------  IN: 0 mL / OUT: 245 mL / NET: -245 mL    15 Dec 2018 07:01  -  16 Dec 2018 00:11  --------------------------------------------------------  IN: 625 mL / OUT: 1545 mL / NET: -920 mL        PHYSICAL EXAM:  GENERAL: NAD  NECK: Supple, No JVD  CHEST/LUNG: Clear to auscultation bilaterally; No wheezing.  HEART: Regular rate and rhythm; No murmurs, rubs, or gallops  ABDOMEN: Soft, Nontender, Nondistended; Bowel sounds present  EXTREMITIES:   No clubbing, cyanosis, or edema  NEUROLOGY: AAO X 3      LABS:                        8.1    5.08  )-----------( 180      ( 15 Dec 2018 07:30 )             28.0     12-15    139  |  98  |  28<H>  ----------------------------<  147<H>  3.4<L>   |  29  |  4.52<H>    Ca    8.3<L>      15 Dec 2018 07:30  Phos  2.1     12-15  Mg     1.9     12-15              CAPILLARY BLOOD GLUCOSE      POCT Blood Glucose.: 202 mg/dL (15 Dec 2018 21:53)  POCT Blood Glucose.: 138 mg/dL (15 Dec 2018 17:31)  POCT Blood Glucose.: 124 mg/dL (15 Dec 2018 12:47)  POCT Blood Glucose.: 153 mg/dL (15 Dec 2018 08:45)  POCT Blood Glucose.: 136 mg/dL (15 Dec 2018 05:39)                RADIOLOGY & ADDITIONAL TESTS:    Imaging Personally Reviewed:    Consultant(s) Notes Reviewed:      Care Discussed with Consultants/Other Providers:

## 2018-12-15 NOTE — PROGRESS NOTE ADULT - PROBLEM SELECTOR PLAN 1
esrd now with dialysis dependence  s/p 1st HD session 12/8/18 via right IJ charles, s/p RIJ tunneled cath  had HD in AM, rx sheet reviewed, uneventful, net 1kg UF achieved. Repeat HD Monday AM 1st shift  f/u vascular for immature avf, outpt  Pt accepted to [facility] on MWF schedule. Will plan for HD MWF next week.

## 2018-12-15 NOTE — PROGRESS NOTE ADULT - ASSESSMENT
· Assessment	  77 y/o  Female, with a PMHx of CKD (fistula June 2018, s/p 2 balloons, last one 2 weeks ago, no dialysis yet), DM, HTN, HLD, anemia presents to the [facility] ED constant SOB, dry cough and clear rhinorrhea x 2 days associated with one episode of vomiting, darken stools and increased urinary frequency admitted to telemetry for Pulmonary Edema/ Anemia/ Acute on Chronic Renal Failure.      PAF: Tele  EP f/up noted./Metoprolol     Problem/Plan - 1:  ·  Problem: Pericardial Effusion:    S/p pericardial window/ Chest tube.       Problem/Plan - 3:  ·  Problem: ANNIE on Advanced CKD:   Plan: For HD. Nephro f/up noted.      Problem/Plan - 4:  ·  Problem: DM (diabetes mellitus).  Plan: FSSS     Problem/Plan - 5:  ·  Problem: HTN (hypertension).  Plan:  Cont. Amlodipine, Hydralazine, Furosemide.       Dw family.

## 2018-12-16 LAB
BUN SERPL-MCNC: 16 MG/DL — SIGNIFICANT CHANGE UP (ref 7–23)
CALCIUM SERPL-MCNC: 8.4 MG/DL — SIGNIFICANT CHANGE UP (ref 8.4–10.5)
CHLORIDE SERPL-SCNC: 99 MMOL/L — SIGNIFICANT CHANGE UP (ref 98–107)
CO2 SERPL-SCNC: 28 MMOL/L — SIGNIFICANT CHANGE UP (ref 22–31)
CREAT SERPL-MCNC: 3.53 MG/DL — HIGH (ref 0.5–1.3)
GLUCOSE BLDC GLUCOMTR-MCNC: 123 MG/DL — HIGH (ref 70–99)
GLUCOSE BLDC GLUCOMTR-MCNC: 158 MG/DL — HIGH (ref 70–99)
GLUCOSE BLDC GLUCOMTR-MCNC: 238 MG/DL — HIGH (ref 70–99)
GLUCOSE BLDC GLUCOMTR-MCNC: 239 MG/DL — HIGH (ref 70–99)
GLUCOSE SERPL-MCNC: 125 MG/DL — HIGH (ref 70–99)
HCT VFR BLD CALC: 29.8 % — LOW (ref 34.5–45)
HGB BLD-MCNC: 8.6 G/DL — LOW (ref 11.5–15.5)
MAGNESIUM SERPL-MCNC: 1.9 MG/DL — SIGNIFICANT CHANGE UP (ref 1.6–2.6)
MCHC RBC-ENTMCNC: 23.1 PG — LOW (ref 27–34)
MCHC RBC-ENTMCNC: 28.9 % — LOW (ref 32–36)
MCV RBC AUTO: 80.1 FL — SIGNIFICANT CHANGE UP (ref 80–100)
NRBC # FLD: 0.1 — SIGNIFICANT CHANGE UP
NRBC FLD-RTO: 2.1 — SIGNIFICANT CHANGE UP
PHOSPHATE SERPL-MCNC: 1.8 MG/DL — LOW (ref 2.5–4.5)
PLATELET # BLD AUTO: 158 K/UL — SIGNIFICANT CHANGE UP (ref 150–400)
PMV BLD: SIGNIFICANT CHANGE UP FL (ref 7–13)
POTASSIUM SERPL-MCNC: 3.7 MMOL/L — SIGNIFICANT CHANGE UP (ref 3.5–5.3)
POTASSIUM SERPL-SCNC: 3.7 MMOL/L — SIGNIFICANT CHANGE UP (ref 3.5–5.3)
RBC # BLD: 3.72 M/UL — LOW (ref 3.8–5.2)
RBC # FLD: 16.5 % — HIGH (ref 10.3–14.5)
SODIUM SERPL-SCNC: 139 MMOL/L — SIGNIFICANT CHANGE UP (ref 135–145)
WBC # BLD: 4.83 K/UL — SIGNIFICANT CHANGE UP (ref 3.8–10.5)
WBC # FLD AUTO: 4.83 K/UL — SIGNIFICANT CHANGE UP (ref 3.8–10.5)

## 2018-12-16 PROCEDURE — 71045 X-RAY EXAM CHEST 1 VIEW: CPT | Mod: 26

## 2018-12-16 RX ORDER — HYDRALAZINE HCL 50 MG
25 TABLET ORAL
Qty: 0 | Refills: 0 | Status: DISCONTINUED | OUTPATIENT
Start: 2018-12-16 | End: 2018-12-20

## 2018-12-16 RX ORDER — SODIUM CHLORIDE 9 MG/ML
250 INJECTION INTRAMUSCULAR; INTRAVENOUS; SUBCUTANEOUS ONCE
Qty: 0 | Refills: 0 | Status: DISCONTINUED | OUTPATIENT
Start: 2018-12-16 | End: 2018-12-16

## 2018-12-16 RX ADMIN — SERTRALINE 50 MILLIGRAM(S): 25 TABLET, FILM COATED ORAL at 12:44

## 2018-12-16 RX ADMIN — Medication 2: at 12:45

## 2018-12-16 RX ADMIN — HEPARIN SODIUM 5000 UNIT(S): 5000 INJECTION INTRAVENOUS; SUBCUTANEOUS at 21:29

## 2018-12-16 RX ADMIN — HEPARIN SODIUM 5000 UNIT(S): 5000 INJECTION INTRAVENOUS; SUBCUTANEOUS at 06:09

## 2018-12-16 RX ADMIN — ATORVASTATIN CALCIUM 80 MILLIGRAM(S): 80 TABLET, FILM COATED ORAL at 21:29

## 2018-12-16 RX ADMIN — Medication 100 MILLIGRAM(S): at 06:10

## 2018-12-16 RX ADMIN — Medication 2: at 09:48

## 2018-12-16 RX ADMIN — Medication 100 MILLIGRAM(S): at 18:07

## 2018-12-16 RX ADMIN — Medication 500000 UNIT(S): at 23:29

## 2018-12-16 RX ADMIN — Medication 500000 UNIT(S): at 12:44

## 2018-12-16 RX ADMIN — Medication 500000 UNIT(S): at 06:09

## 2018-12-16 RX ADMIN — PANTOPRAZOLE SODIUM 40 MILLIGRAM(S): 20 TABLET, DELAYED RELEASE ORAL at 06:09

## 2018-12-16 RX ADMIN — Medication 145 MILLIGRAM(S): at 12:44

## 2018-12-16 RX ADMIN — Medication 25 MILLIGRAM(S): at 06:09

## 2018-12-16 NOTE — PROGRESS NOTE ADULT - SUBJECTIVE AND OBJECTIVE BOX
EP     tele: NSR, frequent PVCs    no palpitations, no syncope, no angina      MEDICATIONS  (STANDING):  atorvastatin 80 milliGRAM(s) Oral at bedtime  dextrose 5%. 1000 milliLiter(s) (50 mL/Hr) IV Continuous <Continuous>  dextrose 50% Injectable 12.5 Gram(s) IV Push once  dextrose 50% Injectable 25 Gram(s) IV Push once  dextrose 50% Injectable 25 Gram(s) IV Push once  epoetin raheem Injectable 76152 Unit(s) IV Push <User Schedule>  fenofibrate Tablet 145 milliGRAM(s) Oral daily  heparin  Injectable 5000 Unit(s) SubCutaneous every 8 hours  hydrALAZINE 25 milliGRAM(s) Oral every 8 hours  insulin lispro (HumaLOG) corrective regimen sliding scale   SubCutaneous three times a day before meals  metoprolol tartrate 100 milliGRAM(s) Oral two times a day  nystatin    Suspension 459488 Unit(s) Oral four times a day  pantoprazole    Tablet 40 milliGRAM(s) Oral before breakfast  sertraline 50 milliGRAM(s) Oral daily    MEDICATIONS  (PRN):  acetaminophen   Tablet .. 650 milliGRAM(s) Oral every 6 hours PRN Mild Pain (1 - 3)  dextrose 40% Gel 15 Gram(s) Oral once PRN Blood Glucose LESS THAN 70 milliGRAM(s)/deciliter  glucagon  Injectable 1 milliGRAM(s) IntraMuscular once PRN Glucose LESS THAN 70 milligrams/deciliter  melatonin 3 milliGRAM(s) Oral at bedtime PRN Insomnia  ondansetron Injectable 4 milliGRAM(s) IV Push every 6 hours PRN Nausea and/or Vomiting      LABS:                        8.6    4.83  )-----------( 158      ( 16 Dec 2018 06:11 )             29.8     139  |  99  |  16  ----------------------------<  125<H>  3.7   |  28  |  3.53<H>    Ca    8.4      16 Dec 2018 06:11  Phos  1.8     12-16  Mg     1.9     12-16    Creatinine Trend: 3.53<--, 4.52<--, 3.04<--, 4.61<--, 3.72<--, 5.29<--     PHYSICAL EXAM  Vital Signs Last 24 Hrs  T(C): 36.7 (16 Dec 2018 12:42), Max: 37.1 (15 Dec 2018 21:44)  T(F): 98 (16 Dec 2018 12:42), Max: 98.8 (15 Dec 2018 21:44)  HR: 64 (16 Dec 2018 12:42) (61 - 72)  BP: 152/58 (16 Dec 2018 12:42) (150/55 - 186/62)  BP(mean): 81 (16 Dec 2018 12:42) (79 - 81)  RR: 18 (16 Dec 2018 12:42) (17 - 18)  SpO2: 100% (16 Dec 2018 12:42) (99% - 100%)    no JVD  RRR, no murmurs  CTAB  soft nt/nd  no c/c/e    echo: normal LVEF  TSH normal    A/P) 79 y/o female PMH ESRD on HD, HTN, DM, hyperlipidemia admitted with a large pericardial effusion requiring pericardiocentesis. Found to have PAF. Echo LVEF and TSH unremarkable. Responding well to beta blockers. CHADS-VASC 5    -continue metoprolol 100 bid  -is currently off a/c given large pericardial effusion. Given no anticoagulation, can't pursue a rhythm control strategy at this time (i.e. amio, etc.).   -Therefore simply recommend continuing beta blockers (she's currently in NSR).   -Hopefully a/c can be started prior to discharge, and if not then she's a good candidate for left atrial appendage occlusion with Watchman.

## 2018-12-16 NOTE — PROGRESS NOTE ADULT - SUBJECTIVE AND OBJECTIVE BOX
Patient is a 78y old  Female who presents with a chief complaint of Orthopnea x 2 days (16 Dec 2018 13:20)      SUBJECTIVE / OVERNIGHT EVENTS:  Covering MD  Evelio on tele  Review of Systems:   CONSTITUTIONAL: No fever, weight loss, or fatigue  EYES: No eye pain, visual disturbances, or discharge  ENMT:  No difficulty hearing, tinnitus, vertigo; No sinus or throat pain  NECK: No pain or stiffness  BREASTS: No pain, masses, or nipple discharge  RESPIRATORY: No cough, wheezing, chills or hemoptysis; No shortness of breath  CARDIOVASCULAR: No chest pain, palpitations, dizziness, or leg swelling  GASTROINTESTINAL: No abdominal or epigastric pain. No nausea, vomiting, or hematemesis; No diarrhea or constipation. No melena or hematochezia.  GENITOURINARY: No dysuria, frequency, hematuria, or incontinence  NEUROLOGICAL: No headaches, memory loss, loss of strength, numbness, or tremors  SKIN: No itching, burning, rashes, or lesions   LYMPH NODES: No enlarged glands  ENDOCRINE: No heat or cold intolerance; No hair loss  MUSCULOSKELETAL: No joint pain or swelling; No muscle, back, or extremity pain  PSYCHIATRIC: No depression, anxiety, mood swings, or difficulty sleeping  HEME/LYMPH: No easy bruising, or bleeding gums  ALLERY AND IMMUNOLOGIC: No hives or eczema    MEDICATIONS  (STANDING):  atorvastatin 80 milliGRAM(s) Oral at bedtime  dextrose 5%. 1000 milliLiter(s) (50 mL/Hr) IV Continuous <Continuous>  dextrose 50% Injectable 12.5 Gram(s) IV Push once  dextrose 50% Injectable 25 Gram(s) IV Push once  dextrose 50% Injectable 25 Gram(s) IV Push once  epoetin raheem Injectable 24248 Unit(s) IV Push <User Schedule>  fenofibrate Tablet 145 milliGRAM(s) Oral daily  heparin  Injectable 5000 Unit(s) SubCutaneous every 8 hours  hydrALAZINE 25 milliGRAM(s) Oral two times a day  insulin lispro (HumaLOG) corrective regimen sliding scale   SubCutaneous three times a day before meals  metoprolol tartrate 100 milliGRAM(s) Oral two times a day  nystatin    Suspension 005890 Unit(s) Oral four times a day  pantoprazole    Tablet 40 milliGRAM(s) Oral before breakfast  sertraline 50 milliGRAM(s) Oral daily    MEDICATIONS  (PRN):  acetaminophen   Tablet .. 650 milliGRAM(s) Oral every 6 hours PRN Mild Pain (1 - 3)  dextrose 40% Gel 15 Gram(s) Oral once PRN Blood Glucose LESS THAN 70 milliGRAM(s)/deciliter  glucagon  Injectable 1 milliGRAM(s) IntraMuscular once PRN Glucose LESS THAN 70 milligrams/deciliter  melatonin 3 milliGRAM(s) Oral at bedtime PRN Insomnia  ondansetron Injectable 4 milliGRAM(s) IV Push every 6 hours PRN Nausea and/or Vomiting      PHYSICAL EXAM:  Vital Signs Last 24 Hrs  T(C): 37.6 (16 Dec 2018 20:04), Max: 37.6 (16 Dec 2018 20:04)  T(F): 99.7 (16 Dec 2018 20:04), Max: 99.7 (16 Dec 2018 20:04)  HR: 70 (16 Dec 2018 20:04) (61 - 70)  BP: 158/90 (16 Dec 2018 20:04) (152/58 - 186/62)  BP(mean): 75 (16 Dec 2018 17:12) (75 - 81)  RR: 16 (16 Dec 2018 20:04) (16 - 18)  SpO2: 100% (16 Dec 2018 20:04) (100% - 100%)  I&O's Summary    15 Dec 2018 07:01  -  16 Dec 2018 07:00  --------------------------------------------------------  IN: 625 mL / OUT: 1820 mL / NET: -1195 mL    16 Dec 2018 07:01  -  16 Dec 2018 22:35  --------------------------------------------------------  IN: 490 mL / OUT: 280 mL / NET: 210 mL      GENERAL: NAD, well-developed  HEAD:  Atraumatic, Normocephalic  EYES: EOMI, PERRLA, conjunctiva and sclera clear  NECK: Supple, No JVD  CHEST/LUNG: Clear to auscultation bilaterally; No wheeze  HEART: Regular rate and rhythm; No murmurs, rubs, or gallops  ABDOMEN: Soft, Nontender, Nondistended; Bowel sounds present  EXTREMITIES:  2+ Peripheral Pulses, No clubbing, cyanosis, or edema  PSYCH: AAOx3  NEUROLOGY: non-focal  SKIN: No rashes or lesions    LABS:  CAPILLARY BLOOD GLUCOSE      POCT Blood Glucose.: 158 mg/dL (16 Dec 2018 21:21)  POCT Blood Glucose.: 123 mg/dL (16 Dec 2018 17:55)  POCT Blood Glucose.: 239 mg/dL (16 Dec 2018 12:36)  POCT Blood Glucose.: 238 mg/dL (16 Dec 2018 09:31)                          8.6    4.83  )-----------( 158      ( 16 Dec 2018 06:11 )             29.8     12-16    139  |  99  |  16  ----------------------------<  125<H>  3.7   |  28  |  3.53<H>    Ca    8.4      16 Dec 2018 06:11  Phos  1.8     12-16  Mg     1.9     12-16                RADIOLOGY & ADDITIONAL TESTS:    Imaging Personally Reviewed:    Consultant(s) Notes Reviewed:      Care Discussed with Consultants/Other Providers:

## 2018-12-16 NOTE — CHART NOTE - NSCHARTNOTEFT_GEN_A_CORE
Called by RN pt complaint of dizziness /35, pt seen at bedside reported she was trying to get up and felt dizzy. BP repeated at bedside 132/28, smaller cuff 144/34. MAP maintained above 60. Pt reported improved dizziness when laying down. S1S2 with murmur, Lung CTA. Given symptomatic with BP started on IVF (pt got around 50cc), attending recommended to DC IVF and encourage PO. Requested by attending to discuss the case with EP and renal. Spoke with consult services, recommended by renal to change hydralazine to 25 BID with holding parameter of SBP<120 and hold on the AM of HD. Will continue to monitor

## 2018-12-16 NOTE — PROGRESS NOTE ADULT - ASSESSMENT
· Assessment	  77 y/o  Female, with a PMHx of CKD (fistula June 2018, s/p 2 balloons, last one 2 weeks ago, no dialysis yet), DM, HTN, HLD, anemia presents to the [facility] ED constant SOB, dry cough and clear rhinorrhea x 2 days associated with one episode of vomiting, darken stools and increased urinary frequency admitted to telemetry for Pulmonary Edema/ Anemia/ Acute on Chronic Renal Failure.      PAF: Tele  EP f/up noted./On Metoprolol     Problem/Plan - 1:  ·  Problem: Pericardial Effusion:    S/p pericardial window/ Chest tube. Unable to AC for now       Problem/Plan - 3:  ·  Problem: ANNIE on Advanced CKD:   Plan: For HD. Nephro f/up noted.      Problem/Plan - 4:  ·  Problem: DM (diabetes mellitus).  Plan: FSSS     Problem/Plan - 5:  ·  Problem: HTN (hypertension).  Plan:  Cont. Amlodipine, Hydralazine, Furosemide.

## 2018-12-17 LAB
BUN SERPL-MCNC: 27 MG/DL — HIGH (ref 7–23)
CALCIUM SERPL-MCNC: 8.2 MG/DL — LOW (ref 8.4–10.5)
CHLORIDE SERPL-SCNC: 99 MMOL/L — SIGNIFICANT CHANGE UP (ref 98–107)
CO2 SERPL-SCNC: 26 MMOL/L — SIGNIFICANT CHANGE UP (ref 22–31)
CREAT SERPL-MCNC: 4.8 MG/DL — HIGH (ref 0.5–1.3)
GLUCOSE BLDC GLUCOMTR-MCNC: 113 MG/DL — HIGH (ref 70–99)
GLUCOSE BLDC GLUCOMTR-MCNC: 114 MG/DL — HIGH (ref 70–99)
GLUCOSE BLDC GLUCOMTR-MCNC: 134 MG/DL — HIGH (ref 70–99)
GLUCOSE BLDC GLUCOMTR-MCNC: 179 MG/DL — HIGH (ref 70–99)
GLUCOSE BLDC GLUCOMTR-MCNC: 271 MG/DL — HIGH (ref 70–99)
GLUCOSE SERPL-MCNC: 103 MG/DL — HIGH (ref 70–99)
HCT VFR BLD CALC: 27.2 % — LOW (ref 34.5–45)
HGB BLD-MCNC: 8.1 G/DL — LOW (ref 11.5–15.5)
MAGNESIUM SERPL-MCNC: 1.9 MG/DL — SIGNIFICANT CHANGE UP (ref 1.6–2.6)
MCHC RBC-ENTMCNC: 23.3 PG — LOW (ref 27–34)
MCHC RBC-ENTMCNC: 29.8 % — LOW (ref 32–36)
MCV RBC AUTO: 78.2 FL — LOW (ref 80–100)
NRBC # FLD: 0.07 — SIGNIFICANT CHANGE UP
NRBC FLD-RTO: 1.1 — SIGNIFICANT CHANGE UP
PHOSPHATE SERPL-MCNC: 1.7 MG/DL — LOW (ref 2.5–4.5)
PLATELET # BLD AUTO: 159 K/UL — SIGNIFICANT CHANGE UP (ref 150–400)
PMV BLD: SIGNIFICANT CHANGE UP FL (ref 7–13)
POTASSIUM SERPL-MCNC: 3.6 MMOL/L — SIGNIFICANT CHANGE UP (ref 3.5–5.3)
POTASSIUM SERPL-SCNC: 3.6 MMOL/L — SIGNIFICANT CHANGE UP (ref 3.5–5.3)
RBC # BLD: 3.48 M/UL — LOW (ref 3.8–5.2)
RBC # FLD: 16.7 % — HIGH (ref 10.3–14.5)
SODIUM SERPL-SCNC: 138 MMOL/L — SIGNIFICANT CHANGE UP (ref 135–145)
WBC # BLD: 6.26 K/UL — SIGNIFICANT CHANGE UP (ref 3.8–10.5)
WBC # FLD AUTO: 6.26 K/UL — SIGNIFICANT CHANGE UP (ref 3.8–10.5)

## 2018-12-17 RX ORDER — DOCUSATE SODIUM 100 MG
100 CAPSULE ORAL
Qty: 0 | Refills: 0 | Status: DISCONTINUED | OUTPATIENT
Start: 2018-12-17 | End: 2018-12-20

## 2018-12-17 RX ORDER — SENNA PLUS 8.6 MG/1
2 TABLET ORAL AT BEDTIME
Qty: 0 | Refills: 0 | Status: DISCONTINUED | OUTPATIENT
Start: 2018-12-17 | End: 2018-12-20

## 2018-12-17 RX ADMIN — HEPARIN SODIUM 5000 UNIT(S): 5000 INJECTION INTRAVENOUS; SUBCUTANEOUS at 13:15

## 2018-12-17 RX ADMIN — Medication 500000 UNIT(S): at 05:43

## 2018-12-17 RX ADMIN — SENNA PLUS 2 TABLET(S): 8.6 TABLET ORAL at 21:15

## 2018-12-17 RX ADMIN — PANTOPRAZOLE SODIUM 40 MILLIGRAM(S): 20 TABLET, DELAYED RELEASE ORAL at 05:43

## 2018-12-17 RX ADMIN — HEPARIN SODIUM 5000 UNIT(S): 5000 INJECTION INTRAVENOUS; SUBCUTANEOUS at 21:16

## 2018-12-17 RX ADMIN — ERYTHROPOIETIN 20000 UNIT(S): 10000 INJECTION, SOLUTION INTRAVENOUS; SUBCUTANEOUS at 07:48

## 2018-12-17 RX ADMIN — ATORVASTATIN CALCIUM 80 MILLIGRAM(S): 80 TABLET, FILM COATED ORAL at 21:15

## 2018-12-17 RX ADMIN — SERTRALINE 50 MILLIGRAM(S): 25 TABLET, FILM COATED ORAL at 13:15

## 2018-12-17 RX ADMIN — Medication 3: at 13:15

## 2018-12-17 RX ADMIN — Medication 100 MILLIGRAM(S): at 21:15

## 2018-12-17 RX ADMIN — HEPARIN SODIUM 5000 UNIT(S): 5000 INJECTION INTRAVENOUS; SUBCUTANEOUS at 05:43

## 2018-12-17 RX ADMIN — Medication 500000 UNIT(S): at 17:36

## 2018-12-17 RX ADMIN — Medication 145 MILLIGRAM(S): at 13:15

## 2018-12-17 RX ADMIN — Medication 100 MILLIGRAM(S): at 17:36

## 2018-12-17 RX ADMIN — Medication 25 MILLIGRAM(S): at 17:36

## 2018-12-17 NOTE — PROGRESS NOTE ADULT - ATTENDING COMMENTS
Patient was seen and examined,interim events noted,labs and radiology studies reviewed.  Mumtaz Martin MD,FACC.  [address] [place] [address].  [facility].  [phone]
Patient was seen and examined,interim events noted,labs and radiology studies reviewed.  Mumtaz Martin MD,FACC.  [address] [place] [address].  [facility].  [phone]
Patient was seen and examined,interim events noted,labs and radiology studies reviewed.  Mumtaz Martin MD,FACC.  [address] [place] [address].  [id number].  [phone]
EP ATTENDING    Agree with above. Continue metoprolol. Recommend lifelong a/c when ok with thoracic surgery. If she can't tolerate a/c long term then she's a good candidate for outpatient left atrial appendage occlusion with Watchman. No further inpatient EP workup needed.
EP ATTENDING    Agree with above. Continue metoprolol. Start A/C if/when ok with thoracic surgery. If a/c can't be safely used long term then she's an excellent candidate for outpatient Watchman. No further inpatient EP workup needed.
EP ATTENDING    Agree with above. Continue metoprolol 100 bid. Would start eliquis for lifelong a/c when ok with thoracic surgery. If she can't tolerate a/c long term then she's an excellent candidate for left atrial appendage occlusion with Watchman.

## 2018-12-17 NOTE — PROGRESS NOTE ADULT - PROBLEM SELECTOR PLAN 1
esrd now with dialysis dependence  s/p 1st HD session 12/8/18 via right IJ shiley, s/p RIJ tunneled cath  had HD in AM, rx sheet reviewed, uneventful, net 1kg UF achieved.   f/u vascular for immature avf, outpt  Pt accepted to [facility] on MWF schedule.   keep HD schedule MWF

## 2018-12-17 NOTE — PROVIDER CONTACT NOTE (OTHER) - ASSESSMENT
Pt asymptomatic with no c/o SOB, chest pain, palpitations. Pt was resting comfortably in bed when this occurred.
NAD noted. pt denies any pain or discomfort.
Pt 's SBP of 180/61 and HR of 53 post dialysis treatment. Pt asymptomatic without any complaints
V/S WNL 
patient resting in bed; no complaints at this time. bp 107/57; hr 130
pt is asymptomatic

## 2018-12-17 NOTE — PROGRESS NOTE ADULT - PROBLEM SELECTOR PROBLEM 3
Patient here for first Japanese Encephalitis immunization.  Verbal consent given, he is afebrile.  Injection given, patient observed for 15 minutes with no adverse affects.  Second injection scheduled for 7/14.     HTN (hypertension)

## 2018-12-17 NOTE — PROGRESS NOTE ADULT - SUBJECTIVE AND OBJECTIVE BOX
Patient is a 78y old  Female who presents with a chief complaint of Orthopnea x 2 days (17 Dec 2018 15:31)      SUBJECTIVE / OVERNIGHT EVENTS:    Events noted.  CONSTITUTIONAL: No fever,  or fatigue  NECK: No pain or stiffness  RESPIRATORY: No cough, wheezing, chills or hemoptysis; No shortness of breath  CARDIOVASCULAR: No chest pain, palpitations, dizziness, or leg swelling  GASTROINTESTINAL: No abdominal or epigastric pain. No nausea, vomiting, or hematemesis; No diarrhea or constipation.   NEUROLOGICAL: No headaches,     MEDICATIONS  (STANDING):  atorvastatin 80 milliGRAM(s) Oral at bedtime  dextrose 5%. 1000 milliLiter(s) (50 mL/Hr) IV Continuous <Continuous>  dextrose 50% Injectable 12.5 Gram(s) IV Push once  dextrose 50% Injectable 25 Gram(s) IV Push once  dextrose 50% Injectable 25 Gram(s) IV Push once  docusate sodium 100 milliGRAM(s) Oral two times a day  epoetin raheem Injectable 59412 Unit(s) IV Push <User Schedule>  fenofibrate Tablet 145 milliGRAM(s) Oral daily  heparin  Injectable 5000 Unit(s) SubCutaneous every 8 hours  hydrALAZINE 25 milliGRAM(s) Oral two times a day  insulin lispro (HumaLOG) corrective regimen sliding scale   SubCutaneous three times a day before meals  metoprolol tartrate 100 milliGRAM(s) Oral two times a day  nystatin    Suspension 373788 Unit(s) Oral four times a day  pantoprazole    Tablet 40 milliGRAM(s) Oral before breakfast  senna 2 Tablet(s) Oral at bedtime  sertraline 50 milliGRAM(s) Oral daily    MEDICATIONS  (PRN):  acetaminophen   Tablet .. 650 milliGRAM(s) Oral every 6 hours PRN Mild Pain (1 - 3)  dextrose 40% Gel 15 Gram(s) Oral once PRN Blood Glucose LESS THAN 70 milliGRAM(s)/deciliter  glucagon  Injectable 1 milliGRAM(s) IntraMuscular once PRN Glucose LESS THAN 70 milligrams/deciliter  melatonin 3 milliGRAM(s) Oral at bedtime PRN Insomnia  ondansetron Injectable 4 milliGRAM(s) IV Push every 6 hours PRN Nausea and/or Vomiting        CAPILLARY BLOOD GLUCOSE      POCT Blood Glucose.: 179 mg/dL (17 Dec 2018 21:42)  POCT Blood Glucose.: 134 mg/dL (17 Dec 2018 17:23)  POCT Blood Glucose.: 271 mg/dL (17 Dec 2018 12:49)  POCT Blood Glucose.: 113 mg/dL (17 Dec 2018 08:07)  POCT Blood Glucose.: 114 mg/dL (17 Dec 2018 05:42)    I&O's Summary    16 Dec 2018 07:01  -  17 Dec 2018 07:00  --------------------------------------------------------  IN: 730 mL / OUT: 410 mL / NET: 320 mL    17 Dec 2018 07:01  -  18 Dec 2018 00:51  --------------------------------------------------------  IN: 500 mL / OUT: 1798 mL / NET: -1298 mL        PHYSICAL EXAM:  GENERAL: NAD  NECK: Supple, No JVD  CHEST/LUNG: Clear to auscultation bilaterally; No wheezing.  HEART: Regular rate and rhythm; No murmurs, rubs, or gallops  ABDOMEN: Soft, Nontender, Nondistended; Bowel sounds present  EXTREMITIES:   No clubbing, cyanosis, or edema  NEUROLOGY: AAO X 3      LABS:                        8.1    6.26  )-----------( 159      ( 17 Dec 2018 06:15 )             27.2     12-17    138  |  99  |  27<H>  ----------------------------<  103<H>  3.6   |  26  |  4.80<H>    Ca    8.2<L>      17 Dec 2018 06:15  Phos  1.7     12-17  Mg     1.9     12-17              CAPILLARY BLOOD GLUCOSE      POCT Blood Glucose.: 179 mg/dL (17 Dec 2018 21:42)  POCT Blood Glucose.: 134 mg/dL (17 Dec 2018 17:23)  POCT Blood Glucose.: 271 mg/dL (17 Dec 2018 12:49)  POCT Blood Glucose.: 113 mg/dL (17 Dec 2018 08:07)  POCT Blood Glucose.: 114 mg/dL (17 Dec 2018 05:42)                RADIOLOGY & ADDITIONAL TESTS:    Imaging Personally Reviewed:    Consultant(s) Notes Reviewed:      Care Discussed with Consultants/Other Providers:

## 2018-12-17 NOTE — PROVIDER CONTACT NOTE (OTHER) - ACTION/TREATMENT ORDERED:
Mary Crenshaw, notified. Hourly rounding increased. Will continue to monitor.
Tele PA made aware and will come to bedside to assess patient.
LIP aware without orders as pt will be evaluated when goes back to the floor. Pt's status endorsed to primary floor RN.
MAKE SURE NO FOOD ON BEDSIDE TO SNACK ON AND MONITOR PT.
continue to monitor
vitals, EKG, labs, IVP lopressor 5 mg. cont to monitor.

## 2018-12-17 NOTE — PROGRESS NOTE ADULT - ASSESSMENT
· Assessment	  79 y/o  Female, with a PMHx of CKD (fistula June 2018, s/p 2 balloons, last one 2 weeks ago, no dialysis yet), DM, HTN, HLD, anemia presents to the [facility] ED constant SOB, dry cough and clear rhinorrhea x 2 days associated with one episode of vomiting, darken stools and increased urinary frequency admitted to telemetry for Pulmonary Edema/ Anemia/ Acute on Chronic Renal Failure.      PAF: Tele  On Metoprolol     Problem/Plan - 1:  ·  Problem: Pericardial Effusion:    S/p pericardial window       Problem/Plan - 3:  ·  Problem: ANNIE on Advanced CKD:   Plan: For HD. Nephro f/up noted.      Problem/Plan - 4:  ·  Problem: DM (diabetes mellitus).  Plan: FSSS     Problem/Plan - 5:  ·  Problem: HTN (hypertension).  Plan:  Cont. Amlodipine, Hydralazine, Furosemide.

## 2018-12-17 NOTE — PROGRESS NOTE ADULT - SUBJECTIVE AND OBJECTIVE BOX
[facility] - Saul / Kurt CHRISTINE /Fabrice/ EMMETT Patel/ EMMETT Storey/ Branden Madrigal / HANH Njazamu  ---------------------------------------------------------------------------------------------------------------    Patient seen and examined bedside    Subjective and Objective: No overnight events, denied sob. No complaints today. feeling better\  tolerated hd well in AM    Allergies: No Known Allergies      Hospital Medications:   MEDICATIONS  (STANDING):  atorvastatin 80 milliGRAM(s) Oral at bedtime  dextrose 5%. 1000 milliLiter(s) (50 mL/Hr) IV Continuous <Continuous>  dextrose 50% Injectable 12.5 Gram(s) IV Push once  dextrose 50% Injectable 25 Gram(s) IV Push once  dextrose 50% Injectable 25 Gram(s) IV Push once  epoetin raheem Injectable 45974 Unit(s) IV Push <User Schedule>  fenofibrate Tablet 145 milliGRAM(s) Oral daily  heparin  Injectable 5000 Unit(s) SubCutaneous every 8 hours  hydrALAZINE 25 milliGRAM(s) Oral two times a day  insulin lispro (HumaLOG) corrective regimen sliding scale   SubCutaneous three times a day before meals  metoprolol tartrate 100 milliGRAM(s) Oral two times a day  nystatin    Suspension 742451 Unit(s) Oral four times a day  pantoprazole    Tablet 40 milliGRAM(s) Oral before breakfast  sertraline 50 milliGRAM(s) Oral daily      VITALS:  T(F): 98 (12-17-18 @ 10:46), Max: 99.7 (12-16-18 @ 20:04)  HR: 69 (12-17-18 @ 10:46)  BP: 150/47 (12-17-18 @ 10:46)  RR: 18 (12-17-18 @ 10:46)  SpO2: 100% (12-17-18 @ 10:46)  Wt(kg): --    12-16 @ 07:01 - 12-17 @ 07:00  --------------------------------------------------------  IN: 730 mL / OUT: 410 mL / NET: 320 mL    12-17 @ 07:01 - 12-17 @ 14:18  --------------------------------------------------------  IN: 500 mL / OUT: 1680 mL / NET: -1180 mL      PHYSICAL EXAM:  Constitutional: NAD  HEENT: anicteric sclera, oropharynx clear  Neck: No JVD  Respiratory: CTAB, no wheezes, rales or rhonchi  Cardiovascular: S1, S2, RRR. chest tube +  Gastrointestinal: BS+, soft, NT/ND  Extremities: No cyanosis or clubbing. No peripheral edema  Neurological: A/O x 3, no focal deficits  Psychiatric: Normal mood, normal affect  : No CVA tenderness. No calvin.   Skin: No rashes  Vascular Access: rt IJ PC    LABS:  12-17    138  |  99  |  27<H>  ----------------------------<  103<H>  3.6   |  26  |  4.80<H>    Ca    8.2<L>      17 Dec 2018 06:15  Phos  1.7     12-17  Mg     1.9     12-17      Creatinine Trend: 4.80 <--, 3.53 <--, 4.52 <--, 3.04 <--, 4.61 <--, 3.72 <--, 5.29 <--                        8.1    6.26  )-----------( 159      ( 17 Dec 2018 06:15 )             27.2     Urine Studies:        RADIOLOGY & ADDITIONAL STUDIES:

## 2018-12-17 NOTE — PROGRESS NOTE ADULT - SUBJECTIVE AND OBJECTIVE BOX
EP     tele: NSR, frequent PVCs, 5 nsvt    no palpitations, no syncope, no angina      MEDICATIONS  (STANDING):  atorvastatin 80 milliGRAM(s) Oral at bedtime  dextrose 5%. 1000 milliLiter(s) (50 mL/Hr) IV Continuous <Continuous>  dextrose 50% Injectable 12.5 Gram(s) IV Push once  dextrose 50% Injectable 25 Gram(s) IV Push once  dextrose 50% Injectable 25 Gram(s) IV Push once  epoetin raheem Injectable 87107 Unit(s) IV Push <User Schedule>  fenofibrate Tablet 145 milliGRAM(s) Oral daily  heparin  Injectable 5000 Unit(s) SubCutaneous every 8 hours  hydrALAZINE 25 milliGRAM(s) Oral two times a day  insulin lispro (HumaLOG) corrective regimen sliding scale   SubCutaneous three times a day before meals  metoprolol tartrate 100 milliGRAM(s) Oral two times a day  nystatin    Suspension 753397 Unit(s) Oral four times a day  pantoprazole    Tablet 40 milliGRAM(s) Oral before breakfast  sertraline 50 milliGRAM(s) Oral daily    MEDICATIONS  (PRN):  acetaminophen   Tablet .. 650 milliGRAM(s) Oral every 6 hours PRN Mild Pain (1 - 3)  dextrose 40% Gel 15 Gram(s) Oral once PRN Blood Glucose LESS THAN 70 milliGRAM(s)/deciliter  glucagon  Injectable 1 milliGRAM(s) IntraMuscular once PRN Glucose LESS THAN 70 milligrams/deciliter  melatonin 3 milliGRAM(s) Oral at bedtime PRN Insomnia  ondansetron Injectable 4 milliGRAM(s) IV Push every 6 hours PRN Nausea and/or Vomiting      LABS:                        8.1    6.26  )-----------( 159      ( 17 Dec 2018 06:15 )             27.2     138  |  99  |  27<H>  ----------------------------<  103<H>  3.6   |  26  |  4.80<H>    Ca    8.2<L>      17 Dec 2018 06:15  Phos  1.7     12-17  Mg     1.9     12-17    Creatinine Trend: 4.80<--, 3.53<--, 4.52<--, 3.04<--, 4.61<--, 3.72<--      PHYSICAL EXAM  Vital Signs Last 24 Hrs  T(C): 36.7 (17 Dec 2018 10:46), Max: 37.6 (16 Dec 2018 20:04)  T(F): 98 (17 Dec 2018 10:46), Max: 99.7 (16 Dec 2018 20:04)  HR: 69 (17 Dec 2018 10:46) (62 - 70)  BP: 150/47 (17 Dec 2018 10:46) (150/47 - 164/74)  BP(mean): 75 (17 Dec 2018 10:46) (75 - 75)  RR: 18 (17 Dec 2018 10:46) (16 - 18)  SpO2: 100% (17 Dec 2018 10:46) (99% - 100%)      no JVD  RRR, no murmurs  CTAB  soft nt/nd  no c/c/e    echo: normal LVEF  TSH normal    A/P) 77 y/o female PMH ESRD on HD, HTN, DM, hyperlipidemia admitted with a large pericardial effusion requiring pericardiocentesis and drain. Found to have PAF. Echo LVEF and TSH unremarkable. Responding well to beta blockers. CHADS-VASC 5    -continue metoprolol 100 bid  -is currently off a/c given large pericardial effusion and drain. Given no anticoagulation, can't pursue a rhythm control strategy at this time (i.e. amio, etc.).   -Therefore simply recommend continuing beta blockers (she's currently in NSR).   -Hopefully a/c can be started prior to discharge, and if not then she's a good candidate for left atrial appendage occlusion with Watchman.    -drain cont today given 60 cc output overnight, thoracic f/u noted

## 2018-12-18 LAB
BUN SERPL-MCNC: 19 MG/DL — SIGNIFICANT CHANGE UP (ref 7–23)
CALCIUM SERPL-MCNC: 8.1 MG/DL — LOW (ref 8.4–10.5)
CHLORIDE SERPL-SCNC: 96 MMOL/L — LOW (ref 98–107)
CO2 SERPL-SCNC: 26 MMOL/L — SIGNIFICANT CHANGE UP (ref 22–31)
CREAT SERPL-MCNC: 3.63 MG/DL — HIGH (ref 0.5–1.3)
GLUCOSE BLDC GLUCOMTR-MCNC: 131 MG/DL — HIGH (ref 70–99)
GLUCOSE BLDC GLUCOMTR-MCNC: 148 MG/DL — HIGH (ref 70–99)
GLUCOSE BLDC GLUCOMTR-MCNC: 242 MG/DL — HIGH (ref 70–99)
GLUCOSE BLDC GLUCOMTR-MCNC: 261 MG/DL — HIGH (ref 70–99)
GLUCOSE SERPL-MCNC: 146 MG/DL — HIGH (ref 70–99)
HCT VFR BLD CALC: 29.9 % — LOW (ref 34.5–45)
HGB BLD-MCNC: 8.6 G/DL — LOW (ref 11.5–15.5)
MAGNESIUM SERPL-MCNC: 2 MG/DL — SIGNIFICANT CHANGE UP (ref 1.6–2.6)
MCHC RBC-ENTMCNC: 22.9 PG — LOW (ref 27–34)
MCHC RBC-ENTMCNC: 28.8 % — LOW (ref 32–36)
MCV RBC AUTO: 79.5 FL — LOW (ref 80–100)
MRSA SPEC QL CULT: SIGNIFICANT CHANGE UP
NRBC # FLD: 0.06 — SIGNIFICANT CHANGE UP
NRBC FLD-RTO: 1.1 — SIGNIFICANT CHANGE UP
PLATELET # BLD AUTO: 167 K/UL — SIGNIFICANT CHANGE UP (ref 150–400)
PMV BLD: SIGNIFICANT CHANGE UP FL (ref 7–13)
POTASSIUM SERPL-MCNC: 3.5 MMOL/L — SIGNIFICANT CHANGE UP (ref 3.5–5.3)
POTASSIUM SERPL-SCNC: 3.5 MMOL/L — SIGNIFICANT CHANGE UP (ref 3.5–5.3)
RBC # BLD: 3.76 M/UL — LOW (ref 3.8–5.2)
RBC # FLD: 16.9 % — HIGH (ref 10.3–14.5)
SODIUM SERPL-SCNC: 135 MMOL/L — SIGNIFICANT CHANGE UP (ref 135–145)
WBC # BLD: 5.22 K/UL — SIGNIFICANT CHANGE UP (ref 3.8–10.5)
WBC # FLD AUTO: 5.22 K/UL — SIGNIFICANT CHANGE UP (ref 3.8–10.5)

## 2018-12-18 RX ORDER — POLYETHYLENE GLYCOL 3350 17 G/17G
17 POWDER, FOR SOLUTION ORAL ONCE
Qty: 0 | Refills: 0 | Status: COMPLETED | OUTPATIENT
Start: 2018-12-18 | End: 2018-12-18

## 2018-12-18 RX ORDER — CHLORHEXIDINE GLUCONATE 213 G/1000ML
1 SOLUTION TOPICAL ONCE
Qty: 0 | Refills: 0 | Status: COMPLETED | OUTPATIENT
Start: 2018-12-18 | End: 2018-12-18

## 2018-12-18 RX ADMIN — Medication 25 MILLIGRAM(S): at 18:18

## 2018-12-18 RX ADMIN — Medication 500000 UNIT(S): at 18:18

## 2018-12-18 RX ADMIN — Medication 25 MILLIGRAM(S): at 05:35

## 2018-12-18 RX ADMIN — Medication 500000 UNIT(S): at 05:37

## 2018-12-18 RX ADMIN — SENNA PLUS 2 TABLET(S): 8.6 TABLET ORAL at 21:47

## 2018-12-18 RX ADMIN — POLYETHYLENE GLYCOL 3350 17 GRAM(S): 17 POWDER, FOR SOLUTION ORAL at 04:58

## 2018-12-18 RX ADMIN — HEPARIN SODIUM 5000 UNIT(S): 5000 INJECTION INTRAVENOUS; SUBCUTANEOUS at 05:34

## 2018-12-18 RX ADMIN — SERTRALINE 50 MILLIGRAM(S): 25 TABLET, FILM COATED ORAL at 13:14

## 2018-12-18 RX ADMIN — Medication 100 MILLIGRAM(S): at 05:34

## 2018-12-18 RX ADMIN — Medication 500000 UNIT(S): at 13:14

## 2018-12-18 RX ADMIN — Medication 500000 UNIT(S): at 23:34

## 2018-12-18 RX ADMIN — CHLORHEXIDINE GLUCONATE 1 APPLICATION(S): 213 SOLUTION TOPICAL at 18:18

## 2018-12-18 RX ADMIN — ATORVASTATIN CALCIUM 80 MILLIGRAM(S): 80 TABLET, FILM COATED ORAL at 21:47

## 2018-12-18 RX ADMIN — Medication 100 MILLIGRAM(S): at 18:18

## 2018-12-18 RX ADMIN — HEPARIN SODIUM 5000 UNIT(S): 5000 INJECTION INTRAVENOUS; SUBCUTANEOUS at 21:48

## 2018-12-18 RX ADMIN — Medication 100 MILLIGRAM(S): at 05:35

## 2018-12-18 RX ADMIN — Medication 3: at 13:13

## 2018-12-18 RX ADMIN — Medication 145 MILLIGRAM(S): at 13:13

## 2018-12-18 RX ADMIN — PANTOPRAZOLE SODIUM 40 MILLIGRAM(S): 20 TABLET, DELAYED RELEASE ORAL at 05:38

## 2018-12-18 RX ADMIN — Medication 500000 UNIT(S): at 00:05

## 2018-12-18 RX ADMIN — HEPARIN SODIUM 5000 UNIT(S): 5000 INJECTION INTRAVENOUS; SUBCUTANEOUS at 13:13

## 2018-12-18 NOTE — PROGRESS NOTE ADULT - SUBJECTIVE AND OBJECTIVE BOX
EP ATTENDING    tele: NSR, no events    no palpitations, no syncope, no angina    acetaminophen   Tablet .. 650 milliGRAM(s) Oral every 6 hours PRN  atorvastatin 80 milliGRAM(s) Oral at bedtime  chlorhexidine 4% Liquid 1 Application(s) Topical once  dextrose 40% Gel 15 Gram(s) Oral once PRN  dextrose 5%. 1000 milliLiter(s) IV Continuous <Continuous>  dextrose 50% Injectable 12.5 Gram(s) IV Push once  dextrose 50% Injectable 25 Gram(s) IV Push once  dextrose 50% Injectable 25 Gram(s) IV Push once  docusate sodium 100 milliGRAM(s) Oral two times a day  epoetin raheem Injectable 45092 Unit(s) IV Push <User Schedule>  fenofibrate Tablet 145 milliGRAM(s) Oral daily  glucagon  Injectable 1 milliGRAM(s) IntraMuscular once PRN  heparin  Injectable 5000 Unit(s) SubCutaneous every 8 hours  hydrALAZINE 25 milliGRAM(s) Oral two times a day  insulin lispro (HumaLOG) corrective regimen sliding scale   SubCutaneous three times a day before meals  melatonin 3 milliGRAM(s) Oral at bedtime PRN  metoprolol tartrate 100 milliGRAM(s) Oral two times a day  nystatin    Suspension 149735 Unit(s) Oral four times a day  ondansetron Injectable 4 milliGRAM(s) IV Push every 6 hours PRN  pantoprazole    Tablet 40 milliGRAM(s) Oral before breakfast  senna 2 Tablet(s) Oral at bedtime  sertraline 50 milliGRAM(s) Oral daily                            8.6    5.22  )-----------( 167      ( 18 Dec 2018 06:30 )             29.9       12-18    135  |  96<L>  |  19  ----------------------------<  146<H>  3.5   |  26  |  3.63<H>    Ca    8.1<L>      18 Dec 2018 06:30  Phos  1.7     12-17  Mg     2.0     12-18      T(C): 36.7 (12-18-18 @ 09:10), Max: 37.1 (12-17-18 @ 21:23)  HR: 54 (12-18-18 @ 09:10) (54 - 70)  BP: 154/56 (12-18-18 @ 09:10) (150/47 - 181/55)  RR: 16 (12-18-18 @ 09:10) (16 - 18)  SpO2: 100% (12-18-18 @ 09:10) (98% - 100%)  Wt(kg): --    no JVD  RRR, no murmurs  CTAB  soft nt/nd  no c/c/e      A/P) 79 y/o female PMH ESRD on HD via right permacath, HTN, DM, hyperlipidemia admitted with a large pericardial effusion requiring pericardiocentesis and drain. Found to have PAF. Echo LVEF and TSH unremarkable. Responding well to beta blockers. CHADS-VASC 5    -continue metoprolol 100 bid  -given no anticoagulation (yet), can't pursue a rhythm control strategy at this time (i.e. amio, etc.).   -therefore simply recommend continuing beta blockers (she's currently in NSR).   -hopefully a/c can be started prior to discharge (will defer timing to thoracic surgery), and if not then she's a good candidate for left atrial appendage occlusion with Watchman  -no further inpatient EP workup needed

## 2018-12-18 NOTE — PROGRESS NOTE ADULT - SUBJECTIVE AND OBJECTIVE BOX
Nephrology Followup Note - [phone] - Dr Hicks / Dr Hughes / Dr Storey / Dr Avina / Dr Patel / Dr Brannon / Dr Madrigal / Dr Kolb  Pt seen and examined at bedside  no acute events overnight. pt comfortable. No issues with HD yesterday.     Allergies:  No Known Allergies    Hospital Medications:   MEDICATIONS  (STANDING):  atorvastatin 80 milliGRAM(s) Oral at bedtime  chlorhexidine 4% Liquid 1 Application(s) Topical once  dextrose 5%. 1000 milliLiter(s) (50 mL/Hr) IV Continuous <Continuous>  dextrose 50% Injectable 12.5 Gram(s) IV Push once  dextrose 50% Injectable 25 Gram(s) IV Push once  dextrose 50% Injectable 25 Gram(s) IV Push once  docusate sodium 100 milliGRAM(s) Oral two times a day  epoetin raheem Injectable 51566 Unit(s) IV Push <User Schedule>  fenofibrate Tablet 145 milliGRAM(s) Oral daily  heparin  Injectable 5000 Unit(s) SubCutaneous every 8 hours  hydrALAZINE 25 milliGRAM(s) Oral two times a day  insulin lispro (HumaLOG) corrective regimen sliding scale   SubCutaneous three times a day before meals  metoprolol tartrate 100 milliGRAM(s) Oral two times a day  nystatin    Suspension 803377 Unit(s) Oral four times a day  pantoprazole    Tablet 40 milliGRAM(s) Oral before breakfast  senna 2 Tablet(s) Oral at bedtime  sertraline 50 milliGRAM(s) Oral daily    VITALS:  T(F): 98.1 (12-18-18 @ 09:10), Max: 98.7 (12-17-18 @ 21:23)  HR: 54 (12-18-18 @ 09:10)  BP: 154/56 (12-18-18 @ 09:10)  RR: 16 (12-18-18 @ 09:10)  SpO2: 100% (12-18-18 @ 09:10)  Wt(kg): --    12-17 @ 07:01  -  12-18 @ 07:00  --------------------------------------------------------  IN: 500 mL / OUT: 1818 mL / NET: -1318 mL        PHYSICAL EXAM:  Constitutional: NAD  HEENT: anicteric sclera, oropharynx clear, MMM  Neck: No JVD  Respiratory: CTAB, no wheezes, rales or rhonchi  Cardiovascular: S1, S2, RRR  Gastrointestinal: BS+, soft, NT/ND  Extremities: No cyanosis or clubbing. No peripheral edema  Neurological: A/O x 3, no focal deficits  Psychiatric: Normal mood, normal affect  : No CVA tenderness. No calvin.   Skin: No rashes  Vascular Access: RIJ Tunneled cath, LUE aVF +thrill     LABS:  12-18    135  |  96<L>  |  19  ----------------------------<  146<H>  3.5   |  26  |  3.63<H>    Ca    8.1<L>      18 Dec 2018 06:30  Phos  1.7     12-17  Mg     2.0     12-18      Creatinine Trend: 3.63 <--, 4.80 <--, 3.53 <--, 4.52 <--, 3.04 <--, 4.61 <--, 3.72 <--                        8.6    5.22  )-----------( 167      ( 18 Dec 2018 06:30 )             29.9     Urine Studies:      RADIOLOGY & ADDITIONAL STUDIES:

## 2018-12-18 NOTE — PROGRESS NOTE ADULT - SUBJECTIVE AND OBJECTIVE BOX
Subjective: 77 y/o female presents sitting up in bed in NAD. Patient states "I am tired"    Vital Signs:  Vital Signs Last 24 Hrs  T(C): 36.7 (12-18-18 @ 09:10), Max: 37.1 (12-17-18 @ 21:23)  T(F): 98.1 (12-18-18 @ 09:10), Max: 98.7 (12-17-18 @ 21:23)  HR: 54 (12-18-18 @ 09:10) (54 - 70)  BP: 154/56 (12-18-18 @ 09:10) (153/67 - 181/55)  RR: 16 (12-18-18 @ 09:10) (16 - 18)  SpO2: 100% (12-18-18 @ 09:10) (98% - 100%) on (O2)                        8.6    5.22  )-----------( 167      ( 18 Dec 2018 06:30 )             29.9     12-18    135  |  96<L>  |  19  ----------------------------<  146<H>  3.5   |  26  |  3.63<H>    Ca    8.1<L>      18 Dec 2018 06:30  Phos  1.7     12-17  Mg     2.0     12-18        Pertinent Physical Exam  Telemetry/Alarms: None  General: WN/WD NAD  CV: RRR, S1S2, no murmurs, rubs or gallops  Tubes: Pericardial andres to pleur-evac to ws, drained 60cc overnight.    Relevant labs, radiology and Medications reviewed                 A/P:   On 12/7/18, patient underwent Pericardial window operation. Patient drained 68cc overnight. Due to undergo HD tomorrow, accepted to outpatient HD center.     - Continue to maintain pericardial drain to ws   - Keep pericardial drain until output is less 30cc a day /24hr  - Care per primary team   - Call for any issues of problems, will continue to follow   - Discussed with Cardiothoracic Team at AM rounds. Subjective: 77 y/o female presents sitting up in bed in NAD. Patient states "I am tired"    Vital Signs:  Vital Signs Last 24 Hrs  T(C): 36.7 (12-18-18 @ 09:10), Max: 37.1 (12-17-18 @ 21:23)  T(F): 98.1 (12-18-18 @ 09:10), Max: 98.7 (12-17-18 @ 21:23)  HR: 54 (12-18-18 @ 09:10) (54 - 70)  BP: 154/56 (12-18-18 @ 09:10) (153/67 - 181/55)  RR: 16 (12-18-18 @ 09:10) (16 - 18)  SpO2: 100% (12-18-18 @ 09:10) (98% - 100%) on (O2)                        8.6    5.22  )-----------( 167      ( 18 Dec 2018 06:30 )             29.9     12-18    135  |  96<L>  |  19  ----------------------------<  146<H>  3.5   |  26  |  3.63<H>    Ca    8.1<L>      18 Dec 2018 06:30  Phos  1.7     12-17  Mg     2.0     12-18        Pertinent Physical Exam  Telemetry/Alarms: None  General: WN/WD NAD  CV: RRR, S1S2, no murmurs, rubs or gallops  Tubes: Pericardial andres to pleur-evac to ws, drained 60cc overnight.    Relevant labs, radiology and Medications reviewed                 A/P:   On 12/7/18, patient underwent Pericardial window operation. Patient drained 68cc overnight. Due to undergo HD tomorrow, accepted to outpatient HD center.     -Pt needs aggressive dialysis to decrease amount of pericardial fluid.   - Continue to maintain pericardial drain to ws   - Keep pericardial drain until output is less 30cc a day /24hr  - Care per primary team   - Call for any issues of problems, will continue to follow   - Discussed with Cardiothoracic Team at AM rounds.

## 2018-12-18 NOTE — PROGRESS NOTE ADULT - PROBLEM SELECTOR PLAN 1
25-Apr-2018 09:19 esrd now with dialysis dependence  s/p 1st HD session 12/8/18 via right IJ charles, s/p RIJ tunneled cath  Plan for repeat HD tomorrow.   f/u vascular for immature avf, outpt  Pt accepted to [facility] on MWF schedule.   keep HD schedule MWF

## 2018-12-18 NOTE — PROGRESS NOTE ADULT - ASSESSMENT
· Assessment	  77 y/o  Female, with a PMHx of CKD (fistula June 2018, s/p 2 balloons, last one 2 weeks ago, no dialysis yet), DM, HTN, HLD, anemia presents to the [facility] ED constant SOB, dry cough and clear rhinorrhea x 2 days associated with one episode of vomiting, darken stools and increased urinary frequency admitted to telemetry for Pulmonary Edema/ Anemia/ Acute on Chronic Renal Failure.      PAF: Tele  On Metoprolol     Problem/Plan - 1:  ·  Problem: Pericardial Effusion:    S/p pericardial window     Problem/Plan - 3:  ·  Problem: ANNIE on Advanced CKD:   Plan: For HD. Nephro f/up noted.      Problem/Plan - 4:  ·  Problem: DM (diabetes mellitus).  Plan: FSSS     Problem/Plan - 5:  ·  Problem: HTN (hypertension).  Plan:  Cont. Amlodipine, Hydralazine, Furosemide.

## 2018-12-18 NOTE — PROGRESS NOTE ADULT - SUBJECTIVE AND OBJECTIVE BOX
Patient is a 78y old  Female who presents with a chief complaint of Orthopnea x 2 days (18 Dec 2018 14:56)      SUBJECTIVE / OVERNIGHT EVENTS:    Events noted.  Feels better.  CONSTITUTIONAL: No fever,  or fatigue  NECK: No pain or stiffness  RESPIRATORY: No cough, wheezing, chills or hemoptysis; No shortness of breath  CARDIOVASCULAR: No chest pain, palpitations, dizziness, or leg swelling  GASTROINTESTINAL: No abdominal or epigastric pain. No nausea, vomiting, or hematemesis; No diarrhea or constipation.   NEUROLOGICAL: No headaches,     MEDICATIONS  (STANDING):  atorvastatin 80 milliGRAM(s) Oral at bedtime  dextrose 5%. 1000 milliLiter(s) (50 mL/Hr) IV Continuous <Continuous>  dextrose 50% Injectable 12.5 Gram(s) IV Push once  dextrose 50% Injectable 25 Gram(s) IV Push once  dextrose 50% Injectable 25 Gram(s) IV Push once  docusate sodium 100 milliGRAM(s) Oral two times a day  epoetin raheem Injectable 57139 Unit(s) IV Push <User Schedule>  fenofibrate Tablet 145 milliGRAM(s) Oral daily  heparin  Injectable 5000 Unit(s) SubCutaneous every 8 hours  hydrALAZINE 25 milliGRAM(s) Oral two times a day  insulin lispro (HumaLOG) corrective regimen sliding scale   SubCutaneous three times a day before meals  metoprolol tartrate 100 milliGRAM(s) Oral two times a day  nystatin    Suspension 573183 Unit(s) Oral four times a day  pantoprazole    Tablet 40 milliGRAM(s) Oral before breakfast  senna 2 Tablet(s) Oral at bedtime  sertraline 50 milliGRAM(s) Oral daily    MEDICATIONS  (PRN):  acetaminophen   Tablet .. 650 milliGRAM(s) Oral every 6 hours PRN Mild Pain (1 - 3)  dextrose 40% Gel 15 Gram(s) Oral once PRN Blood Glucose LESS THAN 70 milliGRAM(s)/deciliter  glucagon  Injectable 1 milliGRAM(s) IntraMuscular once PRN Glucose LESS THAN 70 milligrams/deciliter  melatonin 3 milliGRAM(s) Oral at bedtime PRN Insomnia  ondansetron Injectable 4 milliGRAM(s) IV Push every 6 hours PRN Nausea and/or Vomiting        CAPILLARY BLOOD GLUCOSE      POCT Blood Glucose.: 242 mg/dL (18 Dec 2018 21:34)  POCT Blood Glucose.: 148 mg/dL (18 Dec 2018 17:15)  POCT Blood Glucose.: 261 mg/dL (18 Dec 2018 12:23)  POCT Blood Glucose.: 131 mg/dL (18 Dec 2018 08:52)    I&O's Summary    17 Dec 2018 07:01  -  18 Dec 2018 07:00  --------------------------------------------------------  IN: 500 mL / OUT: 1818 mL / NET: -1318 mL    18 Dec 2018 07:01  -  19 Dec 2018 00:48  --------------------------------------------------------  IN: 0 mL / OUT: 235 mL / NET: -235 mL        PHYSICAL EXAM:  GENERAL: NAD  NECK: Supple, No JVD  CHEST/LUNG: Clear to auscultation bilaterally; No wheezing.  HEART: Regular rate and rhythm; No murmurs, rubs, or gallops  ABDOMEN: Soft, Nontender, Nondistended; Bowel sounds present  EXTREMITIES:   No clubbing, cyanosis, or edema  NEUROLOGY: AAO X 3      LABS:                        8.6    5.22  )-----------( 167      ( 18 Dec 2018 06:30 )             29.9     12-18    135  |  96<L>  |  19  ----------------------------<  146<H>  3.5   |  26  |  3.63<H>    Ca    8.1<L>      18 Dec 2018 06:30  Phos  1.7     12-17  Mg     2.0     12-18              CAPILLARY BLOOD GLUCOSE      POCT Blood Glucose.: 242 mg/dL (18 Dec 2018 21:34)  POCT Blood Glucose.: 148 mg/dL (18 Dec 2018 17:15)  POCT Blood Glucose.: 261 mg/dL (18 Dec 2018 12:23)  POCT Blood Glucose.: 131 mg/dL (18 Dec 2018 08:52)                RADIOLOGY & ADDITIONAL TESTS:    Imaging Personally Reviewed:    Consultant(s) Notes Reviewed:      Care Discussed with Consultants/Other Providers:

## 2018-12-19 LAB
BUN SERPL-MCNC: 27 MG/DL — HIGH (ref 7–23)
CALCIUM SERPL-MCNC: 8.5 MG/DL — SIGNIFICANT CHANGE UP (ref 8.4–10.5)
CHLORIDE SERPL-SCNC: 96 MMOL/L — LOW (ref 98–107)
CO2 SERPL-SCNC: 25 MMOL/L — SIGNIFICANT CHANGE UP (ref 22–31)
CREAT SERPL-MCNC: 4.68 MG/DL — HIGH (ref 0.5–1.3)
GLUCOSE BLDC GLUCOMTR-MCNC: 113 MG/DL — HIGH (ref 70–99)
GLUCOSE BLDC GLUCOMTR-MCNC: 121 MG/DL — HIGH (ref 70–99)
GLUCOSE BLDC GLUCOMTR-MCNC: 153 MG/DL — HIGH (ref 70–99)
GLUCOSE BLDC GLUCOMTR-MCNC: 204 MG/DL — HIGH (ref 70–99)
GLUCOSE BLDC GLUCOMTR-MCNC: 214 MG/DL — HIGH (ref 70–99)
GLUCOSE SERPL-MCNC: 110 MG/DL — HIGH (ref 70–99)
HCT VFR BLD CALC: 30.7 % — LOW (ref 34.5–45)
HGB BLD-MCNC: 8.8 G/DL — LOW (ref 11.5–15.5)
MAGNESIUM SERPL-MCNC: 2 MG/DL — SIGNIFICANT CHANGE UP (ref 1.6–2.6)
MCHC RBC-ENTMCNC: 23 PG — LOW (ref 27–34)
MCHC RBC-ENTMCNC: 28.7 % — LOW (ref 32–36)
MCV RBC AUTO: 80.4 FL — SIGNIFICANT CHANGE UP (ref 80–100)
NRBC # FLD: 0.04 — SIGNIFICANT CHANGE UP
PLATELET # BLD AUTO: 132 K/UL — LOW (ref 150–400)
PMV BLD: SIGNIFICANT CHANGE UP FL (ref 7–13)
POTASSIUM SERPL-MCNC: 3.7 MMOL/L — SIGNIFICANT CHANGE UP (ref 3.5–5.3)
POTASSIUM SERPL-SCNC: 3.7 MMOL/L — SIGNIFICANT CHANGE UP (ref 3.5–5.3)
RBC # BLD: 3.82 M/UL — SIGNIFICANT CHANGE UP (ref 3.8–5.2)
RBC # FLD: 17.4 % — HIGH (ref 10.3–14.5)
SODIUM SERPL-SCNC: 135 MMOL/L — SIGNIFICANT CHANGE UP (ref 135–145)
WBC # BLD: 4.81 K/UL — SIGNIFICANT CHANGE UP (ref 3.8–10.5)
WBC # FLD AUTO: 4.81 K/UL — SIGNIFICANT CHANGE UP (ref 3.8–10.5)

## 2018-12-19 RX ADMIN — Medication 100 MILLIGRAM(S): at 18:18

## 2018-12-19 RX ADMIN — Medication 25 MILLIGRAM(S): at 06:40

## 2018-12-19 RX ADMIN — ERYTHROPOIETIN 20000 UNIT(S): 10000 INJECTION, SOLUTION INTRAVENOUS; SUBCUTANEOUS at 13:26

## 2018-12-19 RX ADMIN — HEPARIN SODIUM 5000 UNIT(S): 5000 INJECTION INTRAVENOUS; SUBCUTANEOUS at 06:40

## 2018-12-19 RX ADMIN — Medication 500000 UNIT(S): at 18:17

## 2018-12-19 RX ADMIN — Medication 100 MILLIGRAM(S): at 06:39

## 2018-12-19 RX ADMIN — SENNA PLUS 2 TABLET(S): 8.6 TABLET ORAL at 22:00

## 2018-12-19 RX ADMIN — Medication 25 MILLIGRAM(S): at 18:18

## 2018-12-19 RX ADMIN — Medication 145 MILLIGRAM(S): at 18:18

## 2018-12-19 RX ADMIN — ATORVASTATIN CALCIUM 80 MILLIGRAM(S): 80 TABLET, FILM COATED ORAL at 22:00

## 2018-12-19 RX ADMIN — Medication 500000 UNIT(S): at 23:00

## 2018-12-19 RX ADMIN — Medication 500000 UNIT(S): at 06:40

## 2018-12-19 RX ADMIN — HEPARIN SODIUM 5000 UNIT(S): 5000 INJECTION INTRAVENOUS; SUBCUTANEOUS at 22:00

## 2018-12-19 RX ADMIN — Medication 3 MILLIGRAM(S): at 22:00

## 2018-12-19 RX ADMIN — Medication 1: at 13:29

## 2018-12-19 RX ADMIN — Medication 500000 UNIT(S): at 11:58

## 2018-12-19 RX ADMIN — SERTRALINE 50 MILLIGRAM(S): 25 TABLET, FILM COATED ORAL at 18:18

## 2018-12-19 RX ADMIN — PANTOPRAZOLE SODIUM 40 MILLIGRAM(S): 20 TABLET, DELAYED RELEASE ORAL at 06:39

## 2018-12-19 NOTE — PROGRESS NOTE ADULT - ASSESSMENT
· Assessment	  79 y/o  Female, with a PMHx of CKD (fistula June 2018, s/p 2 balloons, last one 2 weeks ago, no dialysis yet), DM, HTN, HLD, anemia presents to the [facility] ED constant SOB, dry cough and clear rhinorrhea x 2 days associated with one episode of vomiting, darken stools and increased urinary frequency admitted to telemetry for Pulmonary Edema/ Anemia/ Acute on Chronic Renal Failure.      PAF: Tele  On Metoprolol     Problem/Plan - 1:  ·  Problem: Pericardial Effusion:    S/p pericardial window     Problem/Plan - 3:  ·  Problem: ANNIE on Advanced CKD:   Plan: For HD. Nephro f/up noted.      Problem/Plan - 4:  ·  Problem: DM (diabetes mellitus).  Plan: FSSS     Problem/Plan - 5:  ·  Problem: HTN (hypertension).  Plan:  Cont. Amlodipine, Hydralazine, Furosemide.

## 2018-12-19 NOTE — PROGRESS NOTE ADULT - SUBJECTIVE AND OBJECTIVE BOX
Subjective: pt feeling well, about to leave for HD, no acute complaints    Vital Signs:  Vital Signs Last 24 Hrs  T(C): 36.8 (12-19-18 @ 08:40), Max: 36.8 (12-19-18 @ 08:40)  T(F): 98.2 (12-19-18 @ 08:40), Max: 98.2 (12-19-18 @ 08:40)  HR: 62 (12-19-18 @ 08:40) (57 - 62)  BP: 136/52 (12-19-18 @ 08:40) (136/52 - 152/68)  RR: 18 (12-19-18 @ 08:40) (17 - 18)  SpO2: 100% (12-19-18 @ 08:40) (98% - 100%) on (O2)    Telemetry/Alarms:  General: WN/WD NAD  Neurology: Awake, nonfocal, CUTLER x 4  Eyes: Scleras clear, PERRLA/ EOMI, Gross vision intact  ENT:Gross hearing intact, grossly patent pharynx, no stridor  Neck: Neck supple, trachea midline, No JVD,   Respiratory: CTA B/L, No wheezing, rales, rhonchi  CV: RRR, S1S2, no murmurs, rubs or gallops  Abdominal: Soft, NT, ND +BS,   Extremities: No edema, + peripheral pulses  Skin: No Rashes, Hematoma, Ecchymosis  Lymphatic: No Neck, axilla, groin LAD  Psych: Oriented x 3, normal affect  Incisions: c,d,i  Tubes: pericardial drain output 35cc/24h  Relevant labs, radiology and Medications reviewed                        8.8    4.81  )-----------( 132      ( 19 Dec 2018 06:00 )             30.7     12-19    135  |  96<L>  |  27<H>  ----------------------------<  110<H>  3.7   |  25  |  4.68<H>    Ca    8.5      19 Dec 2018 06:00  Mg     2.0     12-19        MEDICATIONS  (STANDING):  atorvastatin 80 milliGRAM(s) Oral at bedtime  dextrose 5%. 1000 milliLiter(s) (50 mL/Hr) IV Continuous <Continuous>  dextrose 50% Injectable 12.5 Gram(s) IV Push once  dextrose 50% Injectable 25 Gram(s) IV Push once  dextrose 50% Injectable 25 Gram(s) IV Push once  docusate sodium 100 milliGRAM(s) Oral two times a day  epoetin raheem Injectable 36440 Unit(s) IV Push <User Schedule>  fenofibrate Tablet 145 milliGRAM(s) Oral daily  heparin  Injectable 5000 Unit(s) SubCutaneous every 8 hours  hydrALAZINE 25 milliGRAM(s) Oral two times a day  insulin lispro (HumaLOG) corrective regimen sliding scale   SubCutaneous three times a day before meals  metoprolol tartrate 100 milliGRAM(s) Oral two times a day  nystatin    Suspension 972033 Unit(s) Oral four times a day  pantoprazole    Tablet 40 milliGRAM(s) Oral before breakfast  senna 2 Tablet(s) Oral at bedtime  sertraline 50 milliGRAM(s) Oral daily    MEDICATIONS  (PRN):  acetaminophen   Tablet .. 650 milliGRAM(s) Oral every 6 hours PRN Mild Pain (1 - 3)  dextrose 40% Gel 15 Gram(s) Oral once PRN Blood Glucose LESS THAN 70 milliGRAM(s)/deciliter  glucagon  Injectable 1 milliGRAM(s) IntraMuscular once PRN Glucose LESS THAN 70 milligrams/deciliter  melatonin 3 milliGRAM(s) Oral at bedtime PRN Insomnia  ondansetron Injectable 4 milliGRAM(s) IV Push every 6 hours PRN Nausea and/or Vomiting    Pertinent Physical Exam  I&O's Summary    18 Dec 2018 07:01  -  19 Dec 2018 07:00  --------------------------------------------------------  IN: 0 mL / OUT: 255 mL / NET: -255 mL        Assessment  78y Female  w/ PAST MEDICAL & SURGICAL HISTORY:  Anemia  CKD (chronic kidney disease)  Hypercholesteremia  HTN (hypertension)  DM (diabetes mellitus)  H/O colonoscopy with polypectomy  AV fistula: June 2018, 2 ballooning (last one 2 weeks ago), following up on Dec 15th  Status post right foot surgery: hammer toe repair  admitted with complaints of Patient is a 78y old  Female who presents with a chief complaint of Orthopnea x 2 days (19 Dec 2018 10:36)  .  On (12/7/18), patient underwent Pericardial window operation, drain remained for increased output  . Drain removed today 12/19/18.    PLAN  Neuro: Pain management  Pulm: Encourage coughing, deep breathing and use of incentive spirometry. Wean off supplemental oxygen as able. Daily CXR.   Cardio: Monitor telemetry/alarms  GI: Tolerating diet. Continue stool softeners.  Renal: monitor urine output, supplement electrolytes as needed  Vasc: Heparin SC/SCDs for DVT prophylaxis  Heme: Stable H/H. .   ID: Off antibiotics. Stable.  Therapy: OOB/ambulate  Tubes: pericardial drain removed today, pt tolerated procedure well.   Disposition: Care as per primary team  Discussed with Cardiothoracic Team at AM rounds.

## 2018-12-19 NOTE — PROGRESS NOTE ADULT - SUBJECTIVE AND OBJECTIVE BOX
Nephrology Followup Note - [phone] - Dr Hicks / Dr Hughes / Dr Storey / Dr Avina / Dr Patel / Dr Brannon / Dr Madrigal / Dr Kolb  Pt seen and examined at bedside  Pt comfortable, Denies SOB, pericardial window removed this morning.     Allergies:  No Known Allergies    Hospital Medications:   MEDICATIONS  (STANDING):  atorvastatin 80 milliGRAM(s) Oral at bedtime  dextrose 5%. 1000 milliLiter(s) (50 mL/Hr) IV Continuous <Continuous>  dextrose 50% Injectable 12.5 Gram(s) IV Push once  dextrose 50% Injectable 25 Gram(s) IV Push once  dextrose 50% Injectable 25 Gram(s) IV Push once  docusate sodium 100 milliGRAM(s) Oral two times a day  epoetin raheem Injectable 76757 Unit(s) IV Push <User Schedule>  fenofibrate Tablet 145 milliGRAM(s) Oral daily  heparin  Injectable 5000 Unit(s) SubCutaneous every 8 hours  hydrALAZINE 25 milliGRAM(s) Oral two times a day  insulin lispro (HumaLOG) corrective regimen sliding scale   SubCutaneous three times a day before meals  metoprolol tartrate 100 milliGRAM(s) Oral two times a day  nystatin    Suspension 952612 Unit(s) Oral four times a day  pantoprazole    Tablet 40 milliGRAM(s) Oral before breakfast  senna 2 Tablet(s) Oral at bedtime  sertraline 50 milliGRAM(s) Oral daily    VITALS:  T(F): 98.2 (12-19-18 @ 08:40), Max: 98.2 (12-19-18 @ 08:40)  HR: 62 (12-19-18 @ 08:40)  BP: 136/52 (12-19-18 @ 08:40)  RR: 18 (12-19-18 @ 08:40)  SpO2: 100% (12-19-18 @ 08:40)  Wt(kg): --    12-18 @ 07:01  -  12-19 @ 07:00  --------------------------------------------------------  IN: 0 mL / OUT: 255 mL / NET: -255 mL      PHYSICAL EXAM:  Constitutional: NAD  HEENT: anicteric sclera, oropharynx clear, MMM  Neck: No JVD  Respiratory: CTAB, no wheezes, rales or rhonchi  Cardiovascular: S1, S2, RRR  Gastrointestinal: BS+, soft, NT/ND  Extremities: No cyanosis or clubbing. No peripheral edema  Neurological: A/O x 3, no focal deficits  Psychiatric: Normal mood, normal affect  : No CVA tenderness. No calvin.   Skin: No rashes  Vascular Access: RIJ Tunneled cath, LUE AVF +thrill     LABS:  12-19    135  |  96<L>  |  27<H>  ----------------------------<  110<H>  3.7   |  25  |  4.68<H>    Ca    8.5      19 Dec 2018 06:00  Mg     2.0     12-19      Creatinine Trend: 4.68 <--, 3.63 <--, 4.80 <--, 3.53 <--, 4.52 <--, 3.04 <--, 4.61 <--                        8.8    4.81  )-----------( 132      ( 19 Dec 2018 06:00 )             30.7     Urine Studies:      RADIOLOGY & ADDITIONAL STUDIES:

## 2018-12-19 NOTE — PROGRESS NOTE ADULT - SUBJECTIVE AND OBJECTIVE BOX
Patient is a 78y old  Female who presents with a chief complaint of Orthopnea x 2 days (19 Dec 2018 11:39)      SUBJECTIVE / OVERNIGHT EVENTS:    Events noted.  No CP/SOB/Dizziness/N/V    MEDICATIONS  (STANDING):  atorvastatin 80 milliGRAM(s) Oral at bedtime  dextrose 5%. 1000 milliLiter(s) (50 mL/Hr) IV Continuous <Continuous>  dextrose 50% Injectable 12.5 Gram(s) IV Push once  dextrose 50% Injectable 25 Gram(s) IV Push once  dextrose 50% Injectable 25 Gram(s) IV Push once  docusate sodium 100 milliGRAM(s) Oral two times a day  epoetin raheem Injectable 80300 Unit(s) IV Push <User Schedule>  fenofibrate Tablet 145 milliGRAM(s) Oral daily  heparin  Injectable 5000 Unit(s) SubCutaneous every 8 hours  hydrALAZINE 25 milliGRAM(s) Oral two times a day  insulin lispro (HumaLOG) corrective regimen sliding scale   SubCutaneous three times a day before meals  metoprolol tartrate 100 milliGRAM(s) Oral two times a day  nystatin    Suspension 962870 Unit(s) Oral four times a day  pantoprazole    Tablet 40 milliGRAM(s) Oral before breakfast  senna 2 Tablet(s) Oral at bedtime  sertraline 50 milliGRAM(s) Oral daily    MEDICATIONS  (PRN):  acetaminophen   Tablet .. 650 milliGRAM(s) Oral every 6 hours PRN Mild Pain (1 - 3)  dextrose 40% Gel 15 Gram(s) Oral once PRN Blood Glucose LESS THAN 70 milliGRAM(s)/deciliter  glucagon  Injectable 1 milliGRAM(s) IntraMuscular once PRN Glucose LESS THAN 70 milligrams/deciliter  melatonin 3 milliGRAM(s) Oral at bedtime PRN Insomnia  ondansetron Injectable 4 milliGRAM(s) IV Push every 6 hours PRN Nausea and/or Vomiting        CAPILLARY BLOOD GLUCOSE      POCT Blood Glucose.: 214 mg/dL (19 Dec 2018 21:58)  POCT Blood Glucose.: 113 mg/dL (19 Dec 2018 17:42)  POCT Blood Glucose.: 153 mg/dL (19 Dec 2018 13:05)  POCT Blood Glucose.: 204 mg/dL (19 Dec 2018 12:35)  POCT Blood Glucose.: 121 mg/dL (19 Dec 2018 08:29)    I&O's Summary    18 Dec 2018 07:01  -  19 Dec 2018 07:00  --------------------------------------------------------  IN: 0 mL / OUT: 255 mL / NET: -255 mL    19 Dec 2018 07:01  -  19 Dec 2018 23:25  --------------------------------------------------------  IN: 650 mL / OUT: 1550 mL / NET: -900 mL        PHYSICAL EXAM:  GENERAL: NAD  NECK: Supple, No JVD  CHEST/LUNG: Clear to auscultation bilaterally; No wheezing.  HEART: Regular rate and rhythm; No murmurs, rubs, or gallops  ABDOMEN: Soft, Nontender, Nondistended; Bowel sounds present  EXTREMITIES:   No clubbing, cyanosis, or edema  NEUROLOGY: AAO      LABS:                        8.8    4.81  )-----------( 132      ( 19 Dec 2018 06:00 )             30.7     12-19    135  |  96<L>  |  27<H>  ----------------------------<  110<H>  3.7   |  25  |  4.68<H>    Ca    8.5      19 Dec 2018 06:00  Mg     2.0     12-19              CAPILLARY BLOOD GLUCOSE      POCT Blood Glucose.: 214 mg/dL (19 Dec 2018 21:58)  POCT Blood Glucose.: 113 mg/dL (19 Dec 2018 17:42)  POCT Blood Glucose.: 153 mg/dL (19 Dec 2018 13:05)  POCT Blood Glucose.: 204 mg/dL (19 Dec 2018 12:35)  POCT Blood Glucose.: 121 mg/dL (19 Dec 2018 08:29)                RADIOLOGY & ADDITIONAL TESTS:    Imaging Personally Reviewed:    Consultant(s) Notes Reviewed:      Care Discussed with Consultants/Other Providers:

## 2018-12-19 NOTE — PROGRESS NOTE ADULT - PROBLEM SELECTOR PLAN 1
esrd now with dialysis dependence  s/p 1st HD session 12/8/18 via right IJ charles, s/p RIJ tunneled cath  Plan for repeat HD today.  f/u vascular for immature avf, outpt  Pt accepted to [facility] on MWF schedule.   keep HD schedule MWF

## 2018-12-20 VITALS
RESPIRATION RATE: 118 BRPM | TEMPERATURE: 98 F | DIASTOLIC BLOOD PRESSURE: 52 MMHG | SYSTOLIC BLOOD PRESSURE: 151 MMHG | HEART RATE: 61 BPM | OXYGEN SATURATION: 96 %

## 2018-12-20 LAB
BUN SERPL-MCNC: 20 MG/DL — SIGNIFICANT CHANGE UP (ref 7–23)
CALCIUM SERPL-MCNC: 8.9 MG/DL — SIGNIFICANT CHANGE UP (ref 8.4–10.5)
CHLORIDE SERPL-SCNC: 99 MMOL/L — SIGNIFICANT CHANGE UP (ref 98–107)
CO2 SERPL-SCNC: 27 MMOL/L — SIGNIFICANT CHANGE UP (ref 22–31)
CREAT SERPL-MCNC: 4.06 MG/DL — HIGH (ref 0.5–1.3)
GLUCOSE BLDC GLUCOMTR-MCNC: 115 MG/DL — HIGH (ref 70–99)
GLUCOSE BLDC GLUCOMTR-MCNC: 141 MG/DL — HIGH (ref 70–99)
GLUCOSE BLDC GLUCOMTR-MCNC: 286 MG/DL — HIGH (ref 70–99)
GLUCOSE SERPL-MCNC: 143 MG/DL — HIGH (ref 70–99)
HCT VFR BLD CALC: 35.7 % — SIGNIFICANT CHANGE UP (ref 34.5–45)
HGB BLD-MCNC: 10 G/DL — LOW (ref 11.5–15.5)
MCHC RBC-ENTMCNC: 22.7 PG — LOW (ref 27–34)
MCHC RBC-ENTMCNC: 28 % — LOW (ref 32–36)
MCV RBC AUTO: 81.1 FL — SIGNIFICANT CHANGE UP (ref 80–100)
NRBC # FLD: 0.09 — SIGNIFICANT CHANGE UP
NRBC FLD-RTO: 1.4 — SIGNIFICANT CHANGE UP
PLATELET # BLD AUTO: 142 K/UL — LOW (ref 150–400)
PMV BLD: SIGNIFICANT CHANGE UP FL (ref 7–13)
POTASSIUM SERPL-MCNC: 4 MMOL/L — SIGNIFICANT CHANGE UP (ref 3.5–5.3)
POTASSIUM SERPL-SCNC: 4 MMOL/L — SIGNIFICANT CHANGE UP (ref 3.5–5.3)
RBC # BLD: 4.4 M/UL — SIGNIFICANT CHANGE UP (ref 3.8–5.2)
RBC # FLD: 18.2 % — HIGH (ref 10.3–14.5)
SODIUM SERPL-SCNC: 138 MMOL/L — SIGNIFICANT CHANGE UP (ref 135–145)
WBC # BLD: 6.35 K/UL — SIGNIFICANT CHANGE UP (ref 3.8–10.5)
WBC # FLD AUTO: 6.35 K/UL — SIGNIFICANT CHANGE UP (ref 3.8–10.5)

## 2018-12-20 RX ORDER — PANTOPRAZOLE SODIUM 20 MG/1
1 TABLET, DELAYED RELEASE ORAL
Qty: 30 | Refills: 0
Start: 2018-12-20 | End: 2019-01-18

## 2018-12-20 RX ORDER — DOCUSATE SODIUM 100 MG
1 CAPSULE ORAL
Qty: 0 | Refills: 0 | COMMUNITY
Start: 2018-12-20

## 2018-12-20 RX ORDER — FENOFIBRATE,MICRONIZED 130 MG
1 CAPSULE ORAL
Qty: 30 | Refills: 0 | OUTPATIENT
Start: 2018-12-20 | End: 2019-01-18

## 2018-12-20 RX ORDER — PANTOPRAZOLE SODIUM 20 MG/1
1 TABLET, DELAYED RELEASE ORAL
Qty: 30 | Refills: 0 | OUTPATIENT
Start: 2018-12-20 | End: 2019-01-18

## 2018-12-20 RX ORDER — MECLIZINE HCL 12.5 MG
1 TABLET ORAL
Qty: 5 | Refills: 0
Start: 2018-12-20 | End: 2018-12-24

## 2018-12-20 RX ORDER — METOPROLOL TARTRATE 50 MG
1 TABLET ORAL
Qty: 60 | Refills: 0 | OUTPATIENT
Start: 2018-12-20 | End: 2019-01-18

## 2018-12-20 RX ORDER — PANTOPRAZOLE SODIUM 20 MG/1
1 TABLET, DELAYED RELEASE ORAL
Qty: 0 | Refills: 0 | COMMUNITY
Start: 2018-12-20

## 2018-12-20 RX ORDER — HYDRALAZINE HCL 50 MG
1 TABLET ORAL
Qty: 0 | Refills: 0 | COMMUNITY

## 2018-12-20 RX ORDER — HYDRALAZINE HCL 50 MG
1 TABLET ORAL
Qty: 60 | Refills: 0
Start: 2018-12-20 | End: 2019-01-18

## 2018-12-20 RX ORDER — SERTRALINE 25 MG/1
1 TABLET, FILM COATED ORAL
Qty: 0 | Refills: 0 | COMMUNITY

## 2018-12-20 RX ORDER — AMLODIPINE BESYLATE 2.5 MG/1
1 TABLET ORAL
Qty: 0 | Refills: 0 | COMMUNITY

## 2018-12-20 RX ORDER — SERTRALINE 25 MG/1
1 TABLET, FILM COATED ORAL
Qty: 30 | Refills: 0
Start: 2018-12-20 | End: 2019-01-18

## 2018-12-20 RX ORDER — HYDRALAZINE HCL 50 MG
1 TABLET ORAL
Qty: 60 | Refills: 0 | OUTPATIENT
Start: 2018-12-20 | End: 2019-01-18

## 2018-12-20 RX ORDER — ACETAMINOPHEN 500 MG
2 TABLET ORAL
Qty: 112 | Refills: 0
Start: 2018-12-20 | End: 2019-01-02

## 2018-12-20 RX ORDER — OMEPRAZOLE 10 MG/1
1 CAPSULE, DELAYED RELEASE ORAL
Qty: 0 | Refills: 0 | COMMUNITY

## 2018-12-20 RX ORDER — SENNA PLUS 8.6 MG/1
2 TABLET ORAL
Qty: 60 | Refills: 0
Start: 2018-12-20 | End: 2019-01-18

## 2018-12-20 RX ORDER — FENOFIBRATE,MICRONIZED 130 MG
1 CAPSULE ORAL
Qty: 30 | Refills: 0
Start: 2018-12-20 | End: 2019-01-18

## 2018-12-20 RX ORDER — NYSTATIN 500MM UNIT
5 POWDER (EA) MISCELLANEOUS
Qty: 600 | Refills: 0 | OUTPATIENT
Start: 2018-12-20 | End: 2019-01-18

## 2018-12-20 RX ORDER — ATORVASTATIN CALCIUM 80 MG/1
1 TABLET, FILM COATED ORAL
Qty: 0 | Refills: 0 | COMMUNITY
Start: 2018-12-20

## 2018-12-20 RX ORDER — SENNA PLUS 8.6 MG/1
2 TABLET ORAL
Qty: 60 | Refills: 0 | OUTPATIENT
Start: 2018-12-20 | End: 2019-01-18

## 2018-12-20 RX ORDER — DOCUSATE SODIUM 100 MG
1 CAPSULE ORAL
Qty: 60 | Refills: 0 | OUTPATIENT
Start: 2018-12-20 | End: 2019-01-18

## 2018-12-20 RX ORDER — HYDRALAZINE HCL 50 MG
1 TABLET ORAL
Qty: 0 | Refills: 0 | COMMUNITY
Start: 2018-12-20

## 2018-12-20 RX ORDER — FENOFIBRATE,MICRONIZED 130 MG
1 CAPSULE ORAL
Qty: 0 | Refills: 0 | COMMUNITY

## 2018-12-20 RX ORDER — ATORVASTATIN CALCIUM 80 MG/1
1 TABLET, FILM COATED ORAL
Qty: 30 | Refills: 0
Start: 2018-12-20 | End: 2019-01-18

## 2018-12-20 RX ORDER — METOPROLOL TARTRATE 50 MG
1 TABLET ORAL
Qty: 60 | Refills: 0
Start: 2018-12-20 | End: 2019-01-18

## 2018-12-20 RX ORDER — ROSUVASTATIN CALCIUM 5 MG/1
1 TABLET ORAL
Qty: 0 | Refills: 0 | COMMUNITY

## 2018-12-20 RX ORDER — SERTRALINE 25 MG/1
1 TABLET, FILM COATED ORAL
Qty: 30 | Refills: 0 | OUTPATIENT
Start: 2018-12-20 | End: 2019-01-18

## 2018-12-20 RX ORDER — NYSTATIN 500MM UNIT
5 POWDER (EA) MISCELLANEOUS
Qty: 600 | Refills: 0
Start: 2018-12-20 | End: 2019-01-18

## 2018-12-20 RX ORDER — SERTRALINE 25 MG/1
1 TABLET, FILM COATED ORAL
Qty: 0 | Refills: 0 | COMMUNITY
Start: 2018-12-20

## 2018-12-20 RX ORDER — METOPROLOL TARTRATE 50 MG
1 TABLET ORAL
Qty: 0 | Refills: 0 | COMMUNITY
Start: 2018-12-20

## 2018-12-20 RX ORDER — NYSTATIN 500MM UNIT
5 POWDER (EA) MISCELLANEOUS
Qty: 0 | Refills: 0 | COMMUNITY
Start: 2018-12-20

## 2018-12-20 RX ORDER — SENNA PLUS 8.6 MG/1
2 TABLET ORAL
Qty: 0 | Refills: 0 | COMMUNITY
Start: 2018-12-20

## 2018-12-20 RX ORDER — DOCUSATE SODIUM 100 MG
1 CAPSULE ORAL
Qty: 60 | Refills: 0
Start: 2018-12-20 | End: 2019-01-18

## 2018-12-20 RX ORDER — ATORVASTATIN CALCIUM 80 MG/1
1 TABLET, FILM COATED ORAL
Qty: 30 | Refills: 0 | OUTPATIENT
Start: 2018-12-20 | End: 2019-01-18

## 2018-12-20 RX ORDER — FENOFIBRATE,MICRONIZED 130 MG
1 CAPSULE ORAL
Qty: 0 | Refills: 0 | COMMUNITY
Start: 2018-12-20

## 2018-12-20 RX ORDER — FUROSEMIDE 40 MG
1 TABLET ORAL
Qty: 0 | Refills: 0 | COMMUNITY

## 2018-12-20 RX ORDER — ACETAMINOPHEN 500 MG
2 TABLET ORAL
Qty: 0 | Refills: 0 | COMMUNITY
Start: 2018-12-20

## 2018-12-20 RX ADMIN — Medication 145 MILLIGRAM(S): at 12:35

## 2018-12-20 RX ADMIN — Medication 100 MILLIGRAM(S): at 17:07

## 2018-12-20 RX ADMIN — HEPARIN SODIUM 5000 UNIT(S): 5000 INJECTION INTRAVENOUS; SUBCUTANEOUS at 06:28

## 2018-12-20 RX ADMIN — Medication 3: at 13:10

## 2018-12-20 RX ADMIN — HEPARIN SODIUM 5000 UNIT(S): 5000 INJECTION INTRAVENOUS; SUBCUTANEOUS at 13:10

## 2018-12-20 RX ADMIN — Medication 100 MILLIGRAM(S): at 06:26

## 2018-12-20 RX ADMIN — PANTOPRAZOLE SODIUM 40 MILLIGRAM(S): 20 TABLET, DELAYED RELEASE ORAL at 06:28

## 2018-12-20 RX ADMIN — Medication 25 MILLIGRAM(S): at 06:27

## 2018-12-20 RX ADMIN — SERTRALINE 50 MILLIGRAM(S): 25 TABLET, FILM COATED ORAL at 12:35

## 2018-12-20 RX ADMIN — Medication 500000 UNIT(S): at 12:35

## 2018-12-20 RX ADMIN — Medication 500000 UNIT(S): at 06:27

## 2018-12-20 RX ADMIN — Medication 25 MILLIGRAM(S): at 17:07

## 2018-12-20 RX ADMIN — Medication 100 MILLIGRAM(S): at 06:27

## 2018-12-20 NOTE — DISCHARGE NOTE ADULT - CARE PROVIDERS DIRECT ADDRESSES
,[email or web address],[email or web address],DirectAddress_Unknown

## 2018-12-20 NOTE — PROGRESS NOTE ADULT - PROBLEM SELECTOR PLAN 1
esrd now with dialysis dependence  s/p 1st HD session 12/8/18 via right IJ shiley, s/p RIJ tunneled cath  HD yesterday uneventful. Flowsheet reveiwed.   Plan for next HD tomorrow.   f/u vascular for immature avf, outpt  Pt accepted to [facility] on MWF schedule.   keep HD schedule MWF

## 2018-12-20 NOTE — PROGRESS NOTE ADULT - REASON FOR ADMISSION
Orthopnea x 2 days

## 2018-12-20 NOTE — DISCHARGE NOTE ADULT - MEDICATION SUMMARY - MEDICATIONS TO TAKE
I will START or STAY ON the medications listed below when I get home from the hospital:    acetaminophen 325 mg oral tablet  -- 2 tab(s) by mouth every 6 hours, As needed, Mild Pain (1 - 3)  -- Indication: For Pain    sertraline 50 mg oral tablet  -- 1 tab(s) by mouth once a day  -- Indication: For Depression    meclizine 25 mg oral tablet  -- 1 tab(s) by mouth once a day, As Needed - for dizziness  -- May cause drowsiness.  Alcohol may intensify this effect.  Use care when operating dangerous machinery.    -- Indication: For Dizziness    nystatin 100,000 units/mL oral suspension  -- 5 milliliter(s) by mouth 4 times a day  -- Indication: For Antifungal    atorvastatin 80 mg oral tablet  -- 1 tab(s) by mouth once a day (at bedtime)  -- Indication: For Hypercholesteremia    fenofibrate 145 mg oral tablet  -- 1 tab(s) by mouth once a day  -- Indication: For Hypercholesteremia    metoprolol tartrate 100 mg oral tablet  -- 1 tab(s) by mouth 2 times a day  -- Indication: For HTN (hypertension)    senna oral tablet  -- 2 tab(s) by mouth once a day (at bedtime)  -- Indication: For Constipation    docusate sodium 100 mg oral capsule  -- 1 cap(s) by mouth 2 times a day  -- Indication: For Constipation    pantoprazole 40 mg oral delayed release tablet  -- 1 tab(s) by mouth once a day (before a meal)  -- Indication: For GI prophylaxis    hydrALAZINE 25 mg oral tablet  -- 1 tab(s) by mouth 2 times a day  -- Indication: For HTN (hypertension) I will START or STAY ON the medications listed below when I get home from the hospital:    acetaminophen 325 mg oral tablet  -- 2 tab(s) by mouth every 6 hours, As needed, Mild Pain (1 - 3)  -- Indication: For Pain    sertraline 50 mg oral tablet  -- 1 tab(s) by mouth once a day  -- Indication: For Depression    meclizine 25 mg oral tablet  -- 1 tab(s) by mouth once a day, As Needed - for dizziness  -- May cause drowsiness.  Alcohol may intensify this effect.  Use care when operating dangerous machinery.    -- Indication: For Dizziness    nystatin 100,000 units/mL oral suspension  -- 5 milliliter(s) by mouth 4 times a day  -- Indication: For fungal infection    fenofibrate 145 mg oral tablet  -- 1 tab(s) by mouth once a day  -- Indication: For Supplement    atorvastatin 80 mg oral tablet  -- 1 tab(s) by mouth once a day (at bedtime)  -- Indication: For Cholesterol    metoprolol tartrate 100 mg oral tablet  -- 1 tab(s) by mouth 2 times a day  -- Indication: For Heart rate    docusate sodium 100 mg oral capsule  -- 1 cap(s) by mouth 2 times a day  -- Indication: For Constipation    senna oral tablet  -- 2 tab(s) by mouth once a day (at bedtime)  -- Indication: For Constipation    pantoprazole 40 mg oral delayed release tablet  -- 1 tab(s) by mouth once a day (before a meal)  -- Indication: For Stomach protection    hydrALAZINE 25 mg oral tablet  -- 1 tab(s) by mouth 2 times a day  -- Indication: For blood pressure

## 2018-12-20 NOTE — PROGRESS NOTE ADULT - PROBLEM SELECTOR PLAN 2
Anemia noted, likely related to CKD,   On epo 10K subq weekly.
Anemia noted, likely related to CKD,   Started on epo 10K subq weekly.
low hgb  c/w dilan 20k with hd tiw as ordered  trend hgb
low hgb  dilan with hd  trend hgb
low hgb  c/w dilan 20k with hd tiw as ordered  trend hgb

## 2018-12-20 NOTE — DISCHARGE NOTE ADULT - COMMUNITY RESOURCES
Accepted at [facility] [id number] [facility] [id number] .Days:Mon/Wed/Fri;Time:6:00pm;Start date:  12/21 /2018 Thursday at 4:00PM.

## 2018-12-20 NOTE — PROGRESS NOTE ADULT - SUBJECTIVE AND OBJECTIVE BOX
pt pending rehab placement today, no acute complaints  On (12/7/18), patient underwent Pericardial window operation, drain remained for increased output  . Drain removed 12/19/18.  no further thoracic surgery intervention, no objection to discharge to rehab    Zahida PETTY [id number]

## 2018-12-20 NOTE — DISCHARGE NOTE ADULT - CARE PROVIDER_API CALL
Zach Rojas), Internal Medicine  [address] [address] [address]  Phone: [phone]  Fax: [phone]    Julius Bullard), Surgery; Thoracic Surgery  [address] [address] [place] [address]  [place] [address]  Phone: [phone]  Fax: [phone]    Selma Vo), Cardiac Electrophysiology; Cardiovascular Disease; Nuclear Cardiology  [address] [facility]  [address]  [place] [address]  Phone: [phone]  Fax: [phone]    Ascencion Hicks), Internal Medicine; Nephrology  [address] [place] [address]  Phone: [phone]  Fax: [phone]

## 2018-12-20 NOTE — PROGRESS NOTE ADULT - PROBLEM SELECTOR PROBLEM 1
Acute kidney injury superimposed on CKD

## 2018-12-20 NOTE — DISCHARGE NOTE ADULT - MEDICATION SUMMARY - MEDICATIONS TO STOP TAKING
I will STOP taking the medications listed below when I get home from the hospital:    Crestor 40 mg oral tablet  -- 1 tab(s) by mouth once a day (at bedtime)    Aspirin Enteric Coated 81 mg oral delayed release tablet  -- 1 tab(s) by mouth once a day    amLODIPine 10 mg oral tablet  -- 1 tab(s) by mouth once a day    hydrALAZINE 50 mg oral tablet  -- 1 tab(s) by mouth 3 times a day    rosuvastatin 20 mg oral tablet  -- 1 tab(s) by mouth once a day (at bedtime)    omeprazole 40 mg oral delayed release capsule  -- 1 tab(s) by mouth once a day    furosemide 40 mg oral tablet  -- 1 tab(s) by mouth once a day

## 2018-12-20 NOTE — DISCHARGE NOTE ADULT - HOSPITAL COURSE
77 y/o  Female, with a PMHx of CKD (fistula June 2018, s/p 2 balloons, last one 2 weeks ago, no dialysis yet), DM, HTN, HLD, anemia presents to the [facility] ED constant SOB, dry cough and clear rhinorrhea x 2 days associated with one episode of vomiting, darken stools and increased urinary frequency     + orthopnea -  s/p IV lasix now on hold due to ANNIE  + acidosis/ANNIE - NA Bicarb, AVF immature, Shiley placed 12/7 and converted to permacath, started on HD 12/8   + anemia- EPO 10 k sq weekly, s/p 1 U RBC  + large pericardial/early tamponade found on TTE --> s/p Pericardial [id number]/7 Yeast/Fungus: Neg, Acid Fast x Neg, cytopathology: neg for malignant cells, pericardium bx  - benign with focal mesothelial hyperplasia  + Afib with RVR-  on Metoprolol, EP signed off     12/7 Pericardial Window  12/12 s/p permacath placement by IR  EKG- NSR @ 84 TWI I, L, v5-6, Flat T waves II, avF  CXR- Cardiomegaly with interstitial edema.   H/H- 6.8/ 23.2  Occult negative  Trop- 63 -> 58,  BNP- 12,928  12/6 VA left AVF fistula: Patent leftupper extremity radiocephalic arteriovenous  fistula. Patent inflow and outflow vessels. However, the maximal volume flow velocity obtained within the fistula is 220 ml/min within the mid segment, suggesting that this is an immature AVF.  12/7CT chest: Large pericardial effusion, increased since 12/3/2015. Moderate right and small left layering pleural effusions. Mild pulmonary edema. Mid to upper lungs peribronchovascular fibrosis and tractional bronchiectasis associated with mildly enlarged calcified mediastinal and pericardial lymph nodes suggestive of sarcoidosis.  12/7 CXR:   1. Status post pericardial window with drain in place.  2. Post hemodialysis catheter placement.  3. Clear lungs.  12/16 CXR: No change from the prior study.    12/18 MRSA neg

## 2018-12-20 NOTE — DISCHARGE NOTE ADULT - SECONDARY DIAGNOSIS.
Acute on chronic renal failure Pericardial effusion Symptomatic anemia Pulmonary edema HTN (hypertension) DM (diabetes mellitus)

## 2018-12-20 NOTE — PROGRESS NOTE ADULT - SUBJECTIVE AND OBJECTIVE BOX
Nephrology Followup Note - [phone] - Dr Hicks / Dr Hughes / Dr Storey / Dr Avina / Dr Patel / Dr Brannon / Dr Madrigla / Dr Kolb  Pt seen and examined at bedside  No new complaints, Anxious to go home. Denies SOB.     Allergies:  No Known Allergies    Hospital Medications:   MEDICATIONS  (STANDING):  atorvastatin 80 milliGRAM(s) Oral at bedtime  dextrose 5%. 1000 milliLiter(s) (50 mL/Hr) IV Continuous <Continuous>  dextrose 50% Injectable 12.5 Gram(s) IV Push once  dextrose 50% Injectable 25 Gram(s) IV Push once  dextrose 50% Injectable 25 Gram(s) IV Push once  docusate sodium 100 milliGRAM(s) Oral two times a day  epoetin raheem Injectable 22938 Unit(s) IV Push <User Schedule>  fenofibrate Tablet 145 milliGRAM(s) Oral daily  heparin  Injectable 5000 Unit(s) SubCutaneous every 8 hours  hydrALAZINE 25 milliGRAM(s) Oral two times a day  insulin lispro (HumaLOG) corrective regimen sliding scale   SubCutaneous three times a day before meals  metoprolol tartrate 100 milliGRAM(s) Oral two times a day  nystatin    Suspension 357490 Unit(s) Oral four times a day  pantoprazole    Tablet 40 milliGRAM(s) Oral before breakfast  senna 2 Tablet(s) Oral at bedtime  sertraline 50 milliGRAM(s) Oral daily      VITALS:  T(F): 98.4 (12-20-18 @ 11:54), Max: 98.9 (12-19-18 @ 20:25)  HR: 59 (12-20-18 @ 11:54)  BP: 103/40 (12-20-18 @ 11:54)  RR: 18 (12-20-18 @ 11:54)  SpO2: 100% (12-20-18 @ 11:54)  Wt(kg): --    12-19 @ 07:01  -  12-20 @ 07:00  --------------------------------------------------------  IN: 800 mL / OUT: 1550 mL / NET: -750 mL    PHYSICAL EXAM:  Constitutional: NAD  HEENT: anicteric sclera, oropharynx clear, MMM  Neck: No JVD  Respiratory: CTAB, no wheezes, rales or rhonchi  Cardiovascular: S1, S2, RRR  Gastrointestinal: BS+, soft, NT/ND  Extremities: No cyanosis or clubbing. No peripheral edema  Neurological: A/O x 3, no focal deficits  Psychiatric: Normal mood, normal affect  : No CVA tenderness. No calvin.   Skin: No rashes  Vascular Access: RIJ Tunneled cath, LUE AVF +thrill     LABS:  12-20    138  |  99  |  20  ----------------------------<  143<H>  4.0   |  27  |  4.06<H>    Ca    8.9      20 Dec 2018 09:30  Mg     2.0     12-19      Creatinine Trend: 4.06 <--, 4.68 <--, 3.63 <--, 4.80 <--, 3.53 <--, 4.52 <--, 3.04 <--                        10.0   6.35  )-----------( 142      ( 20 Dec 2018 09:30 )             35.7     Urine Studies:      RADIOLOGY & ADDITIONAL STUDIES:

## 2018-12-20 NOTE — DISCHARGE NOTE ADULT - CARE PLAN
Principal Discharge DX:	Orthopnea  Goal:	Resolution of symptoms  Assessment and plan of treatment:	Follow with PMD within 1 week. Call for appointment  take medications as prescribed  Secondary Diagnosis:	Acute on chronic renal failure  Assessment and plan of treatment:	Follow with PMD  Secondary Diagnosis:	Pericardial effusion  Assessment and plan of treatment:	Follow with PMD  Secondary Diagnosis:	Symptomatic anemia  Assessment and plan of treatment:	Follow with PMD  Secondary Diagnosis:	Pulmonary edema  Assessment and plan of treatment:	Follow with PMD  Secondary Diagnosis:	HTN (hypertension)  Assessment and plan of treatment:	Follow with PMD  Secondary Diagnosis:	DM (diabetes mellitus)  Assessment and plan of treatment:	Follow with PMD

## 2018-12-20 NOTE — DISCHARGE NOTE ADULT - ADDITIONAL INSTRUCTIONS
Follow up with PMD Dr Rojas within 1 week. Call for appointment  Take medications as prescribed  Follow up with following  physicians for appointments - Dr Bullard, Dr Vo, Dr Hicks. Call for appointments Follow up with PMD Dr Rojas within 1 week. Call for appointment  Take medications as prescribed  Follow up with following  physicians for appointments - Dr Bullard, Dr Vo, Dr Hicks. Call for appointments  Discuss with Dr Vo at MD visit  the need for watchman  or anticoagulation therapy

## 2018-12-20 NOTE — PROGRESS NOTE ADULT - PROVIDER SPECIALTY LIST ADULT
Anesthesia
Anesthesia
CT Surgery
Cardiology
Electrophysiology
Internal Medicine
Nephrology
Pain Medicine
Thoracic Surgery
Vascular Surgery
Internal Medicine
Internal Medicine

## 2018-12-20 NOTE — DISCHARGE NOTE ADULT - PLAN OF CARE
Resolution of symptoms Follow with PMD within 1 week. Call for appointment  take medications as prescribed Follow with PMD

## 2018-12-20 NOTE — DISCHARGE NOTE ADULT - PATIENT PORTAL LINK FT
You can access the [facility] Patient Portal, offered by [facility], by registering with the following website: [email or web address]

## 2019-01-04 LAB
FUNGUS SPEC QL CULT: SIGNIFICANT CHANGE UP
FUNGUS SPEC QL CULT: SIGNIFICANT CHANGE UP

## 2019-01-18 LAB
ACID FAST STN SPEC: SIGNIFICANT CHANGE UP
ACID FAST STN SPEC: SIGNIFICANT CHANGE UP

## 2019-05-16 NOTE — ED ADULT NURSE NOTE - PAIN RATING/NUMBER SCALE (0-10): REST
Returned call to mom, explained why Dr. Bermudez wants Blanco to follow up in the office with a repeat echo.  Appointments scheduled for 5/23/19, instructed to arrive at 10:15am for check in and registration, with an Echo at 10:30am, and the appointment with Dr. Laws to follow at 11:15am.  Mom verbalizes thanks and understanding, will bring his tablet for distraction if needed.   0

## 2019-07-19 ENCOUNTER — INPATIENT (INPATIENT)
Facility: HOSPITAL | Age: 79
LOS: 5 days | Discharge: HOME | End: 2019-07-25
Attending: INTERNAL MEDICINE | Admitting: INTERNAL MEDICINE
Payer: MEDICARE

## 2019-07-19 VITALS
RESPIRATION RATE: 18 BRPM | DIASTOLIC BLOOD PRESSURE: 69 MMHG | HEART RATE: 59 BPM | OXYGEN SATURATION: 100 % | TEMPERATURE: 98 F | SYSTOLIC BLOOD PRESSURE: 185 MMHG

## 2019-07-19 DIAGNOSIS — I77.0 ARTERIOVENOUS FISTULA, ACQUIRED: Chronic | ICD-10-CM

## 2019-07-19 DIAGNOSIS — E11.8 TYPE 2 DIABETES MELLITUS WITH UNSPECIFIED COMPLICATIONS: ICD-10-CM

## 2019-07-19 DIAGNOSIS — E87.5 HYPERKALEMIA: ICD-10-CM

## 2019-07-19 DIAGNOSIS — R42 DIZZINESS AND GIDDINESS: ICD-10-CM

## 2019-07-19 DIAGNOSIS — E78.00 PURE HYPERCHOLESTEROLEMIA, UNSPECIFIED: ICD-10-CM

## 2019-07-19 DIAGNOSIS — Z98.890 OTHER SPECIFIED POSTPROCEDURAL STATES: Chronic | ICD-10-CM

## 2019-07-19 DIAGNOSIS — N18.6 END STAGE RENAL DISEASE: ICD-10-CM

## 2019-07-19 DIAGNOSIS — I10 ESSENTIAL (PRIMARY) HYPERTENSION: ICD-10-CM

## 2019-07-19 DIAGNOSIS — Z29.9 ENCOUNTER FOR PROPHYLACTIC MEASURES, UNSPECIFIED: ICD-10-CM

## 2019-07-19 PROBLEM — D64.9 ANEMIA, UNSPECIFIED: Chronic | Status: ACTIVE | Noted: 2018-12-05

## 2019-07-19 LAB
ALBUMIN SERPL ELPH-MCNC: 4 G/DL — SIGNIFICANT CHANGE UP (ref 3.3–5)
ALP SERPL-CCNC: 76 U/L — SIGNIFICANT CHANGE UP (ref 40–120)
ALT FLD-CCNC: 9 U/L — SIGNIFICANT CHANGE UP (ref 4–33)
ANION GAP SERPL CALC-SCNC: 11 MMO/L — SIGNIFICANT CHANGE UP (ref 7–14)
ANION GAP SERPL CALC-SCNC: 11 MMO/L — SIGNIFICANT CHANGE UP (ref 7–14)
ANION GAP SERPL CALC-SCNC: 13 MMO/L — SIGNIFICANT CHANGE UP (ref 7–14)
AST SERPL-CCNC: 45 U/L — HIGH (ref 4–32)
BASE EXCESS BLDV CALC-SCNC: 5.4 MMOL/L — SIGNIFICANT CHANGE UP
BASOPHILS # BLD AUTO: 0.04 K/UL — SIGNIFICANT CHANGE UP (ref 0–0.2)
BASOPHILS NFR BLD AUTO: 0.9 % — SIGNIFICANT CHANGE UP (ref 0–2)
BILIRUB SERPL-MCNC: 0.4 MG/DL — SIGNIFICANT CHANGE UP (ref 0.2–1.2)
BLOOD GAS VENOUS - CREATININE: 6.36 MG/DL — HIGH (ref 0.5–1.3)
BUN SERPL-MCNC: 29 MG/DL — HIGH (ref 7–23)
BUN SERPL-MCNC: 30 MG/DL — HIGH (ref 7–23)
BUN SERPL-MCNC: 34 MG/DL — HIGH (ref 7–23)
CALCIUM SERPL-MCNC: 8.8 MG/DL — SIGNIFICANT CHANGE UP (ref 8.4–10.5)
CALCIUM SERPL-MCNC: 8.9 MG/DL — SIGNIFICANT CHANGE UP (ref 8.4–10.5)
CALCIUM SERPL-MCNC: 9.2 MG/DL — SIGNIFICANT CHANGE UP (ref 8.4–10.5)
CHLORIDE BLDV-SCNC: 101 MMOL/L — SIGNIFICANT CHANGE UP (ref 96–108)
CHLORIDE SERPL-SCNC: 96 MMOL/L — LOW (ref 98–107)
CHLORIDE SERPL-SCNC: 97 MMOL/L — LOW (ref 98–107)
CHLORIDE SERPL-SCNC: 97 MMOL/L — LOW (ref 98–107)
CO2 SERPL-SCNC: 27 MMOL/L — SIGNIFICANT CHANGE UP (ref 22–31)
CREAT SERPL-MCNC: 5.73 MG/DL — HIGH (ref 0.5–1.3)
CREAT SERPL-MCNC: 5.75 MG/DL — HIGH (ref 0.5–1.3)
CREAT SERPL-MCNC: 5.88 MG/DL — HIGH (ref 0.5–1.3)
EOSINOPHIL # BLD AUTO: 0.24 K/UL — SIGNIFICANT CHANGE UP (ref 0–0.5)
EOSINOPHIL NFR BLD AUTO: 5.3 % — SIGNIFICANT CHANGE UP (ref 0–6)
GAS PNL BLDV: 134 MMOL/L — LOW (ref 136–146)
GLUCOSE BLDC GLUCOMTR-MCNC: 116 MG/DL — HIGH (ref 70–99)
GLUCOSE BLDV-MCNC: 105 MG/DL — HIGH (ref 70–99)
GLUCOSE SERPL-MCNC: 101 MG/DL — HIGH (ref 70–99)
GLUCOSE SERPL-MCNC: 107 MG/DL — HIGH (ref 70–99)
GLUCOSE SERPL-MCNC: 177 MG/DL — HIGH (ref 70–99)
HBV SURFACE AG SER-ACNC: HIGH
HCO3 BLDV-SCNC: 27 MMOL/L — SIGNIFICANT CHANGE UP (ref 20–27)
HCT VFR BLD CALC: 44.7 % — SIGNIFICANT CHANGE UP (ref 34.5–45)
HCT VFR BLDV CALC: 42.3 % — SIGNIFICANT CHANGE UP (ref 34.5–45)
HGB BLD-MCNC: 13.2 G/DL — SIGNIFICANT CHANGE UP (ref 11.5–15.5)
HGB BLDV-MCNC: 13.8 G/DL — SIGNIFICANT CHANGE UP (ref 11.5–15.5)
IMM GRANULOCYTES NFR BLD AUTO: 0.7 % — SIGNIFICANT CHANGE UP (ref 0–1.5)
LACTATE BLDV-MCNC: 1.4 MMOL/L — SIGNIFICANT CHANGE UP (ref 0.5–2)
LYMPHOCYTES # BLD AUTO: 0.89 K/UL — LOW (ref 1–3.3)
LYMPHOCYTES # BLD AUTO: 19.7 % — SIGNIFICANT CHANGE UP (ref 13–44)
MAGNESIUM SERPL-MCNC: 2.4 MG/DL — SIGNIFICANT CHANGE UP (ref 1.6–2.6)
MCHC RBC-ENTMCNC: 23.7 PG — LOW (ref 27–34)
MCHC RBC-ENTMCNC: 29.5 % — LOW (ref 32–36)
MCV RBC AUTO: 80.3 FL — SIGNIFICANT CHANGE UP (ref 80–100)
MONOCYTES # BLD AUTO: 0.4 K/UL — SIGNIFICANT CHANGE UP (ref 0–0.9)
MONOCYTES NFR BLD AUTO: 8.9 % — SIGNIFICANT CHANGE UP (ref 2–14)
NEUTROPHILS # BLD AUTO: 2.91 K/UL — SIGNIFICANT CHANGE UP (ref 1.8–7.4)
NEUTROPHILS NFR BLD AUTO: 64.5 % — SIGNIFICANT CHANGE UP (ref 43–77)
NRBC # FLD: 0 K/UL — SIGNIFICANT CHANGE UP (ref 0–0)
PCO2 BLDV: 57 MMHG — HIGH (ref 41–51)
PH BLDV: 7.35 PH — SIGNIFICANT CHANGE UP (ref 7.32–7.43)
PHOSPHATE SERPL-MCNC: 5.3 MG/DL — HIGH (ref 2.5–4.5)
PLATELET # BLD AUTO: 151 K/UL — SIGNIFICANT CHANGE UP (ref 150–400)
PMV BLD: SIGNIFICANT CHANGE UP FL (ref 7–13)
PO2 BLDV: 25 MMHG — LOW (ref 35–40)
POTASSIUM BLDV-SCNC: SIGNIFICANT CHANGE UP MMOL/L (ref 3.4–4.5)
POTASSIUM SERPL-MCNC: 4.8 MMOL/L — SIGNIFICANT CHANGE UP (ref 3.5–5.3)
POTASSIUM SERPL-MCNC: 7 MMOL/L — CRITICAL HIGH (ref 3.5–5.3)
POTASSIUM SERPL-MCNC: SIGNIFICANT CHANGE UP MMOL/L (ref 3.5–5.3)
POTASSIUM SERPL-SCNC: 4.8 MMOL/L — SIGNIFICANT CHANGE UP (ref 3.5–5.3)
POTASSIUM SERPL-SCNC: 7 MMOL/L — CRITICAL HIGH (ref 3.5–5.3)
POTASSIUM SERPL-SCNC: SIGNIFICANT CHANGE UP MMOL/L (ref 3.5–5.3)
PROT SERPL-MCNC: 8.3 G/DL — SIGNIFICANT CHANGE UP (ref 6–8.3)
RBC # BLD: 5.57 M/UL — HIGH (ref 3.8–5.2)
RBC # FLD: 15.6 % — HIGH (ref 10.3–14.5)
SAO2 % BLDV: 35.7 % — LOW (ref 60–85)
SODIUM SERPL-SCNC: 134 MMOL/L — LOW (ref 135–145)
SODIUM SERPL-SCNC: 135 MMOL/L — SIGNIFICANT CHANGE UP (ref 135–145)
SODIUM SERPL-SCNC: 137 MMOL/L — SIGNIFICANT CHANGE UP (ref 135–145)
WBC # BLD: 4.51 K/UL — SIGNIFICANT CHANGE UP (ref 3.8–10.5)
WBC # FLD AUTO: 4.51 K/UL — SIGNIFICANT CHANGE UP (ref 3.8–10.5)

## 2019-07-19 PROCEDURE — 99223 1ST HOSP IP/OBS HIGH 75: CPT

## 2019-07-19 RX ORDER — ATORVASTATIN CALCIUM 80 MG/1
80 TABLET, FILM COATED ORAL AT BEDTIME
Refills: 0 | Status: DISCONTINUED | OUTPATIENT
Start: 2019-07-19 | End: 2019-07-25

## 2019-07-19 RX ORDER — DEXTROSE 50 % IN WATER 50 %
25 SYRINGE (ML) INTRAVENOUS ONCE
Refills: 0 | Status: DISCONTINUED | OUTPATIENT
Start: 2019-07-19 | End: 2019-07-25

## 2019-07-19 RX ORDER — FENOFIBRATE,MICRONIZED 130 MG
145 CAPSULE ORAL DAILY
Refills: 0 | Status: DISCONTINUED | OUTPATIENT
Start: 2019-07-19 | End: 2019-07-25

## 2019-07-19 RX ORDER — METOPROLOL TARTRATE 50 MG
100 TABLET ORAL
Refills: 0 | Status: DISCONTINUED | OUTPATIENT
Start: 2019-07-19 | End: 2019-07-25

## 2019-07-19 RX ORDER — DEXTROSE 50 % IN WATER 50 %
15 SYRINGE (ML) INTRAVENOUS ONCE
Refills: 0 | Status: DISCONTINUED | OUTPATIENT
Start: 2019-07-19 | End: 2019-07-25

## 2019-07-19 RX ORDER — SERTRALINE 25 MG/1
50 TABLET, FILM COATED ORAL DAILY
Refills: 0 | Status: DISCONTINUED | OUTPATIENT
Start: 2019-07-19 | End: 2019-07-25

## 2019-07-19 RX ORDER — PANTOPRAZOLE SODIUM 20 MG/1
40 TABLET, DELAYED RELEASE ORAL
Refills: 0 | Status: DISCONTINUED | OUTPATIENT
Start: 2019-07-19 | End: 2019-07-25

## 2019-07-19 RX ORDER — DEXTROSE 50 % IN WATER 50 %
12.5 SYRINGE (ML) INTRAVENOUS ONCE
Refills: 0 | Status: DISCONTINUED | OUTPATIENT
Start: 2019-07-19 | End: 2019-07-25

## 2019-07-19 RX ORDER — INSULIN LISPRO 100/ML
VIAL (ML) SUBCUTANEOUS
Refills: 0 | Status: DISCONTINUED | OUTPATIENT
Start: 2019-07-19 | End: 2019-07-25

## 2019-07-19 RX ORDER — MECLIZINE HCL 12.5 MG
25 TABLET ORAL THREE TIMES A DAY
Refills: 0 | Status: DISCONTINUED | OUTPATIENT
Start: 2019-07-19 | End: 2019-07-25

## 2019-07-19 RX ORDER — HEPARIN SODIUM 5000 [USP'U]/ML
5000 INJECTION INTRAVENOUS; SUBCUTANEOUS EVERY 8 HOURS
Refills: 0 | Status: DISCONTINUED | OUTPATIENT
Start: 2019-07-19 | End: 2019-07-22

## 2019-07-19 RX ORDER — HYDRALAZINE HCL 50 MG
25 TABLET ORAL
Refills: 0 | Status: DISCONTINUED | OUTPATIENT
Start: 2019-07-19 | End: 2019-07-25

## 2019-07-19 RX ORDER — SODIUM CHLORIDE 9 MG/ML
1000 INJECTION, SOLUTION INTRAVENOUS
Refills: 0 | Status: DISCONTINUED | OUTPATIENT
Start: 2019-07-19 | End: 2019-07-25

## 2019-07-19 RX ORDER — DEXTROSE 50 % IN WATER 50 %
50 SYRINGE (ML) INTRAVENOUS ONCE
Refills: 0 | Status: COMPLETED | OUTPATIENT
Start: 2019-07-19 | End: 2019-07-19

## 2019-07-19 RX ORDER — INSULIN HUMAN 100 [IU]/ML
10 INJECTION, SOLUTION SUBCUTANEOUS ONCE
Refills: 0 | Status: COMPLETED | OUTPATIENT
Start: 2019-07-19 | End: 2019-07-19

## 2019-07-19 RX ORDER — INSULIN LISPRO 100/ML
VIAL (ML) SUBCUTANEOUS AT BEDTIME
Refills: 0 | Status: DISCONTINUED | OUTPATIENT
Start: 2019-07-19 | End: 2019-07-25

## 2019-07-19 RX ORDER — GLUCAGON INJECTION, SOLUTION 0.5 MG/.1ML
1 INJECTION, SOLUTION SUBCUTANEOUS ONCE
Refills: 0 | Status: DISCONTINUED | OUTPATIENT
Start: 2019-07-19 | End: 2019-07-25

## 2019-07-19 RX ORDER — ALBUTEROL 90 UG/1
10 AEROSOL, METERED ORAL ONCE
Refills: 0 | Status: COMPLETED | OUTPATIENT
Start: 2019-07-19 | End: 2019-07-19

## 2019-07-19 RX ADMIN — INSULIN HUMAN 10 UNIT(S): 100 INJECTION, SOLUTION SUBCUTANEOUS at 17:12

## 2019-07-19 RX ADMIN — ALBUTEROL 10 MILLIGRAM(S): 90 AEROSOL, METERED ORAL at 17:12

## 2019-07-19 RX ADMIN — Medication 50 MILLILITER(S): at 17:12

## 2019-07-19 NOTE — H&P ADULT - ASSESSMENT
This is a 79F with history as above who presents to the hospital with sudden onset of dizziness with associated n/v and hyperkalemia.

## 2019-07-19 NOTE — ED PROVIDER NOTE - CLINICAL SUMMARY MEDICAL DECISION MAKING FREE TEXT BOX
79f w hx ESRD on HD MWF, HTN, HLD, DM here today for n/v and abd pain that resolved prior to arrival. Appears well NAD. Hypertensive but VS otherwise unremarkable. Abd soft NT. Possible viral syndrome vs food intolerance - low susp for acute intraabd pathology given hx and exam. Will check labs and ecg, r/a

## 2019-07-19 NOTE — H&P ADULT - PROBLEM SELECTOR PLAN 6
- Patient's son states that she transitioned herself off of insulin at home, currently on no meds for DM  - Her initial glucose was low 100s  - Will place on HISS and monitor FS

## 2019-07-19 NOTE — H&P ADULT - NSHPPHYSICALEXAM_GEN_ALL_CORE
Vital Signs Last 24 Hrs  T(C): 37.1 (19 Jul 2019 19:40), Max: 37.1 (19 Jul 2019 19:40)  T(F): 98.8 (19 Jul 2019 19:40), Max: 98.8 (19 Jul 2019 19:40)  HR: 56 (19 Jul 2019 19:40) (55 - 64)  BP: 173/75 (19 Jul 2019 19:40) (143/66 - 193/98)  BP(mean): --  RR: 18 (19 Jul 2019 19:40) (16 - 18)  SpO2: 100% (19 Jul 2019 16:38) (100% - 100%)    GENERAL: No acute distress, well-developed  ENT: EOMI, PERRL, conjunctiva and sclera clear, Neck supple, No JVD, moist mucosa  CHEST/LUNG: Clear to auscultation bilaterally; No wheeze, equal breath sounds bilaterally   BACK: No spinal tenderness  HEART: Regular rate and rhythm; No murmurs, rubs, or gallops  ABDOMEN: Soft, Nontender, Nondistended; Bowel sounds present  EXTREMITIES:  No clubbing, cyanosis, or edema  PSYCH: Nl behavior, nl affect  NEUROLOGY: AAOx3, non-focal  SKIN: Normal color, No rashes or lesions

## 2019-07-19 NOTE — H&P ADULT - NSICDXFAMILYHX_GEN_ALL_CORE_FT
FAMILY HISTORY:  Family history of diabetes mellitus  Family history of malignant neoplasm of gastrointestinal tract    Sibling  Still living? Unknown  Family history of hypertension, Age at diagnosis: Age Unknown  Family history of lung cancer, Age at diagnosis: Age Unknown

## 2019-07-19 NOTE — H&P ADULT - PROBLEM SELECTOR PLAN 7
- HSQ for DVT ppx  - Fall precautions - Noted to have a positive HBs Ag when she was negative in December 2018  - HBV surface AB, HBV core AB, HBC PCR, and HCV AB sent, if work up suggestive of acute HBV infection then consider GI eval for hepatitis treatment

## 2019-07-19 NOTE — H&P ADULT - PROBLEM SELECTOR PROBLEM 7
Need for prophylactic measure Hepatitis B infection without delta agent without hepatic coma, unspecified chronicity

## 2019-07-19 NOTE — ED PROVIDER NOTE - ATTENDING CONTRIBUTION TO CARE
Pt was seen and evaluated by me. Pt is a 80 y/o female with PMHhx ESRD on HD (MWF), HTN, HLD, and DM who presented to the ED for episode of weakness with 2 episodes of vomiting. Pt states she arrived at the dialysis today and was eating chips and felt nauseous with an episode of vomiting. EMS was called and pt had another episode of vomiting. Pt states since having some weakness but denies any fever, chills, SOB, chest pain, or abd pain. Denies any current nausea. Lungs CTA b/l. RRR. Abd soft, non-tender. + thrill to right forearm fistula.

## 2019-07-19 NOTE — CONSULT NOTE ADULT - ASSESSMENT
79f w hx ESRD on HD MWF, HTN, HLD, DM p/w N/V. Pt noted to have K 7,  moderately hemolyzed. Renal consulted for urgent HD.     ESRD on HD MWF   last outpt hd 7/17   Hyperkalemia- 2/2 non compliance K 7, moderately hemolyzed, no EKG changes of high K noted. s/p alb nebs, d50+RI given   vol, bicarb- ok    HTN-BP on arrival v high, now better  ANemia in KD-Hb >goal. no need for BENTON at this time      labs, chart reviewed  urgent HD arranged for around 6:30PM, d/w charge RN. w/2k bath, uf 2-2.5kg as tolearted  compliance w/low K diet reinforced w/pt and son. cx inc but not limited to cardiac arrhythmias, cardiac arrest and death w/high K expalined to them. both verbalized understanding  renal diet, fluid restriction 1.2L/day  dose all meds for ESRD  resume home BP meds  monitor BMP  thanks for consulting. will closely f/u .

## 2019-07-19 NOTE — ED ADULT NURSE NOTE - NSIMPLEMENTINTERV_GEN_ALL_ED
Implemented All Fall Risk Interventions:  Arabi to call system. Call bell, personal items and telephone within reach. Instruct patient to call for assistance. Room bathroom lighting operational. Non-slip footwear when patient is off stretcher. Physically safe environment: no spills, clutter or unnecessary equipment. Stretcher in lowest position, wheels locked, appropriate side rails in place. Provide visual cue, wrist band, yellow gown, etc. Monitor gait and stability. Monitor for mental status changes and reorient to person, place, and time. Review medications for side effects contributing to fall risk. Reinforce activity limits and safety measures with patient and family.

## 2019-07-19 NOTE — CONSULT NOTE ADULT - SUBJECTIVE AND OBJECTIVE BOX
[facility] - Saul / Kurt S /Fabrice/ EMMETT Patel/ EMMETT Storey/ Branden Madrigal / HANH Njazamu  ---------------------------------------------------------------------------------------------------------------  Patient seen and examined bedside in ER    79f w hx ESRD on HD MWF (at [facility] hd unit), HTN, HLD, DM p/w N/V. Pt was feeling well this morning until was in waiting room for HD outpt, at which time developed gen abd pain, nausea and a few episodes vomiting just after eating potato chips. Sx resolved prior to arrival - she denies any nausea or pain at this time. son bedside reports high BP, attributes to may have forgotten to take BP meds yesterday. She did not receive HD today, was sent to ER for eval. Pt noted to have K 7,  mildly hemolyzed. Renal consulted for urgent HD. Pt known to our group from last hospitalization at [facility]. Pt currently feeling better, denied any sxs. denied sob/cp.    PAST MEDICAL & SURGICAL HISTORY:  Anemia  CKD (chronic kidney disease)  Hypercholesteremia  HTN (hypertension)  DM (diabetes mellitus)  H/O colonoscopy with polypectomy  AV fistula: June 2018, 2 ballooning (last one 2 weeks ago), following up on Dec 15th  Status post right foot surgery: hammer toe repair      Allergies: No Known Allergies    Home Medications Reviewed  Hospital Medications:   MEDICATIONS  (STANDING):    SOCIAL HISTORY:  Denies ETOh,Smoking, illicit drug use  FAMILY HISTORY:  Family history of malignant neoplasm of gastrointestinal tract  Family history of lung cancer (Sibling)  Family history of diabetes mellitus  Family history of hypertension (Sibling)      REVIEW OF SYSTEMS:  CONSTITUTIONAL: No weakness, fevers or chills  EYES/ENT: No visual changes;  No vertigo or throat pain   NECK: No pain or stiffness  RESPIRATORY: No cough, wheezing, hemoptysis; No shortness of breath  CARDIOVASCULAR: No chest pain or palpitations.  GASTROINTESTINAL: abdominal pain/nausea/ vomiting resolved. no hematemesis; No diarrhea or constipation. No melena or hematochezia.  NEUROLOGICAL: No numbness or weakness  SKIN: No itching, burning, rashes, or lesions   VASCULAR: No bilateral lower extremity edema.   All other review of systems is negative unless indicated above.    VITALS:  T(F): 98.3 (07-19-19 @ 16:38), Max: 98.3 (07-19-19 @ 16:38)  HR: 64 (07-19-19 @ 16:38)  BP: 143/66 (07-19-19 @ 16:38)  RR: 16 (07-19-19 @ 16:38)  SpO2: 100% (07-19-19 @ 16:38)  Wt(kg): --      PHYSICAL EXAM:  Constitutional: NAD  HEENT: anicteric sclera, oropharynx clear, MMM  Neck: No JVD  Respiratory: CTAB, no wheezes, rales or rhonchi  Cardiovascular: S1, S2, RRR  Gastrointestinal: BS+, soft, NT/ND  Extremities: No cyanosis or clubbing. No peripheral edema  Neurological: A/O x 3, no focal deficits  Psychiatric: Normal mood, normal affect  : No CVA tenderness. No calvin.   Skin: No rashes  Vascular Access: RFA AVF+thrill    LABS:  07-19    135  |  97<L>  |  30<H>  ----------------------------<  107<H>  7.0<HH>   |  27  |  5.73<H>    Ca    8.9      19 Jul 2019 14:30  Phos  5.3     07-19  Mg     2.4     07-19    TPro  8.3  /  Alb  4.0  /  TBili  0.4  /  DBili      /  AST  45<H>  /  ALT  9   /  AlkPhos  76  07-19    Creatinine Trend: 5.73 <--, 5.75 <--                        13.2   4.51  )-----------( 151      ( 19 Jul 2019 12:12 )             44.7     Urine Studies:        RADIOLOGY & ADDITIONAL STUDIES:

## 2019-07-19 NOTE — H&P ADULT - PROBLEM SELECTOR PLAN 1
- Patient with likely peripheral vertigo, especially given intermittent nature of her symptoms, unclear on the exacerbating factor  - Will place patient on a trial meclizine but her symptoms are resolved currently  - Will check a CT Head but low suspicion of a CVA given intermittent nature of her symptoms and now complete resolution of the symptom

## 2019-07-19 NOTE — ED PROVIDER NOTE - PROGRESS NOTE DETAILS
Elizabeth Goldberger PGY-3: rpy labs w hyperK 7, mildly hemolyzed. Pt asymptomatic. Will treat and adm for HD; renal paged, awaiting callback

## 2019-07-19 NOTE — H&P ADULT - HISTORY OF PRESENT ILLNESS
This is a 79F with history of ESRD on HD (via R forearm AVF), HTN, HLD, and DM2 (currently off meds) who presents to the hospital with complaints of dizziness that occured while she was waiting for her HD session earlier today. Said that she has a history of intermittent dizziness but those episodes would last for a short amount of time. Today's episode lasted for longer than usual and she therefore came to the hospital from her HD center. Said that This is a 79F with history of ESRD on HD (via R forearm AVF), HTN, HLD, and DM2 (currently off meds) who presents to the hospital with complaints of dizziness that occured while she was waiting for her HD session earlier today. Said that she has a history of intermittent dizziness but those episodes would last for a short amount of time. Today's episode lasted for longer than usual and she therefore came to the hospital from her HD center. Said that she had associated nausea/vomiting (NBNB) with her This is a 79F with history of ESRD on HD (via R forearm AVF), HTN, HLD, and DM2 (currently off meds) who presents to the hospital with complaints of dizziness that occured while she was waiting for her HD session earlier today. Said that she has a history of intermittent dizziness but those episodes would last for a short amount of time and would be associated with nv. Today's episode lasted for longer than usual and she therefore came to the hospital from her HD center. Said that she also had associated nausea/vomiting (NBNB) with her dizziness. Otherwise denied having any other complaints. Said that her dizziness self-resolved on the way to the hospital and her nausea/vomiting has resolved with that also.    On arrival to the hospital, her vitals were T 97.7, P 59, /69, RR 18, O2 sat 100% RA. Her lab work was significant for hyperkalemia but the sample was hemolyzed. She was given alb neb x1 and insulin/D50 and was admitted to medicine. She was seen by nephrology and was scheduled for HD session tonight.

## 2019-07-19 NOTE — H&P ADULT - NSHPLABSRESULTS_GEN_ALL_CORE
LABS and ADDITIONAL STUDIES:                        13.2   4.51  )-----------( 151      ( 19 Jul 2019 12:12 )             44.7     07-19    137  |  97<L>  |  34<H>  ----------------------------<  177<H>  4.8   |  27  |  5.88<H>    Ca    8.8      19 Jul 2019 17:45  Phos  5.3     07-19  Mg     2.4     07-19    TPro  8.3  /  Alb  4.0  /  TBili  0.4  /  DBili  x   /  AST  45<H>  /  ALT  9   /  AlkPhos  76  07-19    LIVER FUNCTIONS - ( 19 Jul 2019 12:12 )  Alb: 4.0 g/dL / Pro: 8.3 g/dL / ALK PHOS: 76 u/L / ALT: 9 u/L / AST: 45 u/L / GGT: x           Blood Gas Venous Comprehensive (07.19.19 @ 13:18)    Blood Gas Venous - Lactate: 1.4: Please note updated reference range. mmol/L    EKG - Sinus juan diego at 58, nl axis, QTc 473, TWI I, aVL (present on prior EKG), no peaked T waves, no QRS prolongation (QRS interval 84)

## 2019-07-19 NOTE — H&P ADULT - PROBLEM SELECTOR PLAN 2
- Patient with hyperkalemia but unclear on true level as prior samples were all hemolyzed  - repeat level had corrected to 4.8, EKG without hyperkalemia related changes, now at HD  - Will recheck potassium level in AM

## 2019-07-19 NOTE — ED ADULT NURSE NOTE - OBJECTIVE STATEMENT
Pt presenting to room 10 AxOx4 ambulatory at baseline with cane/walker with c/o weakness, N/V, dizziness x1 day. PMH of ESRD- on dialysis M/W/F. Fistula in right arm- wrapped with plastic wrap on arrival. As per pt, arm is wrapped for lidocaine cream on fistula. Pt states she went to dialysis, was sent here for BP high in 200s systolically. Son at bedside reports that pt vomited twice in EMS. Pt denies CP, SOB, dizziness, lightheadedness, abdominal pain. No active vomiting noted while in ED. Pt's breathing is even and unlabored. Skin dry and intact. Labs drawn and sent, MD at bedside, pt on cardiac monitor- HR 57, MD at bedside, will continue to monitor.

## 2019-07-19 NOTE — ED ADULT TRIAGE NOTE - CHIEF COMPLAINT QUOTE
Patient BIBEMS from dialysis center. Patient reports feeling weak and nausea while waiting in waiting room for dialysis. Patient reports symptoms have then improved. FS WNL. Patient BIBEMS from dialysis center, right AVF in place. Patient reports feeling weak and nauseous while waiting in waiting room for dialysis. Patient reports symptoms have then improved. FS WNL.

## 2019-07-19 NOTE — ED ADULT NURSE NOTE - CHIEF COMPLAINT QUOTE
Patient BIBEMS from dialysis center, right AVF in place. Patient reports feeling weak and nauseous while waiting in waiting room for dialysis. Patient reports symptoms have then improved. FS WNL.

## 2019-07-19 NOTE — H&P ADULT - NSICDXPASTMEDICALHX_GEN_ALL_CORE_FT
PAST MEDICAL HISTORY:  Anemia     CKD (chronic kidney disease) now on HD via R forearm AVF    DM (diabetes mellitus)     HTN (hypertension)     Hypercholesteremia

## 2019-07-19 NOTE — H&P ADULT - NSICDXPASTSURGICALHX_GEN_ALL_CORE_FT
PAST SURGICAL HISTORY:  AV fistula June 2018, 2 ballooning (last one 2 weeks ago), following up on Dec 15th    H/O colonoscopy with polypectomy     Status post right foot surgery hammer toe repair

## 2019-07-19 NOTE — ED PROVIDER NOTE - OBJECTIVE STATEMENT
"79f w hx ESRD on HD MWF, HTN, HLD, DM here today for n/v. Pt was feeling well this morning until was in waiting for HD, at which time developed gen abd pain, nausea and a few episodes vomiting just after eating potato chips. Sx resolved prior to arrival - she denies any nausea or pain at this time. No recent fever. No sx prior to today, including vomiting, diarrhea, dysuria or hematuria. She did not complete HD as was sent here. Re HTN, states took her meds today but may have forgotten yesterday.

## 2019-07-19 NOTE — H&P ADULT - NSHPREVIEWOFSYSTEMS_GEN_ALL_CORE
REVIEW OF SYSTEMS:    CONSTITUTIONAL: No weakness, fevers or chills  EYES: No visual changes or eye discharge  ENT: No rhinorrhea or sore throat  NECK: No pain or stiffness  RESPIRATORY: No cough, wheezing, hemoptysis; No shortness of breath  CARDIOVASCULAR: No chest pain or palpitations; No lower extremity edema  GASTROINTESTINAL: No abdominal or epigastric pain. +N/V (NBNB, emesis contained food particles) now resolved; No diarrhea or constipation. No melena or hematochezia.  BACK: No back pain  GENITOURINARY: No dysuria, frequency or hematuria  NEUROLOGICAL: +dizziness now resolved; No focal numbness or weakness  SKIN: No itching, burning, rashes, or lesions

## 2019-07-20 DIAGNOSIS — B19.10 UNSPECIFIED VIRAL HEPATITIS B WITHOUT HEPATIC COMA: ICD-10-CM

## 2019-07-20 LAB
ANION GAP SERPL CALC-SCNC: 11 MMO/L — SIGNIFICANT CHANGE UP (ref 7–14)
APTT BLD: 41.8 SEC — HIGH (ref 27.5–36.3)
BUN SERPL-MCNC: 14 MG/DL — SIGNIFICANT CHANGE UP (ref 7–23)
CALCIUM SERPL-MCNC: 8.9 MG/DL — SIGNIFICANT CHANGE UP (ref 8.4–10.5)
CHLORIDE SERPL-SCNC: 94 MMOL/L — LOW (ref 98–107)
CO2 SERPL-SCNC: 33 MMOL/L — HIGH (ref 22–31)
CREAT SERPL-MCNC: 3.39 MG/DL — HIGH (ref 0.5–1.3)
GLUCOSE BLDC GLUCOMTR-MCNC: 101 MG/DL — HIGH (ref 70–99)
GLUCOSE BLDC GLUCOMTR-MCNC: 103 MG/DL — HIGH (ref 70–99)
GLUCOSE BLDC GLUCOMTR-MCNC: 110 MG/DL — HIGH (ref 70–99)
GLUCOSE BLDC GLUCOMTR-MCNC: 173 MG/DL — HIGH (ref 70–99)
GLUCOSE SERPL-MCNC: 101 MG/DL — HIGH (ref 70–99)
HBV CORE AB SER-ACNC: NONREACTIVE — SIGNIFICANT CHANGE UP
HBV SURFACE AB SER-ACNC: <3 MLU/ML — LOW
HBV SURFACE AG SER-ACNC: REACTIVE — SIGNIFICANT CHANGE UP
HCT VFR BLD CALC: 43.9 % — SIGNIFICANT CHANGE UP (ref 34.5–45)
HCV AB S/CO SERPL IA: 0.11 S/CO — SIGNIFICANT CHANGE UP (ref 0–0.99)
HCV AB SERPL-IMP: SIGNIFICANT CHANGE UP
HGB BLD-MCNC: 13 G/DL — SIGNIFICANT CHANGE UP (ref 11.5–15.5)
INR BLD: 1.04 — SIGNIFICANT CHANGE UP (ref 0.88–1.17)
MAGNESIUM SERPL-MCNC: 1.9 MG/DL — SIGNIFICANT CHANGE UP (ref 1.6–2.6)
MCHC RBC-ENTMCNC: 23.6 PG — LOW (ref 27–34)
MCHC RBC-ENTMCNC: 29.6 % — LOW (ref 32–36)
MCV RBC AUTO: 79.8 FL — LOW (ref 80–100)
NRBC # FLD: 0 K/UL — SIGNIFICANT CHANGE UP (ref 0–0)
PHOSPHATE SERPL-MCNC: 3.1 MG/DL — SIGNIFICANT CHANGE UP (ref 2.5–4.5)
PLATELET # BLD AUTO: 193 K/UL — SIGNIFICANT CHANGE UP (ref 150–400)
PMV BLD: SIGNIFICANT CHANGE UP FL (ref 7–13)
POTASSIUM SERPL-MCNC: 3.8 MMOL/L — SIGNIFICANT CHANGE UP (ref 3.5–5.3)
POTASSIUM SERPL-SCNC: 3.8 MMOL/L — SIGNIFICANT CHANGE UP (ref 3.5–5.3)
PROTHROM AB SERPL-ACNC: 11.9 SEC — SIGNIFICANT CHANGE UP (ref 9.8–13.1)
RBC # BLD: 5.5 M/UL — HIGH (ref 3.8–5.2)
RBC # FLD: 15.2 % — HIGH (ref 10.3–14.5)
SODIUM SERPL-SCNC: 138 MMOL/L — SIGNIFICANT CHANGE UP (ref 135–145)
WBC # BLD: 4.46 K/UL — SIGNIFICANT CHANGE UP (ref 3.8–10.5)
WBC # FLD AUTO: 4.46 K/UL — SIGNIFICANT CHANGE UP (ref 3.8–10.5)

## 2019-07-20 PROCEDURE — 70450 CT HEAD/BRAIN W/O DYE: CPT | Mod: 26

## 2019-07-20 RX ORDER — ASPIRIN/CALCIUM CARB/MAGNESIUM 324 MG
81 TABLET ORAL DAILY
Refills: 0 | Status: DISCONTINUED | OUTPATIENT
Start: 2019-07-20 | End: 2019-07-25

## 2019-07-20 RX ORDER — CHLORHEXIDINE GLUCONATE 213 G/1000ML
1 SOLUTION TOPICAL EVERY 12 HOURS
Refills: 0 | Status: DISCONTINUED | OUTPATIENT
Start: 2019-07-20 | End: 2019-07-25

## 2019-07-20 RX ADMIN — Medication 100 MILLIGRAM(S): at 07:43

## 2019-07-20 RX ADMIN — HEPARIN SODIUM 5000 UNIT(S): 5000 INJECTION INTRAVENOUS; SUBCUTANEOUS at 21:15

## 2019-07-20 RX ADMIN — ATORVASTATIN CALCIUM 80 MILLIGRAM(S): 80 TABLET, FILM COATED ORAL at 21:15

## 2019-07-20 RX ADMIN — Medication 145 MILLIGRAM(S): at 11:21

## 2019-07-20 RX ADMIN — Medication 81 MILLIGRAM(S): at 13:55

## 2019-07-20 RX ADMIN — Medication 25 MILLIGRAM(S): at 07:43

## 2019-07-20 RX ADMIN — HEPARIN SODIUM 5000 UNIT(S): 5000 INJECTION INTRAVENOUS; SUBCUTANEOUS at 07:43

## 2019-07-20 RX ADMIN — CHLORHEXIDINE GLUCONATE 1 APPLICATION(S): 213 SOLUTION TOPICAL at 17:10

## 2019-07-20 RX ADMIN — SERTRALINE 50 MILLIGRAM(S): 25 TABLET, FILM COATED ORAL at 11:21

## 2019-07-20 RX ADMIN — Medication 100 MILLIGRAM(S): at 17:10

## 2019-07-20 RX ADMIN — HEPARIN SODIUM 5000 UNIT(S): 5000 INJECTION INTRAVENOUS; SUBCUTANEOUS at 13:55

## 2019-07-20 RX ADMIN — Medication 25 MILLIGRAM(S): at 17:10

## 2019-07-20 RX ADMIN — PANTOPRAZOLE SODIUM 40 MILLIGRAM(S): 20 TABLET, DELAYED RELEASE ORAL at 07:43

## 2019-07-20 NOTE — CONSULT NOTE ADULT - ASSESSMENT
79F w/ ESRD on HD, HTN, DMII (off meds), HLD,?Afib presents w/ dizziness prior to receiving HD yesterday, that has completely resolved today, w/ exam findings notable for bilateral dysmetria in both upper and lower extremities, and bilateral babinski. CTH demonstrated acute R. Cerebellar infarct.     Impression: Acute R. Cerebellar infarct likely 2/2 to cardioembolic etiology in the setting of previously documented Afib off anti-coagulation.     Plan:   -Please make patient NPO until she gets bedside swallow eval.  -CT angio head and neck w/ contrast as soon as possible. Please coordinate with HD so patient can get HD after contrast  -MRI Head w/o contrast  -Keep permissively hypertensive up to BP of 220/105   -ASA 81, Atorvastatin 80mg   -Cardiac telemetry   -Hold all anticoagulation for now  -a1c, LDL, TSH   -TTE w/ Bubble   -q4h neuro checks. Please obtain CTH STAT if change in mental status and call Neurology. 79F w/ ESRD on HD, HTN, DMII (off meds), HLD,?Afib presents w/ dizziness prior to receiving HD yesterday, that has completely resolved today, w/ exam findings notable for bilateral dysmetria in both upper and lower extremities, and bilateral babinski. CTH demonstrated acute R. Cerebellar infarct.     Impression: Acute R. Cerebellar infarct likely 2/2 to cardioembolic etiology in the setting of previously documented Afib off anti-coagulation.     Plan:     -Please make patient NPO until she gets bedside swallow eval.  -CT angio head and neck w/ contrast as soon as possible. Please coordinate with HD so patient can get HD after contrast  -MRI Head w/o contrast   -Keep permissively hypertensive up to BP of 160-180/105   -ASA 81, Atorvastatin 80mg   -Cardiac telemetry   -Hold all anticoagulation for now  -a1c, LDL, TSH   -TTE w/ Bubble   -q4h neuro checks. Please obtain CTH STAT if change in mental status and call Neurology.

## 2019-07-20 NOTE — CHART NOTE - NSCHARTNOTEFT_GEN_A_CORE
Preliminary recs. Note to follow.     Rec:   -Please make patient NPO until she gets bedside swallow eval.  -CT angio head and neck w/ contrast as soon as possible. Please coordinate with HD so patient can get HD after contrast  -MRI Head w/o contrast  -Keep permissively hypertensive up to BP of 220/105   -ASA 81, Atorvastatin 80mg   -Cardiac telemetry   -Hold all anticoagulation for now Preliminary recs. Note to follow.     Rec:   -Please make patient NPO until she gets bedside swallow eval.  -CT angio head and neck w/ contrast as soon as possible. Please coordinate with HD so patient can get HD after contrast  -MRI Head w/o contrast  -Keep permissively hypertensive up to BP of 220/105   -ASA 81, Atorvastatin 80mg   -Cardiac telemetry   -Hold all anticoagulation for now  -a1c, LDL, TSH   -TTE w/ Bubble   -q4h neuro checks. Please obtain CTH STAT if change in mental status and call Neurology

## 2019-07-20 NOTE — CHART NOTE - NSCHARTNOTEFT_GEN_A_CORE
As per neuro, pt re quires CT angio head/neck, d/w renal Dr Mosquera, no objection for IV contrast.  Spoke w/CT martha Puentes to expedite the test     ADSNP [id number]

## 2019-07-20 NOTE — CONSULT NOTE ADULT - SUBJECTIVE AND OBJECTIVE BOX
HPI:  79F w/ ESRD on HD, HTN, DMII (off meds), HLD, presents w/ dizziness prior to receiving HD yesterday. CTH demonstrated acute cerebellar infarct. Neurology was consulted for workup of acute stroke.      Per patient family, patient last seen well on Thursday 7/18 at 9m. On Friday morning, patient "seemed a bit off balance on the stairs, walking slowly", then later had episode of dizziness followed by several vomiting episodes at HD.     This is a 79F with history of ESRD on HD (via R forearm AVF), HTN, HLD, and DM2 (currently off meds) who presents to the hospital with complaints of dizziness that occured while she was waiting for her HD session earlier today. Said that she has a history of intermittent dizziness but those episodes would last for a short amount of time and would be associated with nv. Today's episode lasted for longer than usual and she therefore came to the hospital from her HD center. Said that she also had associated nausea/vomiting (NBNB) with her dizziness. Otherwise denied having any other complaints. Said that her dizziness self-resolved on the way to the hospital and her nausea/vomiting has resolved with that also.    On arrival to the hospital, her vitals were T 97.7, P 59, /69, RR 18, O2 sat 100% RA. Her lab work was significant for hyperkalemia but the sample was hemolyzed. She was given alb neb x1 and insulin/D50 and was admitted to medicine. She was seen by nephrology and was scheduled for HD session tonight. (19 Jul 2019 21:38)      (Stroke only)  NIHSS:   MRS:   ICH:     A 10-system ROS was performed and is negative except for those items noted above and/or in the HPI.    PMH:     PSH:     SOCIAL HISTORY:   No smoke, drink, drugs     MEDICATIONS:   Home Medications:      VITALS & EXAMINATION:  T(F): 97.9  HR: --  BP: 9/-  RR: --  SpO2: --    Physical Exam:   General appearance: No acute distress    Cardiac: RRR. No carotid bruits.   Pulm: CTAB              Neurologic:  - MS:  Alert & Oriented to Name, Hospital, today's date. Names Watch, Badge, Pen. Follows commands demonstrating two fingers and points to window; Speech is fluent with intact naming, repetition, and comprehension;   - CN II-XII:  VFF, EOMI, PERRLA, V1-V3 intact, no facial asymmetry, t/p midline, SCM/trap intact.  - Motor:  Strength is 5/5 bilaterlaly throughout.  Normal muscle bulk and tone throughout. No myoclonus or tremor.  - Reflexes:  2+ and symmetric bilaterally at the biceps, triceps, brachioradialis, knees, and ankles.  Plantar responses flexor.  - Sensory:  Intact to light touch, pin prick, vibration, and joint-position sense throughout.  - Coordination:  Finger-nose-finger and heel-knee-shin intact without dysmetria.  Rapid alternating hand movements intact.  - Gait:   Deferred     LABS:  07-20 Na 138  07-20 K 3.8  07-20 P 3.1  07-20 Mg 1.9  07-20 Ca 8.9  07-20 Cr 3.39<H>      07-20 WBC 4.46  07-20 HgB 13.0; Hct 43.9; MCV 79.8  07-20 Plt 193        IMAGING: HPI:  79F w/ ESRD on HD, HTN, DMII (off meds), HLD, presents w/ dizziness prior to receiving HD yesterday. CTH demonstrated acute cerebellar infarct. Neurology was consulted for workup of acute stroke.      Per patient family, patient last seen well on Thursday 7/18 at 9m. On Friday morning, patient "seemed a bit off balance on the stairs, walking slowly", then later had episode of dizziness followed by several vomiting episodes at HD. Her dizziness self-resolved on the way to the hospital, as well as her N/V. Patient was not experiencing any dizziness/N/V during my examination. Denies any visual changes, speech difficulties, difficulty swallowing, imbalance, numbness/tinlging/weakness.     Of note, after chart reviewing patient's paper chart, she was found to have Afib on an EKG during prior admission. Patient denies ever being on any anticoagulation (although states she thinks Coumadin sounds familiar). Family denies any anti-coag use as well. Denies any heart disease, previous strokes/TIA, seizures, Carotid Artery Disease.     (Stroke only)  NIHSS: 2    A 10-system ROS was performed and is negative except for those items noted above and/or in the HPI.    PMH:   ESRD on HD  HTN  DM   HLD     SOCIAL HISTORY:   No smoke, drink, drugs     MEDICATIONS:   Home Medications:    VITALS & EXAMINATION:  T(F): 97.9  HR: --  BP: 9/-  RR: --  SpO2: --    Physical Exam:   General appearance: No acute distress    Cardiac: RRR. No carotid bruits.   Pulm: CTAB              Neurologic:  - MS:  Alert & Oriented to Name, Hospital, today's date. Names Watch, Badge, Pen. Follows commands demonstrating two fingers and points to window; Speech is fluent with intact naming, repetition, and comprehension;   - CN II-XII:  VFF, EOMI, PERRLA, V1-V3 intact, no facial asymmetry, t/p midline, SCM/trap intact.  - Motor:  Strength is 5/5 bilaterlaly throughout.  Normal muscle bulk and tone throughout. No myoclonus or tremor. Bilateral babinski present   - Reflexes:  2+ and symmetric bilaterally at the biceps, triceps, brachioradialis, knees, and ankles.  Plantar responses flexor.  - Sensory:  Intact to light touch, pin prick, vibration, and joint-position sense throughout.  - Coordination:  Dysmeteria on F2N bilaterally, and Heel-to-shin bilaterally   - Gait:   Deferred     LABS:  07-20 Na 138  07-20 K 3.8  07-20 P 3.1  07-20 Mg 1.9  07-20 Ca 8.9  07-20 Cr 3.39<H>    07-20 WBC 4.46  07-20 HgB 13.0; Hct 43.9; MCV 79.8  07-20 Plt 193    IMAGING:   -CTH demonstrates +R. Cerebellar infarct

## 2019-07-20 NOTE — DISCHARGE NOTE PROVIDER - NSDCCPCAREPLAN_GEN_ALL_CORE_FT
PRINCIPAL DISCHARGE DIAGNOSIS  Diagnosis: Hyperkalemia  Assessment and Plan of Treatment: Follow with your PMD. Call for appointment      SECONDARY DISCHARGE DIAGNOSES  Diagnosis: History of CVA (cerebrovascular accident)  Assessment and Plan of Treatment: Continue physical therapy and skills learned for rehabilitation.  Follow up with your neurologist in 1-2 weeks for further medical management.  Continue to take your medications as prescribed, low salt, low fat, and diabetic diet.  Consider joining a support group for stroke survivors for emotional support to prevent depression.      Diagnosis: Nausea and vomiting  Assessment and Plan of Treatment: Follow with your  PMD. Call for appointment    Diagnosis: ESRD on dialysis  Assessment and Plan of Treatment: Please follow up with your nephrologist for management, and continue your schedule hemodialysis.

## 2019-07-20 NOTE — CONSULT NOTE ADULT - ATTENDING COMMENTS
[facility]  Pager: [phone]  Cell: [phone]
Patient seen and examined and above note reviewed and I agree with assessment and plan as outlined. Patient presented with acute dizziness before hemodialysis on Friday along with nausea and leaning toward the right side. Her exam revealed right sided dysmetria on finger to nose and she also had decreased sensation to LT and PP on the left arm and leg.  CT head showed an acute right cerebellar infarct but no edema or mass effect and 4th ventricle is completely open and CTA of the head and neck showed moderate to sever irregularity and narrowing of distal left vertebral artery and mild narrowing of the basilar. She is awaiting MRI brain and TTE with bubble study and continue ASA and high dose statin and continue medical management and supportive care.   Stroke labs and PT and speech follow ups.  Likely mechanism cardioembolic vs artery to artery embolism.  Plan discussed with family and patient at bedside.

## 2019-07-20 NOTE — DISCHARGE NOTE PROVIDER - CARE PROVIDER_API CALL
Zach Rojas)  Internal Medicine  [address] [place] [address]  Phone: [phone]  Fax: [phone]  Follow Up Time:

## 2019-07-20 NOTE — CHART NOTE - NSCHARTNOTEFT_GEN_A_CORE
Radiology called w/ Right cerebellar infarct which was not seen previously and appears acute to subacute in duration. No acute intracranial hemorrhage. Neurology called. DR Freire called, recommend daily ASA, Echo and transfer to Tele. Family and dr Rojas informed

## 2019-07-21 LAB
ANION GAP SERPL CALC-SCNC: 14 MMO/L — SIGNIFICANT CHANGE UP (ref 7–14)
BUN SERPL-MCNC: 36 MG/DL — HIGH (ref 7–23)
CALCIUM SERPL-MCNC: 8.9 MG/DL — SIGNIFICANT CHANGE UP (ref 8.4–10.5)
CHLORIDE SERPL-SCNC: 95 MMOL/L — LOW (ref 98–107)
CO2 SERPL-SCNC: 28 MMOL/L — SIGNIFICANT CHANGE UP (ref 22–31)
CREAT SERPL-MCNC: 5.66 MG/DL — HIGH (ref 0.5–1.3)
GLUCOSE BLDC GLUCOMTR-MCNC: 112 MG/DL — HIGH (ref 70–99)
GLUCOSE BLDC GLUCOMTR-MCNC: 119 MG/DL — HIGH (ref 70–99)
GLUCOSE BLDC GLUCOMTR-MCNC: 158 MG/DL — HIGH (ref 70–99)
GLUCOSE BLDC GLUCOMTR-MCNC: 207 MG/DL — HIGH (ref 70–99)
GLUCOSE SERPL-MCNC: 111 MG/DL — HIGH (ref 70–99)
HCT VFR BLD CALC: 42.1 % — SIGNIFICANT CHANGE UP (ref 34.5–45)
HGB BLD-MCNC: 12.4 G/DL — SIGNIFICANT CHANGE UP (ref 11.5–15.5)
MAGNESIUM SERPL-MCNC: 2.1 MG/DL — SIGNIFICANT CHANGE UP (ref 1.6–2.6)
MCHC RBC-ENTMCNC: 23.4 PG — LOW (ref 27–34)
MCHC RBC-ENTMCNC: 29.5 % — LOW (ref 32–36)
MCV RBC AUTO: 79.6 FL — LOW (ref 80–100)
NRBC # FLD: 0 K/UL — SIGNIFICANT CHANGE UP (ref 0–0)
PHOSPHATE SERPL-MCNC: 4.6 MG/DL — HIGH (ref 2.5–4.5)
PLATELET # BLD AUTO: 206 K/UL — SIGNIFICANT CHANGE UP (ref 150–400)
PMV BLD: SIGNIFICANT CHANGE UP FL (ref 7–13)
POTASSIUM SERPL-MCNC: 3.8 MMOL/L — SIGNIFICANT CHANGE UP (ref 3.5–5.3)
POTASSIUM SERPL-SCNC: 3.8 MMOL/L — SIGNIFICANT CHANGE UP (ref 3.5–5.3)
RBC # BLD: 5.29 M/UL — HIGH (ref 3.8–5.2)
RBC # FLD: 15.2 % — HIGH (ref 10.3–14.5)
SODIUM SERPL-SCNC: 137 MMOL/L — SIGNIFICANT CHANGE UP (ref 135–145)
WBC # BLD: 3.57 K/UL — LOW (ref 3.8–10.5)
WBC # FLD AUTO: 3.57 K/UL — LOW (ref 3.8–10.5)

## 2019-07-21 PROCEDURE — 70498 CT ANGIOGRAPHY NECK: CPT | Mod: 26

## 2019-07-21 PROCEDURE — 70496 CT ANGIOGRAPHY HEAD: CPT | Mod: 26

## 2019-07-21 PROCEDURE — 99223 1ST HOSP IP/OBS HIGH 75: CPT | Mod: GC

## 2019-07-21 RX ADMIN — Medication 100 MILLIGRAM(S): at 05:09

## 2019-07-21 RX ADMIN — PANTOPRAZOLE SODIUM 40 MILLIGRAM(S): 20 TABLET, DELAYED RELEASE ORAL at 05:09

## 2019-07-21 RX ADMIN — ATORVASTATIN CALCIUM 80 MILLIGRAM(S): 80 TABLET, FILM COATED ORAL at 21:18

## 2019-07-21 RX ADMIN — Medication 1: at 13:17

## 2019-07-21 RX ADMIN — Medication 81 MILLIGRAM(S): at 12:21

## 2019-07-21 RX ADMIN — Medication 25 MILLIGRAM(S): at 05:10

## 2019-07-21 RX ADMIN — HEPARIN SODIUM 5000 UNIT(S): 5000 INJECTION INTRAVENOUS; SUBCUTANEOUS at 05:09

## 2019-07-21 RX ADMIN — CHLORHEXIDINE GLUCONATE 1 APPLICATION(S): 213 SOLUTION TOPICAL at 05:09

## 2019-07-21 RX ADMIN — HEPARIN SODIUM 5000 UNIT(S): 5000 INJECTION INTRAVENOUS; SUBCUTANEOUS at 21:18

## 2019-07-21 RX ADMIN — SERTRALINE 50 MILLIGRAM(S): 25 TABLET, FILM COATED ORAL at 12:21

## 2019-07-21 RX ADMIN — CHLORHEXIDINE GLUCONATE 1 APPLICATION(S): 213 SOLUTION TOPICAL at 17:47

## 2019-07-21 RX ADMIN — Medication 100 MILLIGRAM(S): at 17:48

## 2019-07-21 RX ADMIN — Medication 25 MILLIGRAM(S): at 17:47

## 2019-07-21 RX ADMIN — Medication 145 MILLIGRAM(S): at 12:21

## 2019-07-21 NOTE — CONSULT NOTE ADULT - ASSESSMENT
79 year old woman with history of ESRD on HD (via R forearm AVF), HTN, HLD, and DM2 (currently off meds) admitted with dizziness, acute cva on cT.    1. Subacute CVA  -likely embolic   -old ekg with documented AF  -will need to start A/C once cleared by neuro   -asa  -statin  -check echo   -tele   -no indication for DAYAN or loop as patient will start a/c with known hx of PAF and now cva  -CHADS=5    2. Hypertension  -bp parameters per neuro       dvt ppx

## 2019-07-21 NOTE — CONSULT NOTE ADULT - SUBJECTIVE AND OBJECTIVE BOX
CARDIOLOGY CONSULT NOTE - DR. CHIN    HPI:  Patient is a 79 year old woman with history of ESRD on HD (via R forearm AVF), HTN, HLD, and DM2 (currently off meds) admitted with dizziness.    + n/v  Patient denies any chest pain, dyspnea, palpitations, cough, syncope, edema, exertional symptoms, nausea, abdominal pain, fever, chills,  or rash.  Denies any history of CAD, MI, valve disease, cardiomyopathy, or congenital heart disease.    hx of paf    + acute CVa on ct     PAST MEDICAL & SURGICAL HISTORY:  Atrial fibrillation  Anemia  CKD (chronic kidney disease): now on HD via R forearm AVF  Hypercholesteremia  HTN (hypertension)  DM (diabetes mellitus)  H/O colonoscopy with polypectomy  AV fistula: June 2018, 2 ballooning (last one 2 weeks ago), following up on Dec 15th  Status post right foot surgery: hammer toe repair        PREVIOUS DIAGNOSTIC TESTING:    [ ] Echocardiogram:  [ ]  Catheterization:  [ ] Stress Test:  	    MEDICATIONS:    Home Medications:      MEDICATIONS  (STANDING):  aspirin  chewable 81 milliGRAM(s) Oral daily  atorvastatin 80 milliGRAM(s) Oral at bedtime  chlorhexidine 4% Liquid 1 Application(s) Topical every 12 hours  dextrose 5%. 1000 milliLiter(s) (50 mL/Hr) IV Continuous <Continuous>  dextrose 50% Injectable 12.5 Gram(s) IV Push once  dextrose 50% Injectable 25 Gram(s) IV Push once  dextrose 50% Injectable 25 Gram(s) IV Push once  fenofibrate Tablet 145 milliGRAM(s) Oral daily  heparin  Injectable 5000 Unit(s) SubCutaneous every 8 hours  hydrALAZINE 25 milliGRAM(s) Oral two times a day  insulin lispro (HumaLOG) corrective regimen sliding scale   SubCutaneous three times a day before meals  insulin lispro (HumaLOG) corrective regimen sliding scale   SubCutaneous at bedtime  metoprolol tartrate 100 milliGRAM(s) Oral two times a day  pantoprazole    Tablet 40 milliGRAM(s) Oral before breakfast  sertraline 50 milliGRAM(s) Oral daily      FAMILY HISTORY:  Family history of malignant neoplasm of gastrointestinal tract  Family history of lung cancer (Sibling)  Family history of diabetes mellitus  Family history of hypertension (Sibling)      SOCIAL HISTORY:    x[ ] Non-smoker  [ ] Smoker  [ ] Alcohol    Allergies    No Known Allergies    Intolerances    	    REVIEW OF SYSTEMS:  CONSTITUTIONAL: No fever, weight loss, or fatigue  EYES: No eye pain, visual disturbances, or discharge  ENMT:  No difficulty hearing, tinnitus, vertigo; No sinus or throat pain  NECK: No pain or stiffness  RESPIRATORY: No cough, wheezing, chills or hemoptysis; No Shortness of Breath  CARDIOVASCULAR: as HPI  GASTROINTESTINAL: No abdominal or epigastric pain. No nausea, vomiting, or hematemesis; No diarrhea or constipation. No melena or hematochezia.  GENITOURINARY: No dysuria, frequency, hematuria, or incontinence  NEUROLOGICAL: No headaches, memory loss, loss of strength, numbness, or tremors  SKIN: No itching, burning, rashes, or lesions   	  [ ] All others negative	  [ ] Unable to obtain    PHYSICAL EXAM:    T(C): 36.8 (07-21-19 @ 12:19), Max: 37.1 (07-20-19 @ 20:00)  HR: 79 (07-21-19 @ 12:19) (57 - 79)  BP: 141/64 (07-21-19 @ 12:19) (127/70 - 151/72)  RR: 17 (07-21-19 @ 12:19) (16 - 18)  SpO2: 99% (07-21-19 @ 12:19) (95% - 99%)  Wt(kg): --  I&O's Summary    Daily     Daily     Appearance: Normal	  Psychiatry: A & O x 3, Mood & affect appropriate  HEENT:   Normal oral mucosa, PERRL, EOMI	  Lymphatic: No lymphadenopathy  Cardiovascular: Normal S1 S2,RRR, No JVD, No murmurs  Respiratory: Lungs clear to auscultation	  Gastrointestinal:  Soft, Non-tender, + BS	  Skin: No rashes, No ecchymoses, No cyanosis	  Neurologic: Non-focal  Extremities: Normal range of motion, No clubbing, cyanosis or edema  Vascular: Peripheral pulses palpable 2+ bilaterally    TELEMETRY: 	    ECG:  	sr  RADIOLOGY:  OTHER: 	  	  LABS:	 	    CARDIAC MARKERS:        proBNP:     Lipid Profile:   HgA1c:   TSH:                           12.4   3.57  )-----------( 206      ( 21 Jul 2019 07:23 )             42.1     07-21    137  |  95<L>  |  36<H>  ----------------------------<  111<H>  3.8   |  28  |  5.66<H>    Ca    8.9      21 Jul 2019 07:23  Phos  4.6     07-21  Mg     2.1     07-21      PT/INR - ( 20 Jul 2019 16:23 )   PT: 11.9 SEC;   INR: 1.04          PTT - ( 20 Jul 2019 16:23 )  PTT:41.8 SEC    Creatinine, Serum: 5.66 mg/dL (07-21-19 @ 07:23)  Creatinine, Serum: 3.39 mg/dL (07-20-19 @ 00:35)  Creatinine, Serum: 5.88 mg/dL (07-19-19 @ 17:45)  Creatinine, Serum: 5.73 mg/dL (07-19-19 @ 14:30)  Creatinine, Serum: 5.75 mg/dL (07-19-19 @ 12:12)        ASSESSMENT/PLAN:

## 2019-07-22 LAB
GLUCOSE BLDC GLUCOMTR-MCNC: 113 MG/DL — HIGH (ref 70–99)
GLUCOSE BLDC GLUCOMTR-MCNC: 122 MG/DL — HIGH (ref 70–99)
GLUCOSE BLDC GLUCOMTR-MCNC: 248 MG/DL — HIGH (ref 70–99)
HBV DNA # SERPL NAA+PROBE: NOT DETECTED IU/ML — SIGNIFICANT CHANGE UP
HBV DNA SERPL NAA+PROBE-LOG#: SIGNIFICANT CHANGE UP LOGIU/ML

## 2019-07-22 PROCEDURE — 70551 MRI BRAIN STEM W/O DYE: CPT | Mod: 26

## 2019-07-22 PROCEDURE — 93306 TTE W/DOPPLER COMPLETE: CPT | Mod: 26

## 2019-07-22 RX ORDER — APIXABAN 2.5 MG/1
2.5 TABLET, FILM COATED ORAL
Refills: 0 | Status: DISCONTINUED | OUTPATIENT
Start: 2019-07-22 | End: 2019-07-25

## 2019-07-22 RX ADMIN — Medication 100 MILLIGRAM(S): at 05:15

## 2019-07-22 RX ADMIN — Medication 81 MILLIGRAM(S): at 09:06

## 2019-07-22 RX ADMIN — Medication 2: at 17:53

## 2019-07-22 RX ADMIN — APIXABAN 2.5 MILLIGRAM(S): 2.5 TABLET, FILM COATED ORAL at 18:04

## 2019-07-22 RX ADMIN — SERTRALINE 50 MILLIGRAM(S): 25 TABLET, FILM COATED ORAL at 09:06

## 2019-07-22 RX ADMIN — PANTOPRAZOLE SODIUM 40 MILLIGRAM(S): 20 TABLET, DELAYED RELEASE ORAL at 05:15

## 2019-07-22 RX ADMIN — ATORVASTATIN CALCIUM 80 MILLIGRAM(S): 80 TABLET, FILM COATED ORAL at 21:43

## 2019-07-22 RX ADMIN — Medication 145 MILLIGRAM(S): at 09:06

## 2019-07-22 RX ADMIN — CHLORHEXIDINE GLUCONATE 1 APPLICATION(S): 213 SOLUTION TOPICAL at 17:25

## 2019-07-22 NOTE — SWALLOW BEDSIDE ASSESSMENT ADULT - ADDITIONAL RECOMMENDATIONS
1. Monitor PO tolerance/ intake  2. Monitor for any change in neurological status that may impact oral intake

## 2019-07-22 NOTE — CHART NOTE - NSCHARTNOTEFT_GEN_A_CORE
[place]ed neurology on [id number] to notify of results of MRI of brain; No acute or subacute CVA noted, chronic appearing right AICA territory infarct with chronic lacunar infarcts in basal ganglia and thalami are present.  Due to CHADS-VASc of at least 3 long term anticoagulation is recommended and neurology cleared patient to start AC with recommendation of Eliquis as preferred agent. Neurology to follow up on 7/23 with final note of recommendations. Cardiology notified and agree with Eliquis. Reviewed as well with nephrology given HD, and advised okay to start Eliquis 2.5mg BID. Will start medication and monitor closely.

## 2019-07-22 NOTE — SWALLOW BEDSIDE ASSESSMENT ADULT - SWALLOW EVAL: DIAGNOSIS
1. Functional ora phase for regular solids, nectar thick liquids and thin liquids marked by functional mastication for solids and adequate bolus manipulation/ oral transit time 2. Functional pharyngeal phase for regular solids, nectar thick liquids and thin liquids marked by adequate laryngeal elevation upon digital palpation. Pt noted w/ throat clear post consumption of regular solid not suspected to be related to laryngeal penetration/aspiration. No other overt s/s of laryngeal penetration/aspiration noted 1. Functional ora phase for regular solids, nectar thick liquids and thin liquids marked by functional mastication for solids and adequate bolus manipulation/ oral transit time 2. Functional pharyngeal phase for regular solids, nectar thick liquids and thin liquids marked by adequate laryngeal elevation upon digital palpation. Pt noted w/ throat clear post consumption of regular solid on initial trial and did not duplicate w/ further trials of regular solids. No other overt s/s of laryngeal penetration/aspiration noted

## 2019-07-22 NOTE — PHYSICAL THERAPY INITIAL EVALUATION ADULT - ADDITIONAL COMMENTS
Patient lives in an apartment with steps to enter and within. Patient uses a rollator or Single [place] prior to admission

## 2019-07-22 NOTE — OCCUPATIONAL THERAPY INITIAL EVALUATION ADULT - MD ORDER
Occupational Therapy (OT) to evaluate and treat. Occupational Therapy (OT) to evaluate and treat. Ambulate with walker. Per JOS Sagastume, pt is okay to participate in OT evaluation and perform activity as tolerated.

## 2019-07-22 NOTE — OCCUPATIONAL THERAPY INITIAL EVALUATION ADULT - PERTINENT HX OF CURRENT PROBLEM, REHAB EVAL
Pt is a 79 year old female with history of ESRD on HD (via R forearm AVF), HTN, HLD, and DM2 (currently off meds) who presented to [facility] on 7/19/19 with complaints of dizziness that occured while she was waiting for her HD session earlier this day. MR Head No Contrast on 7/22/19 displayed no acute or subacute infarction. Chronic right AICA territory infarction. Chronic lacunar infarctions in the basal ganglia and thalami. Mild to moderate microvascular ischemic change.

## 2019-07-22 NOTE — OCCUPATIONAL THERAPY INITIAL EVALUATION ADULT - GENERAL OBSERVATIONS, REHAB EVAL
Pt. received semisupine on stretcher from testing outside room 5N 523A. No acute distress. Patient agreed to evaluation from Occupational Therapist. +Anne.

## 2019-07-22 NOTE — OCCUPATIONAL THERAPY INITIAL EVALUATION ADULT - LIVES WITH, PROFILE
Pt. reports she lives with her grandson in an apartment building. Once inside, pt. reports she has full flight of steps to negotiate to 2nd floor where main bedroom and bathroom are located. Per pt., she has a bathtub in her bathroom.

## 2019-07-22 NOTE — SWALLOW BEDSIDE ASSESSMENT ADULT - COMMENTS
Pt is a 80 y/o Female w/ PMH of ESRD on HD, HTN, HLD, DM2 who presents w/ c/o dizziness. As per Neurology note, pt w/ "acute R cerebellar infarct likely 2/2 to cardioembolic etiology in setting of previously documented Afib off anticoagulation".     Pt seen upright at bedside w/ family member present. Pt was alert/awake and easily engaged in conversation. Pt able to answer simple questions and follow simple commands. throat clear noted post consumption of hard solids not suspected to be related to laryngeal penetration/ aspiration throat clear noted post initial trial of hard solids and did not reduplicate w/ subsequent trials

## 2019-07-23 LAB
ANION GAP SERPL CALC-SCNC: 17 MMO/L — HIGH (ref 7–14)
BUN SERPL-MCNC: 81 MG/DL — HIGH (ref 7–23)
CALCIUM SERPL-MCNC: 8.9 MG/DL — SIGNIFICANT CHANGE UP (ref 8.4–10.5)
CHLORIDE SERPL-SCNC: 94 MMOL/L — LOW (ref 98–107)
CO2 SERPL-SCNC: 23 MMOL/L — SIGNIFICANT CHANGE UP (ref 22–31)
CREAT SERPL-MCNC: 8.55 MG/DL — HIGH (ref 0.5–1.3)
GLUCOSE BLDC GLUCOMTR-MCNC: 116 MG/DL — HIGH (ref 70–99)
GLUCOSE BLDC GLUCOMTR-MCNC: 124 MG/DL — HIGH (ref 70–99)
GLUCOSE BLDC GLUCOMTR-MCNC: 126 MG/DL — HIGH (ref 70–99)
GLUCOSE BLDC GLUCOMTR-MCNC: 127 MG/DL — HIGH (ref 70–99)
GLUCOSE BLDC GLUCOMTR-MCNC: 302 MG/DL — HIGH (ref 70–99)
GLUCOSE SERPL-MCNC: 137 MG/DL — HIGH (ref 70–99)
HCT VFR BLD CALC: 38 % — SIGNIFICANT CHANGE UP (ref 34.5–45)
HGB BLD-MCNC: 11.6 G/DL — SIGNIFICANT CHANGE UP (ref 11.5–15.5)
MAGNESIUM SERPL-MCNC: 2.4 MG/DL — SIGNIFICANT CHANGE UP (ref 1.6–2.6)
MCHC RBC-ENTMCNC: 23.7 PG — LOW (ref 27–34)
MCHC RBC-ENTMCNC: 30.5 % — LOW (ref 32–36)
MCV RBC AUTO: 77.6 FL — LOW (ref 80–100)
NRBC # FLD: 0 K/UL — SIGNIFICANT CHANGE UP (ref 0–0)
PHOSPHATE SERPL-MCNC: 4.6 MG/DL — HIGH (ref 2.5–4.5)
PLATELET # BLD AUTO: 218 K/UL — SIGNIFICANT CHANGE UP (ref 150–400)
PMV BLD: SIGNIFICANT CHANGE UP FL (ref 7–13)
POTASSIUM SERPL-MCNC: 4.3 MMOL/L — SIGNIFICANT CHANGE UP (ref 3.5–5.3)
POTASSIUM SERPL-SCNC: 4.3 MMOL/L — SIGNIFICANT CHANGE UP (ref 3.5–5.3)
RBC # BLD: 4.9 M/UL — SIGNIFICANT CHANGE UP (ref 3.8–5.2)
RBC # FLD: 15 % — HIGH (ref 10.3–14.5)
SODIUM SERPL-SCNC: 134 MMOL/L — LOW (ref 135–145)
WBC # BLD: 5.3 K/UL — SIGNIFICANT CHANGE UP (ref 3.8–10.5)
WBC # FLD AUTO: 5.3 K/UL — SIGNIFICANT CHANGE UP (ref 3.8–10.5)

## 2019-07-23 PROCEDURE — 99222 1ST HOSP IP/OBS MODERATE 55: CPT | Mod: GC

## 2019-07-23 RX ORDER — APIXABAN 2.5 MG/1
1 TABLET, FILM COATED ORAL
Qty: 60 | Refills: 0
Start: 2019-07-23 | End: 2019-08-21

## 2019-07-23 RX ADMIN — ATORVASTATIN CALCIUM 80 MILLIGRAM(S): 80 TABLET, FILM COATED ORAL at 21:54

## 2019-07-23 RX ADMIN — CHLORHEXIDINE GLUCONATE 1 APPLICATION(S): 213 SOLUTION TOPICAL at 06:24

## 2019-07-23 RX ADMIN — SERTRALINE 50 MILLIGRAM(S): 25 TABLET, FILM COATED ORAL at 10:32

## 2019-07-23 RX ADMIN — Medication 145 MILLIGRAM(S): at 10:32

## 2019-07-23 RX ADMIN — Medication 81 MILLIGRAM(S): at 10:32

## 2019-07-23 RX ADMIN — Medication 25 MILLIGRAM(S): at 17:40

## 2019-07-23 RX ADMIN — Medication 100 MILLIGRAM(S): at 17:40

## 2019-07-23 RX ADMIN — CHLORHEXIDINE GLUCONATE 1 APPLICATION(S): 213 SOLUTION TOPICAL at 17:40

## 2019-07-23 RX ADMIN — Medication 4: at 17:40

## 2019-07-23 RX ADMIN — APIXABAN 2.5 MILLIGRAM(S): 2.5 TABLET, FILM COATED ORAL at 17:40

## 2019-07-23 NOTE — CONSULT NOTE ADULT - SUBJECTIVE AND OBJECTIVE BOX
HPI:  This is a 79F with history of ESRD on HD (via R forearm AVF), HTN, HLD, and DM2 (currently off meds) who presents to the hospital with complaints of dizziness that occured while she was waiting for her HD session earlier today. Said that she has a history of intermittent dizziness but those episodes would last for a short amount of time and would be associated with nv. Today's episode lasted for longer than usual and she therefore came to the hospital from her HD center. Said that she also had associated nausea/vomiting (NBNB) with her dizziness. Otherwise denied having any other complaints. Said that her dizziness self-resolved on the way to the hospital and her nausea/vomiting has resolved with that also.    On arrival to the hospital, her vitals were T 97.7, P 59, /69, RR 18, O2 sat 100% RA. Her lab work was significant for hyperkalemia but the sample was hemolyzed. She was given alb neb x1 and insulin/D50 and was admitted to medicine. She was seen by nephrology and was scheduled for HD session tonight. (19 Jul 2019 21:38)  . MR Head No Contrast on 7/22/19 displayed no acute or subacute infarction. Chronic right AICA territory infarction. Chronic lacunar infarctions in the basal ganglia and thalami. Mild to moderate microvascular ischemic change.  On ELiquis  Dizziness is better     REVIEW OF SYSTEMS: No chest pain, shortness of breath, nausea, vomiting or diarhea.      PAST MEDICAL & SURGICAL HISTORY  Atrial fibrillation  Anemia  CKD (chronic kidney disease)  Hypercholesteremia  HTN (hypertension)  DM (diabetes mellitus)  H/O colonoscopy with polypectomy  AV fistula  Status post right foot surgery      SOCIAL HISTORY  Smoking - Denied, EtOH - Denied, Drugs - Denied    FUNCTIONAL HISTORY:   Lives with grandson  Independent PTA     CURRENT FUNCTIONAL STATUS: supervision       FAMILY HISTORY   Family history of malignant neoplasm of gastrointestinal tract  Family history of lung cancer (Sibling)  Family history of diabetes mellitus  Family history of hypertension (Sibling)  No pertinent family history in first degree relatives      RECENT LABS/IMAGING  CBC Full  -  ( 23 Jul 2019 11:30 )  WBC Count : 5.30 K/uL  RBC Count : 4.90 M/uL  Hemoglobin : 11.6 g/dL  Hematocrit : 38.0 %  Platelet Count - Automated : 218 K/uL  Mean Cell Volume : 77.6 fL  Mean Cell Hemoglobin : 23.7 pg  Mean Cell Hemoglobin Concentration : 30.5 %  Auto Neutrophil # : x  Auto Lymphocyte # : x  Auto Monocyte # : x  Auto Eosinophil # : x  Auto Basophil # : x  Auto Neutrophil % : x  Auto Lymphocyte % : x  Auto Monocyte % : x  Auto Eosinophil % : x  Auto Basophil % : x    07-23    134<L>  |  94<L>  |  81<H>  ----------------------------<  137<H>  4.3   |  23  |  8.55<H>    Ca    8.9      23 Jul 2019 11:30  Phos  4.6     07-23  Mg     2.4     07-23          VITALS  T(C): 36.5 (07-23-19 @ 14:35), Max: 36.8 (07-22-19 @ 21:42)  HR: 58 (07-23-19 @ 14:35) (56 - 60)  BP: 128/52 (07-23-19 @ 14:35) (128/52 - 142/67)  RR: 17 (07-23-19 @ 14:35) (16 - 17)  SpO2: 100% (07-23-19 @ 06:23) (100% - 100%)  Wt(kg): --    ALLERGIES  No Known Allergies      MEDICATIONS   apixaban 2.5 milliGRAM(s) Oral two times a day  aspirin  chewable 81 milliGRAM(s) Oral daily  atorvastatin 80 milliGRAM(s) Oral at bedtime  chlorhexidine 4% Liquid 1 Application(s) Topical every 12 hours  dextrose 40% Gel 15 Gram(s) Oral once PRN  dextrose 5%. 1000 milliLiter(s) IV Continuous <Continuous>  dextrose 50% Injectable 12.5 Gram(s) IV Push once  dextrose 50% Injectable 25 Gram(s) IV Push once  dextrose 50% Injectable 25 Gram(s) IV Push once  fenofibrate Tablet 145 milliGRAM(s) Oral daily  glucagon  Injectable 1 milliGRAM(s) IntraMuscular once PRN  hydrALAZINE 25 milliGRAM(s) Oral two times a day  insulin lispro (HumaLOG) corrective regimen sliding scale   SubCutaneous three times a day before meals  insulin lispro (HumaLOG) corrective regimen sliding scale   SubCutaneous at bedtime  meclizine 25 milliGRAM(s) Oral three times a day PRN  metoprolol tartrate 100 milliGRAM(s) Oral two times a day  pantoprazole    Tablet 40 milliGRAM(s) Oral before breakfast  sertraline 50 milliGRAM(s) Oral daily      ----------------------------------------------------------------------------------------  PHYSICAL EXAM  Constitutional - NAD, Comfortable  HEENT - NCAT, EOMI  Neck - Supple, No limited ROM  Chest - CTA bilaterally, No wheeze, No rhonchi, No crackles  Cardiovascular - RRR, S1S2, No murmurs  Abdomen - BS+, Soft, NTND  Extremities - No C/C/E, No calf tenderness   Neurologic Exam -                    Cognitive - Awake, Alert, AAO to self, place, date, year, situation     Communication - Fluent, No dysarthria, no aphasia     Cranial Nerves - CN 2-12 intact     Motor - No focal deficits                       Sensory - Intact to LT     Reflexes - DTR Intact, No primitive reflexive     Balance - WNL Static  Psychiatric - Mood stable, Affect WNL

## 2019-07-24 LAB
ANION GAP SERPL CALC-SCNC: 12 MMO/L — SIGNIFICANT CHANGE UP (ref 7–14)
BUN SERPL-MCNC: 49 MG/DL — HIGH (ref 7–23)
CALCIUM SERPL-MCNC: 9.1 MG/DL — SIGNIFICANT CHANGE UP (ref 8.4–10.5)
CHLORIDE SERPL-SCNC: 93 MMOL/L — LOW (ref 98–107)
CO2 SERPL-SCNC: 29 MMOL/L — SIGNIFICANT CHANGE UP (ref 22–31)
CREAT SERPL-MCNC: 6.27 MG/DL — HIGH (ref 0.5–1.3)
GLUCOSE BLDC GLUCOMTR-MCNC: 109 MG/DL — HIGH (ref 70–99)
GLUCOSE BLDC GLUCOMTR-MCNC: 150 MG/DL — HIGH (ref 70–99)
GLUCOSE BLDC GLUCOMTR-MCNC: 207 MG/DL — HIGH (ref 70–99)
GLUCOSE BLDC GLUCOMTR-MCNC: 295 MG/DL — HIGH (ref 70–99)
GLUCOSE SERPL-MCNC: 115 MG/DL — HIGH (ref 70–99)
HCT VFR BLD CALC: 41.3 % — SIGNIFICANT CHANGE UP (ref 34.5–45)
HGB BLD-MCNC: 12.4 G/DL — SIGNIFICANT CHANGE UP (ref 11.5–15.5)
MCHC RBC-ENTMCNC: 23.6 PG — LOW (ref 27–34)
MCHC RBC-ENTMCNC: 30 % — LOW (ref 32–36)
MCV RBC AUTO: 78.5 FL — LOW (ref 80–100)
NRBC # FLD: 0 K/UL — SIGNIFICANT CHANGE UP (ref 0–0)
PLATELET # BLD AUTO: 190 K/UL — SIGNIFICANT CHANGE UP (ref 150–400)
PMV BLD: SIGNIFICANT CHANGE UP FL (ref 7–13)
POTASSIUM SERPL-MCNC: 4.3 MMOL/L — SIGNIFICANT CHANGE UP (ref 3.5–5.3)
POTASSIUM SERPL-SCNC: 4.3 MMOL/L — SIGNIFICANT CHANGE UP (ref 3.5–5.3)
RBC # BLD: 5.26 M/UL — HIGH (ref 3.8–5.2)
RBC # FLD: 15.2 % — HIGH (ref 10.3–14.5)
SODIUM SERPL-SCNC: 134 MMOL/L — LOW (ref 135–145)
WBC # BLD: 4.34 K/UL — SIGNIFICANT CHANGE UP (ref 3.8–10.5)
WBC # FLD AUTO: 4.34 K/UL — SIGNIFICANT CHANGE UP (ref 3.8–10.5)

## 2019-07-24 RX ORDER — ASPIRIN/CALCIUM CARB/MAGNESIUM 324 MG
1 TABLET ORAL
Qty: 0 | Refills: 0 | DISCHARGE
Start: 2019-07-24

## 2019-07-24 RX ADMIN — Medication 100 MILLIGRAM(S): at 22:29

## 2019-07-24 RX ADMIN — CHLORHEXIDINE GLUCONATE 1 APPLICATION(S): 213 SOLUTION TOPICAL at 22:29

## 2019-07-24 RX ADMIN — PANTOPRAZOLE SODIUM 40 MILLIGRAM(S): 20 TABLET, DELAYED RELEASE ORAL at 07:01

## 2019-07-24 RX ADMIN — Medication 100 MILLIGRAM(S): at 07:01

## 2019-07-24 RX ADMIN — SERTRALINE 50 MILLIGRAM(S): 25 TABLET, FILM COATED ORAL at 09:10

## 2019-07-24 RX ADMIN — Medication 1: at 22:31

## 2019-07-24 RX ADMIN — APIXABAN 2.5 MILLIGRAM(S): 2.5 TABLET, FILM COATED ORAL at 22:29

## 2019-07-24 RX ADMIN — Medication 25 MILLIGRAM(S): at 07:01

## 2019-07-24 RX ADMIN — Medication 145 MILLIGRAM(S): at 09:10

## 2019-07-24 RX ADMIN — CHLORHEXIDINE GLUCONATE 1 APPLICATION(S): 213 SOLUTION TOPICAL at 07:01

## 2019-07-24 RX ADMIN — Medication 2: at 13:16

## 2019-07-24 RX ADMIN — Medication 25 MILLIGRAM(S): at 22:29

## 2019-07-24 RX ADMIN — ATORVASTATIN CALCIUM 80 MILLIGRAM(S): 80 TABLET, FILM COATED ORAL at 22:29

## 2019-07-24 RX ADMIN — APIXABAN 2.5 MILLIGRAM(S): 2.5 TABLET, FILM COATED ORAL at 07:01

## 2019-07-24 RX ADMIN — Medication 81 MILLIGRAM(S): at 09:09

## 2019-07-25 VITALS
OXYGEN SATURATION: 95 % | TEMPERATURE: 97 F | HEART RATE: 63 BPM | DIASTOLIC BLOOD PRESSURE: 67 MMHG | SYSTOLIC BLOOD PRESSURE: 132 MMHG | RESPIRATION RATE: 18 BRPM

## 2019-07-25 LAB
GLUCOSE BLDC GLUCOMTR-MCNC: 131 MG/DL — HIGH (ref 70–99)
GLUCOSE BLDC GLUCOMTR-MCNC: 157 MG/DL — HIGH (ref 70–99)

## 2019-07-25 RX ADMIN — Medication 25 MILLIGRAM(S): at 05:32

## 2019-07-25 RX ADMIN — Medication 145 MILLIGRAM(S): at 09:03

## 2019-07-25 RX ADMIN — SERTRALINE 50 MILLIGRAM(S): 25 TABLET, FILM COATED ORAL at 09:03

## 2019-07-25 RX ADMIN — PANTOPRAZOLE SODIUM 40 MILLIGRAM(S): 20 TABLET, DELAYED RELEASE ORAL at 05:32

## 2019-07-25 RX ADMIN — APIXABAN 2.5 MILLIGRAM(S): 2.5 TABLET, FILM COATED ORAL at 05:32

## 2019-07-25 RX ADMIN — Medication 81 MILLIGRAM(S): at 09:03

## 2019-07-25 RX ADMIN — Medication 1: at 09:03

## 2019-07-25 RX ADMIN — CHLORHEXIDINE GLUCONATE 1 APPLICATION(S): 213 SOLUTION TOPICAL at 05:32

## 2019-07-25 NOTE — PROGRESS NOTE ADULT - ATTENDING COMMENTS
Agree with above NP note.  cv stable  cont current tx
agree with above  AC per neuro  cont current mgmt
Dr Ascencion Hicks  Nephrology  Office [phone]  Ans Serv [phone]  [facility] [phone]
Dr Ascencion Hicks  Nephrology  Office [phone]  Ans Serv [phone]  [facility] [phone]
For any question, call:  Cell # [phone]  Pager # [phone]  Callback # [phone]

## 2019-07-25 NOTE — PROGRESS NOTE ADULT - SUBJECTIVE AND OBJECTIVE BOX
Nephrology Followup Note - [phone] - Dr Hicks / Dr Hughes / Dr Storey / Dr Avina / Dr Patel / Dr Brannon / Dr Madrigal / Dr Kolb  Pt seen and examined at bedside  No acute events overnight. No complaints.     Allergies:  No Known Allergies    Hospital Medications:   MEDICATIONS  (STANDING):  aspirin  chewable 81 milliGRAM(s) Oral daily  atorvastatin 80 milliGRAM(s) Oral at bedtime  chlorhexidine 4% Liquid 1 Application(s) Topical every 12 hours  dextrose 5%. 1000 milliLiter(s) (50 mL/Hr) IV Continuous <Continuous>  dextrose 50% Injectable 12.5 Gram(s) IV Push once  dextrose 50% Injectable 25 Gram(s) IV Push once  dextrose 50% Injectable 25 Gram(s) IV Push once  fenofibrate Tablet 145 milliGRAM(s) Oral daily  heparin  Injectable 5000 Unit(s) SubCutaneous every 8 hours  hydrALAZINE 25 milliGRAM(s) Oral two times a day  insulin lispro (HumaLOG) corrective regimen sliding scale   SubCutaneous three times a day before meals  insulin lispro (HumaLOG) corrective regimen sliding scale   SubCutaneous at bedtime  metoprolol tartrate 100 milliGRAM(s) Oral two times a day  pantoprazole    Tablet 40 milliGRAM(s) Oral before breakfast  sertraline 50 milliGRAM(s) Oral daily    VITALS:  T(F): 98.2 (07-22-19 @ 15:55), Max: 98.2 (07-21-19 @ 17:45)  HR: 58 (07-22-19 @ 15:55)  BP: 136/58 (07-22-19 @ 15:55)  RR: 17 (07-22-19 @ 15:55)  SpO2: 100% (07-22-19 @ 15:55)  Wt(kg): --    07-21 @ 07:01  -  07-22 @ 07:00  --------------------------------------------------------  IN: 300 mL / OUT: 0 mL / NET: 300 mL        PHYSICAL EXAM:  Constitutional: NAD  HEENT: anicteric sclera, oropharynx clear, MMM  Neck: No JVD  Respiratory: CTAB, no wheezes, rales or rhonchi  Cardiovascular: S1, S2, RRR  Gastrointestinal: BS+, soft, NT/ND  Extremities: No cyanosis or clubbing. No peripheral edema  Neurological: A/O x 3, no focal deficits  Psychiatric: Normal mood, normal affect  : No CVA tenderness. No calvin.   Skin: No rashes  Vascular Access: UE AV access +thrill and bruit.     LABS:  07-21    137  |  95<L>  |  36<H>  ----------------------------<  111<H>  3.8   |  28  |  5.66<H>    Ca    8.9      21 Jul 2019 07:23  Phos  4.6     07-21  Mg     2.1     07-21      Creatinine Trend: 5.66 <--, 3.39 <--, 5.88 <--, 5.73 <--, 5.75 <--                        12.4   3.57  )-----------( 206      ( 21 Jul 2019 07:23 )             42.1     Urine Studies:      RADIOLOGY & ADDITIONAL STUDIES:  < from: Transthoracic Echocardiogram (07.22.19 @ 14:39) >  CONCLUSIONS:  1. Mitral annular calcification, otherwise normal mitral  valve. Mild mitral regurgitation.  2. Moderately dilated left atrium.  LA volume index = 45  cc/m2.  3. Normal left ventricular internal dimensions and wall  thicknesses.  4. Normal left ventricular systolic function. No segmental  wall motion abnormalities.  5. Normal right ventricular size and function.  6. Estimated right ventricular systolic pressure equals 44  mm Hg, assuming right atrial pressure equals 10 mm Hg,  consistent with mild pulmonary hypertension.  7. Small pericardial effusion posterior and lateral to the  left ventricle.  8. A bubble study was performed with the intravenous  injection of agitated saline contrast and was inconclusive  regarding the presence of an interatrial shunt.  No obvious cardiac source of embolus was identified on this  transthoracic study.  If clinical suspicion is high,  consider DAYAN for further evaluation.    < end of copied text >
VINCENT GROSS  79y  Female      Patient is a 79y old  Female who presents with a chief complaint of Dizziness, Hyperkalemia (21 Jul 2019 13:10)  -Patient seen and examined.chart reviewed.covering   -no cp,no sob,no headaches,no fever    REVIEW OF SYSTEMS:  as above      INTERVAL HPI/OVERNIGHT EVENTS:  T(C): 36.4 (07-21-19 @ 19:41), Max: 37 (07-21-19 @ 03:00)  HR: 64 (07-21-19 @ 19:41) (57 - 79)  BP: 126/50 (07-21-19 @ 19:41) (126/50 - 149/49)  RR: 17 (07-21-19 @ 19:41) (16 - 17)  SpO2: 100% (07-21-19 @ 19:41) (96% - 100%)  Wt(kg): --  I&O's Summary    T(C): 36.4 (07-21-19 @ 19:41), Max: 37 (07-21-19 @ 03:00)  HR: 64 (07-21-19 @ 19:41) (57 - 79)  BP: 126/50 (07-21-19 @ 19:41) (126/50 - 149/49)  RR: 17 (07-21-19 @ 19:41) (16 - 17)  SpO2: 100% (07-21-19 @ 19:41) (96% - 100%)  Wt(kg): --Vital Signs Last 24 Hrs  T(C): 36.4 (21 Jul 2019 19:41), Max: 37 (21 Jul 2019 03:00)  T(F): 97.5 (21 Jul 2019 19:41), Max: 98.6 (21 Jul 2019 03:00)  HR: 64 (21 Jul 2019 19:41) (57 - 79)  BP: 126/50 (21 Jul 2019 19:41) (126/50 - 149/49)  BP(mean): --  RR: 17 (21 Jul 2019 19:41) (16 - 17)  SpO2: 100% (21 Jul 2019 19:41) (96% - 100%)    LABS:                        12.4   3.57  )-----------( 206      ( 21 Jul 2019 07:23 )             42.1     07-21    137  |  95<L>  |  36<H>  ----------------------------<  111<H>  3.8   |  28  |  5.66<H>    Ca    8.9      21 Jul 2019 07:23  Phos  4.6     07-21  Mg     2.1     07-21      PT/INR - ( 20 Jul 2019 16:23 )   PT: 11.9 SEC;   INR: 1.04          PTT - ( 20 Jul 2019 16:23 )  PTT:41.8 SEC    CAPILLARY BLOOD GLUCOSE      POCT Blood Glucose.: 207 mg/dL (21 Jul 2019 21:13)  POCT Blood Glucose.: 112 mg/dL (21 Jul 2019 17:30)  POCT Blood Glucose.: 158 mg/dL (21 Jul 2019 12:36)  POCT Blood Glucose.: 119 mg/dL (21 Jul 2019 08:50)            PAST MEDICAL & SURGICAL HISTORY:  Atrial fibrillation  Anemia  CKD (chronic kidney disease): now on HD via R forearm AVF  Hypercholesteremia  HTN (hypertension)  DM (diabetes mellitus)  H/O colonoscopy with polypectomy  AV fistula: June 2018, 2 ballooning (last one 2 weeks ago), following up on Dec 15th  Status post right foot surgery: hammer toe repair      MEDICATIONS  (STANDING):  aspirin  chewable 81 milliGRAM(s) Oral daily  atorvastatin 80 milliGRAM(s) Oral at bedtime  chlorhexidine 4% Liquid 1 Application(s) Topical every 12 hours  dextrose 5%. 1000 milliLiter(s) (50 mL/Hr) IV Continuous <Continuous>  dextrose 50% Injectable 12.5 Gram(s) IV Push once  dextrose 50% Injectable 25 Gram(s) IV Push once  dextrose 50% Injectable 25 Gram(s) IV Push once  fenofibrate Tablet 145 milliGRAM(s) Oral daily  heparin  Injectable 5000 Unit(s) SubCutaneous every 8 hours  hydrALAZINE 25 milliGRAM(s) Oral two times a day  insulin lispro (HumaLOG) corrective regimen sliding scale   SubCutaneous three times a day before meals  insulin lispro (HumaLOG) corrective regimen sliding scale   SubCutaneous at bedtime  metoprolol tartrate 100 milliGRAM(s) Oral two times a day  pantoprazole    Tablet 40 milliGRAM(s) Oral before breakfast  sertraline 50 milliGRAM(s) Oral daily    MEDICATIONS  (PRN):  dextrose 40% Gel 15 Gram(s) Oral once PRN Blood Glucose LESS THAN 70 milliGRAM(s)/deciliter  glucagon  Injectable 1 milliGRAM(s) IntraMuscular once PRN Glucose LESS THAN 70 milligrams/deciliter  meclizine 25 milliGRAM(s) Oral three times a day PRN Dizziness        RADIOLOGY & ADDITIONAL TESTS:    Imaging Personally Reviewed:  [ ] YES  [ ] NO    Consultant(s) Notes Reviewed:  [x ] YES  [ ] NO    PHYSICAL EXAM:  GENERAL: NAD, well-groomed, well-developed  HEAD:  Atraumatic, Normocephalic  NECK: Supple, No JVD, Normal thyroid  NERVOUS SYSTEM:  Alert & Oriented X2, nonfocal  CHEST/LUNG: Clear to percussion bilaterally; No rales, rhonchi, wheezing, or rubs  HEART: Regular rate and rhythm; No murmurs, rubs, or gallops  ABDOMEN: Soft, Nontender, Nondistended; Bowel sounds present  EXTREMITIES:  2+ Peripheral Pulses, No clubbing, cyanosis, or edema    Care Discussed with Consultants/Other Providers [ ] YES  [ ] NO      Code Status: [] Full Code [] DNR [] DNI [] Goals of Care:   Disposition: [] ICU [] Stroke Unit [] RCU []PCU []Floor [] Discharge Home
CARDIOLOGY FOLLOW UP - Dr. Corcoran    CC no cp or sob        PHYSICAL EXAM:  T(C): 36.3 (07-25-19 @ 13:17), Max: 37.1 (07-24-19 @ 22:27)  HR: 63 (07-25-19 @ 13:17) (57 - 68)  BP: 132/67 (07-25-19 @ 13:17) (118/57 - 148/60)  RR: 18 (07-25-19 @ 13:17) (17 - 18)  SpO2: 95% (07-25-19 @ 13:17) (95% - 100%)  Wt(kg): --  I&O's Summary    24 Jul 2019 07:01  -  25 Jul 2019 07:00  --------------------------------------------------------  IN: 400 mL / OUT: 1900 mL / NET: -1500 mL        Appearance: Normal	  Cardiovascular: Normal S1 S2,RRR, No JVD, No murmurs  Respiratory: Lungs clear to auscultation	  Gastrointestinal:  Soft, Non-tender, + BS	  Extremities: Normal range of motion, No clubbing, cyanosis or edema        MEDICATIONS  (STANDING):  apixaban 2.5 milliGRAM(s) Oral two times a day  aspirin  chewable 81 milliGRAM(s) Oral daily  atorvastatin 80 milliGRAM(s) Oral at bedtime  chlorhexidine 4% Liquid 1 Application(s) Topical every 12 hours  dextrose 5%. 1000 milliLiter(s) (50 mL/Hr) IV Continuous <Continuous>  dextrose 50% Injectable 12.5 Gram(s) IV Push once  dextrose 50% Injectable 25 Gram(s) IV Push once  dextrose 50% Injectable 25 Gram(s) IV Push once  fenofibrate Tablet 145 milliGRAM(s) Oral daily  hydrALAZINE 25 milliGRAM(s) Oral two times a day  insulin lispro (HumaLOG) corrective regimen sliding scale   SubCutaneous three times a day before meals  insulin lispro (HumaLOG) corrective regimen sliding scale   SubCutaneous at bedtime  metoprolol tartrate 100 milliGRAM(s) Oral two times a day  pantoprazole    Tablet 40 milliGRAM(s) Oral before breakfast  sertraline 50 milliGRAM(s) Oral daily      TELEMETRY: nsr 	    ECG:  	  RADIOLOGY:   DIAGNOSTIC TESTING:  [ ] Echocardiogram:  [ ]  Catheterization:  [ ] Stress Test:    OTHER: 	    LABS:	 	                            12.4   4.34  )-----------( 190      ( 24 Jul 2019 06:30 )             41.3     07-24    134<L>  |  93<L>  |  49<H>  ----------------------------<  115<H>  4.3   |  29  |  6.27<H>    Ca    9.1      24 Jul 2019 06:30
CARDIOLOGY FOLLOW UP - Dr. Corcoran    CC no cp or sob       PHYSICAL EXAM:  T(C): 36.6 (07-22-19 @ 05:13), Max: 36.8 (07-21-19 @ 17:45)  HR: 60 (07-22-19 @ 05:13) (60 - 64)  BP: 132/57 (07-22-19 @ 05:13) (126/50 - 149/49)  RR: 17 (07-22-19 @ 05:13) (16 - 17)  SpO2: 100% (07-22-19 @ 05:13) (100% - 100%)  Wt(kg): --  I&O's Summary    21 Jul 2019 07:01  -  22 Jul 2019 07:00  --------------------------------------------------------  IN: 300 mL / OUT: 0 mL / NET: 300 mL        Appearance: Normal	  Cardiovascular: Normal S1 S2,RRR, No JVD, No murmurs  Respiratory: Lungs clear to auscultation	  Gastrointestinal:  Soft, Non-tender, + BS	  Extremities: Normal range of motion, No clubbing, cyanosis or edema        MEDICATIONS  (STANDING):  aspirin  chewable 81 milliGRAM(s) Oral daily  atorvastatin 80 milliGRAM(s) Oral at bedtime  chlorhexidine 4% Liquid 1 Application(s) Topical every 12 hours  dextrose 5%. 1000 milliLiter(s) (50 mL/Hr) IV Continuous <Continuous>  dextrose 50% Injectable 12.5 Gram(s) IV Push once  dextrose 50% Injectable 25 Gram(s) IV Push once  dextrose 50% Injectable 25 Gram(s) IV Push once  fenofibrate Tablet 145 milliGRAM(s) Oral daily  heparin  Injectable 5000 Unit(s) SubCutaneous every 8 hours  hydrALAZINE 25 milliGRAM(s) Oral two times a day  insulin lispro (HumaLOG) corrective regimen sliding scale   SubCutaneous three times a day before meals  insulin lispro (HumaLOG) corrective regimen sliding scale   SubCutaneous at bedtime  metoprolol tartrate 100 milliGRAM(s) Oral two times a day  pantoprazole    Tablet 40 milliGRAM(s) Oral before breakfast  sertraline 50 milliGRAM(s) Oral daily      TELEMETRY: SB     ECG:  	  RADIOLOGY:   DIAGNOSTIC TESTING:  [ ] Echocardiogram:  [ ]  Catheterization:  [ ] Stress Test:    OTHER: 	  < from: MR Head No Cont (07.22.19 @ 11:12) >    IMPRESSION:  No acute or subacute infarction.  Chronic right AICA territory infarction. Chronic lacunar infarctions in   the basal ganglia and thalami.  Mild to moderate microvascular ischemic change.        < end of copied text >    LABS:	 	                            12.4   3.57  )-----------( 206      ( 21 Jul 2019 07:23 )             42.1     07-21    137  |  95<L>  |  36<H>  ----------------------------<  111<H>  3.8   |  28  |  5.66<H>    Ca    8.9      21 Jul 2019 07:23  Phos  4.6     07-21  Mg     2.1     07-21      PT/INR - ( 20 Jul 2019 16:23 )   PT: 11.9 SEC;   INR: 1.04          PTT - ( 20 Jul 2019 16:23 )  PTT:41.8 SEC
CC: no events    TELEMETRY:     PHYSICAL EXAM:    T(C): 36.8 (07-24-19 @ 12:14), Max: 37.3 (07-23-19 @ 21:53)  HR: 53 (07-24-19 @ 12:14) (53 - 62)  BP: 123/64 (07-24-19 @ 12:14) (123/64 - 142/65)  RR: 16 (07-24-19 @ 12:14) (16 - 18)  SpO2: 100% (07-24-19 @ 12:14) (99% - 100%)  Wt(kg): --  I&O's Summary    23 Jul 2019 07:01  -  24 Jul 2019 07:00  --------------------------------------------------------  IN: 500 mL / OUT: 1986 mL / NET: -1486 mL        Appearance: Normal	  Cardiovascular: Normal S1 S2,RRR, No JVD, No murmurs  Respiratory: Lungs clear to auscultation	  Gastrointestinal:  Soft, Non-tender, + BS	  Extremities: Normal range of motion, No clubbing, cyanosis or edema  Vascular: Peripheral pulses palpable 2+ bilaterally     LABS:	 	                          12.4   4.34  )-----------( 190      ( 24 Jul 2019 06:30 )             41.3     07-24    134<L>  |  93<L>  |  49<H>  ----------------------------<  115<H>  4.3   |  29  |  6.27<H>    Ca    9.1      24 Jul 2019 06:30  Phos  4.6     07-23  Mg     2.4     07-23            CARDIAC MARKERS:
CC: no events, AC started    TELEMETRY:     PHYSICAL EXAM:    T(C): 36.3 (07-23-19 @ 06:23), Max: 36.8 (07-22-19 @ 15:55)  HR: 59 (07-23-19 @ 06:23) (58 - 60)  BP: 130/60 (07-23-19 @ 06:23) (130/60 - 142/67)  RR: 16 (07-23-19 @ 06:23) (16 - 17)  SpO2: 100% (07-23-19 @ 06:23) (100% - 100%)  Wt(kg): --  I&O's Summary    22 Jul 2019 07:01  -  23 Jul 2019 07:00  --------------------------------------------------------  IN: 200 mL / OUT: 0 mL / NET: 200 mL        Appearance: Normal	  Cardiovascular: Normal S1 S2,RRR, No JVD, No murmurs  Respiratory: Lungs clear to auscultation	  Gastrointestinal:  Soft, Non-tender, + BS	  Extremities: Normal range of motion, No clubbing, cyanosis or edema  Vascular: Peripheral pulses palpable 2+ bilaterally     LABS:	 	                  CARDIAC MARKERS:        MEDICATIONS  (STANDING):  apixaban 2.5 milliGRAM(s) Oral two times a day  aspirin  chewable 81 milliGRAM(s) Oral daily  atorvastatin 80 milliGRAM(s) Oral at bedtime  chlorhexidine 4% Liquid 1 Application(s) Topical every 12 hours  dextrose 5%. 1000 milliLiter(s) (50 mL/Hr) IV Continuous <Continuous>  dextrose 50% Injectable 12.5 Gram(s) IV Push once  dextrose 50% Injectable 25 Gram(s) IV Push once  dextrose 50% Injectable 25 Gram(s) IV Push once  fenofibrate Tablet 145 milliGRAM(s) Oral daily  hydrALAZINE 25 milliGRAM(s) Oral two times a day  insulin lispro (HumaLOG) corrective regimen sliding scale   SubCutaneous three times a day before meals  insulin lispro (HumaLOG) corrective regimen sliding scale   SubCutaneous at bedtime  metoprolol tartrate 100 milliGRAM(s) Oral two times a day  pantoprazole    Tablet 40 milliGRAM(s) Oral before breakfast  sertraline 50 milliGRAM(s) Oral daily
[facility] - MARK Hughes / MARK Hicks / CESAR Avina/ MARK Patel/ MARK Storey/ ANNA Madrigal / MATHEW Kolb / ERNESTO Brannon  ---------------------------------------------------------------------------------------------------------------  seen and examined today for ESRD on HD  Interval : no complaints  VITALS:  T(F): 98.3 (07-21-19 @ 05:05), Max: 98.7 (07-20-19 @ 20:00)  HR: 58 (07-21-19 @ 05:05)  BP: 139/76 (07-21-19 @ 05:05)  RR: 16 (07-21-19 @ 05:05)  SpO2: 96% (07-21-19 @ 05:05)  Wt(kg): --    Physical Exam :-  Constitutional: NAD  Neck: Supple.  Respiratory: Bilateral equal breath sounds,  Cardiovascular: S1, S2 normal,  Gastrointestinal: Bowel Sounds present, soft, non tender.  Extremities: No edema  Neurological: Alert and Oriented x 3, no focal deficits  Psychiatric: Normal mood, normal affect  Data:-  Allergies :   No Known Allergies    Hospital Medications:   MEDICATIONS  (STANDING):  aspirin  chewable 81 milliGRAM(s) Oral daily  atorvastatin 80 milliGRAM(s) Oral at bedtime  chlorhexidine 4% Liquid 1 Application(s) Topical every 12 hours  dextrose 5%. 1000 milliLiter(s) (50 mL/Hr) IV Continuous <Continuous>  dextrose 50% Injectable 12.5 Gram(s) IV Push once  dextrose 50% Injectable 25 Gram(s) IV Push once  dextrose 50% Injectable 25 Gram(s) IV Push once  fenofibrate Tablet 145 milliGRAM(s) Oral daily  heparin  Injectable 5000 Unit(s) SubCutaneous every 8 hours  hydrALAZINE 25 milliGRAM(s) Oral two times a day  insulin lispro (HumaLOG) corrective regimen sliding scale   SubCutaneous three times a day before meals  insulin lispro (HumaLOG) corrective regimen sliding scale   SubCutaneous at bedtime  metoprolol tartrate 100 milliGRAM(s) Oral two times a day  pantoprazole    Tablet 40 milliGRAM(s) Oral before breakfast  sertraline 50 milliGRAM(s) Oral daily    07-21    137  |  95<L>  |  36<H>  ----------------------------<  111<H>  3.8   |  28  |  5.66<H>    Ca    8.9      21 Jul 2019 07:23  Phos  4.6     07-21  Mg     2.1     07-21    TPro  8.3  /  Alb  4.0  /  TBili  0.4  /  DBili      /  AST  45<H>  /  ALT  9   /  AlkPhos  76  07-19    Creatinine Trend: 5.66 <--, 3.39 <--, 5.88 <--, 5.73 <--, 5.75 <--                        12.4   3.57  )-----------( 206      ( 21 Jul 2019 07:23 )             42.1
Nephrology Followup Note - [phone] - Dr Hicks / Dr Hughes / Dr Storey / Dr Avina / Dr Patel / Dr Brannon / Dr Madrigal / Dr Kolb  Pt seen and examined at bedside  No acute events overnight. No complaints. Anxious to return home     Allergies:  No Known Allergies    Hospital Medications:   MEDICATIONS  (STANDING):  apixaban 2.5 milliGRAM(s) Oral two times a day  aspirin  chewable 81 milliGRAM(s) Oral daily  atorvastatin 80 milliGRAM(s) Oral at bedtime  chlorhexidine 4% Liquid 1 Application(s) Topical every 12 hours  dextrose 5%. 1000 milliLiter(s) (50 mL/Hr) IV Continuous <Continuous>  dextrose 50% Injectable 12.5 Gram(s) IV Push once  dextrose 50% Injectable 25 Gram(s) IV Push once  dextrose 50% Injectable 25 Gram(s) IV Push once  fenofibrate Tablet 145 milliGRAM(s) Oral daily  hydrALAZINE 25 milliGRAM(s) Oral two times a day  insulin lispro (HumaLOG) corrective regimen sliding scale   SubCutaneous three times a day before meals  insulin lispro (HumaLOG) corrective regimen sliding scale   SubCutaneous at bedtime  metoprolol tartrate 100 milliGRAM(s) Oral two times a day  pantoprazole    Tablet 40 milliGRAM(s) Oral before breakfast  sertraline 50 milliGRAM(s) Oral daily      VITALS:  T(F): 98.3 (07-24-19 @ 12:14), Max: 99.2 (07-23-19 @ 21:53)  HR: 53 (07-24-19 @ 12:14)  BP: 123/64 (07-24-19 @ 12:14)  RR: 16 (07-24-19 @ 12:14)  SpO2: 100% (07-24-19 @ 12:14)  Wt(kg): --    07-23 @ 07:01  -  07-24 @ 07:00  --------------------------------------------------------  IN: 500 mL / OUT: 1986 mL / NET: -1486 mL        PHYSICAL EXAM:  Constitutional: NAD  HEENT: anicteric sclera, oropharynx clear, MMM  Neck: No JVD  Respiratory: CTAB, no wheezes, rales or rhonchi  Cardiovascular: S1, S2, RRR  Gastrointestinal: BS+, soft, NT/ND  Extremities: No cyanosis or clubbing. No peripheral edema  Neurological: A/O x 3, no focal deficits  Psychiatric: Normal mood, normal affect  : No CVA tenderness. No calvin.   Skin: No rashes  Vascular Access: LUE AV access +thrill and bruit.     LABS:  07-24    134<L>  |  93<L>  |  49<H>  ----------------------------<  115<H>  4.3   |  29  |  6.27<H>    Ca    9.1      24 Jul 2019 06:30  Phos  4.6     07-23  Mg     2.4     07-23      Creatinine Trend: 6.27 <--, 8.55 <--, 5.66 <--, 3.39 <--, 5.88 <--, 5.73 <--, 5.75 <--                        12.4   4.34  )-----------( 190      ( 24 Jul 2019 06:30 )             41.3     Urine Studies:      RADIOLOGY & ADDITIONAL STUDIES:
Nephrology Followup Note - [phone] - Dr Hicks / Dr Hughes / Dr Storey / Dr Avina / Dr Paetl / Dr Brannon / Dr Madrigal / Dr Kolb  Pt seen and examined at bedside  No acute events overnight. Pt feeling much improved. No complaints.     Allergies:  No Known Allergies    Hospital Medications:   MEDICATIONS  (STANDING):  atorvastatin 80 milliGRAM(s) Oral at bedtime  chlorhexidine 4% Liquid 1 Application(s) Topical every 12 hours  dextrose 5%. 1000 milliLiter(s) (50 mL/Hr) IV Continuous <Continuous>  dextrose 50% Injectable 12.5 Gram(s) IV Push once  dextrose 50% Injectable 25 Gram(s) IV Push once  dextrose 50% Injectable 25 Gram(s) IV Push once  fenofibrate Tablet 145 milliGRAM(s) Oral daily  heparin  Injectable 5000 Unit(s) SubCutaneous every 8 hours  hydrALAZINE 25 milliGRAM(s) Oral two times a day  insulin lispro (HumaLOG) corrective regimen sliding scale   SubCutaneous three times a day before meals  insulin lispro (HumaLOG) corrective regimen sliding scale   SubCutaneous at bedtime  metoprolol tartrate 100 milliGRAM(s) Oral two times a day  pantoprazole    Tablet 40 milliGRAM(s) Oral before breakfast  sertraline 50 milliGRAM(s) Oral daily    VITALS:  T(F): 97.9 (07-20-19 @ 10:10), Max: 98.8 (07-19-19 @ 19:40)  HR: 68 (07-20-19 @ 06:33)  BP: 9/- (07-20-19 @ 10:10)  RR: 16 (07-20-19 @ 06:33)  SpO2: 100% (07-20-19 @ 06:33)  Wt(kg): --    07-19 @ 07:01  -  07-20 @ 07:00  --------------------------------------------------------  IN: 400 mL / OUT: 1900 mL / NET: -1500 mL      Height (cm): 170.2 (07-19 @ 23:34)  Weight (kg): 57 (07-19 @ 23:34)  BMI (kg/m2): 19.7 (07-19 @ 23:34)  BSA (m2): 1.66 (07-19 @ 23:34)  PHYSICAL EXAM:  Constitutional: NAD  HEENT: anicteric sclera, oropharynx clear, MMM  Neck: No JVD  Respiratory: CTAB, no wheezes, rales or rhonchi  Cardiovascular: S1, S2, RRR  Gastrointestinal: BS+, soft, NT/ND  Extremities: No cyanosis or clubbing. No peripheral edema  Neurological: A/O x 3, no focal deficits  Psychiatric: Normal mood, normal affect  : No CVA tenderness. No calvin.   Skin: No rashes  Vascular Access: UE AV access +thrill and bruit.     LABS:  07-20    138  |  94<L>  |  14  ----------------------------<  101<H>  3.8   |  33<H>  |  3.39<H>    Ca    8.9      20 Jul 2019 00:35  Phos  3.1     07-20  Mg     1.9     07-20    TPro  8.3  /  Alb  4.0  /  TBili  0.4  /  DBili      /  AST  45<H>  /  ALT  9   /  AlkPhos  76  07-19    Creatinine Trend: 3.39 <--, 5.88 <--, 5.73 <--, 5.75 <--                        13.0   4.46  )-----------( 193      ( 20 Jul 2019 06:37 )             43.9     Urine Studies:      RADIOLOGY & ADDITIONAL STUDIES:
Nephrology Followup Note - [phone] - Dr Hicks / Dr Hughes / Dr Storey / Dr Avina / Dr Patel / Dr Brannon / Dr Madrigal / Dr Kolb  Pt seen and examined at bedside  No acute events overnight. No complaints. Anxious to be discharged     Allergies:  No Known Allergies    Hospital Medications:   MEDICATIONS  (STANDING):  apixaban 2.5 milliGRAM(s) Oral two times a day  aspirin  chewable 81 milliGRAM(s) Oral daily  atorvastatin 80 milliGRAM(s) Oral at bedtime  chlorhexidine 4% Liquid 1 Application(s) Topical every 12 hours  dextrose 5%. 1000 milliLiter(s) (50 mL/Hr) IV Continuous <Continuous>  dextrose 50% Injectable 12.5 Gram(s) IV Push once  dextrose 50% Injectable 25 Gram(s) IV Push once  dextrose 50% Injectable 25 Gram(s) IV Push once  fenofibrate Tablet 145 milliGRAM(s) Oral daily  hydrALAZINE 25 milliGRAM(s) Oral two times a day  insulin lispro (HumaLOG) corrective regimen sliding scale   SubCutaneous three times a day before meals  insulin lispro (HumaLOG) corrective regimen sliding scale   SubCutaneous at bedtime  metoprolol tartrate 100 milliGRAM(s) Oral two times a day  pantoprazole    Tablet 40 milliGRAM(s) Oral before breakfast  sertraline 50 milliGRAM(s) Oral daily    VITALS:  T(F): 97.4 (07-25-19 @ 13:17), Max: 98.7 (07-24-19 @ 22:27)  HR: 63 (07-25-19 @ 13:17)  BP: 132/67 (07-25-19 @ 13:17)  RR: 18 (07-25-19 @ 13:17)  SpO2: 95% (07-25-19 @ 13:17)  Wt(kg): --    07-24 @ 07:01  -  07-25 @ 07:00  --------------------------------------------------------  IN: 400 mL / OUT: 1900 mL / NET: -1500 mL        PHYSICAL EXAM:  Constitutional: NAD  HEENT: anicteric sclera, oropharynx clear, MMM  Neck: No JVD  Respiratory: CTAB, no wheezes, rales or rhonchi  Cardiovascular: S1, S2, RRR  Gastrointestinal: BS+, soft, NT/ND  Extremities: No cyanosis or clubbing. No peripheral edema  Neurological: A/O x 3, no focal deficits  Psychiatric: Normal mood, normal affect  : No CVA tenderness. No clavin.   Skin: No rashes  Vascular Access: LUE AV access +thrill and bruit.     LABS:  07-24    134<L>  |  93<L>  |  49<H>  ----------------------------<  115<H>  4.3   |  29  |  6.27<H>    Ca    9.1      24 Jul 2019 06:30      Creatinine Trend: 6.27 <--, 8.55 <--, 5.66 <--, 3.39 <--, 5.88 <--, 5.73 <--, 5.75 <--                        12.4   4.34  )-----------( 190      ( 24 Jul 2019 06:30 )             41.3     Urine Studies:      RADIOLOGY & ADDITIONAL STUDIES:
Patient is a 79y old  Female who presents with a chief complaint of Dizziness, Hyperkalemia (20 Jul 2019 18:14)      SUBJECTIVE / OVERNIGHT EVENTS:    Events noted.  CONSTITUTIONAL: Weakness  No N/V  No abd pain    MEDICATIONS  (STANDING):  aspirin  chewable 81 milliGRAM(s) Oral daily  atorvastatin 80 milliGRAM(s) Oral at bedtime  chlorhexidine 4% Liquid 1 Application(s) Topical every 12 hours  dextrose 5%. 1000 milliLiter(s) (50 mL/Hr) IV Continuous <Continuous>  dextrose 50% Injectable 12.5 Gram(s) IV Push once  dextrose 50% Injectable 25 Gram(s) IV Push once  dextrose 50% Injectable 25 Gram(s) IV Push once  fenofibrate Tablet 145 milliGRAM(s) Oral daily  heparin  Injectable 5000 Unit(s) SubCutaneous every 8 hours  hydrALAZINE 25 milliGRAM(s) Oral two times a day  insulin lispro (HumaLOG) corrective regimen sliding scale   SubCutaneous three times a day before meals  insulin lispro (HumaLOG) corrective regimen sliding scale   SubCutaneous at bedtime  metoprolol tartrate 100 milliGRAM(s) Oral two times a day  pantoprazole    Tablet 40 milliGRAM(s) Oral before breakfast  sertraline 50 milliGRAM(s) Oral daily    MEDICATIONS  (PRN):  dextrose 40% Gel 15 Gram(s) Oral once PRN Blood Glucose LESS THAN 70 milliGRAM(s)/deciliter  glucagon  Injectable 1 milliGRAM(s) IntraMuscular once PRN Glucose LESS THAN 70 milligrams/deciliter  meclizine 25 milliGRAM(s) Oral three times a day PRN Dizziness        CAPILLARY BLOOD GLUCOSE      POCT Blood Glucose.: 103 mg/dL (20 Jul 2019 17:54)  POCT Blood Glucose.: 110 mg/dL (20 Jul 2019 13:50)  POCT Blood Glucose.: 101 mg/dL (20 Jul 2019 08:33)  POCT Blood Glucose.: 116 mg/dL (19 Jul 2019 23:32)    I&O's Summary    19 Jul 2019 07:01  -  20 Jul 2019 07:00  --------------------------------------------------------  IN: 400 mL / OUT: 1900 mL / NET: -1500 mL        PHYSICAL EXAM:    NECK: Supple, No JVD  CHEST/LUNG: Clear to auscultation bilaterally; No wheezing.  HEART: Regular rate and rhythm; No murmurs, rubs, or gallops  ABDOMEN: Soft, Nontender, Nondistended; Bowel sounds present  EXTREMITIES:   No edema  NEUROLOGY: AAO X 3      LABS:                        13.0   4.46  )-----------( 193      ( 20 Jul 2019 06:37 )             43.9     07-20    138  |  94<L>  |  14  ----------------------------<  101<H>  3.8   |  33<H>  |  3.39<H>    Ca    8.9      20 Jul 2019 00:35  Phos  3.1     07-20  Mg     1.9     07-20    TPro  8.3  /  Alb  4.0  /  TBili  0.4  /  DBili  x   /  AST  45<H>  /  ALT  9   /  AlkPhos  76  07-19    PT/INR - ( 20 Jul 2019 16:23 )   PT: 11.9 SEC;   INR: 1.04          PTT - ( 20 Jul 2019 16:23 )  PTT:41.8 SEC        CAPILLARY BLOOD GLUCOSE      POCT Blood Glucose.: 103 mg/dL (20 Jul 2019 17:54)  POCT Blood Glucose.: 110 mg/dL (20 Jul 2019 13:50)  POCT Blood Glucose.: 101 mg/dL (20 Jul 2019 08:33)  POCT Blood Glucose.: 116 mg/dL (19 Jul 2019 23:32)                RADIOLOGY & ADDITIONAL TESTS:    Imaging Personally Reviewed:    Consultant(s) Notes Reviewed:      Care Discussed with Consultants/Other Providers:
VINCENT GROSS  79y  Female      Patient is a 79y old  Female who presents with a chief complaint of Dizziness, Hyperkalemia (21 Jul 2019 13:10)  -Patient seen and examined.chart reviewed.covering   -no cp,no sob,no headaches,no fever    REVIEW OF SYSTEMS:  as above      INTERVAL HPI/OVERNIGHT EVENTS:  T(C): 36.4 (07-21-19 @ 19:41), Max: 37 (07-21-19 @ 03:00)  HR: 64 (07-21-19 @ 19:41) (57 - 79)  BP: 126/50 (07-21-19 @ 19:41) (126/50 - 149/49)  RR: 17 (07-21-19 @ 19:41) (16 - 17)  SpO2: 100% (07-21-19 @ 19:41) (96% - 100%)  Wt(kg): --  I&O's Summary    T(C): 36.4 (07-21-19 @ 19:41), Max: 37 (07-21-19 @ 03:00)  HR: 64 (07-21-19 @ 19:41) (57 - 79)  BP: 126/50 (07-21-19 @ 19:41) (126/50 - 149/49)  RR: 17 (07-21-19 @ 19:41) (16 - 17)  SpO2: 100% (07-21-19 @ 19:41) (96% - 100%)  Wt(kg): --Vital Signs Last 24 Hrs  T(C): 36.4 (21 Jul 2019 19:41), Max: 37 (21 Jul 2019 03:00)  T(F): 97.5 (21 Jul 2019 19:41), Max: 98.6 (21 Jul 2019 03:00)  HR: 64 (21 Jul 2019 19:41) (57 - 79)  BP: 126/50 (21 Jul 2019 19:41) (126/50 - 149/49)  BP(mean): --  RR: 17 (21 Jul 2019 19:41) (16 - 17)  SpO2: 100% (21 Jul 2019 19:41) (96% - 100%)    LABS:                        12.4   3.57  )-----------( 206      ( 21 Jul 2019 07:23 )             42.1     07-21    137  |  95<L>  |  36<H>  ----------------------------<  111<H>  3.8   |  28  |  5.66<H>    Ca    8.9      21 Jul 2019 07:23  Phos  4.6     07-21  Mg     2.1     07-21      PT/INR - ( 20 Jul 2019 16:23 )   PT: 11.9 SEC;   INR: 1.04          PTT - ( 20 Jul 2019 16:23 )  PTT:41.8 SEC    CAPILLARY BLOOD GLUCOSE      POCT Blood Glucose.: 207 mg/dL (21 Jul 2019 21:13)  POCT Blood Glucose.: 112 mg/dL (21 Jul 2019 17:30)  POCT Blood Glucose.: 158 mg/dL (21 Jul 2019 12:36)  POCT Blood Glucose.: 119 mg/dL (21 Jul 2019 08:50)            PAST MEDICAL & SURGICAL HISTORY:  Atrial fibrillation  Anemia  CKD (chronic kidney disease): now on HD via R forearm AVF  Hypercholesteremia  HTN (hypertension)  DM (diabetes mellitus)  H/O colonoscopy with polypectomy  AV fistula: June 2018, 2 ballooning (last one 2 weeks ago), following up on Dec 15th  Status post right foot surgery: hammer toe repair      MEDICATIONS  (STANDING):  aspirin  chewable 81 milliGRAM(s) Oral daily  atorvastatin 80 milliGRAM(s) Oral at bedtime  chlorhexidine 4% Liquid 1 Application(s) Topical every 12 hours  dextrose 5%. 1000 milliLiter(s) (50 mL/Hr) IV Continuous <Continuous>  dextrose 50% Injectable 12.5 Gram(s) IV Push once  dextrose 50% Injectable 25 Gram(s) IV Push once  dextrose 50% Injectable 25 Gram(s) IV Push once  fenofibrate Tablet 145 milliGRAM(s) Oral daily  heparin  Injectable 5000 Unit(s) SubCutaneous every 8 hours  hydrALAZINE 25 milliGRAM(s) Oral two times a day  insulin lispro (HumaLOG) corrective regimen sliding scale   SubCutaneous three times a day before meals  insulin lispro (HumaLOG) corrective regimen sliding scale   SubCutaneous at bedtime  metoprolol tartrate 100 milliGRAM(s) Oral two times a day  pantoprazole    Tablet 40 milliGRAM(s) Oral before breakfast  sertraline 50 milliGRAM(s) Oral daily    MEDICATIONS  (PRN):  dextrose 40% Gel 15 Gram(s) Oral once PRN Blood Glucose LESS THAN 70 milliGRAM(s)/deciliter  glucagon  Injectable 1 milliGRAM(s) IntraMuscular once PRN Glucose LESS THAN 70 milligrams/deciliter  meclizine 25 milliGRAM(s) Oral three times a day PRN Dizziness        RADIOLOGY & ADDITIONAL TESTS:    Imaging Personally Reviewed:  [ ] YES  [ ] NO    Consultant(s) Notes Reviewed:  [x ] YES  [ ] NO    PHYSICAL EXAM:  GENERAL: NAD, well-groomed, well-developed  HEAD:  Atraumatic, Normocephalic  EYES: EOMI, PERRLA, conjunctiva and sclera clear  ENMT: No tonsillar erythema, exudates, or enlargement; Moist mucous membranes, Good dentition, No lesions  NECK: Supple, No JVD, Normal thyroid  NERVOUS SYSTEM:  Alert & Oriented X2, nonfocal  CHEST/LUNG: Clear to percussion bilaterally; No rales, rhonchi, wheezing, or rubs  HEART: Regular rate and rhythm; No murmurs, rubs, or gallops  ABDOMEN: Soft, Nontender, Nondistended; Bowel sounds present  EXTREMITIES:  2+ Peripheral Pulses, No clubbing, cyanosis, or edema  LYMPH: No lymphadenopathy noted  SKIN: No rashes or lesions    Care Discussed with Consultants/Other Providers [ ] YES  [ ] NO      Code Status: [] Full Code [] DNR [] DNI [] Goals of Care:   Disposition: [] ICU [] Stroke Unit [] RCU []PCU []Floor [] Discharge Home         AUBREE RegaladoFACP
VINCENT GROSS  79y  Female      Patient is a 79y old  Female who presents with a chief complaint of Dizziness, Hyperkalemia (21 Jul 2019 13:10)  -Patient seen and examined.chart reviewed.covering   -no cp,no sob,no headaches,no fever    REVIEW OF SYSTEMS:  as above      INTERVAL HPI/OVERNIGHT EVENTS:  T(C): 36.4 (07-21-19 @ 19:41), Max: 37 (07-21-19 @ 03:00)  HR: 64 (07-21-19 @ 19:41) (57 - 79)  BP: 126/50 (07-21-19 @ 19:41) (126/50 - 149/49)  RR: 17 (07-21-19 @ 19:41) (16 - 17)  SpO2: 100% (07-21-19 @ 19:41) (96% - 100%)  Wt(kg): --  I&O's Summary    T(C): 36.4 (07-21-19 @ 19:41), Max: 37 (07-21-19 @ 03:00)  HR: 64 (07-21-19 @ 19:41) (57 - 79)  BP: 126/50 (07-21-19 @ 19:41) (126/50 - 149/49)  RR: 17 (07-21-19 @ 19:41) (16 - 17)  SpO2: 100% (07-21-19 @ 19:41) (96% - 100%)  Wt(kg): --Vital Signs Last 24 Hrs  T(C): 36.4 (21 Jul 2019 19:41), Max: 37 (21 Jul 2019 03:00)  T(F): 97.5 (21 Jul 2019 19:41), Max: 98.6 (21 Jul 2019 03:00)  HR: 64 (21 Jul 2019 19:41) (57 - 79)  BP: 126/50 (21 Jul 2019 19:41) (126/50 - 149/49)  BP(mean): --  RR: 17 (21 Jul 2019 19:41) (16 - 17)  SpO2: 100% (21 Jul 2019 19:41) (96% - 100%)    LABS:                        12.4   3.57  )-----------( 206      ( 21 Jul 2019 07:23 )             42.1     07-21    137  |  95<L>  |  36<H>  ----------------------------<  111<H>  3.8   |  28  |  5.66<H>    Ca    8.9      21 Jul 2019 07:23  Phos  4.6     07-21  Mg     2.1     07-21      PT/INR - ( 20 Jul 2019 16:23 )   PT: 11.9 SEC;   INR: 1.04          PTT - ( 20 Jul 2019 16:23 )  PTT:41.8 SEC    CAPILLARY BLOOD GLUCOSE      POCT Blood Glucose.: 207 mg/dL (21 Jul 2019 21:13)  POCT Blood Glucose.: 112 mg/dL (21 Jul 2019 17:30)  POCT Blood Glucose.: 158 mg/dL (21 Jul 2019 12:36)  POCT Blood Glucose.: 119 mg/dL (21 Jul 2019 08:50)            PAST MEDICAL & SURGICAL HISTORY:  Atrial fibrillation  Anemia  CKD (chronic kidney disease): now on HD via R forearm AVF  Hypercholesteremia  HTN (hypertension)  DM (diabetes mellitus)  H/O colonoscopy with polypectomy  AV fistula: June 2018, 2 ballooning (last one 2 weeks ago), following up on Dec 15th  Status post right foot surgery: hammer toe repair      MEDICATIONS  (STANDING):  aspirin  chewable 81 milliGRAM(s) Oral daily  atorvastatin 80 milliGRAM(s) Oral at bedtime  chlorhexidine 4% Liquid 1 Application(s) Topical every 12 hours  dextrose 5%. 1000 milliLiter(s) (50 mL/Hr) IV Continuous <Continuous>  dextrose 50% Injectable 12.5 Gram(s) IV Push once  dextrose 50% Injectable 25 Gram(s) IV Push once  dextrose 50% Injectable 25 Gram(s) IV Push once  fenofibrate Tablet 145 milliGRAM(s) Oral daily  heparin  Injectable 5000 Unit(s) SubCutaneous every 8 hours  hydrALAZINE 25 milliGRAM(s) Oral two times a day  insulin lispro (HumaLOG) corrective regimen sliding scale   SubCutaneous three times a day before meals  insulin lispro (HumaLOG) corrective regimen sliding scale   SubCutaneous at bedtime  metoprolol tartrate 100 milliGRAM(s) Oral two times a day  pantoprazole    Tablet 40 milliGRAM(s) Oral before breakfast  sertraline 50 milliGRAM(s) Oral daily    MEDICATIONS  (PRN):  dextrose 40% Gel 15 Gram(s) Oral once PRN Blood Glucose LESS THAN 70 milliGRAM(s)/deciliter  glucagon  Injectable 1 milliGRAM(s) IntraMuscular once PRN Glucose LESS THAN 70 milligrams/deciliter  meclizine 25 milliGRAM(s) Oral three times a day PRN Dizziness        RADIOLOGY & ADDITIONAL TESTS:    Imaging Personally Reviewed:  [ ] YES  [ ] NO    Consultant(s) Notes Reviewed:  [x ] YES  [ ] NO    PHYSICAL EXAM:  GENERAL: NAD, well-groomed, well-developed  HEAD:  Atraumatic, Normocephalic  NECK: Supple, No JVD, Normal thyroid  NERVOUS SYSTEM:  Alert & Oriented X2, nonfocal  CHEST/LUNG: Clear to percussion bilaterally; No rales, rhonchi, wheezing, or rubs  HEART: Regular rate and rhythm; No murmurs, rubs, or gallops  ABDOMEN: Soft, Nontender, Nondistended; Bowel sounds present  EXTREMITIES:  2+ Peripheral Pulses, No clubbing, cyanosis, or edema    Care Discussed with Consultants/Other Providers [ ] YES  [ ] NO      Code Status: [] Full Code [] DNR [] DNI [] Goals of Care:   Disposition: [] ICU [] Stroke Unit [] RCU []PCU []Floor [] Discharge Home
VINCENT GROSS  79y  Female      Patient is a 79y old  Female who presents with a chief complaint of Dizziness, Hyperkalemia (21 Jul 2019 13:10)  -Patient seen and examined.chart reviewed.covering   -no cp,no sob,no headaches,no fever    REVIEW OF SYSTEMS:  as above      INTERVAL HPI/OVERNIGHT EVENTS:  T(C): 36.4 (07-21-19 @ 19:41), Max: 37 (07-21-19 @ 03:00)  HR: 64 (07-21-19 @ 19:41) (57 - 79)  BP: 126/50 (07-21-19 @ 19:41) (126/50 - 149/49)  RR: 17 (07-21-19 @ 19:41) (16 - 17)  SpO2: 100% (07-21-19 @ 19:41) (96% - 100%)  Wt(kg): --  I&O's Summary    T(C): 36.4 (07-21-19 @ 19:41), Max: 37 (07-21-19 @ 03:00)  HR: 64 (07-21-19 @ 19:41) (57 - 79)  BP: 126/50 (07-21-19 @ 19:41) (126/50 - 149/49)  RR: 17 (07-21-19 @ 19:41) (16 - 17)  SpO2: 100% (07-21-19 @ 19:41) (96% - 100%)  Wt(kg): --Vital Signs Last 24 Hrs  T(C): 36.4 (21 Jul 2019 19:41), Max: 37 (21 Jul 2019 03:00)  T(F): 97.5 (21 Jul 2019 19:41), Max: 98.6 (21 Jul 2019 03:00)  HR: 64 (21 Jul 2019 19:41) (57 - 79)  BP: 126/50 (21 Jul 2019 19:41) (126/50 - 149/49)  BP(mean): --  RR: 17 (21 Jul 2019 19:41) (16 - 17)  SpO2: 100% (21 Jul 2019 19:41) (96% - 100%)    LABS:                        12.4   3.57  )-----------( 206      ( 21 Jul 2019 07:23 )             42.1     07-21    137  |  95<L>  |  36<H>  ----------------------------<  111<H>  3.8   |  28  |  5.66<H>    Ca    8.9      21 Jul 2019 07:23  Phos  4.6     07-21  Mg     2.1     07-21      PT/INR - ( 20 Jul 2019 16:23 )   PT: 11.9 SEC;   INR: 1.04          PTT - ( 20 Jul 2019 16:23 )  PTT:41.8 SEC    CAPILLARY BLOOD GLUCOSE      POCT Blood Glucose.: 207 mg/dL (21 Jul 2019 21:13)  POCT Blood Glucose.: 112 mg/dL (21 Jul 2019 17:30)  POCT Blood Glucose.: 158 mg/dL (21 Jul 2019 12:36)  POCT Blood Glucose.: 119 mg/dL (21 Jul 2019 08:50)            PAST MEDICAL & SURGICAL HISTORY:  Atrial fibrillation  Anemia  CKD (chronic kidney disease): now on HD via R forearm AVF  Hypercholesteremia  HTN (hypertension)  DM (diabetes mellitus)  H/O colonoscopy with polypectomy  AV fistula: June 2018, 2 ballooning (last one 2 weeks ago), following up on Dec 15th  Status post right foot surgery: hammer toe repair      MEDICATIONS  (STANDING):  aspirin  chewable 81 milliGRAM(s) Oral daily  atorvastatin 80 milliGRAM(s) Oral at bedtime  chlorhexidine 4% Liquid 1 Application(s) Topical every 12 hours  dextrose 5%. 1000 milliLiter(s) (50 mL/Hr) IV Continuous <Continuous>  dextrose 50% Injectable 12.5 Gram(s) IV Push once  dextrose 50% Injectable 25 Gram(s) IV Push once  dextrose 50% Injectable 25 Gram(s) IV Push once  fenofibrate Tablet 145 milliGRAM(s) Oral daily  heparin  Injectable 5000 Unit(s) SubCutaneous every 8 hours  hydrALAZINE 25 milliGRAM(s) Oral two times a day  insulin lispro (HumaLOG) corrective regimen sliding scale   SubCutaneous three times a day before meals  insulin lispro (HumaLOG) corrective regimen sliding scale   SubCutaneous at bedtime  metoprolol tartrate 100 milliGRAM(s) Oral two times a day  pantoprazole    Tablet 40 milliGRAM(s) Oral before breakfast  sertraline 50 milliGRAM(s) Oral daily    MEDICATIONS  (PRN):  dextrose 40% Gel 15 Gram(s) Oral once PRN Blood Glucose LESS THAN 70 milliGRAM(s)/deciliter  glucagon  Injectable 1 milliGRAM(s) IntraMuscular once PRN Glucose LESS THAN 70 milligrams/deciliter  meclizine 25 milliGRAM(s) Oral three times a day PRN Dizziness        RADIOLOGY & ADDITIONAL TESTS:    Imaging Personally Reviewed:  [ ] YES  [ ] NO    Consultant(s) Notes Reviewed:  [x ] YES  [ ] NO    PHYSICAL EXAM:  GENERAL: NAD, well-groomed, well-developed  HEAD:  Atraumatic, Normocephalic  NECK: Supple, No JVD, Normal thyroid  NERVOUS SYSTEM:  Alert & Oriented X2, nonfocal  CHEST/LUNG: Clear to percussion bilaterally; No rales, rhonchi, wheezing, or rubs  HEART: Regular rate and rhythm; No murmurs, rubs, or gallops  ABDOMEN: Soft, Nontender, Nondistended; Bowel sounds present  EXTREMITIES:  2+ Peripheral Pulses, No clubbing, cyanosis, or edema    Care Discussed with Consultants/Other Providers [ ] YES  [ ] NO      Code Status: [] Full Code [] DNR [] DNI [] Goals of Care:   Disposition: [] ICU [] Stroke Unit [] RCU []PCU []Floor [] Discharge Home

## 2019-07-25 NOTE — PROGRESS NOTE ADULT - REASON FOR ADMISSION
Dizziness, Hyperkalemia

## 2019-07-25 NOTE — PROGRESS NOTE ADULT - ASSESSMENT
79f w hx ESRD on HD MWF, HTN, HLD, DM p/w N/V. Renal consult for ESRD and HD management. New dx acute CVA noted, getting w/u.
· Assessment	  This is a 79F with history as above who presents to the hospital with sudden onset of dizziness with associated n/v and hyperkalemia.        Problem/Plan -   ·  Problem: ESRD on dialysis.  Plan: - c/w HD as per renal.      Problem/Plan -·  Problem: Hepatitis B infection without delta agent without hepatic coma, unspecified chronicity. Plan: - GI f/up as OP
79 year old woman with history of ESRD on HD (via R forearm AVF), HTN, HLD, and DM2 (currently off meds) admitted with dizziness, acute cva on cT.    1. Subacute CVA  -likely embolic   -old ekg with documented AF  -cont Eliquis   -asa; statin  - MR head with no acute or subacute infarct, Chronic right AICA territory infarct, Chronic lacunar infarct, mild to mod microvasvular ischemic changes     2. Hypertension  -bp parameters per neuro       dvt ppx
79 year old woman with history of ESRD on HD (via R forearm AVF), HTN, HLD, and DM2 (currently off meds) admitted with dizziness, acute cva on cT.    1. Subacute CVA  -likely embolic   -old ekg with documented AF  -cont Eliquis   -asa; statin  - MR head with no acute or subacute infarct, Chronic right AICA territory infarct, Chronic lacunar infarct, mild to mod microvasvular ischemic changes     2. Hypertension  -bp parameters per neuro       dvt ppx  dc planning
79 year old woman with history of ESRD on HD (via R forearm AVF), HTN, HLD, and DM2 (currently off meds) admitted with dizziness, acute cva on cT.    1. Subacute CVA  -likely embolic   -old ekg with documented AF  -cont Eliquis   -asa; statin  -no indication for DAYAN or loop as patient will start a/c with known hx of PAF and now cva  -CHADS=5  - MR head with no acute or subacute infarct, Chronic right AICA territory infarct, Chronic lacunar infarct, mild to mod microvasvular ischemic changes     2. Hypertension  -bp parameters per neuro       dvt ppx
79 year old woman with history of ESRD on HD (via R forearm AVF), HTN, HLD, and DM2 (currently off meds) admitted with dizziness, acute cva on cT.    1. Subacute CVA  -likely embolic   -old ekg with documented AF  -will need to start A/C once cleared by neuro   -asa; statin  -pending echo with bubble study   -tele   -no indication for DAYAN or loop as patient will start a/c with known hx of PAF and now cva  -CHADS=5  - MR head with no acute or subacute infarct, Chronic right AICA territory infarct, Chronic lacunar infarct, mild to mod microvasvular ischemic changes     2. Hypertension  -bp parameters per neuro       dvt ppx
79f w hx ESRD on HD MWF, HTN, HLD, DM p/w N/V. Renal consult for ESRD and HD management
79f w hx ESRD on HD MWF, HTN, HLD, DM p/w N/V. Renal consult for ESRD and HD management
79f w hx ESRD on HD MWF, HTN, HLD, DM p/w N/V. Renal consult for ESRD and HD management. New dx acute CVA noted, getting w/u. Repeat MRI showed only chronic CVA
79f w hx ESRD on HD MWF, HTN, HLD, DM p/w N/V. Renal consult for ESRD and HD management. New dx acute CVA noted, getting w/u. Repeat MRI showed only chronic CVA
· Assessment	  This is a 79F with history as above who presents to the hospital with sudden onset of dizziness with associated n/v and hyperkalemia.        Problem/Plan -   ·  Problem: ESRD on dialysis.  Plan: - c/w HD as per renal.      Problem/Plan -·  Problem: Hepatitis B infection without delta agent without hepatic coma, unspecified chronicity. Plan: - GI f/up as OP
· Assessment	  This is a 79F with history as above who presents to the hospital with sudden onset of dizziness with associated n/v and hyperkalemia.     Acute CVA:  Neuro eval noted.  TTE  Cardio eval     Problem/Plan - 1:  ·  Problem: Vertigo.  Plan: - Likely from acute CVA   Problem/Plan - 2:  ·  Problem: Hyperkalemia.  Plan: - BMP     Problem/Plan - 3:  ·  Problem: ESRD on dialysis.  Plan: - c/w HD as per renal.      Problem/Plan - 4:  ·  Problem: Essential hypertension.  Plan: - c/w home BP meds.      Problem/Plan - 5:  ·  Problem: Hypercholesteremia.  Plan: - c/w home lipitor.      Problem/Plan - 6:  Problem: Type 2 diabetes mellitus with complication, without long-term current use of insulin. Plan: - Patient's son states that she transitioned herself off of insulin at home, currently on no meds for DM  - Will place on HISS and monitor FS.     Problem/Plan - 7:  ·  Problem: Hepatitis B infection without delta agent without hepatic coma, unspecified chronicity. Plan: - GI eval called.
· Assessment	  This is a 79F with history as above who presents to the hospital with sudden onset of dizziness with associated n/v and hyperkalemia.     Acute CVA:  Neuro eval noted.=off ac  TTE  Cardio f/u noted-embolic event-consider AC?     Problem/Plan - 1:  ·  Problem: Vertigo.  Plan: - Likely from acute CVA   Problem/Plan - 2:  ·  Problem: Hyperkalemia.  Plan: - BMP     Problem/Plan - 3:  ·  Problem: ESRD on dialysis.  Plan: - c/w HD as per renal.      Problem/Plan - 4:  ·  Problem: Essential hypertension.  Plan: - c/w home BP meds.      Problem/Plan - 5:  ·  Problem: Hypercholesteremia.  Plan: - c/w home lipitor.      Problem/Plan - 6:  Problem: Type 2 diabetes mellitus with complication, without long-term current use of insulin. Plan: - Patient's son states that she transitioned herself off of insulin at home, currently on no meds for DM  - On HISS and monitor FS.     Problem/Plan - 7:  ·  Problem: Hepatitis B infection without delta agent without hepatic coma, unspecified chronicity. Plan: - GI f/up
· Assessment	  This is a 79F with history as above who presents to the hospital with sudden onset of dizziness with associated n/v and hyperkalemia.     Acute CVA:  Neuro eval noted.=off ac  TTE  Cardio f/u noted-embolic event-consider AC?     Problem/Plan - 1:  ·  Problem: Vertigo.  Plan: - Likely from acute CVA   Problem/Plan - 2:  ·  Problem: Hyperkalemia.  Plan: - BMP     Problem/Plan - 3:  ·  Problem: ESRD on dialysis.  Plan: - c/w HD as per renal.      Problem/Plan - 4:  ·  Problem: Essential hypertension.  Plan: - c/w home BP meds.      Problem/Plan - 5:  ·  Problem: Hypercholesteremia.  Plan: - c/w home lipitor.      Problem/Plan - 6:  Problem: Type 2 diabetes mellitus with complication, without long-term current use of insulin. Plan: - Patient's son states that she transitioned herself off of insulin at home, currently on no meds for DM  - Will place on HISS and monitor FS.     Problem/Plan - 7:  ·  Problem: Hepatitis B infection without delta agent without hepatic coma, unspecified chronicity. Plan: - GI eval called.

## 2019-07-25 NOTE — DISCHARGE NOTE NURSING/CASE MANAGEMENT/SOCIAL WORK - NSDCDPATPORTLINK_GEN_ALL_CORE
You can access the [facility], offered by [facility], by registering with the following website: [email or web address]

## 2019-07-25 NOTE — PROGRESS NOTE ADULT - PROBLEM SELECTOR PLAN 1
Maintenance HD schedule MWF.  Plan for HD today. UF goal 1kg,as BP tolerates   resolved hyperkalemia. Volume status acceptable.
Maintenance HD schedule MWF.  Pt was dialyzed yesterday, but will repeat HD today to resume outpt MWF schedule.  resolved hyperkalemia. Volume status acceptable.  No objections to starting eliquis for Afib.   Dispo planning.
Maintenance HD schedule MWF.  Pt was dialyzed yesterday, to resume outpt MWF schedule.  Uneventful HD yesterday, UF 1.5kg achieved.   resolved hyperkalemia. Volume status acceptable.  No objections to starting eliquis for Afib.   Dispo planning.
Maintenance HD schedule MWF.  Tolerated HD last night, with 1.5kg UF achieved.  resolved hyperkalemia. Volume status acceptable.   Repeat HD 7/22.
Maintenance HD schedule MWF.  Tolerated HD last night, with 1.5kg UF achieved.  resolved hyperkalemia. Volume status acceptable.   Repeat HD 7/22.

## 2019-07-25 NOTE — DISCHARGE NOTE NURSING/CASE MANAGEMENT/SOCIAL WORK - NSDCPEPTSTRK_GEN_ALL_CORE
Need for follow up after discharge/Prescribed medications/Risk factors for stroke/Stroke education booklet/Call 911 for stroke/Stroke warning signs and symptoms/Signs and symptoms of stroke/Stroke support groups for patients, families, and friends

## 2019-07-25 NOTE — PROGRESS NOTE ADULT - PROVIDER SPECIALTY LIST ADULT
Cardiology
Internal Medicine
Nephrology
Internal Medicine
Nephrology

## 2019-07-25 NOTE — PROGRESS NOTE ADULT - PROBLEM SELECTOR PLAN 2
BP much better controlled on current regimen.

## 2019-08-18 ENCOUNTER — EMERGENCY (EMERGENCY)
Facility: HOSPITAL | Age: 79
LOS: 1 days | Discharge: HOME | End: 2019-08-18
Attending: EMERGENCY MEDICINE | Admitting: EMERGENCY MEDICINE
Payer: MEDICARE

## 2019-08-18 VITALS
SYSTOLIC BLOOD PRESSURE: 166 MMHG | HEART RATE: 65 BPM | TEMPERATURE: 97 F | DIASTOLIC BLOOD PRESSURE: 53 MMHG | RESPIRATION RATE: 16 BRPM | OXYGEN SATURATION: 100 %

## 2019-08-18 DIAGNOSIS — Z98.890 OTHER SPECIFIED POSTPROCEDURAL STATES: Chronic | ICD-10-CM

## 2019-08-18 DIAGNOSIS — I77.0 ARTERIOVENOUS FISTULA, ACQUIRED: Chronic | ICD-10-CM

## 2019-08-18 PROBLEM — N18.9 CHRONIC KIDNEY DISEASE, UNSPECIFIED: Chronic | Status: ACTIVE | Noted: 2018-02-06

## 2019-08-18 LAB
ALBUMIN SERPL ELPH-MCNC: 4.5 G/DL — SIGNIFICANT CHANGE UP (ref 3.3–5)
ALP SERPL-CCNC: 99 U/L — SIGNIFICANT CHANGE UP (ref 40–120)
ALT FLD-CCNC: 16 U/L — SIGNIFICANT CHANGE UP (ref 4–33)
ANION GAP SERPL CALC-SCNC: 15 MMO/L — HIGH (ref 7–14)
AST SERPL-CCNC: 24 U/L — SIGNIFICANT CHANGE UP (ref 4–32)
BASE EXCESS BLDV CALC-SCNC: 6.9 MMOL/L — SIGNIFICANT CHANGE UP
BILIRUB SERPL-MCNC: 0.3 MG/DL — SIGNIFICANT CHANGE UP (ref 0.2–1.2)
BLOOD GAS VENOUS - CREATININE: 6.79 MG/DL — HIGH (ref 0.5–1.3)
BUN SERPL-MCNC: 38 MG/DL — HIGH (ref 7–23)
CALCIUM SERPL-MCNC: 9.6 MG/DL — SIGNIFICANT CHANGE UP (ref 8.4–10.5)
CHLORIDE BLDV-SCNC: 98 MMOL/L — SIGNIFICANT CHANGE UP (ref 96–108)
CHLORIDE SERPL-SCNC: 94 MMOL/L — LOW (ref 98–107)
CO2 SERPL-SCNC: 29 MMOL/L — SIGNIFICANT CHANGE UP (ref 22–31)
CREAT SERPL-MCNC: 6.16 MG/DL — HIGH (ref 0.5–1.3)
GAS PNL BLDV: 139 MMOL/L — SIGNIFICANT CHANGE UP (ref 136–146)
GLUCOSE BLDV-MCNC: 134 MG/DL — HIGH (ref 70–99)
GLUCOSE SERPL-MCNC: 136 MG/DL — HIGH (ref 70–99)
HCO3 BLDV-SCNC: 27 MMOL/L — SIGNIFICANT CHANGE UP (ref 20–27)
HCT VFR BLD CALC: 44.6 % — SIGNIFICANT CHANGE UP (ref 34.5–45)
HCT VFR BLDV CALC: 41.5 % — SIGNIFICANT CHANGE UP (ref 34.5–45)
HGB BLD-MCNC: 12.8 G/DL — SIGNIFICANT CHANGE UP (ref 11.5–15.5)
HGB BLDV-MCNC: 13.5 G/DL — SIGNIFICANT CHANGE UP (ref 11.5–15.5)
LACTATE BLDV-MCNC: 2 MMOL/L — SIGNIFICANT CHANGE UP (ref 0.5–2)
MCHC RBC-ENTMCNC: 23.5 PG — LOW (ref 27–34)
MCHC RBC-ENTMCNC: 28.7 % — LOW (ref 32–36)
MCV RBC AUTO: 81.8 FL — SIGNIFICANT CHANGE UP (ref 80–100)
NRBC # FLD: 0 K/UL — SIGNIFICANT CHANGE UP (ref 0–0)
PCO2 BLDV: 60 MMHG — HIGH (ref 41–51)
PH BLDV: 7.35 PH — SIGNIFICANT CHANGE UP (ref 7.32–7.43)
PLATELET # BLD AUTO: 177 K/UL — SIGNIFICANT CHANGE UP (ref 150–400)
PMV BLD: SIGNIFICANT CHANGE UP FL (ref 7–13)
PO2 BLDV: 16 MMHG — LOW (ref 35–40)
POTASSIUM BLDV-SCNC: 4.5 MMOL/L — SIGNIFICANT CHANGE UP (ref 3.4–4.5)
POTASSIUM SERPL-MCNC: 4.6 MMOL/L — SIGNIFICANT CHANGE UP (ref 3.5–5.3)
POTASSIUM SERPL-SCNC: 4.6 MMOL/L — SIGNIFICANT CHANGE UP (ref 3.5–5.3)
PROT SERPL-MCNC: 8.5 G/DL — HIGH (ref 6–8.3)
RBC # BLD: 5.45 M/UL — HIGH (ref 3.8–5.2)
RBC # FLD: 16.7 % — HIGH (ref 10.3–14.5)
SAO2 % BLDV: 16.6 % — LOW (ref 60–85)
SODIUM SERPL-SCNC: 138 MMOL/L — SIGNIFICANT CHANGE UP (ref 135–145)
WBC # BLD: 7.75 K/UL — SIGNIFICANT CHANGE UP (ref 3.8–10.5)
WBC # FLD AUTO: 7.75 K/UL — SIGNIFICANT CHANGE UP (ref 3.8–10.5)

## 2019-08-18 PROCEDURE — 99283 EMERGENCY DEPT VISIT LOW MDM: CPT

## 2019-08-18 RX ORDER — LACTULOSE 10 G/15ML
10 SOLUTION ORAL ONCE
Refills: 0 | Status: COMPLETED | OUTPATIENT
Start: 2019-08-18 | End: 2019-08-18

## 2019-08-18 RX ORDER — LACTULOSE 10 G/15ML
15 SOLUTION ORAL
Qty: 150 | Refills: 0
Start: 2019-08-18 | End: 2019-08-25

## 2019-08-18 RX ADMIN — LACTULOSE 10 GRAM(S): 10 SOLUTION ORAL at 12:01

## 2019-08-18 NOTE — ED PROVIDER NOTE - RECTAL
TENDER/hard stool in vault, has some rectal tone but some oozing of stool around hard stool, refused disimpaction.

## 2019-08-18 NOTE — ED PROVIDER NOTE - OBJECTIVE STATEMENT
This is a 79F with history of ESRD on HD (via R forearm AVF), HTN, HLD, and DM2, afib, with recent visit for vertigo and hyperkalemia, started on meclizine ( for BPV)and Eliquis recently ( for afib) complaining of constipation, no BM x 7 days,  denies abd pain, no n/v, last Dialysis Friday, 2 days ago. tried enema without success, has had some oozing of stool ( incontinence) no blood.

## 2019-08-18 NOTE — ED PROVIDER NOTE - PROGRESS NOTE DETAILS
Patient didn't tolerate disimpaction, but had good BM with relief of constipation. Requests discharge. Labs okay. will d/c on lactulose 10 gm every other day as needed.

## 2019-08-18 NOTE — ED PROVIDER NOTE - CLINICAL SUMMARY MEDICAL DECISION MAKING FREE TEXT BOX
constipation- no prior surgery or abd pain or tenderness. constipation- no prior surgery or abd pain or tenderness.christiano try disimpaction, enema, labs.

## 2019-08-18 NOTE — ED PROVIDER NOTE - PMH
Anemia    Atrial fibrillation    CKD (chronic kidney disease)  now on HD via R forearm AVF  DM (diabetes mellitus)    HTN (hypertension)    Hypercholesteremia

## 2019-08-18 NOTE — ED PROVIDER NOTE - PSH
AV fistula  June 2018, 2 ballooning (last one 2 weeks ago), following up on Dec 15th  H/O colonoscopy with polypectomy    Status post right foot surgery  hammer toe repair
2012

## 2019-08-18 NOTE — ED ADULT NURSE NOTE - OBJECTIVE STATEMENT
Pt received to intake AAO x 3 and ambulatory c/o constipation. Pt states she hasn't had a normal BM in 1 week, abdomen is soft and non-deistended and pt denies pain presently. Pt given lactulose and enema, small amount of soft brown stool passed. Labs sent and 18G placed to the left forearm, pt eval in progress and pt in NAD.

## 2019-08-18 NOTE — ED PROVIDER NOTE - NSFOLLOWUPINSTRUCTIONS_ED_ALL_ED_FT
continue present meds.   May use lactulose 1 tablespoon(15ml) if no bm  may use mineral oil enema if no result from lactulose   Follow up with Gastroenterologist.

## 2019-10-17 NOTE — ASU PATIENT PROFILE, ADULT - HEALTHCARE QUESTIONS, PROFILE
"-- Message is from the [employer]--    Reason for Call: Patient was seen on 5/22 and patient mother stated the paper work was not fill out for school will it be ok if patient mother can drop off the paper work to have [address] fill it out patient mother need it before 10/23 to drop it off at patient school. Please give patient mother a call      Caller Information       Type Contact Phone    10/17/2019 07:53 AM Phone (Incoming) Cedric Saini (Mother) [phone] (M)          Alternative phone number: na    Turnaround time given to caller: ""This message will be sent to [facility] Provider's name]. The clinical team will fulfill your request as soon as they review your message. \""    " none

## 2019-10-22 NOTE — PATIENT PROFILE ADULT - ANY SIGNIFICANT CHANGE IN ABILITY TO PERFORM THE FOLLOWING ADL SINCE THE ONSET OF PRESENT ILLNESS?
no Complex Repair And Graft Additional Text (Will Appearing After The Standard Complex Repair Text): The complex repair was not sufficient to completely close the primary defect. The remaining additional defect was repaired with the graft mentioned below.

## 2019-12-09 ENCOUNTER — INPATIENT (INPATIENT)
Facility: HOSPITAL | Age: 79
LOS: 0 days | Discharge: HOME | End: 2019-12-10
Attending: INTERNAL MEDICINE | Admitting: INTERNAL MEDICINE
Payer: MEDICARE

## 2019-12-09 VITALS
DIASTOLIC BLOOD PRESSURE: 59 MMHG | TEMPERATURE: 97 F | RESPIRATION RATE: 18 BRPM | OXYGEN SATURATION: 97 % | HEART RATE: 52 BPM | SYSTOLIC BLOOD PRESSURE: 202 MMHG

## 2019-12-09 DIAGNOSIS — I77.0 ARTERIOVENOUS FISTULA, ACQUIRED: Chronic | ICD-10-CM

## 2019-12-09 DIAGNOSIS — N18.6 END STAGE RENAL DISEASE: ICD-10-CM

## 2019-12-09 DIAGNOSIS — R79.89 OTHER SPECIFIED ABNORMAL FINDINGS OF BLOOD CHEMISTRY: ICD-10-CM

## 2019-12-09 DIAGNOSIS — Z29.9 ENCOUNTER FOR PROPHYLACTIC MEASURES, UNSPECIFIED: ICD-10-CM

## 2019-12-09 DIAGNOSIS — I48.91 UNSPECIFIED ATRIAL FIBRILLATION: ICD-10-CM

## 2019-12-09 DIAGNOSIS — Z98.890 OTHER SPECIFIED POSTPROCEDURAL STATES: Chronic | ICD-10-CM

## 2019-12-09 DIAGNOSIS — E11.9 TYPE 2 DIABETES MELLITUS WITHOUT COMPLICATIONS: ICD-10-CM

## 2019-12-09 DIAGNOSIS — E78.5 HYPERLIPIDEMIA, UNSPECIFIED: ICD-10-CM

## 2019-12-09 DIAGNOSIS — I16.0 HYPERTENSIVE URGENCY: ICD-10-CM

## 2019-12-09 DIAGNOSIS — I48.0 PAROXYSMAL ATRIAL FIBRILLATION: ICD-10-CM

## 2019-12-09 DIAGNOSIS — I10 ESSENTIAL (PRIMARY) HYPERTENSION: ICD-10-CM

## 2019-12-09 DIAGNOSIS — E11.65 TYPE 2 DIABETES MELLITUS WITH HYPERGLYCEMIA: ICD-10-CM

## 2019-12-09 LAB
ALBUMIN SERPL ELPH-MCNC: 4.1 G/DL — SIGNIFICANT CHANGE UP (ref 3.3–5)
ALP SERPL-CCNC: 104 U/L — SIGNIFICANT CHANGE UP (ref 40–120)
ALT FLD-CCNC: 17 U/L — SIGNIFICANT CHANGE UP (ref 4–33)
ANION GAP SERPL CALC-SCNC: 12 MMO/L — SIGNIFICANT CHANGE UP (ref 7–14)
APPEARANCE UR: CLEAR — SIGNIFICANT CHANGE UP
APTT BLD: 38.3 SEC — HIGH (ref 27.5–36.3)
AST SERPL-CCNC: 25 U/L — SIGNIFICANT CHANGE UP (ref 4–32)
BACTERIA # UR AUTO: SIGNIFICANT CHANGE UP
BASE EXCESS BLDV CALC-SCNC: 5 MMOL/L — SIGNIFICANT CHANGE UP
BASOPHILS # BLD AUTO: 0.02 K/UL — SIGNIFICANT CHANGE UP (ref 0–0.2)
BASOPHILS NFR BLD AUTO: 0.5 % — SIGNIFICANT CHANGE UP (ref 0–2)
BILIRUB SERPL-MCNC: 0.3 MG/DL — SIGNIFICANT CHANGE UP (ref 0.2–1.2)
BILIRUB UR-MCNC: NEGATIVE — SIGNIFICANT CHANGE UP
BLOOD GAS VENOUS - CREATININE: 7.67 MG/DL — HIGH (ref 0.5–1.3)
BLOOD UR QL VISUAL: NEGATIVE — SIGNIFICANT CHANGE UP
BUN SERPL-MCNC: 41 MG/DL — HIGH (ref 7–23)
CALCIUM SERPL-MCNC: 9.2 MG/DL — SIGNIFICANT CHANGE UP (ref 8.4–10.5)
CHLORIDE BLDV-SCNC: 101 MMOL/L — SIGNIFICANT CHANGE UP (ref 96–108)
CHLORIDE SERPL-SCNC: 98 MMOL/L — SIGNIFICANT CHANGE UP (ref 98–107)
CK MB BLD-MCNC: 1.68 NG/ML — SIGNIFICANT CHANGE UP (ref 1–4.7)
CK SERPL-CCNC: 110 U/L — SIGNIFICANT CHANGE UP (ref 25–170)
CO2 SERPL-SCNC: 30 MMOL/L — SIGNIFICANT CHANGE UP (ref 22–31)
COLOR SPEC: SIGNIFICANT CHANGE UP
CREAT SERPL-MCNC: 7.27 MG/DL — HIGH (ref 0.5–1.3)
EOSINOPHIL # BLD AUTO: 0.21 K/UL — SIGNIFICANT CHANGE UP (ref 0–0.5)
EOSINOPHIL NFR BLD AUTO: 5 % — SIGNIFICANT CHANGE UP (ref 0–6)
GAS PNL BLDV: 137 MMOL/L — SIGNIFICANT CHANGE UP (ref 136–146)
GLUCOSE BLDV-MCNC: 123 MG/DL — HIGH (ref 70–99)
GLUCOSE SERPL-MCNC: 130 MG/DL — HIGH (ref 70–99)
GLUCOSE UR-MCNC: 50 — SIGNIFICANT CHANGE UP
HBV SURFACE AG SER-ACNC: NEGATIVE — SIGNIFICANT CHANGE UP
HCO3 BLDV-SCNC: 27 MMOL/L — SIGNIFICANT CHANGE UP (ref 20–27)
HCT VFR BLD CALC: 36 % — SIGNIFICANT CHANGE UP (ref 34.5–45)
HCT VFR BLDV CALC: 34.2 % — LOW (ref 34.5–45)
HGB BLD-MCNC: 10.8 G/DL — LOW (ref 11.5–15.5)
HGB BLDV-MCNC: 11.1 G/DL — LOW (ref 11.5–15.5)
HYALINE CASTS # UR AUTO: NEGATIVE — SIGNIFICANT CHANGE UP
IMM GRANULOCYTES NFR BLD AUTO: 0.2 % — SIGNIFICANT CHANGE UP (ref 0–1.5)
INR BLD: 1.12 — SIGNIFICANT CHANGE UP (ref 0.88–1.17)
KETONES UR-MCNC: NEGATIVE — SIGNIFICANT CHANGE UP
LACTATE BLDV-MCNC: 1.2 MMOL/L — SIGNIFICANT CHANGE UP (ref 0.5–2)
LEUKOCYTE ESTERASE UR-ACNC: NEGATIVE — SIGNIFICANT CHANGE UP
LYMPHOCYTES # BLD AUTO: 0.82 K/UL — LOW (ref 1–3.3)
LYMPHOCYTES # BLD AUTO: 19.3 % — SIGNIFICANT CHANGE UP (ref 13–44)
MCHC RBC-ENTMCNC: 24.5 PG — LOW (ref 27–34)
MCHC RBC-ENTMCNC: 30 % — LOW (ref 32–36)
MCV RBC AUTO: 81.8 FL — SIGNIFICANT CHANGE UP (ref 80–100)
MONOCYTES # BLD AUTO: 0.26 K/UL — SIGNIFICANT CHANGE UP (ref 0–0.9)
MONOCYTES NFR BLD AUTO: 6.1 % — SIGNIFICANT CHANGE UP (ref 2–14)
NEUTROPHILS # BLD AUTO: 2.92 K/UL — SIGNIFICANT CHANGE UP (ref 1.8–7.4)
NEUTROPHILS NFR BLD AUTO: 68.9 % — SIGNIFICANT CHANGE UP (ref 43–77)
NITRITE UR-MCNC: NEGATIVE — SIGNIFICANT CHANGE UP
NRBC # FLD: 0 K/UL — SIGNIFICANT CHANGE UP (ref 0–0)
PCO2 BLDV: 63 MMHG — HIGH (ref 41–51)
PH BLDV: 7.31 PH — LOW (ref 7.32–7.43)
PH UR: 8.5 — HIGH (ref 5–8)
PHOSPHATE SERPL-MCNC: 3.8 MG/DL — SIGNIFICANT CHANGE UP (ref 2.5–4.5)
PLATELET # BLD AUTO: 212 K/UL — SIGNIFICANT CHANGE UP (ref 150–400)
PMV BLD: 13.2 FL — HIGH (ref 7–13)
PO2 BLDV: < 24 MMHG — LOW (ref 35–40)
POTASSIUM BLDV-SCNC: 4.2 MMOL/L — SIGNIFICANT CHANGE UP (ref 3.4–4.5)
POTASSIUM SERPL-MCNC: 4.3 MMOL/L — SIGNIFICANT CHANGE UP (ref 3.5–5.3)
POTASSIUM SERPL-SCNC: 4.3 MMOL/L — SIGNIFICANT CHANGE UP (ref 3.5–5.3)
PROT SERPL-MCNC: 8.1 G/DL — SIGNIFICANT CHANGE UP (ref 6–8.3)
PROT UR-MCNC: 300 — HIGH
PROTHROM AB SERPL-ACNC: 12.8 SEC — SIGNIFICANT CHANGE UP (ref 9.8–13.1)
PTH-INTACT SERPL-MCNC: 437.9 PG/ML — HIGH (ref 15–65)
RBC # BLD: 4.4 M/UL — SIGNIFICANT CHANGE UP (ref 3.8–5.2)
RBC # FLD: 14.3 % — SIGNIFICANT CHANGE UP (ref 10.3–14.5)
RBC CASTS # UR COMP ASSIST: SIGNIFICANT CHANGE UP (ref 0–?)
SAO2 % BLDV: 29.3 % — LOW (ref 60–85)
SODIUM SERPL-SCNC: 140 MMOL/L — SIGNIFICANT CHANGE UP (ref 135–145)
SP GR SPEC: 1.01 — SIGNIFICANT CHANGE UP (ref 1–1.04)
SQUAMOUS # UR AUTO: SIGNIFICANT CHANGE UP
TROPONIN T, HIGH SENSITIVITY: 47 NG/L — SIGNIFICANT CHANGE UP (ref ?–14)
TROPONIN T, HIGH SENSITIVITY: 55 NG/L — CRITICAL HIGH (ref ?–14)
UROBILINOGEN FLD QL: NORMAL — SIGNIFICANT CHANGE UP
WBC # BLD: 4.24 K/UL — SIGNIFICANT CHANGE UP (ref 3.8–10.5)
WBC # FLD AUTO: 4.24 K/UL — SIGNIFICANT CHANGE UP (ref 3.8–10.5)
WBC UR QL: SIGNIFICANT CHANGE UP (ref 0–?)

## 2019-12-09 PROCEDURE — 71045 X-RAY EXAM CHEST 1 VIEW: CPT | Mod: 26

## 2019-12-09 PROCEDURE — 99222 1ST HOSP IP/OBS MODERATE 55: CPT

## 2019-12-09 RX ORDER — DONEPEZIL HYDROCHLORIDE 10 MG/1
10 TABLET, FILM COATED ORAL AT BEDTIME
Refills: 0 | Status: DISCONTINUED | OUTPATIENT
Start: 2019-12-09 | End: 2019-12-10

## 2019-12-09 RX ORDER — SODIUM CHLORIDE 9 MG/ML
1000 INJECTION, SOLUTION INTRAVENOUS
Refills: 0 | Status: DISCONTINUED | OUTPATIENT
Start: 2019-12-09 | End: 2019-12-10

## 2019-12-09 RX ORDER — ATORVASTATIN CALCIUM 80 MG/1
80 TABLET, FILM COATED ORAL AT BEDTIME
Refills: 0 | Status: DISCONTINUED | OUTPATIENT
Start: 2019-12-09 | End: 2019-12-10

## 2019-12-09 RX ORDER — DEXTROSE 50 % IN WATER 50 %
12.5 SYRINGE (ML) INTRAVENOUS ONCE
Refills: 0 | Status: DISCONTINUED | OUTPATIENT
Start: 2019-12-09 | End: 2019-12-10

## 2019-12-09 RX ORDER — DEXTROSE 50 % IN WATER 50 %
25 SYRINGE (ML) INTRAVENOUS ONCE
Refills: 0 | Status: DISCONTINUED | OUTPATIENT
Start: 2019-12-09 | End: 2019-12-10

## 2019-12-09 RX ORDER — METOPROLOL TARTRATE 50 MG
100 TABLET ORAL ONCE
Refills: 0 | Status: COMPLETED | OUTPATIENT
Start: 2019-12-09 | End: 2019-12-09

## 2019-12-09 RX ORDER — HYDRALAZINE HCL 50 MG
25 TABLET ORAL
Refills: 0 | Status: DISCONTINUED | OUTPATIENT
Start: 2019-12-10 | End: 2019-12-10

## 2019-12-09 RX ORDER — APIXABAN 2.5 MG/1
1 TABLET, FILM COATED ORAL
Qty: 0 | Refills: 0 | DISCHARGE

## 2019-12-09 RX ORDER — MECLIZINE HCL 12.5 MG
1 TABLET ORAL
Qty: 0 | Refills: 0 | DISCHARGE

## 2019-12-09 RX ORDER — LOSARTAN POTASSIUM 100 MG/1
50 TABLET, FILM COATED ORAL DAILY
Refills: 0 | Status: DISCONTINUED | OUTPATIENT
Start: 2019-12-09 | End: 2019-12-10

## 2019-12-09 RX ORDER — GLUCAGON INJECTION, SOLUTION 0.5 MG/.1ML
1 INJECTION, SOLUTION SUBCUTANEOUS ONCE
Refills: 0 | Status: DISCONTINUED | OUTPATIENT
Start: 2019-12-09 | End: 2019-12-10

## 2019-12-09 RX ORDER — CHLORHEXIDINE GLUCONATE 213 G/1000ML
1 SOLUTION TOPICAL DAILY
Refills: 0 | Status: DISCONTINUED | OUTPATIENT
Start: 2019-12-09 | End: 2019-12-10

## 2019-12-09 RX ORDER — SENNA PLUS 8.6 MG/1
2 TABLET ORAL AT BEDTIME
Refills: 0 | Status: DISCONTINUED | OUTPATIENT
Start: 2019-12-09 | End: 2019-12-10

## 2019-12-09 RX ORDER — METOPROLOL TARTRATE 50 MG
50 TABLET ORAL
Refills: 0 | Status: DISCONTINUED | OUTPATIENT
Start: 2019-12-09 | End: 2019-12-10

## 2019-12-09 RX ORDER — CALCIUM ACETATE 667 MG
667 TABLET ORAL
Refills: 0 | Status: DISCONTINUED | OUTPATIENT
Start: 2019-12-09 | End: 2019-12-10

## 2019-12-09 RX ORDER — INSULIN LISPRO 100/ML
VIAL (ML) SUBCUTANEOUS AT BEDTIME
Refills: 0 | Status: DISCONTINUED | OUTPATIENT
Start: 2019-12-09 | End: 2019-12-10

## 2019-12-09 RX ORDER — INSULIN GLARGINE 100 [IU]/ML
10 INJECTION, SOLUTION SUBCUTANEOUS AT BEDTIME
Refills: 0 | Status: DISCONTINUED | OUTPATIENT
Start: 2019-12-09 | End: 2019-12-10

## 2019-12-09 RX ORDER — INSULIN LISPRO 100/ML
VIAL (ML) SUBCUTANEOUS
Refills: 0 | Status: DISCONTINUED | OUTPATIENT
Start: 2019-12-09 | End: 2019-12-10

## 2019-12-09 RX ORDER — ASPIRIN/CALCIUM CARB/MAGNESIUM 324 MG
81 TABLET ORAL DAILY
Refills: 0 | Status: DISCONTINUED | OUTPATIENT
Start: 2019-12-09 | End: 2019-12-10

## 2019-12-09 RX ORDER — APIXABAN 2.5 MG/1
2.5 TABLET, FILM COATED ORAL
Refills: 0 | Status: DISCONTINUED | OUTPATIENT
Start: 2019-12-09 | End: 2019-12-10

## 2019-12-09 RX ORDER — SERTRALINE 25 MG/1
50 TABLET, FILM COATED ORAL DAILY
Refills: 0 | Status: DISCONTINUED | OUTPATIENT
Start: 2019-12-09 | End: 2019-12-10

## 2019-12-09 RX ORDER — DEXTROSE 50 % IN WATER 50 %
15 SYRINGE (ML) INTRAVENOUS ONCE
Refills: 0 | Status: DISCONTINUED | OUTPATIENT
Start: 2019-12-09 | End: 2019-12-10

## 2019-12-09 RX ORDER — HYDRALAZINE HCL 50 MG
10 TABLET ORAL ONCE
Refills: 0 | Status: DISCONTINUED | OUTPATIENT
Start: 2019-12-09 | End: 2019-12-10

## 2019-12-09 RX ORDER — FENOFIBRATE,MICRONIZED 130 MG
145 CAPSULE ORAL DAILY
Refills: 0 | Status: DISCONTINUED | OUTPATIENT
Start: 2019-12-09 | End: 2019-12-10

## 2019-12-09 RX ADMIN — Medication 100 MILLIGRAM(S): at 14:35

## 2019-12-09 RX ADMIN — INSULIN GLARGINE 10 UNIT(S): 100 INJECTION, SOLUTION SUBCUTANEOUS at 23:36

## 2019-12-09 NOTE — CONSULT NOTE ADULT - ATTENDING COMMENTS
Thank you for allowing me to participate in the care of Ms. Livingston. I will continue to follow.     Thomas Hendrickson MD, [facility]  [address] [place]  [address]    cell: [phone]  office: [phone]

## 2019-12-09 NOTE — CONSULT NOTE ADULT - PROBLEM SELECTOR RECOMMENDATION 9
Patient is in sinus rhythm, bradycardic 50-55 BPM. Recommend decrease metoprolol xl 100 mg po daily for rate control. She admits to being noncompliant with medication, once a day/extended release pill would be a better option for this patient and I have stressed need of compliance.   Continue eliquis 2.5 mg po BID for prevention of thromboembolic phenomena. [id number] Score=7.  History of CVA in past, continue ASA 81 mg po daily. Currently no active bleeding. Hb 10.8/36, continue PPI for GI px.

## 2019-12-09 NOTE — CONSULT NOTE ADULT - PROBLEM SELECTOR RECOMMENDATION 6
Deep venous thrombosis prophylaxis: heparin or lovenox SQ. Deep venous thrombosis prophylaxis: on eliquis for atrial fibrillation

## 2019-12-09 NOTE — H&P ADULT - PROBLEM SELECTOR PLAN 1
Blood pressure in the ED noted to be 212/58 and patient received Metoprolol 100mg with repeat being 176/57. Patient ordered for STAT HD as she did not get today's session  Will continue with home antihypertensive medications with hold parameters of SBP<130   DASH diet recommended  Renal started patient on Losartan 50mg QD Blood pressure in the ED noted to be 212/58 and patient received Metoprolol 100mg with repeat being 176/57. Patient ordered for STAT HD as she did not get today's session. In hypertensive state 2/2 to medication non-compliance  Will continue with home antihypertensive medications with hold parameters of SBP<130   DASH diet recommended  Renal started patient on Losartan 50mg QD

## 2019-12-09 NOTE — H&P ADULT - NEGATIVE OPHTHALMOLOGIC SYMPTOMS
no lacrimation L/no lacrimation R/no discharge L/no blurred vision L/no photophobia/no blurred vision R/no discharge R/no diplopia

## 2019-12-09 NOTE — H&P ADULT - NEGATIVE CARDIOVASCULAR SYMPTOMS
no chest pain/no peripheral edema/no orthopnea/no paroxysmal nocturnal dyspnea/no palpitations/no dyspnea on exertion

## 2019-12-09 NOTE — H&P ADULT - NEGATIVE NEUROLOGICAL SYMPTOMS
no loss of sensation/no difficulty walking/no loss of consciousness/no transient paralysis/no weakness/no generalized seizures/no tremors/no hemiparesis/no syncope/no vertigo/no paresthesias/no headache/no confusion/no focal seizures

## 2019-12-09 NOTE — H&P ADULT - PROBLEM SELECTOR PLAN 7
Continue with Lipitor and Tricor daily   Lipid profile in am, low cholesterol diet recommended Continue with Lipitor daily   Lipid profile in am, low cholesterol diet recommended

## 2019-12-09 NOTE — H&P ADULT - PROBLEM SELECTOR PLAN 3
H&H: 10.8/36.0 in the setting of underline ESRD. Patient denied any overt signs of GI/ bleeding  Will monitor for now

## 2019-12-09 NOTE — CONSULT NOTE ADULT - SUBJECTIVE AND OBJECTIVE BOX
CARDIOLOGY CONSULTATION NOTE                                                                                             Consult requested by:  Dr. Hudson    Reason for Consultation: atrial fibrillation    History obtained by: Patient and medical record     obtained: No    Chief complaint:    Patient is a 79y old  Female who presents with a chief complaint of Nausea and vomiting (09 Dec 2019 21:59)      HPI:  78 y/o F with PMH of HTN, ESRD(M/W/F), Anemia, DM type II, GERD, Afib(on Eliquis) presented from dialysis center for elevated blood pressure with N/V. As per the patient she was in her usual state of health last night. Patient stated that whenever she has to got to HD she would feel sick, and "does not want to do HD anymore". Today knowing that she has her scheduled HD she "felt bad" unable to explain in further detail. Patient stated that at HD center she had 1 episode of NBNB vomiting and was nauseous and had a SBP of 200. Staff at the HD center was worried and called her nephrologist who recommended to send her to the ER. Patient stated that her blood pressure SBP is 160-170 and it has never been 200s. Patient denied any other complaints: CP, SOB, fevers, chills, diarrhea, constipation, abdominal pain, dysuria, melena, hematochezia, recent travel, sick contact, pleuritic or positional chest pain.     On ED admission EKG revealed Sinus bradycardia at a rate of 55 with TWI in lead AVL and QTc of 463, CE x 1: Trop: 55, H&H: 10.8/36.0, BUN/Cr: 41/7.27, Gluc: 130, UA: Negative. CXR: Previously seen right chest wall dialysis catheter not present currently. Suboptimally inflated but otherwise clear lungs. No pleural effusions or pneumothorax. Stable cardiomegaly. Trachea midline. Generalized osteopenia again noted. Patient was seen by renal and is to get HD tonight. When examined patient is resting in the stretcher and denied any current complaints. 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 Patient presented to HD center after one episode of vomiting, found to have BP of 220mmHG systolic. Admits to being noncompliant with her medications and states that she missed her dose today. No fever or chills. No diarrhea. No c/o chest pain, palpitations, lower extremity oedema, shortness of breath, dizziness, PND. She is able to carry on with her day to day activities, walks 2-3 blocks without difficulty and can climb 2 flights of stairs. Currently patient is resting comfortably while receiving HD.         REVIEW OF SYMPTOMS: Cardiovascular:  See HPI. No chest pain,  No dyspnea,  No syncope,  No palpitations, No dizziness, No Orthopnea,      No Paroxsymal nocturnal dyspnea;  Respiratory:  No Dyspnea, No cough,     Genitourinary:  No dysuria, no hematuria; Gastrointestinal:  + nausea, + vomiting. No diarrhea.  No abdominal pain. No dark color stool, no melena ; Neurological: No headache, no dizziness, no slurred speech;  Psychiatric: No agitation, no anxiety.  ALL OTHER REVIEW OF SYSTEMS ARE NEGATIVE.    ALLERGIES:   No Known Allergies      CURRENT MEDICATIONS:  hydrALAZINE Injectable 10 milliGRAM(s) IV Push once  losartan 50 milliGRAM(s) Oral daily  metoprolol tartrate 50 milliGRAM(s) Oral two times a day     donepezil  sertraline  senna  apixaban  aspirin  chewable  atorvastatin  calcium acetate  chlorhexidine 4% Liquid  dextrose 5%.  dextrose 50% Injectable  dextrose 50% Injectable  dextrose 50% Injectable  fenofibrate Tablet  insulin glargine Injectable (LANTUS)  insulin lispro (HumaLOG) corrective regimen sliding scale  insulin lispro (HumaLOG) corrective regimen sliding scale      HOME MEDICATIONS:  as per med rec    PAST MEDICAL HISTORY  Atrial fibrillation  Anemia  CKD (chronic kidney disease)  Hypercholesteremia  HTN (hypertension)  DM (diabetes mellitus)  subacute CVA      PAST SURGICAL HISTORY:  H/O colonoscopy with polypectomy  AV fistula  Status post right foot surgery      FAMILY HISTORY:  Family history of malignant neoplasm of gastrointestinal tract  Family history of lung cancer (Sibling)  Family history of diabetes mellitus  Family history of hypertension (Sibling)      SOCIAL HISTORY:  Denies smoking/alcohol/drugs      Vital Signs Last 24 Hrs  T(C): 37.2 (09 Dec 2019 20:20), Max: 37.2 (09 Dec 2019 20:20)  T(F): 98.9 (09 Dec 2019 20:20), Max: 98.9 (09 Dec 2019 20:20)  HR: 58 (09 Dec 2019 20:20) (52 - 98)  BP: 193/78 (09 Dec 2019 20:20) (140/77 - 212/58)  BP(mean): --  RR: 17 (09 Dec 2019 20:20) (17 - 19)  SpO2: 98% (09 Dec 2019 19:11) (97% - 99%)      PHYSICAL EXAM:  Constitutional: Comfortable . No acute distress.   HEENT: Atraumatic and normcephalic , neck is supple . no JVD. No carotid bruit. PEERL   CNS: A&Ox3. No focal deficits. EOMI.   Lymph Nodes: Cervical : Not palpable.  Respiratory: CTAB  Cardiovascular: S1S2 RRR. No murmur/rubs or gallop.  Gastrointestinal: Soft non-tender and non distended . +Bowel sounds.   Extremities: No edema. right arm AV fistula  Psychiatric: Calm . no agitation.  Skin: No skin rash/ulcers visualized to face, hands or feet.    Intake and output:     LABS:                        10.8   4.24  )-----------( 212      ( 09 Dec 2019 14:25 )             36.0     12    140  |  98  |  41<H>  ----------------------------<  130<H>  4.3   |  30  |  7.27<H>    Ca    9.2      09 Dec 2019 14:25  Phos  3.8     12    TPro  8.1  /  Alb  4.1  /  TBili  0.3  /  DBili  x   /  AST  25  /  ALT  17  /  AlkPhos  104  12    CARDIAC MARKERS ( 09 Dec 2019 20:45 )  x     / x     / 110 u/L / 1.68 ng/mL / x        ;p-BNP=  PT/INR - ( 09 Dec 2019 14:25 )   PT: 12.8 SEC;   INR: 1.12          PTT - ( 09 Dec 2019 14:25 )  PTT:38.3 SEC  Urinalysis Basic - ( 09 Dec 2019 14:35 )    Color: LIGHT YELLOW / Appearance: CLEAR / S.014 / pH: 8.5  Gluc: 50 / Ketone: NEGATIVE  / Bili: NEGATIVE / Urobili: NORMAL   Blood: NEGATIVE / Protein: 300 / Nitrite: NEGATIVE   Leuk Esterase: NEGATIVE / RBC: 3-5 / WBC 0-2   Sq Epi: FEW / Non Sq Epi: x / Bacteria: FEW        INTERPRETATION OF TELEMETRY: Reviewed by me.   ECG: Reviewed by me. sinus bradycardia, no st-t wave abnormality suggestive of ischemia    RADIOLOGY & ADDITIONAL STUDIES:    cxr: no vascular congestion, no effusion    < from: Transthoracic Echocardiogram (19 @ 14:39) >  1. Mitral annular calcification, otherwise normal mitral  valve. Mild mitral regurgitation.  2. Moderately dilated left atrium.  LA volume index = 45  cc/m2.  3. Normal left ventricular internal dimensions and wall  thicknesses.  4. Normal left ventricular systolic function. No segmental  wall motion abnormalities.  5. Normal right ventricular size and function.  6. Estimated right ventricular systolic pressure equals 44  mm Hg, assuming right atrial pressure equals 10 mm Hg,  consistent with mild pulmonary hypertension.  7. Small pericardial effusion posterior and lateral to the  left ventricle.  8. A bubble study was performed with the intravenous  injection of agitated saline contrast and was inconclusive  regarding the presence of an interatrial shunt.  No obvious cardiac source of embolus was identified on this  transthoracic study.  If clinical suspicion is high,  < end of copied text >  consider DAYAN for further evaluation.    < from: MR Head No Cont (19 @ 11:12) >  IMPRESSION:  No acute or subacute infarction.  Chronic right AICA territory infarction. Chronic lacunar infarctions in   the basal ganglia and thalami.  Mild to moderate microvascular ischemic change.        < end of copied text >

## 2019-12-09 NOTE — H&P ADULT - ASSESSMENT
80 y/o F with PMH of HTN, ESRD(M/W/F), Anemia, DM type II, GERD, Afib(on Eliquis) presented from dialysis center for elevated blood pressure with N/V. 80 y/o F with PMH of HTN, ESRD(M/W/F), Anemia, DM type II, GERD, Afib(on Eliquis), Prior CVA presented from dialysis center for elevated blood pressure with N/V

## 2019-12-09 NOTE — H&P ADULT - PROBLEM SELECTOR PLAN 5
[id number] Score of 5 and patient on Eliquis for anticoagulation. Currently bradycardic on examination and EKG. Will half Metoprolol from 100mg BID to 50mg BID due to bradycardia [id number] Score of 7 and patient on Eliquis for anticoagulation. Currently bradycardic on examination and EKG. Will half Metoprolol from 100mg BID to 50mg BID due to bradycardia

## 2019-12-09 NOTE — ED PROVIDER NOTE - OBJECTIVE STATEMENT
79F PMH ESRD on HD, DM, GERD, HTN presents with elevated blood pressure and nausea with vomiting.  Daughter reports patient was at the dialysis center and noted to have a SBP 200s.  Patient also noted to be nauseous with 1 episode of vomiting.  Patient's nephrologist, Dr. Hudson was contacted, who advised patient be sent to hospital.  Patient reports mild chest pain earlier which has since resolved.  Patient denies any chest pain or nausea currently.  Patient denies fevers, chills, abdominal pain, shortness of breath, rectal bleeding, dysuria, hematuria, melena, hematochezia.

## 2019-12-09 NOTE — ED ADULT TRIAGE NOTE - CHIEF COMPLAINT QUOTE
Arrives via EMS from dialysis satellite for evaluation HTN.  Pt did not receive her dialysis today.  Pt endorses nausea with vomiting.   mg/dl

## 2019-12-09 NOTE — H&P ADULT - HISTORY OF PRESENT ILLNESS
78 y/o F with PMH of HTN, ESRD(M/W/F), Anemia, DM type II, GERD, Afib(on Eliquis) presented from dialysis center for elevated blood pressure with N/V. As per the patient she was in her usual state of health last night. Patient stated that whenever she has to got to HD she would feel sick, and "does not want to do HD anymore". Today knowing that she has her scheduled HD she "felt bad" unable to explain in further detail. Patient stated that at HD center she had 1 episode of NBNB vomiting and was nauseous and had a SBP of 200. Staff at the HD center was worried and called her nephrologist who recommended to send her to the ER. Patient stated that her blood pressure SBP is 160-170 and it has never been 200s. Patient denied any other complaints: CP, SOB, fevers, chills, diarrhea, constipation, abdominal pain, dysuria, melena, hematochezia, recent travel, sick contact, pleuritic or positional chest pain.     On ED admission EKG revealed Sinus bradycardia at a rate of 55 with TWI in lead AVL and QTc of 463, CE x 1: Trop: 55, H&H: 10.8/36.0, BUN/Cr: 41/7.27, Gluc: 130, UA: Negative. CXR: Previously seen right chest wall dialysis catheter not present currently. Suboptimally inflated but otherwise clear lungs. No pleural effusions or pneumothorax. Stable cardiomegaly. Trachea midline. Generalized osteopenia again noted. Patient was seen by renal and is to get HD tonight. When examined patient is resting in the stretcher and denied any current complaints. 78 y/o F with PMH of HTN, ESRD(M/W/F), Anemia, DM type II, GERD, Afib(on Eliquis), Prior CVA presented from dialysis center for elevated blood pressure with N/V. As per the patient she was in her usual state of health last night. Patient stated that whenever she has to got to HD she would feel sick, and "does not want to do HD anymore". Today knowing that she has her scheduled HD she "felt bad" unable to explain in further detail. Patient stated that at HD center she had 1 episode of NBNB vomiting and was nauseous and had a SBP of 200. Staff at the HD center was worried and called her nephrologist who recommended to send her to the ER. Patient stated that her blood pressure SBP is 160-170 and it has never been 200s. Patient denied any other complaints: CP, SOB, fevers, chills, diarrhea, constipation, abdominal pain, dysuria, melena, hematochezia, recent travel, sick contact, pleuritic or positional chest pain.     On ED admission EKG revealed Sinus bradycardia at a rate of 55 with TWI in lead AVL and QTc of 463, CE x 1: Trop: 55, H&H: 10.8/36.0, BUN/Cr: 41/7.27, Gluc: 130, UA: Negative. CXR: Previously seen right chest wall dialysis catheter not present currently. Suboptimally inflated but otherwise clear lungs. No pleural effusions or pneumothorax. Stable cardiomegaly. Trachea midline. Generalized osteopenia again noted. Patient was seen by renal and is to get HD tonight. When examined patient is resting in the stretcher and denied any current complaints.

## 2019-12-09 NOTE — CONSULT NOTE ADULT - ASSESSMENT
79F PMH ESRD on HD, DM, GERD, HTN presents from [facility] with elevated blood pressure and nausea with vomiting    ESRD on HD MWF  last HD friday  plan for Hd today  consent in chart  renal diet  monitor bmp    HTN  uncontrolled  resume home meds. on hydralazine and metoprolol  will give hydralazine 10 iv x 1 now   will start losartan 50 now  monitor  up titrate hydralazine as needed  UF with HD    anemia  epo 4000 for maintainenc  monitor Hb    ckd-mbd  check pth, phos level 79F PMH ESRD on HD, DM, GERD, HTN presents from [facility] with elevated blood pressure and nausea with vomiting    ESRD on HD MWF  last HD friday  plan for Hd today  consent in chart  renal diet  monitor bmp    HTN  uncontrolled  resume home meds. on hydralazine and metoprolol  will give hydralazine 10 iv x 1 now   will start losartan 50 now  monitor  up titrate hydralazine as needed  UF with HD    anemia  acceptable  will hold epo until bp is better  monitor Hb    ckd-mbd  check pth, phos level 79F PMH ESRD on HD, DM, GERD, HTN presents from [facility] with elevated blood pressure and nausea with vomiting    ESRD on HD MWF  last HD friday  plan for Hd today  consent in chart  renal diet  monitor bmp    HTN  uncontrolled  resume home meds. on hydralazine and metoprolol  will give hydralazine 10 iv x 1 now   will start losartan 50 now  monitor  up titrate hydralazine as needed  UF with HD    Anemia  acceptable  will hold epo until bp is better  monitor Hb    ckd-mbd  check pth, phos level

## 2019-12-09 NOTE — H&P ADULT - NSHPLABSRESULTS_GEN_ALL_CORE
10.8   4.24  )-----------( 212      ( 09 Dec 2019 14:25 )             36.0     12-09    140  |  98  |  41<H>  ----------------------------<  130<H>  4.3   |  30  |  7.27<H>    Ca    9.2      09 Dec 2019 14:25  Phos  3.8     12-09    TPro  8.1  /  Alb  4.1  /  TBili  0.3  /  DBili  x   /  AST  25  /  ALT  17  /  AlkPhos  104  12-09    CXR: Previously seen right chest wall dialysis catheter not present currently. Suboptimally inflated but otherwise clear lungs. No pleural effusions or pneumothorax. Stable cardiomegaly. Trachea midline. Generalized osteopenia again noted.     EKG: Sinus bradycardia at a rate of 55 with TWI in lead AVL and QTc of 463

## 2019-12-09 NOTE — H&P ADULT - NSICDXPASTMEDICALHX_GEN_ALL_CORE_FT
PAST MEDICAL HISTORY:  Anemia     Atrial fibrillation On Eliquis    CKD (chronic kidney disease) now on HD via R forearm AVF    DM (diabetes mellitus) TYpe II    HTN (hypertension)     Hypercholesteremia PAST MEDICAL HISTORY:  Anemia     Atrial fibrillation On Eliquis    Cerebrovascular accident (CVA)     CKD (chronic kidney disease) now on HD via R forearm AVF    DM (diabetes mellitus) TYpe II    HTN (hypertension)     Hypercholesteremia

## 2019-12-09 NOTE — CONSULT NOTE ADULT - ASSESSMENT
79yr old pleasant female pmhx paroxysmal atrial fibrillation, prior subacute embolic CVA July 2019 no neurological deficit, HTN, DM type II, GERD, ESRD on HD (M/W/F) presents today complaining of nausea and vomiting found to have elevated BP, hypertensive urgency.

## 2019-12-09 NOTE — ED PROVIDER NOTE - ATTENDING CONTRIBUTION TO CARE
DR. BLOCH, ATTENDING MD-  I performed a face to face bedside interview with patient regarding history of present illness, review of symptoms and past medical history. I completed an independent physical exam.  I have discussed patient's plan of care with the resident.  WEll appearing NAD HEENT nml Heart sounds 2/6 MORENA. Lungs clear abd soft nontender, extrem no edema.neur nml

## 2019-12-09 NOTE — H&P ADULT - NEGATIVE MUSCULOSKELETAL SYMPTOMS
no muscle weakness/no neck pain/no muscle cramps/no joint swelling/no myalgia/no stiffness/no arthralgia/no arthritis

## 2019-12-09 NOTE — ED PROVIDER NOTE - CLINICAL SUMMARY MEDICAL DECISION MAKING FREE TEXT BOX
79F PMH ESRD on HD, DM, GERD, HTN presents with elevated blood pressure and nausea with vomiting.  CN II-XII intact, abdomen soft NTND, lungs clear.  Case discussed with Dr. Hudson who requests admission to Dr. Sutton.  Labs, CXR, EKG.  Admission.

## 2019-12-09 NOTE — H&P ADULT - PROBLEM SELECTOR PLAN 2
HsT on admission was 55. Patient denied any chest pain and EKG unchanged from prior EKG. Troponin leak 2/2 to underline ESRD and hypertensive state  Will monitor on telemetry for now, serial EKG and Louann prn for any episodes of chest pain   HgbA1C, TSH, lipid profile, CBC, CMP in am

## 2019-12-09 NOTE — H&P ADULT - RS GEN PE MLT RESP DETAILS PC
respirations non-labored/good air movement/airway patent/no intercostal retractions/clear to auscultation bilaterally/no rales/no rhonchi/breath sounds equal/no wheezes/no chest wall tenderness/normal

## 2019-12-09 NOTE — H&P ADULT - PROBLEM SELECTOR PLAN 4
BUN/Cr: 41/7.27. Patient seen by Nephrology and recommendation noted  Patient to get HD tonight  Avoid nephrotoxic medications   Renal diet ordered

## 2019-12-09 NOTE — H&P ADULT - GASTROINTESTINAL DETAILS
no rebound tenderness/no rigidity/soft/bowel sounds normal/no distention/normal/no guarding/nontender

## 2019-12-09 NOTE — CONSULT NOTE ADULT - SUBJECTIVE AND OBJECTIVE BOX
[facility]   MD JAJA SMITH DO MARYANNE SOURIAL, MALINDA ALEXANDRA    TEL:  OFFICE: [phone]  DR CASTILLO CELL: [phone]  DR. HARRISON CELL: [phone]  DR. HECK CELL: [phone]  LIZZY DHILLON CELL: [phone]    HPI:  79F PMH ESRD on HD, DM, GERD, HTN presents from [facility] dialysis with elevated blood pressure and nausea with vomiting. did not receive HD today.   patient seen and examined at bedside.  denies fevers, chills, abdominal pain, shortness of breath, rectal bleeding, dysuria, hematuria, melena, hematochezia.    Allergies:  No Known Allergies      PAST MEDICAL & SURGICAL HISTORY:  Atrial fibrillation  Anemia  CKD (chronic kidney disease): now on HD via R forearm AVF  Hypercholesteremia  HTN (hypertension)  DM (diabetes mellitus)  H/O colonoscopy with polypectomy  AV fistula: 2018, 2 ballooning (last one 2 weeks ago), following up on Dec 15th  Status post right foot surgery: hammer toe repair      Home Medications Reviewed    Hospital Medications:   MEDICATIONS  (STANDING):      SOCIAL HISTORY:  Denies ETOh, Smoking,     FAMILY HISTORY:  Family history of malignant neoplasm of gastrointestinal tract  Family history of lung cancer (Sibling)  Family history of diabetes mellitus  Family history of hypertension (Sibling)      REVIEW OF SYSTEMS:  CONSTITUTIONAL: No weakness, fevers or chills  EYES/ENT: No visual changes;  No vertigo or throat pain   NECK: No pain or stiffness  RESPIRATORY: No cough, wheezing, hemoptysis; No shortness of breath  CARDIOVASCULAR: No chest pain or palpitations.  GASTROINTESTINAL: see hpi  GENITOURINARY: No dysuria, frequency, foamy urine, urinary urgency, incontinence or hematuria  NEUROLOGICAL: No numbness or weakness  SKIN: No itching, burning, rashes, or lesions   VASCULAR: No bilateral lower extremity edema.   All other review of systems is negative unless indicated above.    VITALS:  T(F): 97.3 (19 @ 12:15), Max: 97.3 (19 @ 12:15)  HR: 52 (19 @ 12:15)  BP: 212/58 (19 @ 14:30)  RR: 18 (19 @ 12:15)  SpO2: 97% (19 @ 12:15)  Wt(kg): --        PHYSICAL EXAM:  Constitutional: NAD  HEENT: anicteric sclera, oropharynx clear, MMM  Neck: No JVD  Respiratory: CTAB, no wheezes, rales or rhonchi  Cardiovascular: S1, S2, RRR  Gastrointestinal: BS+, soft, NT/ND  Extremities: No cyanosis or clubbing. No peripheral edema  Neurological: A/O x 3, no focal deficits  Psychiatric: Normal mood, normal affect  : No CVA tenderness. No calvin.   Skin: No rashes  Vascular Access: right aVF    LABS:      140  |  98  |  41<H>  ----------------------------<  130<H>  4.3   |  30  |  7.27<H>    Ca    9.2      09 Dec 2019 14:25    TPro  8.1  /  Alb  4.1  /  TBili  0.3  /  DBili      /  AST  25  /  ALT  17  /  AlkPhos  104      Creatinine Trend: 7.27 <--                        10.8   4.24  )-----------( 212      ( 09 Dec 2019 14:25 )             36.0     Urine Studies:  Urinalysis Basic - ( 09 Dec 2019 14:35 )    Color: LIGHT YELLOW / Appearance: CLEAR / S.014 / pH: 8.5  Gluc: 50 / Ketone: NEGATIVE  / Bili: NEGATIVE / Urobili: NORMAL   Blood: NEGATIVE / Protein: 300 / Nitrite: NEGATIVE   Leuk Esterase: NEGATIVE / RBC: 3-5 / WBC 0-2   Sq Epi: FEW / Non Sq Epi:  / Bacteria: FEW          RADIOLOGY & ADDITIONAL STUDIES:

## 2019-12-10 VITALS
DIASTOLIC BLOOD PRESSURE: 63 MMHG | TEMPERATURE: 98 F | RESPIRATION RATE: 18 BRPM | HEART RATE: 61 BPM | SYSTOLIC BLOOD PRESSURE: 150 MMHG | OXYGEN SATURATION: 100 %

## 2019-12-10 LAB
ANION GAP SERPL CALC-SCNC: 16 MMO/L — HIGH (ref 7–14)
BUN SERPL-MCNC: 17 MG/DL — SIGNIFICANT CHANGE UP (ref 7–23)
CALCIUM SERPL-MCNC: 8.6 MG/DL — SIGNIFICANT CHANGE UP (ref 8.4–10.5)
CHLORIDE SERPL-SCNC: 98 MMOL/L — SIGNIFICANT CHANGE UP (ref 98–107)
CHOLEST SERPL-MCNC: 137 MG/DL — SIGNIFICANT CHANGE UP (ref 120–199)
CO2 SERPL-SCNC: 23 MMOL/L — SIGNIFICANT CHANGE UP (ref 22–31)
CREAT SERPL-MCNC: 4.17 MG/DL — HIGH (ref 0.5–1.3)
GLUCOSE SERPL-MCNC: 89 MG/DL — SIGNIFICANT CHANGE UP (ref 70–99)
HBA1C BLD-MCNC: 5.8 % — HIGH (ref 4–5.6)
HBV CORE AB SER-ACNC: NONREACTIVE — SIGNIFICANT CHANGE UP
HBV SURFACE AB SER-ACNC: <3 MLU/ML — LOW
HCT VFR BLD CALC: 31.9 % — LOW (ref 34.5–45)
HCV AB S/CO SERPL IA: 0.12 S/CO — SIGNIFICANT CHANGE UP (ref 0–0.99)
HCV AB SERPL-IMP: SIGNIFICANT CHANGE UP
HDLC SERPL-MCNC: 66 MG/DL — HIGH (ref 45–65)
HGB BLD-MCNC: 9.5 G/DL — LOW (ref 11.5–15.5)
LIPID PNL WITH DIRECT LDL SERPL: 41 MG/DL — SIGNIFICANT CHANGE UP
MAGNESIUM SERPL-MCNC: 2.1 MG/DL — SIGNIFICANT CHANGE UP (ref 1.6–2.6)
MCHC RBC-ENTMCNC: 24 PG — LOW (ref 27–34)
MCHC RBC-ENTMCNC: 29.8 % — LOW (ref 32–36)
MCV RBC AUTO: 80.6 FL — SIGNIFICANT CHANGE UP (ref 80–100)
NRBC # FLD: 0 K/UL — SIGNIFICANT CHANGE UP (ref 0–0)
PHOSPHATE SERPL-MCNC: 2.5 MG/DL — SIGNIFICANT CHANGE UP (ref 2.5–4.5)
PLATELET # BLD AUTO: 183 K/UL — SIGNIFICANT CHANGE UP (ref 150–400)
PMV BLD: SIGNIFICANT CHANGE UP FL (ref 7–13)
POTASSIUM SERPL-MCNC: 4.4 MMOL/L — SIGNIFICANT CHANGE UP (ref 3.5–5.3)
POTASSIUM SERPL-SCNC: 4.4 MMOL/L — SIGNIFICANT CHANGE UP (ref 3.5–5.3)
RBC # BLD: 3.96 M/UL — SIGNIFICANT CHANGE UP (ref 3.8–5.2)
RBC # FLD: 14.2 % — SIGNIFICANT CHANGE UP (ref 10.3–14.5)
SODIUM SERPL-SCNC: 137 MMOL/L — SIGNIFICANT CHANGE UP (ref 135–145)
TRIGL SERPL-MCNC: 178 MG/DL — HIGH (ref 10–149)
TSH SERPL-MCNC: 2.39 UIU/ML — SIGNIFICANT CHANGE UP (ref 0.27–4.2)
WBC # BLD: 4.6 K/UL — SIGNIFICANT CHANGE UP (ref 3.8–10.5)
WBC # FLD AUTO: 4.6 K/UL — SIGNIFICANT CHANGE UP (ref 3.8–10.5)

## 2019-12-10 RX ORDER — METOPROLOL TARTRATE 50 MG
50 TABLET ORAL DAILY
Refills: 0 | Status: DISCONTINUED | OUTPATIENT
Start: 2019-12-10 | End: 2019-12-10

## 2019-12-10 RX ORDER — METOPROLOL TARTRATE 50 MG
1 TABLET ORAL
Qty: 0 | Refills: 0 | DISCHARGE

## 2019-12-10 RX ORDER — HYDRALAZINE HCL 50 MG
25 TABLET ORAL
Refills: 0 | Status: DISCONTINUED | OUTPATIENT
Start: 2019-12-10 | End: 2019-12-10

## 2019-12-10 RX ORDER — LOSARTAN POTASSIUM 100 MG/1
1 TABLET, FILM COATED ORAL
Qty: 30 | Refills: 0
Start: 2019-12-10 | End: 2020-01-08

## 2019-12-10 RX ORDER — LACTULOSE 10 G/15ML
15 SOLUTION ORAL
Qty: 0 | Refills: 0 | DISCHARGE

## 2019-12-10 RX ORDER — METOPROLOL TARTRATE 50 MG
50 TABLET ORAL
Refills: 0 | Status: DISCONTINUED | OUTPATIENT
Start: 2019-12-10 | End: 2019-12-10

## 2019-12-10 RX ORDER — METOPROLOL TARTRATE 50 MG
1 TABLET ORAL
Qty: 30 | Refills: 0
Start: 2019-12-10 | End: 2020-01-08

## 2019-12-10 RX ADMIN — APIXABAN 2.5 MILLIGRAM(S): 2.5 TABLET, FILM COATED ORAL at 06:23

## 2019-12-10 RX ADMIN — LOSARTAN POTASSIUM 50 MILLIGRAM(S): 100 TABLET, FILM COATED ORAL at 06:24

## 2019-12-10 NOTE — DISCHARGE NOTE PROVIDER - NSDCCPCAREPLAN_GEN_ALL_CORE_FT
PRINCIPAL DISCHARGE DIAGNOSIS  Diagnosis: Hypertension  Assessment and Plan of Treatment: blood pressure now stable. Toprol XL added and metoprolol BID stopped. You were seen by Dr Hendrickson (cardiology) and will need to follow up with your cardiologist Dr Black within 2 weeks.      SECONDARY DISCHARGE DIAGNOSES  Diagnosis: Chest pain  Assessment and Plan of Treatment:

## 2019-12-10 NOTE — DISCHARGE NOTE PROVIDER - CARE PROVIDER_API CALL
Susy Palm  Phone: (   )    -  Fax: (   )    -  Established Patient  Follow Up Time: 2 weeks    Dr Leslie Pacheco,   Phone: (   )    -  Fax: (   )    -  Established Patient  Follow Up Time: 2 weeks    Dr Black,   Phone: (   )    -  Fax: (   )    -  Established Patient  Follow Up Time: 2 weeks

## 2019-12-10 NOTE — DISCHARGE NOTE NURSING/CASE MANAGEMENT/SOCIAL WORK - NSDCPEPTSTRK_GEN_ALL_CORE
Stroke warning signs and symptoms/Call 911 for stroke/Signs and symptoms of stroke/Prescribed medications/Risk factors for stroke/Stroke support groups for patients, families, and friends/Need for follow up after discharge/Stroke education booklet

## 2019-12-10 NOTE — DISCHARGE NOTE NURSING/CASE MANAGEMENT/SOCIAL WORK - NSTRANSFERBELONGINGSDISPO_GEN_A_NUR
11 weeks preg. Here with 24 hours left jaw pain. Denies yelling,trauma or opening mouth wide. States she feels like she can't open it.   not applicable

## 2019-12-10 NOTE — DISCHARGE NOTE NURSING/CASE MANAGEMENT/SOCIAL WORK - PATIENT PORTAL LINK FT
You can access the FollowMyHealth Patient Portal offered by [facility] by registering at the following website: [email or web address]. By joining [facility]’s FollowMyHealth portal, you will also be able to view your health information using other applications (apps) compatible with our system.

## 2019-12-10 NOTE — DISCHARGE NOTE PROVIDER - PROVIDER TOKENS
FREE:[LAST:[Dr Black],FIRST:[Susy],PHONE:[(   )    -],FAX:[(   )    -],FOLLOWUP:[2 weeks],ESTABLISHEDPATIENT:[T]],FREE:[LAST:[Dr Leslie Pacheco],PHONE:[(   )    -],FAX:[(   )    -],FOLLOWUP:[2 weeks],ESTABLISHEDPATIENT:[T]],FREE:[LAST:[Dr Black],PHONE:[(   )    -],FAX:[(   )    -],FOLLOWUP:[2 weeks],ESTABLISHEDPATIENT:[T]]

## 2019-12-10 NOTE — DISCHARGE NOTE PROVIDER - NSDCMRMEDTOKEN_GEN_ALL_CORE_FT
apixaban 2.5 mg oral tablet: 1 tab(s) orally 2 times a day  Per pharmacy, last dispensed 8/20/19  aspirin 81 mg oral tablet, chewable: 1 tab(s) orally once a day  atorvastatin 80 mg oral tablet: 1 tab(s) orally once a day  calcium acetate 667 mg oral capsule: 1 cap(s) orally 3 times a day (with meals)  docusate sodium 100 mg oral capsule: 1 cap(s) orally 2 times a day, As Needed  donepezil 10 mg oral tablet: 1 tab(s) orally once a day (at bedtime)  fenofibrate 145 mg oral tablet: 1 tab(s) orally once a day  No recent fill history at [facility]  hydrALAZINE 25 mg oral tablet: 1 tab(s) orally 2 times a day  Lantus Solostar Pen 100 units/mL subcutaneous solution: 10 unit(s) subcutaneous once a day (at bedtime)  losartan 50 mg oral tablet: 1 tab(s) orally once a day  Metoprolol Tartrate 100 mg oral tablet: 1 tab(s) orally 2 times a day  Senna 8.6 mg oral tablet: 2 tab(s) orally once a day (at bedtime), As Needed  sertraline 50 mg oral tablet: 1 tab(s) orally once a day  Toprol- mg oral tablet, extended release: 1 tab(s) orally once a day apixaban 2.5 mg oral tablet: 1 tab(s) orally 2 times a day  Per pharmacy, last dispensed 8/20/19  aspirin 81 mg oral tablet, chewable: 1 tab(s) orally once a day  atorvastatin 80 mg oral tablet: 1 tab(s) orally once a day  calcium acetate 667 mg oral capsule: 1 cap(s) orally 3 times a day (with meals)  docusate sodium 100 mg oral capsule: 1 cap(s) orally 2 times a day, As Needed  donepezil 10 mg oral tablet: 1 tab(s) orally once a day (at bedtime)  fenofibrate 145 mg oral tablet: 1 tab(s) orally once a day  No recent fill history at [facility]  hydrALAZINE 25 mg oral tablet: 1 tab(s) orally 2 times a day  Lantus Solostar Pen 100 units/mL subcutaneous solution: 10 unit(s) subcutaneous once a day (at bedtime)  losartan 50 mg oral tablet: 1 tab(s) orally once a day  Senna 8.6 mg oral tablet: 2 tab(s) orally once a day (at bedtime), As Needed  sertraline 50 mg oral tablet: 1 tab(s) orally once a day  Toprol- mg oral tablet, extended release: 1 tab(s) orally once a day

## 2019-12-10 NOTE — DISCHARGE NOTE PROVIDER - NSDCFUADDAPPT_GEN_ALL_CORE_FT
Call your PMD Dr Pacheco and cardiologist Dr Black you will need to follow up with both of them within 2 weeks

## 2019-12-10 NOTE — DISCHARGE NOTE NURSING/CASE MANAGEMENT/SOCIAL WORK - NSDCFUADDAPPT_GEN_ALL_CORE_FT
Call your PMD Dr Pacheco and cardiologist Dr Black you will need to follow up with both of them within 2 weeks Call your PMD Dr Pacheco and cardiologist Dr Black you will need to follow up with both of them within 2 weeks.   Hemodialysis reinstated at [facility].  Please return for dialysis as scheduled.

## 2019-12-10 NOTE — DISCHARGE NOTE PROVIDER - HOSPITAL COURSE
This is a 78 yo F admitted with elevated blood pressure , hypertensive urgency sent from dialysis center. PMhx  Anemia, DM type II, GERD, Afib(on Eliquis), Prior CVA presented from dialysis center for elevated blood pressure with N/V. As per the patient she was in her usual state of health last night. Patient stated that whenever she has to got to HD she would feel sick, and "does not want to do HD anymore". Today knowing that she has her scheduled HD she "felt bad" unable to explain in further detail. Patient stated that at HD center she had 1 episode of NBNB vomiting and was nauseous and had a SBP of 200. Staff at the HD center was worried and called her nephrologist who recommended to send her to the ER. Patient stated that her blood pressure SBP is 160-170 and it has never been 200s. Patient denied any other complaints: CP, SOB, fevers, chills, diarrhea, constipation, abdominal pain, dysuria, melena, hematochezia, recent travel, sick contact, pleuritic or positional chest pain.     Patient seen by renal Dr Conner and had dialysis on 12/9 and will resume her normal dialysis on 12/11 . She was seen by Dr Crespo cardiology and changed the lopressor to toprol  QD because     of bradycardia. She is stable for discharge to home as per Dr Cummings on 12/10.

## 2020-01-25 ENCOUNTER — INPATIENT (INPATIENT)
Facility: HOSPITAL | Age: 80
LOS: 1 days | Discharge: HOME | End: 2020-01-27
Attending: INTERNAL MEDICINE | Admitting: INTERNAL MEDICINE
Payer: MEDICARE

## 2020-01-25 VITALS
TEMPERATURE: 98 F | OXYGEN SATURATION: 99 % | SYSTOLIC BLOOD PRESSURE: 144 MMHG | DIASTOLIC BLOOD PRESSURE: 44 MMHG | HEART RATE: 64 BPM | RESPIRATION RATE: 17 BRPM

## 2020-01-25 DIAGNOSIS — Z98.890 OTHER SPECIFIED POSTPROCEDURAL STATES: Chronic | ICD-10-CM

## 2020-01-25 DIAGNOSIS — I77.0 ARTERIOVENOUS FISTULA, ACQUIRED: Chronic | ICD-10-CM

## 2020-01-25 PROCEDURE — 73610 X-RAY EXAM OF ANKLE: CPT | Mod: 26,RT

## 2020-01-25 RX ORDER — OXYCODONE AND ACETAMINOPHEN 5; 325 MG/1; MG/1
1 TABLET ORAL ONCE
Refills: 0 | Status: DISCONTINUED | OUTPATIENT
Start: 2020-01-25 | End: 2020-01-25

## 2020-01-25 RX ADMIN — OXYCODONE AND ACETAMINOPHEN 1 TABLET(S): 5; 325 TABLET ORAL at 22:55

## 2020-01-25 NOTE — ED ADULT NURSE NOTE - INTERVENTIONS DEFINITIONS
Non-slip footwear when patient is off stretcher/Physically safe environment: no spills, clutter or unnecessary equipment/Stretcher in lowest position, wheels locked, appropriate side rails in place/Monitor gait and stability

## 2020-01-26 DIAGNOSIS — N18.6 END STAGE RENAL DISEASE: ICD-10-CM

## 2020-01-26 DIAGNOSIS — E11.9 TYPE 2 DIABETES MELLITUS WITHOUT COMPLICATIONS: ICD-10-CM

## 2020-01-26 DIAGNOSIS — M25.571 PAIN IN RIGHT ANKLE AND JOINTS OF RIGHT FOOT: ICD-10-CM

## 2020-01-26 DIAGNOSIS — M10.371 GOUT DUE TO RENAL IMPAIRMENT, RIGHT ANKLE AND FOOT: ICD-10-CM

## 2020-01-26 DIAGNOSIS — Z02.9 ENCOUNTER FOR ADMINISTRATIVE EXAMINATIONS, UNSPECIFIED: ICD-10-CM

## 2020-01-26 DIAGNOSIS — I10 ESSENTIAL (PRIMARY) HYPERTENSION: ICD-10-CM

## 2020-01-26 DIAGNOSIS — I48.11 LONGSTANDING PERSISTENT ATRIAL FIBRILLATION: ICD-10-CM

## 2020-01-26 DIAGNOSIS — Z29.9 ENCOUNTER FOR PROPHYLACTIC MEASURES, UNSPECIFIED: ICD-10-CM

## 2020-01-26 PROBLEM — I48.91 UNSPECIFIED ATRIAL FIBRILLATION: Chronic | Status: ACTIVE | Noted: 2019-07-20

## 2020-01-26 PROBLEM — I63.9 CEREBRAL INFARCTION, UNSPECIFIED: Chronic | Status: ACTIVE | Noted: 2019-12-10

## 2020-01-26 LAB
ALBUMIN SERPL ELPH-MCNC: 4.3 G/DL — SIGNIFICANT CHANGE UP (ref 3.3–5)
ALP SERPL-CCNC: 71 U/L — SIGNIFICANT CHANGE UP (ref 40–120)
ALT FLD-CCNC: 11 U/L — SIGNIFICANT CHANGE UP (ref 4–33)
ANION GAP SERPL CALC-SCNC: 18 MMO/L — HIGH (ref 7–14)
AST SERPL-CCNC: 27 U/L — SIGNIFICANT CHANGE UP (ref 4–32)
BILIRUB SERPL-MCNC: 0.4 MG/DL — SIGNIFICANT CHANGE UP (ref 0.2–1.2)
BUN SERPL-MCNC: 34 MG/DL — HIGH (ref 7–23)
CALCIUM SERPL-MCNC: 9.2 MG/DL — SIGNIFICANT CHANGE UP (ref 8.4–10.5)
CHLORIDE SERPL-SCNC: 88 MMOL/L — LOW (ref 98–107)
CO2 SERPL-SCNC: 26 MMOL/L — SIGNIFICANT CHANGE UP (ref 22–31)
CREAT SERPL-MCNC: 5.48 MG/DL — HIGH (ref 0.5–1.3)
CRP SERPL-MCNC: 12.1 MG/L — HIGH
ERYTHROCYTE [SEDIMENTATION RATE] IN BLOOD: 34 MM/HR — HIGH (ref 4–25)
GLUCOSE BLDC GLUCOMTR-MCNC: 125 MG/DL — HIGH (ref 70–99)
GLUCOSE SERPL-MCNC: 157 MG/DL — HIGH (ref 70–99)
HCT VFR BLD CALC: 31.9 % — LOW (ref 34.5–45)
HGB BLD-MCNC: 9.6 G/DL — LOW (ref 11.5–15.5)
MCHC RBC-ENTMCNC: 23.5 PG — LOW (ref 27–34)
MCHC RBC-ENTMCNC: 30.1 % — LOW (ref 32–36)
MCV RBC AUTO: 78.2 FL — LOW (ref 80–100)
NRBC # FLD: 0 K/UL — SIGNIFICANT CHANGE UP (ref 0–0)
PLATELET # BLD AUTO: 222 K/UL — SIGNIFICANT CHANGE UP (ref 150–400)
PMV BLD: SIGNIFICANT CHANGE UP FL (ref 7–13)
POTASSIUM SERPL-MCNC: 3.8 MMOL/L — SIGNIFICANT CHANGE UP (ref 3.5–5.3)
POTASSIUM SERPL-SCNC: 3.8 MMOL/L — SIGNIFICANT CHANGE UP (ref 3.5–5.3)
PROT SERPL-MCNC: 8.1 G/DL — SIGNIFICANT CHANGE UP (ref 6–8.3)
RBC # BLD: 4.08 M/UL — SIGNIFICANT CHANGE UP (ref 3.8–5.2)
RBC # FLD: 14.4 % — SIGNIFICANT CHANGE UP (ref 10.3–14.5)
SODIUM SERPL-SCNC: 132 MMOL/L — LOW (ref 135–145)
WBC # BLD: 9.9 K/UL — SIGNIFICANT CHANGE UP (ref 3.8–10.5)
WBC # FLD AUTO: 9.9 K/UL — SIGNIFICANT CHANGE UP (ref 3.8–10.5)

## 2020-01-26 PROCEDURE — 99222 1ST HOSP IP/OBS MODERATE 55: CPT

## 2020-01-26 RX ORDER — DEXTROSE 50 % IN WATER 50 %
25 SYRINGE (ML) INTRAVENOUS ONCE
Refills: 0 | Status: DISCONTINUED | OUTPATIENT
Start: 2020-01-26 | End: 2020-01-27

## 2020-01-26 RX ORDER — INSULIN LISPRO 100/ML
VIAL (ML) SUBCUTANEOUS
Refills: 0 | Status: DISCONTINUED | OUTPATIENT
Start: 2020-01-26 | End: 2020-01-26

## 2020-01-26 RX ORDER — METOPROLOL TARTRATE 50 MG
100 TABLET ORAL DAILY
Refills: 0 | Status: DISCONTINUED | OUTPATIENT
Start: 2020-01-26 | End: 2020-01-27

## 2020-01-26 RX ORDER — LIDOCAINE AND PRILOCAINE CREAM 25; 25 MG/G; MG/G
1 CREAM TOPICAL
Refills: 0 | Status: DISCONTINUED | OUTPATIENT
Start: 2020-01-26 | End: 2020-01-27

## 2020-01-26 RX ORDER — SODIUM CHLORIDE 9 MG/ML
1000 INJECTION, SOLUTION INTRAVENOUS
Refills: 0 | Status: DISCONTINUED | OUTPATIENT
Start: 2020-01-26 | End: 2020-01-27

## 2020-01-26 RX ORDER — INSULIN LISPRO 100/ML
VIAL (ML) SUBCUTANEOUS
Refills: 0 | Status: DISCONTINUED | OUTPATIENT
Start: 2020-01-26 | End: 2020-01-27

## 2020-01-26 RX ORDER — APIXABAN 2.5 MG/1
2.5 TABLET, FILM COATED ORAL
Refills: 0 | Status: DISCONTINUED | OUTPATIENT
Start: 2020-01-26 | End: 2020-01-27

## 2020-01-26 RX ORDER — DEXTROSE 50 % IN WATER 50 %
15 SYRINGE (ML) INTRAVENOUS ONCE
Refills: 0 | Status: DISCONTINUED | OUTPATIENT
Start: 2020-01-26 | End: 2020-01-27

## 2020-01-26 RX ORDER — DEXTROSE 50 % IN WATER 50 %
12.5 SYRINGE (ML) INTRAVENOUS ONCE
Refills: 0 | Status: DISCONTINUED | OUTPATIENT
Start: 2020-01-26 | End: 2020-01-27

## 2020-01-26 RX ORDER — GLUCAGON INJECTION, SOLUTION 0.5 MG/.1ML
1 INJECTION, SOLUTION SUBCUTANEOUS ONCE
Refills: 0 | Status: DISCONTINUED | OUTPATIENT
Start: 2020-01-26 | End: 2020-01-27

## 2020-01-26 RX ORDER — ATORVASTATIN CALCIUM 80 MG/1
80 TABLET, FILM COATED ORAL AT BEDTIME
Refills: 0 | Status: DISCONTINUED | OUTPATIENT
Start: 2020-01-26 | End: 2020-01-27

## 2020-01-26 RX ORDER — ASPIRIN/CALCIUM CARB/MAGNESIUM 324 MG
81 TABLET ORAL DAILY
Refills: 0 | Status: DISCONTINUED | OUTPATIENT
Start: 2020-01-26 | End: 2020-01-27

## 2020-01-26 RX ORDER — SODIUM CHLORIDE 9 MG/ML
1000 INJECTION INTRAMUSCULAR; INTRAVENOUS; SUBCUTANEOUS ONCE
Refills: 0 | Status: DISCONTINUED | OUTPATIENT
Start: 2020-01-26 | End: 2020-01-26

## 2020-01-26 RX ORDER — FLUTICASONE PROPIONATE 50 MCG
1 SPRAY, SUSPENSION NASAL DAILY
Refills: 0 | Status: DISCONTINUED | OUTPATIENT
Start: 2020-01-26 | End: 2020-01-27

## 2020-01-26 RX ORDER — SODIUM CHLORIDE 9 MG/ML
250 INJECTION INTRAMUSCULAR; INTRAVENOUS; SUBCUTANEOUS ONCE
Refills: 0 | Status: COMPLETED | OUTPATIENT
Start: 2020-01-26 | End: 2020-01-26

## 2020-01-26 RX ORDER — LOSARTAN POTASSIUM 100 MG/1
50 TABLET, FILM COATED ORAL DAILY
Refills: 0 | Status: DISCONTINUED | OUTPATIENT
Start: 2020-01-26 | End: 2020-01-27

## 2020-01-26 RX ORDER — CALCIUM ACETATE 667 MG
667 TABLET ORAL
Refills: 0 | Status: DISCONTINUED | OUTPATIENT
Start: 2020-01-26 | End: 2020-01-27

## 2020-01-26 RX ORDER — DONEPEZIL HYDROCHLORIDE 10 MG/1
10 TABLET, FILM COATED ORAL AT BEDTIME
Refills: 0 | Status: DISCONTINUED | OUTPATIENT
Start: 2020-01-26 | End: 2020-01-27

## 2020-01-26 RX ORDER — HYDRALAZINE HCL 50 MG
25 TABLET ORAL
Refills: 0 | Status: DISCONTINUED | OUTPATIENT
Start: 2020-01-26 | End: 2020-01-27

## 2020-01-26 RX ORDER — CHLORHEXIDINE GLUCONATE 213 G/1000ML
1 SOLUTION TOPICAL DAILY
Refills: 0 | Status: DISCONTINUED | OUTPATIENT
Start: 2020-01-26 | End: 2020-01-27

## 2020-01-26 RX ORDER — SENNA PLUS 8.6 MG/1
2 TABLET ORAL AT BEDTIME
Refills: 0 | Status: DISCONTINUED | OUTPATIENT
Start: 2020-01-26 | End: 2020-01-27

## 2020-01-26 RX ORDER — SERTRALINE 25 MG/1
50 TABLET, FILM COATED ORAL DAILY
Refills: 0 | Status: DISCONTINUED | OUTPATIENT
Start: 2020-01-26 | End: 2020-01-27

## 2020-01-26 RX ORDER — INSULIN GLARGINE 100 [IU]/ML
10 INJECTION, SOLUTION SUBCUTANEOUS AT BEDTIME
Refills: 0 | Status: DISCONTINUED | OUTPATIENT
Start: 2020-01-26 | End: 2020-01-27

## 2020-01-26 RX ADMIN — SENNA PLUS 2 TABLET(S): 8.6 TABLET ORAL at 21:53

## 2020-01-26 RX ADMIN — Medication 100 MILLIGRAM(S): at 13:08

## 2020-01-26 RX ADMIN — Medication 40 MILLIGRAM(S): at 19:42

## 2020-01-26 RX ADMIN — Medication 1 SPRAY(S): at 17:45

## 2020-01-26 RX ADMIN — SODIUM CHLORIDE 250 MILLILITER(S): 9 INJECTION INTRAMUSCULAR; INTRAVENOUS; SUBCUTANEOUS at 03:05

## 2020-01-26 RX ADMIN — ATORVASTATIN CALCIUM 80 MILLIGRAM(S): 80 TABLET, FILM COATED ORAL at 21:53

## 2020-01-26 RX ADMIN — APIXABAN 2.5 MILLIGRAM(S): 2.5 TABLET, FILM COATED ORAL at 17:45

## 2020-01-26 RX ADMIN — CHLORHEXIDINE GLUCONATE 1 APPLICATION(S): 213 SOLUTION TOPICAL at 21:54

## 2020-01-26 RX ADMIN — DONEPEZIL HYDROCHLORIDE 10 MILLIGRAM(S): 10 TABLET, FILM COATED ORAL at 21:53

## 2020-01-26 RX ADMIN — LOSARTAN POTASSIUM 50 MILLIGRAM(S): 100 TABLET, FILM COATED ORAL at 13:08

## 2020-01-26 RX ADMIN — Medication 81 MILLIGRAM(S): at 13:08

## 2020-01-26 RX ADMIN — Medication 667 MILLIGRAM(S): at 13:07

## 2020-01-26 RX ADMIN — OXYCODONE AND ACETAMINOPHEN 1 TABLET(S): 5; 325 TABLET ORAL at 02:36

## 2020-01-26 RX ADMIN — SERTRALINE 50 MILLIGRAM(S): 25 TABLET, FILM COATED ORAL at 13:07

## 2020-01-26 RX ADMIN — Medication 667 MILLIGRAM(S): at 17:45

## 2020-01-26 RX ADMIN — Medication 25 MILLIGRAM(S): at 17:45

## 2020-01-26 RX ADMIN — INSULIN GLARGINE 10 UNIT(S): 100 INJECTION, SOLUTION SUBCUTANEOUS at 21:54

## 2020-01-26 NOTE — CONSULT NOTE ADULT - ASSESSMENT
79F with PMH of T2DM, ESRD (HD on M/W/F), HTN, A fib on Eliquis prior CVA presented with right ankle swelling and pain. denies any previous similar symptoms.     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 Cielo Jara MD  Rheum fellow PGY 4   Pager [phone] 79F with PMH of T2DM, ESRD (HD on M/W/F), HTN, A fib on Eliquis prior CVA presented with right ankle swelling and pain. denies any previous similar symptoms.     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  If does not respond to therapy as expected or if uric acid is significantly low could consider further workup with dual energy CT.  Aspirate was offered today to help confirm diagnosis but patient has deferred     Recommend:   - please check uric Acid level   - Will give Solumedrol 40 mg IV once today and will reevaluate her response tomorrow  - No further w/u needed given it is her first episode of arthritis   - If clinically did not improve or very high UA level will get [employer] CT     Rheum will follow  Case was seen and discussed with Dr. Cielo Jara MD  Rheum fellow PGY 4   Pager [phone]

## 2020-01-26 NOTE — ED PROVIDER NOTE - ATTENDING CONTRIBUTION TO CARE
Gray: 79 yof with right ankle pain atraumatic, started at noon and worsened and no longer able to walk on it. Pt has cane for regular ambulation and does not remember any trauma. Pain only in ankle and swelling in foot. Pt denies fever, no hx of monoarthritis. On exam, pt is uncomfortable, clear lungs, RRR, right ankle, +edema over foot and very tender over medial and lateral mall with moderate sized ankle effusion. pulses not palpable, foot warm and sensation present. no calf tenderness. Concern for monoarthritis vs occult trauma - labs, pain meds, xrays.   Pt is very limited in treatment if it is gouty given renal failure, DM and overall condition.

## 2020-01-26 NOTE — ED PROVIDER NOTE - OBJECTIVE STATEMENT
78 yo female with pmhx of afib, CKD on HD MWF, HTN, presenting with right foot pain today. Patient was able to ambulate at baseline today, but later today began to feel worsening right ankle pain. Denies any falls, trauma, open wounds. Denies this happening before. Came to ED for further evaluation. Currently unable to walk. Has redness and swelling.    Denies LOC, falls, CP, SOB, abd pain, N/V

## 2020-01-26 NOTE — H&P ADULT - PROBLEM SELECTOR PLAN 1
Acute onset R ankle pain, swelling, x-ray negative for fracture. Possible gout in the setting of renal failure, but no history. Less likely OM given negative X-ray findings and ESR 34.   -Pain control with percocet  -consult Rheumatology regarding possible gout Acute onset R ankle pain, swelling, x-ray negative for fracture. Possible gout in the setting of renal failure, but no history. Less likely OM given negative X-ray findings and ESR 34.   -Pain control with percocet  -CT of RLE to rule out microfracture not visualized on Xray  -consult Rheumatology regarding possible gout

## 2020-01-26 NOTE — PHYSICAL THERAPY INITIAL EVALUATION ADULT - PERTINENT HX OF CURRENT PROBLEM, REHAB EVAL
patient present with right ankle pain. PMH includes T2DM, ESRD (HD on M/W/F), HTN, A fib on eliquis, prior CVA

## 2020-01-26 NOTE — CONSULT NOTE ADULT - ASSESSMENT
79F PMH ESRD on HD, DM, GERD, HTN admitted for joint pain     ESRD on HD MWF  last HD friday  plan for Hd tmrw  consent in chart  renal diet  monitor bmp    HTN  uncontrolled  monitor BP  up titrate hydralazine as needed  UF with HD    Anemia  Slightly below goal  will hold epo until bp is better  monitor Hb    ckd-mbd  check phos level  Last PTH at goal 12/2019

## 2020-01-26 NOTE — H&P ADULT - HISTORY OF PRESENT ILLNESS
79F Hx T2DM, ESRD (HD on M/W/F), HTN, A fib on eliquis, prior CVA presenting for joint pain since yesterday. At noon yesterday patient developed 10/10 sharp pain of R ankle. Pain is worse when walking and keeps her from ambulating. She's never had anything like this before and all other joints are spared. She denies any trauma to the area or recent falls. Denies any recent illness. Denies fevers, chills, rash, eye pain. 79F with PMH of T2DM, ESRD (HD on M/W/F), HTN, A fib on eliquis, prior CVA presenting for joint pain since yesterday. At noon yesterday patient developed 10/10 sharp pain of R ankle. Pain is worse when walking and keeps her from ambulating. She's never had anything like this before and all other joints are spared. She denies any trauma to the area or recent falls. Denies any recent illness. Denies fevers, chills, rash, eye pain.    In ED  Vitals: T 98, HR 64, /44, RR 17, 99% on RA  WBC 4.08, CRP 12.1, ESR 34  Received: Percocet x1, 250cc NS 79F with PMH of T2DM, ESRD (HD on M/W/F), HTN, A fib on eliquis, prior CVA presenting for joint pain since yesterday. At noon yesterday patient developed 10/10 sharp pain in R ankle. Pain is non-radiating and worse when walking and keeps her from ambulating. She's never had anything like this before and all other joints are spared. There was no specific trigger; she denies any recent trauma or falls. Denies any recent illness. Denies fevers, chills, rash, eye pain.    In ED  Vitals: T 98, HR 64, /44, RR 17, 99% on RA  WBC 4.08, CRP 12.1, ESR 34  Received: Percocet x1, 250cc NS

## 2020-01-26 NOTE — CONSULT NOTE ADULT - SUBJECTIVE AND OBJECTIVE BOX
VINCENT GROSS  [id number]    HISTORY OF PRESENT ILLNESS:  79F with PMH of T2DM, ESRD (HD on M/W/F), HTN, A fib on eliquis, prior CVA presenting for joint pain since yesterday. Rheumatology was called concerned for gout flare.       Pt reported to medical team since noon yesterday patient developed 10/10 sharp pain in R ankle. Pain is non-radiating and worse when walking and keeps her from ambulating. She's never had anything like this before and all other joints are spared. There was no specific trigger; she denies any recent trauma or falls. Denies any recent illness.    Pt was seen and examined by rheumatology team at the bedside. ***        PAST MEDICAL & SURGICAL HISTORY:  Cerebrovascular accident (CVA)  Atrial fibrillation: On Eliquis  Anemia  CKD (chronic kidney disease): now on HD via R forearm AVF  Hypercholesteremia  HTN (hypertension)  DM (diabetes mellitus): TYpe II  H/O colonoscopy with polypectomy  AV fistula: June 2018, 2 ballooning (last one 2 weeks ago), following up on Dec 15th  Status post right foot surgery: hammer toe repair      Review of Systems:  Gen:  No fevers/chills, weight loss  HEENT: No blurry vision, no difficulty swallowing, no oral or nasal ulcers  CVS: No chest pain/palpitations  Resp: No SOB/wheezing  GI: No N/V/C/D/abdominal pain  MSK:  Skin: No new rashes  Neuro: No headaches    MEDICATIONS  (STANDING):  apixaban 2.5 milliGRAM(s) Oral two times a day  aspirin  chewable 81 milliGRAM(s) Oral daily  atorvastatin 80 milliGRAM(s) Oral at bedtime  calcium acetate 667 milliGRAM(s) Oral three times a day with meals  dextrose 5%. 1000 milliLiter(s) (50 mL/Hr) IV Continuous <Continuous>  dextrose 50% Injectable 12.5 Gram(s) IV Push once  dextrose 50% Injectable 25 Gram(s) IV Push once  dextrose 50% Injectable 25 Gram(s) IV Push once  donepezil 10 milliGRAM(s) Oral at bedtime  fluticasone propionate 50 MICROgram(s)/spray Nasal Spray 1 Spray(s) Both Nostrils daily  hydrALAZINE 25 milliGRAM(s) Oral two times a day  insulin glargine Injectable (LANTUS) 10 Unit(s) SubCutaneous at bedtime  insulin lispro (HumaLOG) corrective regimen sliding scale   SubCutaneous three times a day before meals  losartan 50 milliGRAM(s) Oral daily  metoprolol succinate  milliGRAM(s) Oral daily  senna 2 Tablet(s) Oral at bedtime  sertraline 50 milliGRAM(s) Oral daily    MEDICATIONS  (PRN):  dextrose 40% Gel 15 Gram(s) Oral once PRN Blood Glucose LESS THAN 70 milliGRAM(s)/deciliter  glucagon  Injectable 1 milliGRAM(s) IntraMuscular once PRN Glucose LESS THAN 70 milligrams/deciliter      Allergies    No Known Allergies    Intolerances        PERTINENT MEDICATION HISTORY:  	Toprol- mg oral tablet, extended release: 1 tab(s) orally once a day   · 	losartan 50 mg oral tablet: 1 tab(s) orally once a day  · 	aspirin 81 mg oral tablet, chewable: 1 tab(s) orally once a day  · 	atorvastatin 80 mg oral tablet: 1 tab(s) orally once a day  · 	sertraline 50 mg oral tablet: 1 tab(s) orally once a day  · 	Lantus Solostar Pen 100 units/mL subcutaneous solution: 10 unit(s) subcutaneous once a day (at bedtime)  · 	donepezil 10 mg oral tablet: 1 tab(s) orally once a day (at bedtime)  · 	calcium acetate 667 mg oral capsule: 1 cap(s) orally 3 times a day (with meals)  · 	hydrALAZINE 25 mg oral tablet: 1 tab(s) orally 2 times a day  · 	Senna 8.6 mg oral tablet: 2 tab(s) orally once a day (at bedtime), As Needed  · 	docusate sodium 100 mg oral capsule: 1 cap(s) orally 2 times a day, As Needed  · 	fenofibrate 145 mg oral tablet: 1 tab(s) orally once a day  	No recent fill history at [facility]  · 	apixaban 2.5 mg oral tablet: 1 tab(s) orally 2 times a day  	Per pharmacy, last dispensed 8/20/19      SOCIAL HISTORY: Denies smoking, alcohol, recreational drug use.       FAMILY HISTORY:  Family history of malignant neoplasm of gastrointestinal tract  Family history of lung cancer (Sibling)  Family history of diabetes mellitus  Family history of hypertension (Sibling)      Vital Signs Last 24 Hrs  T(C): 37.4 (26 Jan 2020 06:26), Max: 37.7 (26 Jan 2020 04:34)  T(F): 99.4 (26 Jan 2020 06:26), Max: 99.9 (26 Jan 2020 04:34)  HR: 68 (26 Jan 2020 06:26) (63 - 68)  BP: 164/56 (26 Jan 2020 06:26) (144/44 - 179/63)  BP(mean): --  RR: 17 (26 Jan 2020 06:26) (15 - 18)  SpO2: 97% (26 Jan 2020 06:26) (97% - 100%)    Physical Exam:  General: No apparent distress  HEENT: EOMI, MMM  CVS: +S1/S2, RRR, no murmurs/rubs/gallops  Resp: CTA b/l. No crackles/wheezing  GI: Soft, NT/ND +BS  MSK:  Neuro: AAOx3  Skin: no visible rashes    LABS:                        9.6    9.90  )-----------( 222      ( 25 Jan 2020 23:51 )             31.9     01-25    132<L>  |  88<L>  |  34<H>  ----------------------------<  157<H>  3.8   |  26  |  5.48<H>    Ca    9.2      25 Jan 2020 22:51    TPro  8.1  /  Alb  4.3  /  TBili  0.4  /  DBili  x   /  AST  27  /  ALT  11  /  AlkPhos  71  01-25    Sedimentation Rate, Erythrocyte: 34 mm/hr (01.25.20 @ 23:51)    C-Reactive Protein, Serum: 12.1 mg/L (01.25.20 @ 22:51)    RADIOLOGY & ADDITIONAL STUDIES:    < from: Xray Ankle Complete 3 Views, Right (01.25.20 @ 23:59) >  No acute fracture or dislocation. No subcutaneous tracking gas collection, periosteal reaction or bony erosions suggestive of advanced osteoarthritis. Joint spaces are preserved. Vascular calcification is present. Enthesophyte formation at the insertion of the Achilles tendon.    IMPRESSION:    1.  No acute fracture or dislocation.  2.  No radiographic evidence of advanced osteomyelitis.      < end of copied text > VINCENT GROSS  [id number]    HISTORY OF PRESENT ILLNESS:  79F with PMH of T2DM, ESRD (HD on M/W/F), HTN, A fib on eliquis, prior CVA presenting for joint pain since yesterday. Rheumatology was called concerned for gout flare.   Pt reported to medical team since noon yesterday patient developed 10/10 sharp pain in R ankle. Pain is non-radiating and worse when walking and keeps her from ambulating. Pt was seen and examined by rheumatology team at the bedside. She denies any similar symptoms in the past, any arthritis, gout or pseudogout. Denies any trauma or injury to the joint.   Diet: does not overeat red meat, organ meat, Shellfish or Shrimps, does not drink bear or soda. Denies any family history of gout.     PAST MEDICAL & SURGICAL HISTORY:  Cerebrovascular accident (CVA)  Atrial fibrillation: On Eliquis  Anemia  CKD (chronic kidney disease): now on HD via R forearm AVF  Hypercholesteremia  HTN (hypertension)  DM (diabetes mellitus): TYpe II  H/O colonoscopy with polypectomy  AV fistula: June 2018, 2 ballooning (last one 2 weeks ago), following up on Dec 15th  Status post right foot surgery: hammer toe repair      Review of Systems:  Gen:  No fevers/chills, weight loss  HEENT: No blurry vision, no difficulty swallowing, no oral or nasal ulcers  CVS: No chest pain/palpitations  Resp: No SOB/wheezing  GI: No N/V/C/D/abdominal pain  MSK: as HPI  Skin: No new rashes  Neuro: No headaches    MEDICATIONS  (STANDING):  apixaban 2.5 milliGRAM(s) Oral two times a day  aspirin  chewable 81 milliGRAM(s) Oral daily  atorvastatin 80 milliGRAM(s) Oral at bedtime  calcium acetate 667 milliGRAM(s) Oral three times a day with meals  dextrose 5%. 1000 milliLiter(s) (50 mL/Hr) IV Continuous <Continuous>  dextrose 50% Injectable 12.5 Gram(s) IV Push once  dextrose 50% Injectable 25 Gram(s) IV Push once  dextrose 50% Injectable 25 Gram(s) IV Push once  donepezil 10 milliGRAM(s) Oral at bedtime  fluticasone propionate 50 MICROgram(s)/spray Nasal Spray 1 Spray(s) Both Nostrils daily  hydrALAZINE 25 milliGRAM(s) Oral two times a day  insulin glargine Injectable (LANTUS) 10 Unit(s) SubCutaneous at bedtime  insulin lispro (HumaLOG) corrective regimen sliding scale   SubCutaneous three times a day before meals  losartan 50 milliGRAM(s) Oral daily  metoprolol succinate  milliGRAM(s) Oral daily  senna 2 Tablet(s) Oral at bedtime  sertraline 50 milliGRAM(s) Oral daily    MEDICATIONS  (PRN):  dextrose 40% Gel 15 Gram(s) Oral once PRN Blood Glucose LESS THAN 70 milliGRAM(s)/deciliter  glucagon  Injectable 1 milliGRAM(s) IntraMuscular once PRN Glucose LESS THAN 70 milligrams/deciliter    Allergies    No Known Allergies    Intolerances    PERTINENT MEDICATION HISTORY:  	Toprol- mg oral tablet, extended release: 1 tab(s) orally once a day   · 	losartan 50 mg oral tablet: 1 tab(s) orally once a day  · 	aspirin 81 mg oral tablet, chewable: 1 tab(s) orally once a day  · 	atorvastatin 80 mg oral tablet: 1 tab(s) orally once a day  · 	sertraline 50 mg oral tablet: 1 tab(s) orally once a day  · 	Lantus Solostar Pen 100 units/mL subcutaneous solution: 10 unit(s) subcutaneous once a day (at bedtime)  · 	donepezil 10 mg oral tablet: 1 tab(s) orally once a day (at bedtime)  · 	calcium acetate 667 mg oral capsule: 1 cap(s) orally 3 times a day (with meals)  · 	hydrALAZINE 25 mg oral tablet: 1 tab(s) orally 2 times a day  · 	Senna 8.6 mg oral tablet: 2 tab(s) orally once a day (at bedtime), As Needed  · 	docusate sodium 100 mg oral capsule: 1 cap(s) orally 2 times a day, As Needed  · 	fenofibrate 145 mg oral tablet: 1 tab(s) orally once a day  	No recent fill history at [facility]  · 	apixaban 2.5 mg oral tablet: 1 tab(s) orally 2 times a day  	Per pharmacy, last dispensed 8/20/19      SOCIAL HISTORY: Denies smoking, alcohol, recreational drug use.       FAMILY HISTORY:  Family history of malignant neoplasm of gastrointestinal tract  Family history of lung cancer (Sibling)  Family history of diabetes mellitus  Family history of hypertension (Sibling)      Vital Signs Last 24 Hrs  T(C): 37.4 (26 Jan 2020 06:26), Max: 37.7 (26 Jan 2020 04:34)  T(F): 99.4 (26 Jan 2020 06:26), Max: 99.9 (26 Jan 2020 04:34)  HR: 68 (26 Jan 2020 06:26) (63 - 68)  BP: 164/56 (26 Jan 2020 06:26) (144/44 - 179/63)  BP(mean): --  RR: 17 (26 Jan 2020 06:26) (15 - 18)  SpO2: 97% (26 Jan 2020 06:26) (97% - 100%)    Physical Exam:  General: No apparent distress  HEENT: EOMI, MMM  CVS: +S1/S2, RRR, no murmurs/rubs/gallops  Resp: CTA b/l. No crackles/wheezing  GI: Soft, NT/ND +BS  MSK: Right ankle: with moderate effusion, warmth and severe tenderness. limited ROM   Neuro: AAOx3  Skin: no visible rashes    LABS:                        9.6    9.90  )-----------( 222      ( 25 Jan 2020 23:51 )             31.9     01-25    132<L>  |  88<L>  |  34<H>  ----------------------------<  157<H>  3.8   |  26  |  5.48<H>    Ca    9.2      25 Jan 2020 22:51    TPro  8.1  /  Alb  4.3  /  TBili  0.4  /  DBili  x   /  AST  27  /  ALT  11  /  AlkPhos  71  01-25    Sedimentation Rate, Erythrocyte: 34 mm/hr (01.25.20 @ 23:51)    C-Reactive Protein, Serum: 12.1 mg/L (01.25.20 @ 22:51)    RADIOLOGY & ADDITIONAL STUDIES:    < from: Xray Ankle Complete 3 Views, Right (01.25.20 @ 23:59) >  No acute fracture or dislocation. No subcutaneous tracking gas collection, periosteal reaction or bony erosions suggestive of advanced osteoarthritis. Joint spaces are preserved. Vascular calcification is present. Enthesophyte formation at the insertion of the Achilles tendon.    IMPRESSION:    1.  No acute fracture or dislocation.  2.  No radiographic evidence of advanced osteomyelitis.      < end of copied text >

## 2020-01-26 NOTE — H&P ADULT - NSHPPHYSICALEXAM_GEN_ALL_CORE
T(C): 37.4 (01-26-20 @ 06:26), Max: 37.7 (01-26-20 @ 04:34)  HR: 68 (01-26-20 @ 06:26) (63 - 68)  BP: 164/56 (01-26-20 @ 06:26) (144/44 - 179/63)  RR: 17 (01-26-20 @ 06:26) (15 - 18)  SpO2: 97% (01-26-20 @ 06:26) (97% - 100%)    GENERAL: NAD, comfortable  EYES: EOMi, PERRLA, no scleral icterus or injection  ENMT: Oropharynx clear without erythema, no exudates, moist mucous membranes  NECK: Supple  RESPIRATORY: Clear to auscultation bilaterally, good inspiratory effort and air movement, no crackles, wheezes, rales  CARDIOVASCULAR: Normal S1/S2, regular rate and rhythm, no murmurs noted  GASTROINTESTINAL: Abdomen is soft, nontender, nondistended, normoactive bowel sounds, no palpable masses  SKIN: No rashes, excoriations, purpura  MUSCULOSKELETAL: RLE ankle is edematous to the level of the lateral malleolus. Exquisitely tender to palpation on lateral aspect of foot, especially over lateral malleolus. Decreased ROM due to pain.   VASCULAR: Warm, well perfused in all extremities.  NEUROLOGIC: Strength 5/5 upper and lower extremities bilaterally  PSYCHIATRIC: AAOx3

## 2020-01-26 NOTE — H&P ADULT - NSICDXPASTMEDICALHX_GEN_ALL_CORE_FT
PAST MEDICAL HISTORY:  Anemia     Atrial fibrillation On Eliquis    Cerebrovascular accident (CVA)     CKD (chronic kidney disease) now on HD via R forearm AVF    DM (diabetes mellitus) TYpe II    HTN (hypertension)     Hypercholesteremia

## 2020-01-26 NOTE — H&P ADULT - NSHPLABSRESULTS_GEN_ALL_CORE
01-25-20 @ 22:51    132<L>  |  88<L>  |  34<H>             --------------------------< 157<H>     3.8  |  26  | 5.48<H>    eGFR AA: 8  eGFR N-AA: 7    Calcium: 9.2  Phosphorus: --  Magnesium: --    AST: 27    ALT: 11  AlkPhos: 71  Protein: 8.1  Albumin: 4.3  TBili: 0.4  D-Bili: --                        9.6    9.90  )-----------( 222      ( 25 Jan 2020 23:51 )             31.9     ESR: 34    Xray Ankle Complete 3 Views, Right (01.25.20 @ 23:59):    1.  No acute fracture or dislocation.  2.  No radiographic evidence of advanced osteomyelitis.

## 2020-01-26 NOTE — H&P ADULT - NSHPSOCIALHISTORY_GEN_ALL_CORE
Denies smoking, alcohol, recreational drug use. 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 Lives with her grandson in an apartment. Denies smoking, alcohol, recreational drug use. 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 Lives with her grandson in an apartment. Has a diet with occasional fish and meat consumption. Endorses compliance with diabetes regimen.

## 2020-01-26 NOTE — H&P ADULT - PROBLEM SELECTOR PLAN 7
Transitions of Care Status:  1.  Name of PCP: Dr. Arevalo  2.  PCP Contacted on Admission: [ ] Y    [X] N  (Weekend admission)  3.  PCP contacted at Discharge: [ ] Y    [ ] N    [ ] N/A  4.  Post-Discharge Appointment Date and Location:  5.  Summary of Handoff given to PCP:

## 2020-01-26 NOTE — ED PROVIDER NOTE - CLINICAL SUMMARY MEDICAL DECISION MAKING FREE TEXT BOX
80 yo female with pmhx of CKD on HD MWF, HTN, DM, presenting with right ankle pain/swelling. will evaluate for fx vs inflammatory joint disease such as OA vs gout with cbc cmp crp, will get xray ankle and will reassess

## 2020-01-26 NOTE — H&P ADULT - PROBLEM SELECTOR PLAN 5
Admission in December 2019 for hypertensive crisis. Wide pulse pressure.  -c/w home hydralazine 25 BID, Admission in December 2019 for hypertensive crisis. Wide pulse pressure.  -c/w home hydralazine 25 BID Admission in December 2019 for hypertensive crisis. Wide pulse pressure.  -c/w home hydralazine 25 BID, atorvastatin 80mg qD

## 2020-01-26 NOTE — H&P ADULT - PROBLEM SELECTOR PLAN 6
Diet: Renal deit  Dispo: Home pending PT  DVT: Apixaban 2.5mg qD for A fib    Baseline Mobility: Ambulatory  6-Click Admission Score: _______  Dispo: Home pending PT Diet: Renal diet  Dispo: Home pending PT  DVT: Apixaban 2.5mg qD for A fib    Baseline Mobility: Ambulatory  6-Click Admission Score: _______  Dispo: Home pending PT Diet: Renal diet  Dispo: Home pending PT  DVT: Apixaban 2.5mg BID for A fib      Baseline Mobility: Ambulatory  6-Click Admission Score: _______  Dispo: Home pending PT

## 2020-01-26 NOTE — H&P ADULT - ASSESSMENT
79F PMH T2DM, ESRD (HD on M/W/F), HTN, A fib on eliquis, prior CVA presenting with R ankle pain for one day, x-rays negative for fracture with soft tissue swelling, most likely new onset gout vs less like osteomyelitis and septic arthritis.

## 2020-01-26 NOTE — CONSULT NOTE ADULT - SUBJECTIVE AND OBJECTIVE BOX
[facility]   MD Nehemias Busch MD Fatima Sheikh, D.O. Ruoru Wong, PA    From 7 AM - 5 PM:  OFFICE: [phone]  Dr. Jacobson cell: [phone]  Dr. Sidhu Cell: [phone]  Dr. Ross cell: [phone]  MALINDA Shepard cell: [phone]    From 5 PM - 7 AM: Answering Service: [phone]      -- INITIAL RENAL CONSULT NOTE  --------------------------------------------------------------------------------  HPI: 79F PMH ESRD on HD MWF , DM, GERD, HTN admitted for joint pain. Pt recieves her HD at [facility]. Pt last HD was on Friday without complications. Pt seen and examined. Denies CP, SOB or LE edema.     PAST HISTORY  --------------------------------------------------------------------------------  PAST MEDICAL & SURGICAL HISTORY:  Cerebrovascular accident (CVA)  Atrial fibrillation: On Eliquis  Anemia  CKD (chronic kidney disease): now on HD via R forearm AVF  Hypercholesteremia  HTN (hypertension)  DM (diabetes mellitus): TYpe II  H/O colonoscopy with polypectomy  AV fistula: June 2018, 2 ballooning (last one 2 weeks ago), following up on Dec 15th  Status post right foot surgery: hammer toe repair    FAMILY HISTORY:  Family history of malignant neoplasm of gastrointestinal tract  Family history of lung cancer (Sibling)  Family history of diabetes mellitus  Family history of hypertension (Sibling)    PAST SOCIAL HISTORY:    ALLERGIES & MEDICATIONS  --------------------------------------------------------------------------------  Allergies    No Known Allergies    Intolerances      Standing Inpatient Medications  apixaban 2.5 milliGRAM(s) Oral two times a day  aspirin  chewable 81 milliGRAM(s) Oral daily  atorvastatin 80 milliGRAM(s) Oral at bedtime  calcium acetate 667 milliGRAM(s) Oral three times a day with meals  dextrose 5%. 1000 milliLiter(s) IV Continuous <Continuous>  dextrose 50% Injectable 12.5 Gram(s) IV Push once  dextrose 50% Injectable 25 Gram(s) IV Push once  dextrose 50% Injectable 25 Gram(s) IV Push once  donepezil 10 milliGRAM(s) Oral at bedtime  fluticasone propionate 50 MICROgram(s)/spray Nasal Spray 1 Spray(s) Both Nostrils daily  hydrALAZINE 25 milliGRAM(s) Oral two times a day  insulin glargine Injectable (LANTUS) 10 Unit(s) SubCutaneous at bedtime  insulin lispro (HumaLOG) corrective regimen sliding scale   SubCutaneous three times a day before meals  losartan 50 milliGRAM(s) Oral daily  metoprolol succinate  milliGRAM(s) Oral daily  senna 2 Tablet(s) Oral at bedtime  sertraline 50 milliGRAM(s) Oral daily    PRN Inpatient Medications  dextrose 40% Gel 15 Gram(s) Oral once PRN  glucagon  Injectable 1 milliGRAM(s) IntraMuscular once PRN      REVIEW OF SYSTEMS  --------------------------------------------------------------------------------  Gen: No fevers/chills  Skin: No rashes  Head/Eyes/Ears: Normal hearing,  Normal vision   Respiratory: No dyspnea, cough  CV: No chest pain  GI: No abdominal pain, diarrhea, constipation, nausea, vomiting  : No dysuria, hematuria  MSK: No  edema  Heme: No easy bruising or bleeding  Psych: No significant depression    All other systems were reviewed and are negative, except as noted.    VITALS/PHYSICAL EXAM  --------------------------------------------------------------------------------  T(C): 37.2 (01-26-20 @ 13:06), Max: 37.7 (01-26-20 @ 04:34)  HR: 57 (01-26-20 @ 17:30) (57 - 68)  BP: 179/102 (01-26-20 @ 17:30) (141/56 - 179/102)  RR: 17 (01-26-20 @ 17:30) (15 - 18)  SpO2: 99% (01-26-20 @ 17:30) (93% - 100%)  Wt(kg): --  Height (cm): 170.2 (01-26-20 @ 06:26)  Weight (kg): 67.4 (01-26-20 @ 06:26)  BMI (kg/m2): 23.3 (01-26-20 @ 06:26)  BSA (m2): 1.78 (01-26-20 @ 06:26)      Physical Exam:  	Gen: NAD  	HEENT: MMM  	Pulm: CTA B/L  	CV: S1S2  	Abd: Soft, +BS   	Ext: No LE edema B/L  	Neuro: Awake  	Skin: Warm and dry  	Vascular access: +AVF    LABS/STUDIES  --------------------------------------------------------------------------------              9.6    9.90  >-----------<  222      [01-25-20 @ 23:51]              31.9     132  |  88  |  34  ----------------------------<  157      [01-25-20 @ 22:51]  3.8   |  26  |  5.48        Ca     9.2     [01-25-20 @ 22:51]    TPro  8.1  /  Alb  4.3  /  TBili  0.4  /  DBili  x   /  AST  27  /  ALT  11  /  AlkPhos  71  [01-25-20 @ 22:51]      Creatinine Trend:  SCr 5.48 [01-25 @ 22:51]    Urinalysis - [12-09-19 @ 14:35]      Color LIGHT YELLOW / Appearance CLEAR / SG 1.014 / pH 8.5      Gluc 50 / Ketone NEGATIVE  / Bili NEGATIVE / Urobili NORMAL       Blood NEGATIVE / Protein 300 / Leuk Est NEGATIVE / Nitrite NEGATIVE      RBC 3-5 / WBC 0-2 / Hyaline NEGATIVE / Gran  / Sq Epi FEW / Non Sq Epi  / Bacteria FEW      .9 (Ca --)      [12-09-19 @ 20:00]   --  HbA1c 5.8      [12-10-19 @ 06:41]  TSH 2.39      [12-10-19 @ 06:41]  Lipid: chol 137, , HDL 66, LDL 41      [12-10-19 @ 06:41]    HBsAb <3.0      [12-09-19 @ 20:45]  HBsAg NEGATIVE      [12-09-19 @ 20:45]  HBcAb Nonreactive      [12-09-19 @ 20:45]  HCV 0.12, Nonreactive Hepatitis C AB  S/CO Ratio                        Interpretation  < 1.00                                   Non-Reactive  1.00 - 4.99                         Weakly-Reactive  >= 5.00                                Reactive  Non-Reactive: Aperson with a non-reactive HCV antibody  result is considered uninfected.  No further action is  needed unless recent infection is suspected.  In these  cases, consider repeat testing later to detect  seroconversion..  Weakly-Reactive: HCV antibody test is abnormal, HCV RNA  Qualitative test will follow.  Reactive: HCV antibody test is abnormal, HCV RNA  Qualitative test will follow.  Note: HCV antibody testing is performed on the uTest system.      [12-09-19 @ 20:45]

## 2020-01-26 NOTE — H&P ADULT - NSHPREVIEWOFSYSTEMS_GEN_ALL_CORE
General: Denies fatigue, fevers, chills  Skin/Breast: Denies any rash  Ophthalmologic: Denies vision changes, eye pain  ENMT: No sore throat, rhinorrhea  Respiratory and Thorax: No shortness of breath  Cardiovascular: No chest pain or palpitations  Gastrointestinal: Constipation for a few days, no diarrhea. Nausea but no vomiting during HD on Friday.  Genitourinary: Makes small amounts of urine. Denies hematuria or dysuria.  Musculoskeletal: Pain in ankle associated with swelling.  Endocrine: No hot or cold intolerance

## 2020-01-27 VITALS
SYSTOLIC BLOOD PRESSURE: 131 MMHG | DIASTOLIC BLOOD PRESSURE: 53 MMHG | HEART RATE: 56 BPM | TEMPERATURE: 98 F | OXYGEN SATURATION: 98 % | RESPIRATION RATE: 17 BRPM

## 2020-01-27 LAB
ANION GAP SERPL CALC-SCNC: 21 MMO/L — HIGH (ref 7–14)
BUN SERPL-MCNC: 51 MG/DL — HIGH (ref 7–23)
CALCIUM SERPL-MCNC: 9.1 MG/DL — SIGNIFICANT CHANGE UP (ref 8.4–10.5)
CHLORIDE SERPL-SCNC: 88 MMOL/L — LOW (ref 98–107)
CO2 SERPL-SCNC: 22 MMOL/L — SIGNIFICANT CHANGE UP (ref 22–31)
CREAT SERPL-MCNC: 7.15 MG/DL — HIGH (ref 0.5–1.3)
GLUCOSE SERPL-MCNC: 236 MG/DL — HIGH (ref 70–99)
HBV SURFACE AG SER-ACNC: NEGATIVE — SIGNIFICANT CHANGE UP
HCT VFR BLD CALC: 34.5 % — SIGNIFICANT CHANGE UP (ref 34.5–45)
HGB BLD-MCNC: 10.5 G/DL — LOW (ref 11.5–15.5)
MAGNESIUM SERPL-MCNC: 2.2 MG/DL — SIGNIFICANT CHANGE UP (ref 1.6–2.6)
MCHC RBC-ENTMCNC: 24.3 PG — LOW (ref 27–34)
MCHC RBC-ENTMCNC: 30.4 % — LOW (ref 32–36)
MCV RBC AUTO: 79.9 FL — LOW (ref 80–100)
NRBC # FLD: 0 K/UL — SIGNIFICANT CHANGE UP (ref 0–0)
PHOSPHATE SERPL-MCNC: 4.9 MG/DL — HIGH (ref 2.5–4.5)
PLATELET # BLD AUTO: 236 K/UL — SIGNIFICANT CHANGE UP (ref 150–400)
PMV BLD: SIGNIFICANT CHANGE UP FL (ref 7–13)
POTASSIUM SERPL-MCNC: 4.1 MMOL/L — SIGNIFICANT CHANGE UP (ref 3.5–5.3)
POTASSIUM SERPL-SCNC: 4.1 MMOL/L — SIGNIFICANT CHANGE UP (ref 3.5–5.3)
RBC # BLD: 4.32 M/UL — SIGNIFICANT CHANGE UP (ref 3.8–5.2)
RBC # FLD: 14.5 % — SIGNIFICANT CHANGE UP (ref 10.3–14.5)
SODIUM SERPL-SCNC: 131 MMOL/L — LOW (ref 135–145)
SPECIMEN SOURCE: SIGNIFICANT CHANGE UP
URATE SERPL-MCNC: 5.3 MG/DL — SIGNIFICANT CHANGE UP (ref 2.5–7)
WBC # BLD: 7.59 K/UL — SIGNIFICANT CHANGE UP (ref 3.8–10.5)
WBC # FLD AUTO: 7.59 K/UL — SIGNIFICANT CHANGE UP (ref 3.8–10.5)

## 2020-01-27 PROCEDURE — 99232 SBSQ HOSP IP/OBS MODERATE 35: CPT | Mod: GC

## 2020-01-27 RX ADMIN — LIDOCAINE AND PRILOCAINE CREAM 1 APPLICATION(S): 25; 25 CREAM TOPICAL at 10:17

## 2020-01-27 RX ADMIN — Medication 81 MILLIGRAM(S): at 15:46

## 2020-01-27 RX ADMIN — APIXABAN 2.5 MILLIGRAM(S): 2.5 TABLET, FILM COATED ORAL at 05:31

## 2020-01-27 RX ADMIN — Medication 2: at 08:57

## 2020-01-27 RX ADMIN — LOSARTAN POTASSIUM 50 MILLIGRAM(S): 100 TABLET, FILM COATED ORAL at 05:31

## 2020-01-27 RX ADMIN — Medication 100 MILLIGRAM(S): at 05:31

## 2020-01-27 RX ADMIN — Medication 1 SPRAY(S): at 15:46

## 2020-01-27 RX ADMIN — Medication 667 MILLIGRAM(S): at 08:58

## 2020-01-27 RX ADMIN — Medication 25 MILLIGRAM(S): at 05:31

## 2020-01-27 RX ADMIN — SERTRALINE 50 MILLIGRAM(S): 25 TABLET, FILM COATED ORAL at 15:46

## 2020-01-27 NOTE — DISCHARGE NOTE PROVIDER - NSDCMRMEDTOKEN_GEN_ALL_CORE_FT
apixaban 2.5 mg oral tablet: 1 tab(s) orally 2 times a day  Per pharmacy, last dispensed 8/20/19  aspirin 81 mg oral tablet, chewable: 1 tab(s) orally once a day  atorvastatin 80 mg oral tablet: 1 tab(s) orally once a day  calcium acetate 667 mg oral capsule: 1 cap(s) orally 3 times a day (with meals)  docusate sodium 100 mg oral capsule: 1 cap(s) orally 2 times a day, As Needed  donepezil 10 mg oral tablet: 1 tab(s) orally once a day (at bedtime)  fenofibrate 145 mg oral tablet: 1 tab(s) orally once a day  No recent fill history at [facility]  Flonase 50 mcg/inh nasal spray: 1 spray(s) nasal once a day  hydrALAZINE 25 mg oral tablet: 1 tab(s) orally 2 times a day  Lantus Solostar Pen 100 units/mL subcutaneous solution: 10 unit(s) subcutaneous once a day (at bedtime)  losartan 50 mg oral tablet: 1 tab(s) orally once a day  Senna 8.6 mg oral tablet: 2 tab(s) orally once a day (at bedtime), As Needed  sertraline 50 mg oral tablet: 1 tab(s) orally once a day  Toprol- mg oral tablet, extended release: 1 tab(s) orally once a day apixaban 2.5 mg oral tablet: 1 tab(s) orally 2 times a day  Per pharmacy, last dispensed 8/20/19  aspirin 81 mg oral tablet, chewable: 1 tab(s) orally once a day  atorvastatin 80 mg oral tablet: 1 tab(s) orally once a day  calcium acetate 667 mg oral capsule: 1 cap(s) orally 3 times a day (with meals)  docusate sodium 100 mg oral capsule: 1 cap(s) orally 2 times a day, As Needed  donepezil 10 mg oral tablet: 1 tab(s) orally once a day (at bedtime)  fenofibrate 145 mg oral tablet: 1 tab(s) orally once a day  No recent fill history at [facility]  Flonase 50 mcg/inh nasal spray: 1 spray(s) nasal once a day  hydrALAZINE 25 mg oral tablet: 1 tab(s) orally 2 times a day  Lantus Solostar Pen 100 units/mL subcutaneous solution: 10 unit(s) subcutaneous once a day (at bedtime)  losartan 50 mg oral tablet: 1 tab(s) orally once a day  Senna 8.6 mg oral tablet: 2 tab(s) orally once a day (at bedtime), As Needed  sertraline 50 mg oral tablet: 1 tab(s) orally once a day  Toprol- mg oral tablet, extended release: 1 tab(s) orally once a day apixaban 2.5 mg oral tablet: 1 tab(s) orally 2 times a day  Per pharmacy, last dispensed 8/20/19  aspirin 81 mg oral tablet, chewable: 1 tab(s) orally once a day  atorvastatin 80 mg oral tablet: 1 tab(s) orally once a day  calcium acetate 667 mg oral capsule: 1 cap(s) orally 3 times a day (with meals)  docusate sodium 100 mg oral capsule: 1 cap(s) orally 2 times a day, As Needed  donepezil 10 mg oral tablet: 1 tab(s) orally once a day (at bedtime)  fenofibrate 145 mg oral tablet: 1 tab(s) orally once a day  No recent fill history at [facility]  Flonase 50 mcg/inh nasal spray: 1 spray(s) nasal once a day  hydrALAZINE 25 mg oral tablet: 1 tab(s) orally 2 times a day  Lantus Solostar Pen 100 units/mL subcutaneous solution: 10 unit(s) subcutaneous once a day (at bedtime)  losartan 50 mg oral tablet: 1 tab(s) orally once a day  Senna 8.6 mg oral tablet: 2 tab(s) orally once a day (at bedtime), As Needed  sertraline 50 mg oral tablet: 1 tab(s) orally once a day  Toprol- mg oral tablet, extended release: 1 tab(s) orally once a day

## 2020-01-27 NOTE — DISCHARGE NOTE PROVIDER - HOSPITAL COURSE
79F with PMH of T2DM, ESRD (HD on M/W/F), HTN, A fib on eliquis, prior CVA presenting for joint pain since yesterday. At noon yesterday patient developed 10/10 sharp pain in R ankle. Pain is non-radiating and worse when walking and keeps her from ambulating. She's never had anything like this before and all other joints are spared. There was no specific trigger; she denies any recent trauma or falls. Denies any recent illness. Denies fevers, chills, rash, eye pain.        In ED    Vitals: T 98, HR 64, /44, RR 17, 99% on RA    WBC 4.08, CRP 12.1, ESR 34    Received: Percocet x1, 250cc NS        Patient admitted for inability to ambulate in the setting of possible gout vs septic arthritis. She had immediate clinical improvement off any antibiotic or steroid therapy. Did not require additional pain medications. Rheumatology evaluated the patient and recommended aspiration of the joint to confirm suspicions of gout. They recommended a trial dose of 40mg IV solumedrol, after which patient continued to show improvement. Uric acid was within normal limits. Patient regained ability to ambulate and is discharged with instructions to follow up with PCP if similar event occurs in another joint. 79F with PMH of T2DM, ESRD (HD on M/W/F), HTN, A fib on eliquis, prior CVA presenting for joint pain since yesterday. At noon yesterday patient developed 10/10 sharp pain in R ankle. Pain is non-radiating and worse when walking and keeps her from ambulating. She's never had anything like this before and all other joints are spared. There was no specific trigger; she denies any recent trauma or falls. Denies any recent illness. Denies fevers, chills, rash, eye pain.        In ED    Vitals: T 98, HR 64, /44, RR 17, 99% on RA    WBC 4.08, CRP 12.1, ESR 34    Received: Percocet x1, 250cc NS        Patient admitted for inability to ambulate in the setting of possible gout vs septic arthritis. She had immediate clinical improvement off any antibiotic or steroid therapy. Did not require additional pain medications. Rheumatology evaluated the patient and recommended aspiration of the joint to confirm suspicions of gout. They recommended a trial dose of 40mg IV solumedrol, after which patient continued to show improvement. Uric acid was within normal limits. She was discharged with instructions to follow up with PCP if similar event occurs in another joint.

## 2020-01-27 NOTE — PROGRESS NOTE ADULT - PROBLEM SELECTOR PLAN 5
Admission in December 2019 for hypertensive crisis. Wide pulse pressure.  -c/w home hydralazine 25 BID, atorvastatin 80mg qD

## 2020-01-27 NOTE — PROGRESS NOTE ADULT - SUBJECTIVE AND OBJECTIVE BOX
PROGRESS NOTE:   Authored by Kemar Taylor MD, Pager [phone] [facility], [phone] [facility]     Patient is a 79y old  Female who presents with a chief complaint of Inability to ambulate (27 Jan 2020 08:51)      SUBJECTIVE / OVERNIGHT EVENTS: No acute events overnight. Patient seen and examined at bedside.    ADDITIONAL REVIEW OF SYSTEMS:    MEDICATIONS  (STANDING):  apixaban 2.5 milliGRAM(s) Oral two times a day  aspirin  chewable 81 milliGRAM(s) Oral daily  atorvastatin 80 milliGRAM(s) Oral at bedtime  calcium acetate 667 milliGRAM(s) Oral three times a day with meals  chlorhexidine 4% Liquid 1 Application(s) Topical daily  dextrose 5%. 1000 milliLiter(s) (50 mL/Hr) IV Continuous <Continuous>  dextrose 50% Injectable 12.5 Gram(s) IV Push once  dextrose 50% Injectable 25 Gram(s) IV Push once  dextrose 50% Injectable 25 Gram(s) IV Push once  donepezil 10 milliGRAM(s) Oral at bedtime  fluticasone propionate 50 MICROgram(s)/spray Nasal Spray 1 Spray(s) Both Nostrils daily  hydrALAZINE 25 milliGRAM(s) Oral two times a day  insulin glargine Injectable (LANTUS) 10 Unit(s) SubCutaneous at bedtime  insulin lispro (HumaLOG) corrective regimen sliding scale   SubCutaneous three times a day before meals  lidocaine/prilocaine Cream 1 Application(s) Topical <User Schedule>  losartan 50 milliGRAM(s) Oral daily  metoprolol succinate  milliGRAM(s) Oral daily  senna 2 Tablet(s) Oral at bedtime  sertraline 50 milliGRAM(s) Oral daily    MEDICATIONS  (PRN):  dextrose 40% Gel 15 Gram(s) Oral once PRN Blood Glucose LESS THAN 70 milliGRAM(s)/deciliter  glucagon  Injectable 1 milliGRAM(s) IntraMuscular once PRN Glucose LESS THAN 70 milligrams/deciliter      CAPILLARY BLOOD GLUCOSE      POCT Blood Glucose.: 222 mg/dL (27 Jan 2020 08:43)  POCT Blood Glucose.: 215 mg/dL (26 Jan 2020 21:53)  POCT Blood Glucose.: 176 mg/dL (26 Jan 2020 17:09)  POCT Blood Glucose.: 114 mg/dL (26 Jan 2020 12:32)    I&O's Summary      PHYSICAL EXAM:  Vital Signs Last 24 Hrs  T(C): 36.3 (27 Jan 2020 04:53), Max: 37.2 (26 Jan 2020 13:06)  T(F): 97.4 (27 Jan 2020 04:53), Max: 99 (26 Jan 2020 13:06)  HR: 63 (27 Jan 2020 04:53) (57 - 68)  BP: 153/60 (27 Jan 2020 04:53) (131/58 - 179/102)  BP(mean): --  RR: 16 (27 Jan 2020 04:53) (16 - 17)  SpO2: 99% (27 Jan 2020 04:53) (93% - 99%)      LABS:                        10.5   7.59  )-----------( 236      ( 27 Jan 2020 06:37 )             34.5     01-27    131<L>  |  88<L>  |  51<H>  ----------------------------<  236<H>  4.1   |  22  |  7.15<H>    Ca    9.1      27 Jan 2020 06:37  Phos  4.9     01-27  Mg     2.2     01-27    TPro  8.1  /  Alb  4.3  /  TBili  0.4  /  DBili  x   /  AST  27  /  ALT  11  /  AlkPhos  71  01-25                RADIOLOGY & ADDITIONAL TESTS:  Results Reviewed:   Imaging Personally Reviewed:    COORDINATION OF CARE:  Care Discussed with Consultants/Other Providers [Y/N]:  Prior or Outpatient Records Reviewed [Y/N]: PROGRESS NOTE:   Authored by Kemar Taylor MD, Pager [phone] [facility], [phone] [facility]     Patient is a 79y old  Female who presents with a chief complaint of Inability to ambulate (27 Jan 2020 08:51)      SUBJECTIVE / OVERNIGHT EVENTS: No acute events overnight. Patient seen and examined at bedside. Patient says pain is improved today. Says she has been able to walk around, albeit with some difficulty. Does not want an aspiration. Explained to the patient the benefits of the procedure, but she still declined.    ADDITIONAL REVIEW OF SYSTEMS: Denies fevers, chills.    MEDICATIONS  (STANDING):  apixaban 2.5 milliGRAM(s) Oral two times a day  aspirin  chewable 81 milliGRAM(s) Oral daily  atorvastatin 80 milliGRAM(s) Oral at bedtime  calcium acetate 667 milliGRAM(s) Oral three times a day with meals  chlorhexidine 4% Liquid 1 Application(s) Topical daily  dextrose 5%. 1000 milliLiter(s) (50 mL/Hr) IV Continuous <Continuous>  dextrose 50% Injectable 12.5 Gram(s) IV Push once  dextrose 50% Injectable 25 Gram(s) IV Push once  dextrose 50% Injectable 25 Gram(s) IV Push once  donepezil 10 milliGRAM(s) Oral at bedtime  fluticasone propionate 50 MICROgram(s)/spray Nasal Spray 1 Spray(s) Both Nostrils daily  hydrALAZINE 25 milliGRAM(s) Oral two times a day  insulin glargine Injectable (LANTUS) 10 Unit(s) SubCutaneous at bedtime  insulin lispro (HumaLOG) corrective regimen sliding scale   SubCutaneous three times a day before meals  lidocaine/prilocaine Cream 1 Application(s) Topical <User Schedule>  losartan 50 milliGRAM(s) Oral daily  metoprolol succinate  milliGRAM(s) Oral daily  senna 2 Tablet(s) Oral at bedtime  sertraline 50 milliGRAM(s) Oral daily    MEDICATIONS  (PRN):  dextrose 40% Gel 15 Gram(s) Oral once PRN Blood Glucose LESS THAN 70 milliGRAM(s)/deciliter  glucagon  Injectable 1 milliGRAM(s) IntraMuscular once PRN Glucose LESS THAN 70 milligrams/deciliter      CAPILLARY BLOOD GLUCOSE      POCT Blood Glucose.: 222 mg/dL (27 Jan 2020 08:43)  POCT Blood Glucose.: 215 mg/dL (26 Jan 2020 21:53)  POCT Blood Glucose.: 176 mg/dL (26 Jan 2020 17:09)  POCT Blood Glucose.: 114 mg/dL (26 Jan 2020 12:32)    I&O's Summary      PHYSICAL EXAM:  Vital Signs Last 24 Hrs  T(C): 36.3 (27 Jan 2020 04:53), Max: 37.2 (26 Jan 2020 13:06)  T(F): 97.4 (27 Jan 2020 04:53), Max: 99 (26 Jan 2020 13:06)  HR: 63 (27 Jan 2020 04:53) (57 - 68)  BP: 153/60 (27 Jan 2020 04:53) (131/58 - 179/102)  BP(mean): --  RR: 16 (27 Jan 2020 04:53) (16 - 17)  SpO2: 99% (27 Jan 2020 04:53) (93% - 99%)    GENERAL: NAD, comfortable  	EYES: EOMi, PERRLA, no scleral icterus or injection  	ENMT: Oropharynx clear without erythema, no exudates, moist mucous membranes  	NECK: Supple  	RESPIRATORY: Clear to auscultation bilaterally, good inspiratory effort and air movement, no crackles, wheezes, rales  	CARDIOVASCULAR: Normal S1/S2, regular rate and rhythm, no murmurs noted  	GASTROINTESTINAL: Abdomen is soft, nontender, nondistended, normoactive bowel sounds, no palpable masses  	SKIN: No rashes, excoriations, purpura  	MUSCULOSKELETAL: RLE ankle is edematous to the level of the lateral malleolus, decreased overall today. Mildly tender to palpation on lateral aspect of foot.   	VASCULAR: Warm, well perfused in all extremities.  	NEUROLOGIC: Grossly intact    LABS:                        10.5   7.59  )-----------( 236      ( 27 Jan 2020 06:37 )             34.5     01-27    131<L>  |  88<L>  |  51<H>  ----------------------------<  236<H>  4.1   |  22  |  7.15<H>    Ca    9.1      27 Jan 2020 06:37  Phos  4.9     01-27  Mg     2.2     01-27    TPro  8.1  /  Alb  4.3  /  TBili  0.4  /  DBili  x   /  AST  27  /  ALT  11  /  AlkPhos  71  01-25                RADIOLOGY & ADDITIONAL TESTS:  Results Reviewed: Y  Imaging Personally Reviewed: Y    COORDINATION OF CARE:  Care Discussed with Consultants/Other Providers [Y/N]: Yes, Rheumatology, Dr. Jara  Prior or Outpatient Records Reviewed [Y/N]: Yes

## 2020-01-27 NOTE — PROGRESS NOTE ADULT - ASSESSMENT
79F with PMH of T2DM, ESRD (HD on M/W/F), HTN, A fib on Eliquis prior CVA presented with right ankle swelling and pain.    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 Pt refused arthrocentesis to help confirm diagnosis.    Recommend:   - Given significant improvement, does not require additional steroid tx.  - No further w/u needed at this time.  - If recurrent flare in the future, can follow up with Rheumatology, however given initial flare no indication at this time.      Traci Boston MD  Rheumatology Fellow   pager [phone] with PMH of T2DM, ESRD (HD on M/W/F), HTN, A fib on Eliquis prior CVA presented with right ankle swelling and pain.    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 Xray without erosions or chondrocalcinosis. Pt refused arthrocentesis to help confirm diagnosis.    Recommend:   - Given significant improvement, does not require additional steroid tx.  - No further w/u needed at this time.  - If recurrent flare in the future, can follow up with Rheumatology, however given initial flare no indication at this time.      Traci Boston MD  Rheumatology Fellow   pager [phone] 79F with PMH of T2DM, ESRD (HD on M/W/F), HTN, A fib on Eliquis prior CVA presented with right ankle swelling and pain.    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 Xray without erosions or chondrocalcinosis. Pt declined arthrocentesis to help confirm diagnosis.    Recommend:   - Given significant improvement, does not require additional steroid tx.  - No further w/u needed at this time.  - If recurrent flare in the future, can follow up with Rheumatology      Traci Boston MD  Rheumatology Fellow   pager [phone]

## 2020-01-27 NOTE — PROGRESS NOTE ADULT - PROBLEM SELECTOR PLAN 2
HD M/W/F last HD prior to admission on Friday 1/24.   -renally dose meds  -house nephrology following

## 2020-01-27 NOTE — PROGRESS NOTE ADULT - PROBLEM SELECTOR PLAN 1
Acute onset R ankle pain, swelling, x-ray negative for fracture. Possible gout in the setting of renal failure, but no history. Less likely OM given negative X-ray findings and ESR 34.   -Patient symptomatically much improved, now walking  -Rheumatology recommended trial of 40 IV prednisone and evaluate response, no further workup necessary

## 2020-01-27 NOTE — DISCHARGE NOTE PROVIDER - CARE PROVIDER_API CALL
FRANKLYN MATTSON  Internal Medicine  Phone: [phone]  Established Patient  Follow Up Time: 2 weeks

## 2020-01-27 NOTE — PROGRESS NOTE ADULT - ASSESSMENT
79F PMH ESRD on HD, DM, GERD, HTN admitted for joint pain     ESRD on HD MWF  last HD friday  HD today as ordered. vitals stable, access working well  consent in chart  renal diet  monitor bmp    HTN  controlled  monitor BP  up titrate hydralazine as needed  UF with HD    Anemia  Hb acceptable    monitor Hb    ckd-mbd  check phos level  Last PTH at goal 12/2019

## 2020-01-27 NOTE — DISCHARGE NOTE NURSING/CASE MANAGEMENT/SOCIAL WORK - PATIENT PORTAL LINK FT
You can access the FollowMyHealth Patient Portal offered by [facility] by registering at the following website: [email or web address]. By joining [employer]’s FollowMyHealth portal, you will also be able to view your health information using other applications (apps) compatible with our system.

## 2020-01-27 NOTE — PROGRESS NOTE ADULT - PROBLEM SELECTOR PLAN 6
Diet: Renal diet  Dispo: Home pending PT  DVT: Apixaban 2.5mg BID for A fib      Baseline Mobility: Ambulatory  6-Click Admission Score: _______  Dispo: Home pending PT Diet: Renal diet  Dispo: Home pending PT  DVT: Apixaban 2.5mg BID for A fib      Baseline Mobility: Ambulatory  6-Click Admission Score: 10-15  Dispo: Home pending PT

## 2020-01-27 NOTE — PROGRESS NOTE ADULT - SUBJECTIVE AND OBJECTIVE BOX
VINCENT GROSS  [id number]    INTERVAL HPI/OVERNIGHT EVENTS:    R ankle pain and swelling significantly improved. No new joint pain or swelling.    MEDICATIONS  (STANDING):  apixaban 2.5 milliGRAM(s) Oral two times a day  aspirin  chewable 81 milliGRAM(s) Oral daily  atorvastatin 80 milliGRAM(s) Oral at bedtime  calcium acetate 667 milliGRAM(s) Oral three times a day with meals  chlorhexidine 4% Liquid 1 Application(s) Topical daily  dextrose 5%. 1000 milliLiter(s) (50 mL/Hr) IV Continuous <Continuous>  dextrose 50% Injectable 12.5 Gram(s) IV Push once  dextrose 50% Injectable 25 Gram(s) IV Push once  dextrose 50% Injectable 25 Gram(s) IV Push once  donepezil 10 milliGRAM(s) Oral at bedtime  fluticasone propionate 50 MICROgram(s)/spray Nasal Spray 1 Spray(s) Both Nostrils daily  hydrALAZINE 25 milliGRAM(s) Oral two times a day  insulin glargine Injectable (LANTUS) 10 Unit(s) SubCutaneous at bedtime  insulin lispro (HumaLOG) corrective regimen sliding scale   SubCutaneous three times a day before meals  lidocaine/prilocaine Cream 1 Application(s) Topical <User Schedule>  losartan 50 milliGRAM(s) Oral daily  metoprolol succinate  milliGRAM(s) Oral daily  senna 2 Tablet(s) Oral at bedtime  sertraline 50 milliGRAM(s) Oral daily    MEDICATIONS  (PRN):  dextrose 40% Gel 15 Gram(s) Oral once PRN Blood Glucose LESS THAN 70 milliGRAM(s)/deciliter  glucagon  Injectable 1 milliGRAM(s) IntraMuscular once PRN Glucose LESS THAN 70 milligrams/deciliter    Allergies  No Known Allergies  Intolerances    Vital Signs Last 24 Hrs  T(C): 36.6 (27 Jan 2020 15:39), Max: 36.9 (26 Jan 2020 21:50)  T(F): 97.8 (27 Jan 2020 15:39), Max: 98.5 (26 Jan 2020 21:50)  HR: 56 (27 Jan 2020 15:39) (55 - 68)  BP: 131/53 (27 Jan 2020 15:39) (118/50 - 153/60)  BP(mean): --  RR: 17 (27 Jan 2020 15:39) (16 - 18)  SpO2: 98% (27 Jan 2020 15:39) (97% - 99%)    Physical Exam:  General: No apparent distress  MSK: R ankle with minimal synovitis and no TTP/warmth/erythema. FROM  Skin: no visible rashes    LABS:                        10.5   7.59  )-----------( 236      ( 27 Jan 2020 06:37 )             34.5     01-27    131<L>  |  88<L>  |  51<H>  ----------------------------<  236<H>  4.1   |  22  |  7.15<H>    Ca    9.1      27 Jan 2020 06:37  Phos  4.9     01-27  Mg     2.2     01-27    TPro  8.1  /  Alb  4.3  /  TBili  0.4  /  DBili  x   /  AST  27  /  ALT  11  /  AlkPhos  71  01-25    Uric Acid, Serum (01.27.20 @ 06:37)    Uric Acid, Serum: 5.3 mg/dL      EXAM:  RAD ANKLE MIN 3 VIEWS RIGHT      PROCEDURE DATE:  Jan 25 2020     INTERPRETATION:  CLINICAL INDICATION: Right foot swelling/pain.    TECHNIQUE: 3 views right foot.    COMPARISON: None.    FINDINGS:    No acute fracture or dislocation. No subcutaneous tracking gas collection, periosteal reaction or bony erosions suggestive of advanced osteoarthritis. Joint spaces are preserved. Vascular calcification is present. Enthesophyte formation at the insertion of the Achilles tendon.    IMPRESSION:    1.  No acute fracture or dislocation.  2.  No radiographic evidence of advanced osteomyelitis.    SIENA VINES M.D., RADIOLOGY RESIDENT  This document has been electronically signed.  TOMÁS MATHEW M.D., ATTENDING RADIOLOGIST  This document has been electronically signed. Jan 26 2020  8:08AM

## 2020-01-27 NOTE — DISCHARGE NOTE PROVIDER - NSDCCPCAREPLAN_GEN_ALL_CORE_FT
PRINCIPAL DISCHARGE DIAGNOSIS  Diagnosis: Right ankle pain  Assessment and Plan of Treatment: You came to the hospital for ankle pain. There was no evidence of fracture on imaging. We were suspicious for gout, so our rheumatologists came to evaluate you. You declined the option of pulling fluid from the ankle, so it is unclear what caused your pain. You were given a dose of steroids and showed much improvement afterwards. Please follow up with your PCP if this pain recurs in the same joint or in any other.      SECONDARY DISCHARGE DIAGNOSES  Diagnosis: ESRD on dialysis  Assessment and Plan of Treatment: Your kidneys no longer function properly, so you receive dialysis. You received dialysis here at the hospital. Please continue to receive dialysis at your regularly scheduled session on monday, wednesday, and friday.

## 2020-01-27 NOTE — PROGRESS NOTE ADULT - SUBJECTIVE AND OBJECTIVE BOX
[facility]   MD JAJA SMITH DO ANAM SIDDIQUI ANGELA WONG, PA    TEL:  OFFICE: [phone]  DR CASTILLO CELL: [phone]  DR. HARRISON CELL: [phone]  DR. RILEY CELL: [phone]  LIZZY DHILLON CELL: [phone]        Patient is a 79y old  Female who presents with a chief complaint of Inability to ambulate (27 Jan 2020 08:51)      Patient seen and examined in HD. No chest pain/sob    VITALS:  T(F): 97.2 (01-27-20 @ 11:22), Max: 99 (01-26-20 @ 13:06)  HR: 55 (01-27-20 @ 11:22)  BP: 142/58 (01-27-20 @ 11:22)  RR: 16 (01-27-20 @ 11:22)  SpO2: 99% (01-27-20 @ 04:53)  Wt(kg): --  flow 400        PHYSICAL EXAM:  Constitutional: NAD  Neck: No JVD  Respiratory: CTAB, no wheezes, rales or rhonchi  Cardiovascular: S1, S2, RRR  Gastrointestinal: BS+, soft, NT/ND  Extremities: No peripheral edema    Hospital Medications:   MEDICATIONS  (STANDING):  apixaban 2.5 milliGRAM(s) Oral two times a day  aspirin  chewable 81 milliGRAM(s) Oral daily  atorvastatin 80 milliGRAM(s) Oral at bedtime  calcium acetate 667 milliGRAM(s) Oral three times a day with meals  chlorhexidine 4% Liquid 1 Application(s) Topical daily  dextrose 5%. 1000 milliLiter(s) (50 mL/Hr) IV Continuous <Continuous>  dextrose 50% Injectable 12.5 Gram(s) IV Push once  dextrose 50% Injectable 25 Gram(s) IV Push once  dextrose 50% Injectable 25 Gram(s) IV Push once  donepezil 10 milliGRAM(s) Oral at bedtime  fluticasone propionate 50 MICROgram(s)/spray Nasal Spray 1 Spray(s) Both Nostrils daily  hydrALAZINE 25 milliGRAM(s) Oral two times a day  insulin glargine Injectable (LANTUS) 10 Unit(s) SubCutaneous at bedtime  insulin lispro (HumaLOG) corrective regimen sliding scale   SubCutaneous three times a day before meals  lidocaine/prilocaine Cream 1 Application(s) Topical <User Schedule>  losartan 50 milliGRAM(s) Oral daily  metoprolol succinate  milliGRAM(s) Oral daily  senna 2 Tablet(s) Oral at bedtime  sertraline 50 milliGRAM(s) Oral daily      LABS:  01-27    131<L>  |  88<L>  |  51<H>  ----------------------------<  236<H>  4.1   |  22  |  7.15<H>    Ca    9.1      27 Jan 2020 06:37  Phos  4.9     01-27  Mg     2.2     01-27    TPro  8.1  /  Alb  4.3  /  TBili  0.4  /  DBili      /  AST  27  /  ALT  11  /  AlkPhos  71  01-25    Creatinine Trend: 7.15 <--, 5.48 <--    Phosphorus Level, Serum: 4.9 mg/dL (01-27 @ 06:37)                              10.5   7.59  )-----------( 236      ( 27 Jan 2020 06:37 )             34.5     Urine Studies:      .9 (Ca --)      [12-09-19 @ 20:00]   --  HbA1c 5.8      [12-10-19 @ 06:41]  TSH 2.39      [12-10-19 @ 06:41]  Lipid: chol 137, , HDL 66, LDL 41      [12-10-19 @ 06:41]    HBsAg NEGATIVE      [01-27-20 @ 06:37]      RADIOLOGY & ADDITIONAL STUDIES:

## 2020-01-29 LAB — BACTERIA NPH CULT: SIGNIFICANT CHANGE UP

## 2020-12-31 NOTE — CHART NOTE - NSCHARTNOTEFT_GEN_A_CORE
NUTRITION SERVICES     Upon Nutritional Assessment by the Registered Dietitian your patient was determined to meet criteria/ has evidence of the following diagnosis/diagnoses:  [ ] Mild Protein Calorie Malnutrition   [X] Moderate Protein Calorie Malnutrition   [ ] Severe Protein Calorie Malnutrition   [ ] Unspecified Protein Calorie Malnutrition   [ ] Underweight / BMI <19  [ ] Morbid Obesity / BMI >40    Findings as based on:  •  Comprehensive nutrition assessment and consultation       Treatment:  The following diet has been recommended: Detail Level: Detailed

## 2021-05-08 ENCOUNTER — INPATIENT (INPATIENT)
Facility: HOSPITAL | Age: 81
LOS: 8 days | Discharge: NURSING, REHAB OR LONG-TERM CARE | End: 2021-05-17
Attending: INTERNAL MEDICINE | Admitting: INTERNAL MEDICINE
Payer: MEDICARE

## 2021-05-08 VITALS
RESPIRATION RATE: 18 BRPM | DIASTOLIC BLOOD PRESSURE: 64 MMHG | OXYGEN SATURATION: 100 % | SYSTOLIC BLOOD PRESSURE: 157 MMHG | HEART RATE: 74 BPM | HEIGHT: 67 IN | TEMPERATURE: 98 F

## 2021-05-08 DIAGNOSIS — I10 ESSENTIAL (PRIMARY) HYPERTENSION: ICD-10-CM

## 2021-05-08 DIAGNOSIS — I48.91 UNSPECIFIED ATRIAL FIBRILLATION: ICD-10-CM

## 2021-05-08 DIAGNOSIS — G93.41 METABOLIC ENCEPHALOPATHY: ICD-10-CM

## 2021-05-08 DIAGNOSIS — I77.0 ARTERIOVENOUS FISTULA, ACQUIRED: Chronic | ICD-10-CM

## 2021-05-08 DIAGNOSIS — R41.82 ALTERED MENTAL STATUS, UNSPECIFIED: ICD-10-CM

## 2021-05-08 DIAGNOSIS — Z98.890 OTHER SPECIFIED POSTPROCEDURAL STATES: Chronic | ICD-10-CM

## 2021-05-08 DIAGNOSIS — E11.22 TYPE 2 DIABETES MELLITUS WITH DIABETIC CHRONIC KIDNEY DISEASE: ICD-10-CM

## 2021-05-08 DIAGNOSIS — N18.6 END STAGE RENAL DISEASE: ICD-10-CM

## 2021-05-08 DIAGNOSIS — N39.0 URINARY TRACT INFECTION, SITE NOT SPECIFIED: ICD-10-CM

## 2021-05-08 LAB
ALBUMIN SERPL ELPH-MCNC: 3.7 G/DL — SIGNIFICANT CHANGE UP (ref 3.3–5)
ALP SERPL-CCNC: 66 U/L — SIGNIFICANT CHANGE UP (ref 40–120)
ALT FLD-CCNC: 6 U/L — SIGNIFICANT CHANGE UP (ref 4–33)
AMMONIA BLD-MCNC: 10 UMOL/L — LOW (ref 11–55)
ANION GAP SERPL CALC-SCNC: 13 MMOL/L — SIGNIFICANT CHANGE UP (ref 7–14)
APPEARANCE UR: ABNORMAL
AST SERPL-CCNC: 17 U/L — SIGNIFICANT CHANGE UP (ref 4–32)
B PERT DNA SPEC QL NAA+PROBE: SIGNIFICANT CHANGE UP
BACTERIA # UR AUTO: ABNORMAL
BASOPHILS # BLD AUTO: 0.02 K/UL — SIGNIFICANT CHANGE UP (ref 0–0.2)
BASOPHILS NFR BLD AUTO: 0.4 % — SIGNIFICANT CHANGE UP (ref 0–2)
BILIRUB SERPL-MCNC: 0.4 MG/DL — SIGNIFICANT CHANGE UP (ref 0.2–1.2)
BILIRUB UR-MCNC: NEGATIVE — SIGNIFICANT CHANGE UP
BLOOD GAS VENOUS COMPREHENSIVE RESULT: SIGNIFICANT CHANGE UP
BUN SERPL-MCNC: 18 MG/DL — SIGNIFICANT CHANGE UP (ref 7–23)
C PNEUM DNA SPEC QL NAA+PROBE: SIGNIFICANT CHANGE UP
CALCIUM SERPL-MCNC: 8.8 MG/DL — SIGNIFICANT CHANGE UP (ref 8.4–10.5)
CHLORIDE SERPL-SCNC: 97 MMOL/L — LOW (ref 98–107)
CO2 SERPL-SCNC: 28 MMOL/L — SIGNIFICANT CHANGE UP (ref 22–31)
COLOR SPEC: YELLOW — SIGNIFICANT CHANGE UP
CREAT SERPL-MCNC: 7.45 MG/DL — HIGH (ref 0.5–1.3)
DIFF PNL FLD: ABNORMAL
EOSINOPHIL # BLD AUTO: 0.18 K/UL — SIGNIFICANT CHANGE UP (ref 0–0.5)
EOSINOPHIL NFR BLD AUTO: 3.8 % — SIGNIFICANT CHANGE UP (ref 0–6)
EPI CELLS # UR: 9 /HPF — HIGH (ref 0–5)
FLUAV SUBTYP SPEC NAA+PROBE: SIGNIFICANT CHANGE UP
FLUBV RNA SPEC QL NAA+PROBE: SIGNIFICANT CHANGE UP
GLUCOSE SERPL-MCNC: 135 MG/DL — HIGH (ref 70–99)
GLUCOSE UR QL: NEGATIVE — SIGNIFICANT CHANGE UP
HADV DNA SPEC QL NAA+PROBE: SIGNIFICANT CHANGE UP
HCOV 229E RNA SPEC QL NAA+PROBE: SIGNIFICANT CHANGE UP
HCOV HKU1 RNA SPEC QL NAA+PROBE: SIGNIFICANT CHANGE UP
HCOV NL63 RNA SPEC QL NAA+PROBE: SIGNIFICANT CHANGE UP
HCOV OC43 RNA SPEC QL NAA+PROBE: SIGNIFICANT CHANGE UP
HCT VFR BLD CALC: 33.2 % — LOW (ref 34.5–45)
HGB BLD-MCNC: 9.7 G/DL — LOW (ref 11.5–15.5)
HMPV RNA SPEC QL NAA+PROBE: SIGNIFICANT CHANGE UP
HPIV1 RNA SPEC QL NAA+PROBE: SIGNIFICANT CHANGE UP
HPIV2 RNA SPEC QL NAA+PROBE: SIGNIFICANT CHANGE UP
HPIV3 RNA SPEC QL NAA+PROBE: SIGNIFICANT CHANGE UP
HPIV4 RNA SPEC QL NAA+PROBE: SIGNIFICANT CHANGE UP
HYALINE CASTS # UR AUTO: 2 /LPF — SIGNIFICANT CHANGE UP (ref 0–7)
IANC: 2.94 K/UL — SIGNIFICANT CHANGE UP (ref 1.5–8.5)
IMM GRANULOCYTES NFR BLD AUTO: 0.4 % — SIGNIFICANT CHANGE UP (ref 0–1.5)
KETONES UR-MCNC: NEGATIVE — SIGNIFICANT CHANGE UP
LEUKOCYTE ESTERASE UR-ACNC: ABNORMAL
LYMPHOCYTES # BLD AUTO: 1.04 K/UL — SIGNIFICANT CHANGE UP (ref 1–3.3)
LYMPHOCYTES # BLD AUTO: 22.1 % — SIGNIFICANT CHANGE UP (ref 13–44)
MCHC RBC-ENTMCNC: 23.8 PG — LOW (ref 27–34)
MCHC RBC-ENTMCNC: 29.2 GM/DL — LOW (ref 32–36)
MCV RBC AUTO: 81.6 FL — SIGNIFICANT CHANGE UP (ref 80–100)
MONOCYTES # BLD AUTO: 0.51 K/UL — SIGNIFICANT CHANGE UP (ref 0–0.9)
MONOCYTES NFR BLD AUTO: 10.8 % — SIGNIFICANT CHANGE UP (ref 2–14)
NEUTROPHILS # BLD AUTO: 2.94 K/UL — SIGNIFICANT CHANGE UP (ref 1.8–7.4)
NEUTROPHILS NFR BLD AUTO: 62.5 % — SIGNIFICANT CHANGE UP (ref 43–77)
NITRITE UR-MCNC: NEGATIVE — SIGNIFICANT CHANGE UP
NRBC # BLD: 0 /100 WBCS — SIGNIFICANT CHANGE UP
NRBC # FLD: 0 K/UL — SIGNIFICANT CHANGE UP
PCP SPEC-MCNC: SIGNIFICANT CHANGE UP
PH UR: 8.5 — HIGH (ref 5–8)
PLATELET # BLD AUTO: 208 K/UL — SIGNIFICANT CHANGE UP (ref 150–400)
POTASSIUM SERPL-MCNC: 4.4 MMOL/L — SIGNIFICANT CHANGE UP (ref 3.5–5.3)
POTASSIUM SERPL-SCNC: 4.4 MMOL/L — SIGNIFICANT CHANGE UP (ref 3.5–5.3)
PROT SERPL-MCNC: 7.8 G/DL — SIGNIFICANT CHANGE UP (ref 6–8.3)
PROT UR-MCNC: ABNORMAL
RAPID RVP RESULT: SIGNIFICANT CHANGE UP
RBC # BLD: 4.07 M/UL — SIGNIFICANT CHANGE UP (ref 3.8–5.2)
RBC # FLD: 15.4 % — HIGH (ref 10.3–14.5)
RBC CASTS # UR COMP ASSIST: 1 /HPF — SIGNIFICANT CHANGE UP (ref 0–4)
RSV RNA SPEC QL NAA+PROBE: SIGNIFICANT CHANGE UP
RV+EV RNA SPEC QL NAA+PROBE: SIGNIFICANT CHANGE UP
SARS-COV-2 RNA SPEC QL NAA+PROBE: SIGNIFICANT CHANGE UP
SODIUM SERPL-SCNC: 138 MMOL/L — SIGNIFICANT CHANGE UP (ref 135–145)
SP GR SPEC: 1.02 — SIGNIFICANT CHANGE UP (ref 1.01–1.02)
TOXICOLOGY SCREEN, DRUGS OF ABUSE, SERUM RESULT: SIGNIFICANT CHANGE UP
UROBILINOGEN FLD QL: SIGNIFICANT CHANGE UP
WBC # BLD: 4.71 K/UL — SIGNIFICANT CHANGE UP (ref 3.8–10.5)
WBC # FLD AUTO: 4.71 K/UL — SIGNIFICANT CHANGE UP (ref 3.8–10.5)
WBC UR QL: 61 /HPF — HIGH (ref 0–5)

## 2021-05-08 PROCEDURE — 99223 1ST HOSP IP/OBS HIGH 75: CPT

## 2021-05-08 PROCEDURE — 71045 X-RAY EXAM CHEST 1 VIEW: CPT | Mod: 26

## 2021-05-08 PROCEDURE — 99285 EMERGENCY DEPT VISIT HI MDM: CPT

## 2021-05-08 PROCEDURE — 70450 CT HEAD/BRAIN W/O DYE: CPT | Mod: 26

## 2021-05-08 RX ORDER — SERTRALINE 25 MG/1
1 TABLET, FILM COATED ORAL
Qty: 0 | Refills: 0 | DISCHARGE

## 2021-05-08 RX ORDER — FLUTICASONE PROPIONATE 50 MCG
1 SPRAY, SUSPENSION NASAL
Qty: 0 | Refills: 0 | DISCHARGE

## 2021-05-08 RX ORDER — DOCUSATE SODIUM 100 MG
1 CAPSULE ORAL
Qty: 0 | Refills: 0 | DISCHARGE

## 2021-05-08 RX ORDER — ERYTHROPOIETIN 10000 [IU]/ML
10000 INJECTION, SOLUTION INTRAVENOUS; SUBCUTANEOUS
Refills: 0 | Status: DISCONTINUED | OUTPATIENT
Start: 2021-05-08 | End: 2021-05-14

## 2021-05-08 RX ORDER — HYDRALAZINE HCL 50 MG
25 TABLET ORAL
Refills: 0 | Status: DISCONTINUED | OUTPATIENT
Start: 2021-05-08 | End: 2021-05-17

## 2021-05-08 RX ORDER — DEXTROSE 50 % IN WATER 50 %
12.5 SYRINGE (ML) INTRAVENOUS ONCE
Refills: 0 | Status: DISCONTINUED | OUTPATIENT
Start: 2021-05-08 | End: 2021-05-17

## 2021-05-08 RX ORDER — CEFTRIAXONE 500 MG/1
1000 INJECTION, POWDER, FOR SOLUTION INTRAMUSCULAR; INTRAVENOUS EVERY 24 HOURS
Refills: 0 | Status: DISCONTINUED | OUTPATIENT
Start: 2021-05-09 | End: 2021-05-12

## 2021-05-08 RX ORDER — GLUCAGON INJECTION, SOLUTION 0.5 MG/.1ML
1 INJECTION, SOLUTION SUBCUTANEOUS ONCE
Refills: 0 | Status: DISCONTINUED | OUTPATIENT
Start: 2021-05-08 | End: 2021-05-17

## 2021-05-08 RX ORDER — APIXABAN 2.5 MG/1
5 TABLET, FILM COATED ORAL
Refills: 0 | Status: DISCONTINUED | OUTPATIENT
Start: 2021-05-08 | End: 2021-05-14

## 2021-05-08 RX ORDER — INSULIN GLARGINE 100 [IU]/ML
10 INJECTION, SOLUTION SUBCUTANEOUS AT BEDTIME
Refills: 0 | Status: DISCONTINUED | OUTPATIENT
Start: 2021-05-08 | End: 2021-05-17

## 2021-05-08 RX ORDER — ACETAMINOPHEN 500 MG
650 TABLET ORAL ONCE
Refills: 0 | Status: COMPLETED | OUTPATIENT
Start: 2021-05-08 | End: 2021-05-08

## 2021-05-08 RX ORDER — CLONAZEPAM 1 MG
0.5 TABLET ORAL ONCE
Refills: 0 | Status: DISCONTINUED | OUTPATIENT
Start: 2021-05-08 | End: 2021-05-13

## 2021-05-08 RX ORDER — APIXABAN 2.5 MG/1
1 TABLET, FILM COATED ORAL
Qty: 0 | Refills: 0 | DISCHARGE

## 2021-05-08 RX ORDER — ONDANSETRON 8 MG/1
4 TABLET, FILM COATED ORAL EVERY 6 HOURS
Refills: 0 | Status: DISCONTINUED | OUTPATIENT
Start: 2021-05-08 | End: 2021-05-17

## 2021-05-08 RX ORDER — ASPIRIN/CALCIUM CARB/MAGNESIUM 324 MG
81 TABLET ORAL DAILY
Refills: 0 | Status: DISCONTINUED | OUTPATIENT
Start: 2021-05-08 | End: 2021-05-14

## 2021-05-08 RX ORDER — ATORVASTATIN CALCIUM 80 MG/1
80 TABLET, FILM COATED ORAL AT BEDTIME
Refills: 0 | Status: DISCONTINUED | OUTPATIENT
Start: 2021-05-08 | End: 2021-05-17

## 2021-05-08 RX ORDER — SODIUM CHLORIDE 9 MG/ML
1000 INJECTION, SOLUTION INTRAVENOUS
Refills: 0 | Status: DISCONTINUED | OUTPATIENT
Start: 2021-05-08 | End: 2021-05-17

## 2021-05-08 RX ORDER — DONEPEZIL HYDROCHLORIDE 10 MG/1
10 TABLET, FILM COATED ORAL AT BEDTIME
Refills: 0 | Status: DISCONTINUED | OUTPATIENT
Start: 2021-05-08 | End: 2021-05-17

## 2021-05-08 RX ORDER — DONEPEZIL HYDROCHLORIDE 10 MG/1
1 TABLET, FILM COATED ORAL
Qty: 0 | Refills: 0 | DISCHARGE

## 2021-05-08 RX ORDER — CALCIUM ACETATE 667 MG
667 TABLET ORAL
Refills: 0 | Status: DISCONTINUED | OUTPATIENT
Start: 2021-05-08 | End: 2021-05-17

## 2021-05-08 RX ORDER — INSULIN LISPRO 100/ML
VIAL (ML) SUBCUTANEOUS AT BEDTIME
Refills: 0 | Status: DISCONTINUED | OUTPATIENT
Start: 2021-05-08 | End: 2021-05-17

## 2021-05-08 RX ORDER — DEXTROSE 50 % IN WATER 50 %
25 SYRINGE (ML) INTRAVENOUS ONCE
Refills: 0 | Status: DISCONTINUED | OUTPATIENT
Start: 2021-05-08 | End: 2021-05-17

## 2021-05-08 RX ORDER — INSULIN LISPRO 100/ML
VIAL (ML) SUBCUTANEOUS
Refills: 0 | Status: DISCONTINUED | OUTPATIENT
Start: 2021-05-08 | End: 2021-05-17

## 2021-05-08 RX ORDER — CEFTRIAXONE 500 MG/1
1000 INJECTION, POWDER, FOR SOLUTION INTRAMUSCULAR; INTRAVENOUS ONCE
Refills: 0 | Status: COMPLETED | OUTPATIENT
Start: 2021-05-08 | End: 2021-05-08

## 2021-05-08 RX ORDER — FENOFIBRATE,MICRONIZED 130 MG
1 CAPSULE ORAL
Qty: 0 | Refills: 0 | DISCHARGE

## 2021-05-08 RX ORDER — PANTOPRAZOLE SODIUM 20 MG/1
40 TABLET, DELAYED RELEASE ORAL
Refills: 0 | Status: DISCONTINUED | OUTPATIENT
Start: 2021-05-08 | End: 2021-05-17

## 2021-05-08 RX ORDER — SENNA PLUS 8.6 MG/1
2 TABLET ORAL
Qty: 0 | Refills: 0 | DISCHARGE

## 2021-05-08 RX ORDER — METOPROLOL TARTRATE 50 MG
100 TABLET ORAL DAILY
Refills: 0 | Status: DISCONTINUED | OUTPATIENT
Start: 2021-05-08 | End: 2021-05-17

## 2021-05-08 RX ORDER — DEXTROSE 50 % IN WATER 50 %
15 SYRINGE (ML) INTRAVENOUS ONCE
Refills: 0 | Status: DISCONTINUED | OUTPATIENT
Start: 2021-05-08 | End: 2021-05-17

## 2021-05-08 RX ADMIN — INSULIN GLARGINE 10 UNIT(S): 100 INJECTION, SOLUTION SUBCUTANEOUS at 23:37

## 2021-05-08 RX ADMIN — APIXABAN 5 MILLIGRAM(S): 2.5 TABLET, FILM COATED ORAL at 18:26

## 2021-05-08 RX ADMIN — CEFTRIAXONE 1000 MILLIGRAM(S): 500 INJECTION, POWDER, FOR SOLUTION INTRAMUSCULAR; INTRAVENOUS at 13:40

## 2021-05-08 RX ADMIN — Medication 650 MILLIGRAM(S): at 11:58

## 2021-05-08 RX ADMIN — CEFTRIAXONE 100 MILLIGRAM(S): 500 INJECTION, POWDER, FOR SOLUTION INTRAMUSCULAR; INTRAVENOUS at 23:39

## 2021-05-08 RX ADMIN — Medication 25 MILLIGRAM(S): at 18:26

## 2021-05-08 RX ADMIN — Medication 650 MILLIGRAM(S): at 13:40

## 2021-05-08 RX ADMIN — CEFTRIAXONE 100 MILLIGRAM(S): 500 INJECTION, POWDER, FOR SOLUTION INTRAMUSCULAR; INTRAVENOUS at 11:57

## 2021-05-08 RX ADMIN — Medication 667 MILLIGRAM(S): at 18:26

## 2021-05-08 NOTE — H&P ADULT - HISTORY OF PRESENT ILLNESS
79 yo F with h/o  HTN, DM, A fib on Eliquis, ESRD on HD (MWF) brought to ED by family for AMS. Pt is poor historian, history obtained from her son Julius ([phone]). For the past week pt has been moving slower and appeared more lethargic. Yesterday she also appeared confused, kept screaming for help when nothing was wrong. She has also been making up names and sometimes had nonsensical speech. At baseline pt is AAOx3 and able to have conversation and understands what's going on around her. She usually ambulates with a cane. She has not had any recent falls. No recent fevers, chills or cough. She has no complained of any pain.     In ED pt was given Ceftriaxone 1g IV, Tylenol 650mg PO  VS:  145/55  60  98.4  16  100% on RA 81 yo F with h/o HTN, DM, A fib on Eliquis, ESRD on HD (MWF) brought to ED by family for AMS. Pt is poor historian, history obtained from her son Julius ([phone]). For the past week pt has been moving slower and appeared more lethargic. Yesterday she also appeared confused, kept screaming for help when nothing was wrong. She has also been making up names and sometimes had nonsensical speech. At baseline pt is AAOx3 and able to have conversation and understands what's going on around her. She usually ambulates with a cane. She has not had any recent falls. No recent fevers, chills or cough. She has no complained of any pain.     In ED pt was given Ceftriaxone 1g IV, Tylenol 650mg PO  VS:  145/55  60  98.4  16  100% on RA

## 2021-05-08 NOTE — ED ADULT TRIAGE NOTE - CHIEF COMPLAINT QUOTE
Pt ESRD pt, receives dialysis M,W,F brought in from home for AMS progressively since Tuesday, last full session of dialysis was yesterday. Pt repeating "please help me", talking to self. Pt denies any present pain, breathing even and unlabored, denies chest pain, denies headache/dizziness, afebrile. AV fistula to Rt arm.

## 2021-05-08 NOTE — ED PROVIDER NOTE - CLINICAL SUMMARY MEDICAL DECISION MAKING FREE TEXT BOX
80F hx htn, dm and esrd MWF last dialyzed Friday presents with AMS since Tuesday worsening today. baseline aaox3. look for tox, metabolic or infectious source of confusion, CTH as well

## 2021-05-08 NOTE — H&P ADULT - PROBLEM SELECTOR PLAN 1
- Etiology unclear. CTH showing "region of decreased attenuation in the right parietal lobe with associated sulcal effacement suspicious for acute infarction"   - Neurology consulted in ED. Rec CTA head  - Pt also noted to have UTI which may be contributing - Etiology unclear. CTH showing "region of decreased attenuation in the right parietal lobe with associated sulcal effacement suspicious for acute infarction"   - Neurology consulted in ED. Rec CTA head- will need to coordinate with HD  - Pt also noted to have UTI which may be contributing - Etiology unclear. CTH showing "region of decreased attenuation in the right parietal lobe with associated sulcal effacement suspicious for acute infarction"   - Neurology consulted in ED. Rec CTA head- will need to coordinate with HD  - Repeat CTH in AM  - Pt also noted to have UTI which may be contributing

## 2021-05-08 NOTE — ED PROVIDER NOTE - PMH
Anemia    Atrial fibrillation  On Eliquis  Cerebrovascular accident (CVA)    CKD (chronic kidney disease)  now on HD via R forearm AVF  DM (diabetes mellitus)  TYpe II  HTN (hypertension)    Hypercholesteremia

## 2021-05-08 NOTE — ED PROVIDER NOTE - NS ED ROS FT
Constitutional: no fevers, no chills.  Eyes: no visual changes.  Ears: no ear drainage, no ear pain.  Nose: no nasal congestion.  Mouth/Throat: no sore throat.  Cardiovascular: no chest pain.  Respiratory: no shortness of breath, no wheezing, no cough  Gastrointestinal: no nausea, no vomiting, no diarrhea, no abdominal pain.  MSK: no flank pain, no back pain.  Genitourinary: no dysuria, no hematuria.  Skin: no rashes.  Neuro: no headache +AMS

## 2021-05-08 NOTE — ED PROVIDER NOTE - PHYSICAL EXAMINATION
GEN: Well appearing, well nourished, in no apparent distress.  HEAD: NCAT  HEENT: PERRL, Airway patent, EOMI, non-erythematous pharynx, no exudates, uvula midline, MMM, neck supple, no LAD, no JVD  LUNG: CTAB, no adventitious sounds, no retractions, no nasal flaring  CV: RRR, no murmurs,   Abd: soft, NTND, no rebound or guarding, BS+ in all quadrants, no CVAT  MSK: WWP, Pulses 2+ in extremities, No edema   Neuro:  AAOx0,  CUTLER without laterality  Skin: Warm and dry, no evidence of rash  Psych: normal mood and affect GEN: Well appearing, well nourished, in no apparent distress, keeps repeating "don't kill me", "stay with me"  HEAD: NCAT  HEENT: PERRL, EOMI, no nystagmus, no facial droop, Airway patent, uvula midline, MMM, neck supple, no LAD, no JVD, no raccoon sign, no jimenez sign   LUNG: CTAB, no adventitious sounds, no retractions, no nasal flaring  CV: RRR, no murmurs,   Abd: soft, NTND, no rebound or guarding, BS+ in all quadrants, no CVAT  MSK: WWP, Pulses 2+ in extremities, No edema, no visible deformities, strength 5/5 in all extremities, FROM, no midspine TTP  Neuro:  CN II-XII in tact. AAOx0, sensations intact, neg pronator drift  Skin: Warm and dry, no evidence of rash  Psych: normal mood and affect

## 2021-05-08 NOTE — ED PROVIDER NOTE - CARE PLAN
Principal Discharge DX:	Altered mental status   Principal Discharge DX:	Altered mental status  Secondary Diagnosis:	UTI (urinary tract infection)   Principal Discharge DX:	Altered mental status  Secondary Diagnosis:	UTI (urinary tract infection)  Secondary Diagnosis:	Stroke

## 2021-05-08 NOTE — H&P ADULT - PROBLEM SELECTOR PROBLEM 5
Type 2 diabetes mellitus with chronic kidney disease on chronic dialysis, without long-term current use of insulin Essential hypertension

## 2021-05-08 NOTE — ED ADULT NURSE NOTE - OBJECTIVE STATEMENT
Pt presents to rm 25, A&Ox1- disoriented to time and place, pmhx of dialysis (M/W/F- last dialysis yesterday), afib on eliquis, here for evaluation of AMS, pt continues to should "Please help." Pt also believes she is at home. Denies any pain/ symptoms at this time. Pt is able to make a small amount of urine and is continent, pt provided urine sample at this time. No neuro deficit observed, no facial asymmetry, equal  strength bilaterally, no arm/leg drift, positive strength to all four extremities, and positive sensation. Skin is warm, dry and intact. US IV established in left arm with a 20G placed by Dr. Lester, labs drawn and sent, call bell in reach, warm blanket provided, bed in lowest position, side rails up x2, MD evaluation in progress, pt on telemetry. Will continue to monitor.

## 2021-05-08 NOTE — CONSULT NOTE ADULT - SUBJECTIVE AND OBJECTIVE BOX
HPI:  Vanessa Livingston is a 80 year old RH female with a past medical history of a previous ischemic infarcts with no residual deficits, afib on Eliquis (family and patient reported adherence), HTN, DM, ESRD (HD on MWF) who presents with increased altered mental status. At baseline, the patient is alert and oriented to all modalities. According to family via ED records, the patient had been shouting nonsensical things today and has been having worsening of her mental status since Tuesday. The patient stated "Am I ok?" when asked how she was doing. Afterwards stated that she was doing ok and denied any acute headaches, numbness, tingling, weakness, dizziness, lightheadedness, gait instability. On reassessment stated that she was not feeling well but was unable to elaborate further.   Of note had missed one dialysis session last week. No reported falls,     (Stroke only)  NIHSS: 2  MRS: ?    REVIEW OF SYSTEMS    A 10-system ROS was performed and is negative except for those items noted above and/or in the HPI.    PAST MEDICAL & SURGICAL HISTORY:  DM (diabetes mellitus)  TYpe II    HTN (hypertension)    Hypercholesteremia    CKD (chronic kidney disease)  now on HD via R forearm AVF    Anemia    Atrial fibrillation  On Eliquis    Cerebrovascular accident (CVA)    Status post right foot surgery  hammer toe repair    AV fistula  2018, 2 ballooning (last one 2 weeks ago), following up on Dec 15th    H/O colonoscopy with polypectomy      FAMILY HISTORY:  Family history of hypertension (Sibling)    Family history of diabetes mellitus    Family history of lung cancer (Sibling)    Family history of malignant neoplasm of gastrointestinal tract      SOCIAL HISTORY:   T/E/D:   Occupation:   Lives with:     MEDICATIONS (HOME):  Home Medications:  apixaban 2.5 mg oral tablet: 1 tab(s) orally 2 times a day  Per pharmacy, last dispensed 19 (09 Dec 2019 21:28)  aspirin 81 mg oral tablet, chewable: 1 tab(s) orally once a day (09 Dec 2019 21:28)  atorvastatin 80 mg oral tablet: 1 tab(s) orally once a day (09 Dec 2019 21:28)  calcium acetate 667 mg oral capsule: 1 cap(s) orally 3 times a day (with meals) (09 Dec 2019 21:28)  docusate sodium 100 mg oral capsule: 1 cap(s) orally 2 times a day, As Needed (09 Dec 2019 21:28)  donepezil 10 mg oral tablet: 1 tab(s) orally once a day (at bedtime) (09 Dec 2019 21:28)  fenofibrate 145 mg oral tablet: 1 tab(s) orally once a day  No recent fill history at [facility] (09 Dec 2019 21:28)  Flonase 50 mcg/inh nasal spray: 1 spray(s) nasal once a day (2020 11:50)  hydrALAZINE 25 mg oral tablet: 1 tab(s) orally 2 times a day (09 Dec 2019 21:28)  Lantus Solostar Pen 100 units/mL subcutaneous solution: 10 unit(s) subcutaneous once a day (at bedtime) (09 Dec 2019 21:28)  Senna 8.6 mg oral tablet: 2 tab(s) orally once a day (at bedtime), As Needed (09 Dec 2019 21:28)  sertraline 50 mg oral tablet: 1 tab(s) orally once a day (09 Dec 2019 21:28)    MEDICATIONS  (STANDING):    MEDICATIONS  (PRN):    ALLERGIES/INTOLERANCES:  Allergies  No Known Allergies    Intolerances    VITALS & EXAMINATION:  Vital Signs Last 24 Hrs  T(C): 36.9 (08 May 2021 13:00), Max: 37.5 (08 May 2021 11:06)  T(F): 98.4 (08 May 2021 13:00), Max: 99.5 (08 May 2021 11:06)  HR: 60 (08 May 2021 13:00) (60 - 74)  BP: 145/55 (08 May 2021 13:00) (145/55 - 160/70)  BP(mean): --  RR: 16 (08 May 2021 13:00) (15 - 18)  SpO2: 100% (08 May 2021 13:00) (100% - 100%)    General:  Constitutional: Obese Female, appears stated age, in no apparent distress including pain  Head: Normocephalic & atraumatic.  ENT: Patent ear canals, intact TM, mucus membranes moist & pink, neck supple, no lymphadenopathy.   Respiratory: Patent airway. All lung fields are clear to auscultation bilaterally.  Extremities: No cyanosis, clubbing, or edema.  Skin: No rashes, bruising, or discoloration.    Cardiovascular (>2): RRR no murmurs. Carotid pulsations symmetric, no bruits. Normal capillary beds refill, 1-2 seconds or less.     Neurological (>12):  MS: Awake, alert, oriented to person, place, situation, time. Normal affect. Follows all commands.    Language: Speech is clear, fluent with good repetition & comprehension (able to name objects___)    CNs: PERRLA (R = 3mm, L = 3mm). VFF. EOMI no nystagmus, no diplopia. V1-3 intact to LT/pinprick, well developed masseter muscles b/l. No facial asymmetry b/l, full eye closure strength b/l. Hearing grossly normal (rubbing fingers) b/l. Symmetric palate elevation in midline. Gag reflex deferred. Head turning & shoulder shrug intact b/l. Tongue midline, normal movements, no atrophy.    Fundoscopic: pale w/ sharp discs margins No vascular changes.      Motor: Normal muscle bulk & tone. No noticeable tremor or seizure. No pronator drift.              Deltoid	Biceps	Triceps	Wrist	Finger ABd	   R	5	5	5	5	5		5 	  L	5	5	5	5	5		5    	H-Flex	H-Ext	H-ABd	H-ADd	K-Flex	K-Ext	D-Flex	P-Flex  R	5	5	5	5	5	5	5	5 	   L	5	5	5	5	5	5	5	5	     Sensation: Intact to LT/PP/Temp/Vibration/Position b/l throughout.     Cortical: Extinction on DSS (neglect): none    Reflexes:              Biceps(C5)       BR(C6)     Triceps(C7)               Patellar(L4)    Achilles(S1)    Plantar Resp  R	2	          2	             2		        2		    2		Down   L	2	          2	             2		        2		    2		Down     Coordination: intact rapid-alt movements. No dysmetria to FTN/HTS    Gait: Normal Romberg. No postural instability. Normal stance and tandem gait.     LABORATORY:  CBC                       9.7    4.71  )-----------( 208      ( 08 May 2021 11:09 )             33.2     Chem 05-08    138  |  97<L>  |  18  ----------------------------<  135<H>  4.4   |  28  |  7.45<H>    Ca    8.8      08 May 2021 11:09    TPro  7.8  /  Alb  3.7  /  TBili  0.4  /  DBili  x   /  AST  17  /  ALT  6   /  AlkPhos  66  05-08    LFTs LIVER FUNCTIONS - ( 08 May 2021 11:09 )  Alb: 3.7 g/dL / Pro: 7.8 g/dL / ALK PHOS: 66 U/L / ALT: 6 U/L / AST: 17 U/L / GGT: x           Coagulopathy   Lipid Panel   A1c   Cardiac enzymes     U/A Urinalysis Basic - ( 08 May 2021 11:19 )    Color: Yellow / Appearance: Slightly Turbid / S.017 / pH: x  Gluc: x / Ketone: Negative  / Bili: Negative / Urobili: <2 mg/dL   Blood: x / Protein: >600 mg/dL / Nitrite: Negative   Leuk Esterase: Moderate / RBC: 1 /HPF / WBC 61 /HPF   Sq Epi: x / Non Sq Epi: 9 /HPF / Bacteria: Few      CSF  Immunological  Other    STUDIES & IMAGING:  Studies (EKG, EEG, EMG, etc):     Radiology (XR, CT, MR, U/S, TTE/DAYAN): HPI:  Vanessa Livingston is a 80 year old RH female with a past medical history of a previous ischemic infarcts with no residual deficits, afib on Eliquis (family and patient reported adherence), HTN, DM, ESRD (HD on MWF) who presents with increased altered mental status. At baseline, the patient is alert and oriented to all modalities. According to family via ED records, the patient had been shouting nonsensical things today and has been having worsening of her mental status since Tuesday. The patient stated "Am I ok?" when asked how she was doing. Afterwards stated that she was doing ok and denied any acute headaches, numbness, tingling, weakness, dizziness, lightheadedness, gait instability. On reassessment stated that she was not feeling well but was unable to elaborate further.   Of note had missed one dialysis session last week. No reported falls, head trauma.     (Stroke only)  NIHSS: 2  MRS: ?    REVIEW OF SYSTEMS    A 10-system ROS was performed and is negative except for those items noted above and/or in the HPI.    PAST MEDICAL & SURGICAL HISTORY:  DM (diabetes mellitus)  TYpe II    HTN (hypertension)    Hypercholesteremia    CKD (chronic kidney disease)  now on HD via R forearm AVF    Anemia    Atrial fibrillation  On Eliquis    Cerebrovascular accident (CVA)    Status post right foot surgery  hammer toe repair    AV fistula  2018, 2 ballooning (last one 2 weeks ago), following up on Dec 15th    H/O colonoscopy with polypectomy      FAMILY HISTORY:  Family history of hypertension (Sibling)    Family history of diabetes mellitus    Family history of lung cancer (Sibling)    Family history of malignant neoplasm of gastrointestinal tract      SOCIAL HISTORY:   T/E/D:   Occupation:   Lives with:     MEDICATIONS (HOME):  Home Medications:  apixaban 2.5 mg oral tablet: 1 tab(s) orally 2 times a day  Per pharmacy, last dispensed 19 (09 Dec 2019 21:28)  aspirin 81 mg oral tablet, chewable: 1 tab(s) orally once a day (09 Dec 2019 21:28)  atorvastatin 80 mg oral tablet: 1 tab(s) orally once a day (09 Dec 2019 21:28)  calcium acetate 667 mg oral capsule: 1 cap(s) orally 3 times a day (with meals) (09 Dec 2019 21:28)  docusate sodium 100 mg oral capsule: 1 cap(s) orally 2 times a day, As Needed (09 Dec 2019 21:28)  donepezil 10 mg oral tablet: 1 tab(s) orally once a day (at bedtime) (09 Dec 2019 21:28)  fenofibrate 145 mg oral tablet: 1 tab(s) orally once a day  No recent fill history at [facility] (09 Dec 2019 21:28)  Flonase 50 mcg/inh nasal spray: 1 spray(s) nasal once a day (2020 11:50)  hydrALAZINE 25 mg oral tablet: 1 tab(s) orally 2 times a day (09 Dec 2019 21:28)  Lantus Solostar Pen 100 units/mL subcutaneous solution: 10 unit(s) subcutaneous once a day (at bedtime) (09 Dec 2019 21:28)  Senna 8.6 mg oral tablet: 2 tab(s) orally once a day (at bedtime), As Needed (09 Dec 2019 21:28)  sertraline 50 mg oral tablet: 1 tab(s) orally once a day (09 Dec 2019 21:28)    MEDICATIONS  (STANDING):    MEDICATIONS  (PRN):    ALLERGIES/INTOLERANCES:  Allergies  No Known Allergies    Intolerances    VITALS & EXAMINATION:  Vital Signs Last 24 Hrs  T(C): 36.9 (08 May 2021 13:00), Max: 37.5 (08 May 2021 11:06)  T(F): 98.4 (08 May 2021 13:00), Max: 99.5 (08 May 2021 11:06)  HR: 60 (08 May 2021 13:00) (60 - 74)  BP: 145/55 (08 May 2021 13:00) (145/55 - 160/70)  BP(mean): --  RR: 16 (08 May 2021 13:00) (15 - 18)  SpO2: 100% (08 May 2021 13:00) (100% - 100%)    General:  Constitutional: Obese Female, appears stated age, in no apparent distress including pain  Head: Normocephalic & atraumatic.    Neurological (>12):  MS: Awake, alert, oriented to person, "hospital", stated it was 1981. Normal affect. Follows most commands. Moderate level of distractibility and some poor attention.     Language: Speech is clear, fluent with good repetition & comprehension.    CNs: Shutting eyes unable to assess pupillary reactivity. VFF intact to blink to threat. EOMI no nystagmus. V1-3 intact to LT. No facial asymmetry b/l, full eye closure strength b/l. Symmetric palate elevation in midline. Shoulder shrug intact b/l. Tongue midline, normal movements, no atrophy.    Motor: Normal muscle bulk & tone. No noticeable tremor or seizure. Slight L pronator drift.              Deltoid	Biceps	Triceps	   R	4	5	5	5	 	  L	4	5	5	5    	H-Flex	H-Ext	K-Flex	K-Ext	D-Flex	P-Flex  R	4	4	5	5	5	5	 	   L	4	4	5	5	5	5	     Sensation: Intact to LT b/l throughout.     Reflexes:              Biceps(C5)       BR(C6)     Triceps(C7)               Patellar(L4)    Achilles(S1)    Plantar Resp  R	1	          1	             1		        1		    1		Down   L	1	          1	             1		        1		    1		Down     Coordination: No dysmetria to FTN    Gait: Deferred    LABORATORY:  CBC                       9.7    4.71  )-----------( 208      ( 08 May 2021 11:09 )             33.2     Chem 05-08    138  |  97<L>  |  18  ----------------------------<  135<H>  4.4   |  28  |  7.45<H>    Ca    8.8      08 May 2021 11:09    TPro  7.8  /  Alb  3.7  /  TBili  0.4  /  DBili  x   /  AST  17  /  ALT  6   /  AlkPhos  66  05-08    LFTs LIVER FUNCTIONS - ( 08 May 2021 11:09 )  Alb: 3.7 g/dL / Pro: 7.8 g/dL / ALK PHOS: 66 U/L / ALT: 6 U/L / AST: 17 U/L / GGT: x           U/A Urinalysis Basic - ( 08 May 2021 11:19 )    Color: Yellow / Appearance: Slightly Turbid / S.017 / pH: x  Gluc: x / Ketone: Negative  / Bili: Negative / Urobili: <2 mg/dL   Blood: x / Protein: >600 mg/dL / Nitrite: Negative   Leuk Esterase: Moderate / RBC: 1 /HPF / WBC 61 /HPF   Sq Epi: x / Non Sq Epi: 9 /HPF / Bacteria: Few    STUDIES & IMAGING:    Radiology (XR, CT, MR, U/S, TTE/DAYAN):    < from: CT Head No Cont (21 @ 12:32) >  IMPRESSION:    Region of decreased attenuation in the right parietal lobe with associated sulcal effacement suspicious for acute infarction.    No CT evidence for reva hemorrhagic transformation.    < end of copied text >

## 2021-05-08 NOTE — CONSULT NOTE ADULT - ATTENDING COMMENTS
Briefly 79 yo RH AA F with multiple old stroke R AICA, B/L BG and thalalmi, afib on eliquis, HTN, DM, ESRD on HD p/w AMS found to have UTI and new R parietal infarct.  CTA in 2019 with L VA stenosis and mild BA stenosis. MRI at that time showed multiple old gordillo.    - check coags. question compliance   - okay for continuation of DOAC. when she becomes 81 dose will be 2.5mg bid. would consider adding ASA 81 at that time   - treatment of infection  - consider rEEG can be outpt if stable   - High dose statin therapy - atorvastatin 40mg PO daily. LDL goal <70mg/dL.  - CTA H/N  - MRI brain w/o  - Hemoglobin A1c and lipid panel  - telemetry  - PT/OT/SS/SLP, OOBC  - permissive HTN, -180mmHg.  - check FS, glucose control <180  - GI/DVT ppx  - Counseling on diet, exercise, and medication adherence was done  - Counseling on smoking cessation and alcohol consumption offered when appropriate.  - Pain assessed and judicious use of narcotics when appropriate was discussed.    - Stroke education given when appropriate.  - Importance of fall prevention discussed.   - Differential diagnosis and plan of care discussed with patient and/or family and primary team  - Thank you for allowing me to participate in the care of this patient. Call with questions. Briefly 79 yo RH AA F with multiple old stroke R AICA, B/L BG and thalalmi, afib on eliquis, HTN, DM, ESRD on HD p/w AMS found to have UTI and new R parietal infarct.  CTA in 2019 with L VA stenosis and mild BA stenosis. MRI at that time showed multiple old gordillo.    - check coags. question compliance   - okay for continuation of DOAC. when she becomes 81 dose will be 2.5mg bid. would consider adding ASA 81 at that time if not already indicated   - treatment of infection  - consider rEEG can be outpt if stable   - High dose statin therapy - atorvastatin 40mg PO daily. LDL goal <70mg/dL.  - CTA H/N  - MRI brain w/o  - Hemoglobin A1c and lipid panel  - telemetry  - PT/OT/SS/SLP, OOBC  - permissive HTN, -180mmHg.  - check FS, glucose control <180  - GI/DVT ppx  - Counseling on diet, exercise, and medication adherence was done  - Counseling on smoking cessation and alcohol consumption offered when appropriate.  - Pain assessed and judicious use of narcotics when appropriate was discussed.    - Stroke education given when appropriate.  - Importance of fall prevention discussed.   - Differential diagnosis and plan of care discussed with patient and/or family and primary team  - Thank you for allowing me to participate in the care of this patient. Call with questions.

## 2021-05-08 NOTE — CONSULT NOTE ADULT - ASSESSMENT
Vanessa Livingston is a 80 year old RH female with a past medical history of a previous ischemic infarcts with no residual deficits, afib on Eliquis (family and patient reported adherence), HTN, DM, ESRD (HD on MWF) who presents with increased altered mental status.    Impression: Mild L hemiparesis in the setting of a R parietal infarct with possibly some affecting of the motor fibers mechanism likely cardioembolic from afib    Recs:  Meds    [] should continue on Eliquis 5mg bid (once reaches age 81 will consider decreasing to 2.5mg)  [] Atorvastatin 80, titrate to LDL<70  [] DVT prophylaxis    Imaging  [] repeat CT scan in AM to assess for infarct development and to rule out hemorrhagic transformation  [] STAT CT if patient's neurological exam changes  [] MRI brain w/o contrast may be done as outpatient as it will not   [] TTE and telemetry    Labs  [] HbA1C and Lipid Panel    Other  [] continue to treat all infectious processes and optimize vascular risk factors  [] Telemonitoring;  Neurochecks and Vital signs per unit protocol  [] Permissive HTN up to 220/120 mmHg for first 24 hours after the symptom onset followed by gradual normotension.   [] BGM goals 140-180  [] PT/OT evaluation  [] NPO until clears Dysphagia screen, otherwise Swallow evaluation  [] Stroke education provided    Case to be discussed with stroke neurology attending, Dr. Renee.    Vanessa Livingston is a 80 year old RH female with a past medical history of a previous ischemic infarcts with no residual deficits, afib on Eliquis (family and patient reported adherence), HTN, DM, ESRD (HD on MWF) who presents with increased altered mental status.    Impression: Mild L hemiparesis in the setting of a R parietal infarct with possibly some affecting of the motor fibers mechanism likely cardioembolic from afib    Recs:  Meds    [] should continue on Eliquis 5mg bid (once reaches age 81 will consider decreasing to 2.5mg)  [] Atorvastatin 80, titrate to LDL<70  [] DVT prophylaxis    Imaging  [] repeat CT scan in AM to assess for infarct development and to rule out hemorrhagic transformation  [] STAT CT if patient's neurological exam changes  [] please obtain CTA to investigate for any vascular changes  [] MRI brain w/o contrast may be done as outpatient as it will not   [] TTE and telemetry    Labs  [] HbA1C and Lipid Panel    Other  [] continue to treat all infectious processes and optimize vascular risk factors  [] Telemonitoring;  Neurochecks and Vital signs per unit protocol  [] Permissive HTN up to 220/120 mmHg for first 24 hours after the symptom onset followed by gradual normotension.   [] BGM goals 140-180  [] PT/OT evaluation  [] NPO until clears Dysphagia screen, otherwise Swallow evaluation  [] Stroke education provided    Case to be discussed with stroke neurology attending, Dr. Renee.

## 2021-05-08 NOTE — ED PROVIDER NOTE - OBJECTIVE STATEMENT
80F hx htn, dm and esrd MWF last dialyzed Friday presents with AMS since Tuesday worsening today. baseline aaox3. Patient denies chest pain, SOB, f/c, cough, abd pain, N/V/D/C, focal weakness, HA, dizziness, urinary symptoms 80F hx htn, dm, A fib on eliquis, prior CVA and esrd MWF last dialyzed Friday but missed Wednesday presents with AMS since Tuesday worsening today. Has been shouting "nonsensical things" according to family. baseline aaox3. Patient denies chest pain, SOB, f/c, cough, abd pain, N/V/D/C, focal weakness, HA, dizziness, urinary symptoms.

## 2021-05-08 NOTE — H&P ADULT - NSHPPHYSICALEXAM_GEN_ALL_CORE
T(C): 37.3 (05-08-21 @ 15:11), Max: 37.5 (05-08-21 @ 11:06)  HR: 66 (05-08-21 @ 15:11) (60 - 74)  BP: 118/80 (05-08-21 @ 15:11) (118/80 - 160/70)  RR: 19 (05-08-21 @ 15:11) (15 - 19)  SpO2: 100% (05-08-21 @ 15:11) (100% - 100%)    GENERAL: No acute distress, well-developed  HEAD:  Atraumatic, Normocephalic  ENT: EOMI, PERRLA, conjunctiva and sclera clear, Neck supple, No JVD, moist mucosa, no pharyngeal erythema, no tonsillar enlargement or exudate  CHEST/LUNG: Clear to auscultation bilaterally; No wheeze, equal breath sounds bilaterally   HEART: Regular rate and rhythm; No murmurs, rubs, or gallops  ABDOMEN: Soft, Nontender, Nondistended; Bowel sounds present, no organomegaly  EXTREMITIES:  2+ Peripheral Pulses, No clubbing, cyanosis, or edema  PSYCH: AAOx1 to person only, appears anxious  NEUROLOGY: non-focal, cranial nerves intact  SKIN: Normal color, No rashes or lesions

## 2021-05-08 NOTE — CONSULT NOTE ADULT - ASSESSMENT
80F hx htn, dm, A fib on eliquis, prior CVA and esrd MWF last dialyzed Friday but missed Wednesday presents with AMS    ESRD on HD MWF via avf  from [place] [facility]  last HD friday  lab reviewed no need for hd today  consent obtained from kenny in hd unit  monitor electrolyte    AMS  ? etiology  work up per team  CT head neg    anemia  epo with hd  monitor    ckd-mbd  check pth, phos level  monitor phos and calcium daily

## 2021-05-08 NOTE — H&P ADULT - PROBLEM SELECTOR PLAN 2
- Continue Ceftriaxone  - f/u UCx - Last HD was yesterday  - Renal following. No urgent need for HD at this time  - Will continue to follow. Monitor lytes - Last HD was yesterday  - Renal following (Dr Jacobson). No urgent need for HD at this time  - Will continue to follow. Monitor lytes

## 2021-05-08 NOTE — ED PROVIDER NOTE - ATTENDING CONTRIBUTION TO CARE
Prescription approved per [facility] Refill Protocol.    NEHA BowieN, RN  [facility]     80F DM HTN ESRD CVA p/w AMS x 3-4 days.  had HD yest.  AMS worsening today- shouting nonsensical things here, like "help" and when investigated starts a regular conversation.  AMS acute presentation.  No fever.  Tr99.  Pt does endorse cough but is AAO x 0.  Plan check labs, CTH, urine.  Nephr eval as ESRD on HD.  Likely will need admission.  Update- called by NRads found to have possible acute stroke - neuro contacted for consult.  Admit.  VS:  unremarkable    GEN - NAD;   well appearing;   A+O x1   HEAD - NC/AT     ENT - PEERL, EOMI, mucous membranes    moist , no discharge      NECK: Neck supple, non-tender without lymphadenopathy, no masses, no JVD  PULM - CTA b/l,  symmetric breath sounds  COR -  normal heart sounds    ABD - , ND, NT, soft,  BACK - no CVA tenderness, nontender spine     EXTREMS - no edema, no deformity, warm and well perfused  (+)LUE AVF (+)thrill.  SKIN - no rash    or bruising      NEUROLOGIC - alert, face symmetric, speech fluent, sensation nl, motor no focal deficit.

## 2021-05-08 NOTE — ED ADULT NURSE REASSESSMENT NOTE - COMFORT CARE
meal provided/plan of care explained/po fluids offered/repositioned/side rails up/warm blanket provided

## 2021-05-08 NOTE — ED ADULT NURSE REASSESSMENT NOTE - NS ED NURSE REASSESS COMMENT FT1
Pt A&Ox1, continues to intermittently shout for help when asked what's wrong pt states "I don't know." Denies pain/ symptoms. Pt passed dysphagia screening, Dr. Lester states okay for patient to eat at this time. Meal tray provided. Will continue to monitor.

## 2021-05-09 LAB
A1C WITH ESTIMATED AVERAGE GLUCOSE RESULT: 7.1 % — HIGH (ref 4–5.6)
ALBUMIN SERPL ELPH-MCNC: 3.2 G/DL — LOW (ref 3.3–5)
ALP SERPL-CCNC: 54 U/L — SIGNIFICANT CHANGE UP (ref 40–120)
ALT FLD-CCNC: 11 U/L — SIGNIFICANT CHANGE UP (ref 4–33)
ANION GAP SERPL CALC-SCNC: 16 MMOL/L — HIGH (ref 7–14)
ANION GAP SERPL CALC-SCNC: 17 MMOL/L — HIGH (ref 7–14)
AST SERPL-CCNC: 42 U/L — HIGH (ref 4–32)
BASOPHILS # BLD AUTO: 0.04 K/UL — SIGNIFICANT CHANGE UP (ref 0–0.2)
BASOPHILS NFR BLD AUTO: 0.7 % — SIGNIFICANT CHANGE UP (ref 0–2)
BILIRUB SERPL-MCNC: 0.3 MG/DL — SIGNIFICANT CHANGE UP (ref 0.2–1.2)
BUN SERPL-MCNC: 26 MG/DL — HIGH (ref 7–23)
BUN SERPL-MCNC: 26 MG/DL — HIGH (ref 7–23)
CALCIUM SERPL-MCNC: 8.5 MG/DL — SIGNIFICANT CHANGE UP (ref 8.4–10.5)
CALCIUM SERPL-MCNC: 8.7 MG/DL — SIGNIFICANT CHANGE UP (ref 8.4–10.5)
CHLORIDE SERPL-SCNC: 97 MMOL/L — LOW (ref 98–107)
CHLORIDE SERPL-SCNC: 99 MMOL/L — SIGNIFICANT CHANGE UP (ref 98–107)
CHOLEST SERPL-MCNC: 219 MG/DL — HIGH
CO2 SERPL-SCNC: 22 MMOL/L — SIGNIFICANT CHANGE UP (ref 22–31)
CO2 SERPL-SCNC: 23 MMOL/L — SIGNIFICANT CHANGE UP (ref 22–31)
COVID-19 SPIKE DOMAIN AB INTERP: POSITIVE
COVID-19 SPIKE DOMAIN ANTIBODY RESULT: >250 U/ML — HIGH
CREAT SERPL-MCNC: 8.3 MG/DL — HIGH (ref 0.5–1.3)
CREAT SERPL-MCNC: 8.48 MG/DL — HIGH (ref 0.5–1.3)
CULTURE RESULTS: SIGNIFICANT CHANGE UP
EOSINOPHIL # BLD AUTO: 0.29 K/UL — SIGNIFICANT CHANGE UP (ref 0–0.5)
EOSINOPHIL NFR BLD AUTO: 5.3 % — SIGNIFICANT CHANGE UP (ref 0–6)
ESTIMATED AVERAGE GLUCOSE: 157 MG/DL — HIGH (ref 68–114)
GLUCOSE SERPL-MCNC: 78 MG/DL — SIGNIFICANT CHANGE UP (ref 70–99)
GLUCOSE SERPL-MCNC: 85 MG/DL — SIGNIFICANT CHANGE UP (ref 70–99)
HCT VFR BLD CALC: 32.6 % — LOW (ref 34.5–45)
HDLC SERPL-MCNC: 65 MG/DL — SIGNIFICANT CHANGE UP
HGB BLD-MCNC: 9.8 G/DL — LOW (ref 11.5–15.5)
IANC: 3.3 K/UL — SIGNIFICANT CHANGE UP (ref 1.5–8.5)
IMM GRANULOCYTES NFR BLD AUTO: 0.4 % — SIGNIFICANT CHANGE UP (ref 0–1.5)
LIPID PNL WITH DIRECT LDL SERPL: 110 MG/DL — HIGH
LYMPHOCYTES # BLD AUTO: 1.18 K/UL — SIGNIFICANT CHANGE UP (ref 1–3.3)
LYMPHOCYTES # BLD AUTO: 21.7 % — SIGNIFICANT CHANGE UP (ref 13–44)
MAGNESIUM SERPL-MCNC: 2.1 MG/DL — SIGNIFICANT CHANGE UP (ref 1.6–2.6)
MAGNESIUM SERPL-MCNC: 2.2 MG/DL — SIGNIFICANT CHANGE UP (ref 1.6–2.6)
MCHC RBC-ENTMCNC: 24.4 PG — LOW (ref 27–34)
MCHC RBC-ENTMCNC: 30.1 GM/DL — LOW (ref 32–36)
MCV RBC AUTO: 81.3 FL — SIGNIFICANT CHANGE UP (ref 80–100)
MONOCYTES # BLD AUTO: 0.61 K/UL — SIGNIFICANT CHANGE UP (ref 0–0.9)
MONOCYTES NFR BLD AUTO: 11.2 % — SIGNIFICANT CHANGE UP (ref 2–14)
NEUTROPHILS # BLD AUTO: 3.3 K/UL — SIGNIFICANT CHANGE UP (ref 1.8–7.4)
NEUTROPHILS NFR BLD AUTO: 60.7 % — SIGNIFICANT CHANGE UP (ref 43–77)
NON HDL CHOLESTEROL: 154 MG/DL — HIGH
NRBC # BLD: 0 /100 WBCS — SIGNIFICANT CHANGE UP
NRBC # FLD: 0 K/UL — SIGNIFICANT CHANGE UP
PHOSPHATE SERPL-MCNC: 4.4 MG/DL — SIGNIFICANT CHANGE UP (ref 2.5–4.5)
PHOSPHATE SERPL-MCNC: 4.4 MG/DL — SIGNIFICANT CHANGE UP (ref 2.5–4.5)
PLATELET # BLD AUTO: 204 K/UL — SIGNIFICANT CHANGE UP (ref 150–400)
POTASSIUM SERPL-MCNC: 4.2 MMOL/L — SIGNIFICANT CHANGE UP (ref 3.5–5.3)
POTASSIUM SERPL-MCNC: 5.7 MMOL/L — HIGH (ref 3.5–5.3)
POTASSIUM SERPL-SCNC: 4.2 MMOL/L — SIGNIFICANT CHANGE UP (ref 3.5–5.3)
POTASSIUM SERPL-SCNC: 5.7 MMOL/L — HIGH (ref 3.5–5.3)
PROT SERPL-MCNC: 7.6 G/DL — SIGNIFICANT CHANGE UP (ref 6–8.3)
PTH-INTACT FLD-MCNC: 421 PG/ML — HIGH (ref 15–65)
RBC # BLD: 4.01 M/UL — SIGNIFICANT CHANGE UP (ref 3.8–5.2)
RBC # FLD: 15.2 % — HIGH (ref 10.3–14.5)
SARS-COV-2 IGG+IGM SERPL QL IA: >250 U/ML — HIGH
SARS-COV-2 IGG+IGM SERPL QL IA: POSITIVE
SODIUM SERPL-SCNC: 136 MMOL/L — SIGNIFICANT CHANGE UP (ref 135–145)
SODIUM SERPL-SCNC: 138 MMOL/L — SIGNIFICANT CHANGE UP (ref 135–145)
SPECIMEN SOURCE: SIGNIFICANT CHANGE UP
TRIGL SERPL-MCNC: 219 MG/DL — HIGH
WBC # BLD: 5.44 K/UL — SIGNIFICANT CHANGE UP (ref 3.8–10.5)
WBC # FLD AUTO: 5.44 K/UL — SIGNIFICANT CHANGE UP (ref 3.8–10.5)

## 2021-05-09 PROCEDURE — 70450 CT HEAD/BRAIN W/O DYE: CPT | Mod: 26

## 2021-05-09 RX ORDER — DOXERCALCIFEROL 2.5 UG/1
2 CAPSULE ORAL
Refills: 0 | Status: DISCONTINUED | OUTPATIENT
Start: 2021-05-09 | End: 2021-05-17

## 2021-05-09 RX ADMIN — Medication 81 MILLIGRAM(S): at 18:26

## 2021-05-09 RX ADMIN — ATORVASTATIN CALCIUM 80 MILLIGRAM(S): 80 TABLET, FILM COATED ORAL at 23:07

## 2021-05-09 RX ADMIN — CEFTRIAXONE 100 MILLIGRAM(S): 500 INJECTION, POWDER, FOR SOLUTION INTRAMUSCULAR; INTRAVENOUS at 23:07

## 2021-05-09 RX ADMIN — DONEPEZIL HYDROCHLORIDE 10 MILLIGRAM(S): 10 TABLET, FILM COATED ORAL at 23:08

## 2021-05-09 RX ADMIN — Medication 667 MILLIGRAM(S): at 14:12

## 2021-05-09 RX ADMIN — Medication 25 MILLIGRAM(S): at 18:26

## 2021-05-09 RX ADMIN — APIXABAN 5 MILLIGRAM(S): 2.5 TABLET, FILM COATED ORAL at 18:23

## 2021-05-09 RX ADMIN — INSULIN GLARGINE 10 UNIT(S): 100 INJECTION, SOLUTION SUBCUTANEOUS at 23:08

## 2021-05-09 RX ADMIN — Medication 667 MILLIGRAM(S): at 18:23

## 2021-05-09 NOTE — CONSULT NOTE ADULT - SUBJECTIVE AND OBJECTIVE BOX
Dieter Willson MD  Interventional Cardiology / Advance Heart Failure and Cardiac Transplant Specialist  [place] Office : [address] [place] [address] [place] [address]  Tel:   [place] Office : [address] [facility] [place] [address]  Tel: [phone]  Cell : [phone]    HISTORY OF PRESENTING ILLNESS:  79 yo F with h/o HTN, DM, A fib on Eliquis, ESRD on HD (MWF) brought to ED by family for AMS. Pt is poor historian, history obtained from her son Julius ([phone]). For the past week pt has been moving slower and appeared more lethargic. Yesterday she also appeared confused, kept screaming for help when nothing was wrong. She has also been making up names and sometimes had nonsensical speech. At baseline pt is AAOx3 and able to have conversation and understands what's going on around her. She usually ambulates with a cane. Denies chest pain, SOB or palpitations  	  MEDICATIONS:  apixaban 5 milliGRAM(s) Oral two times a day  aspirin  chewable 81 milliGRAM(s) Oral daily  hydrALAZINE 25 milliGRAM(s) Oral two times a day  metoprolol succinate  milliGRAM(s) Oral daily    cefTRIAXone   IVPB 1000 milliGRAM(s) IV Intermittent every 24 hours      clonazePAM  Tablet 0.5 milliGRAM(s) Oral once  donepezil 10 milliGRAM(s) Oral at bedtime  ondansetron Injectable 4 milliGRAM(s) IV Push every 6 hours PRN    pantoprazole    Tablet 40 milliGRAM(s) Oral before breakfast    atorvastatin 80 milliGRAM(s) Oral at bedtime  dextrose 40% Gel 15 Gram(s) Oral once  dextrose 50% Injectable 25 Gram(s) IV Push once  dextrose 50% Injectable 12.5 Gram(s) IV Push once  dextrose 50% Injectable 25 Gram(s) IV Push once  glucagon  Injectable 1 milliGRAM(s) IntraMuscular once  insulin glargine Injectable (LANTUS) 10 Unit(s) SubCutaneous at bedtime  insulin lispro (ADMELOG) corrective regimen sliding scale   SubCutaneous three times a day before meals  insulin lispro (ADMELOG) corrective regimen sliding scale   SubCutaneous at bedtime    calcium acetate 667 milliGRAM(s) Oral three times a day with meals  dextrose 5%. 1000 milliLiter(s) IV Continuous <Continuous>  dextrose 5%. 1000 milliLiter(s) IV Continuous <Continuous>  epoetin raheem-epbx (RETACRIT) Injectable 05686 Unit(s) IV Push <User Schedule>      PAST MEDICAL/SURGICAL HISTORY  PAST MEDICAL & SURGICAL HISTORY:  DM (diabetes mellitus)  TYpe II    HTN (hypertension)    Hypercholesteremia    CKD (chronic kidney disease)  now on HD via R forearm AVF    Anemia    Atrial fibrillation  On Eliquis    Cerebrovascular accident (CVA)    Status post right foot surgery  hammer toe repair    AV fistula  June 2018, 2 ballooning (last one 2 weeks ago), following up on Dec 15th    H/O colonoscopy with polypectomy        SOCIAL HISTORY: Substance Use (street drugs): ( x ) never used  (  ) other:    FAMILY HISTORY:  Family history of hypertension (Sibling)    Family history of diabetes mellitus    Family history of lung cancer (Sibling)    Family history of malignant neoplasm of gastrointestinal tract        REVIEW OF SYSTEMS:  CONSTITUTIONAL: No fever, weight loss, or fatigue  EYES: No eye pain, visual disturbances, or discharge  ENMT:  No difficulty hearing, tinnitus, vertigo; No sinus or throat pain  BREASTS: No pain, masses, or nipple discharge  GASTROINTESTINAL: No abdominal or epigastric pain. No nausea, vomiting, or hematemesis; No diarrhea or constipation. No melena or hematochezia.  GENITOURINARY: No dysuria, frequency, hematuria, or incontinence  NEUROLOGICAL: No headaches, memory loss, loss of strength, numbness, or tremors  ENDOCRINE: No heat or cold intolerance; No hair loss  MUSCULOSKELETAL: No joint pain or swelling; No muscle, back, or extremity pain  PSYCHIATRIC: No depression, anxiety, mood swings, or difficulty sleeping  HEME/LYMPH: No easy bruising, or bleeding gums  All others negative    PHYSICAL EXAM:  T(C): 37 (05-09-21 @ 06:26), Max: 37.3 (05-08-21 @ 15:11)  HR: 68 (05-09-21 @ 06:26) (60 - 74)  BP: 138/62 (05-09-21 @ 06:26) (118/80 - 155/72)  RR: 18 (05-09-21 @ 06:26) (16 - 19)  SpO2: 100% (05-09-21 @ 06:26) (99% - 100%)  Wt(kg): --  I&O's Summary    Height (cm): 170.2 (05-08 @ 16:38)  Weight (kg): 59.9 (05-08 @ 16:38)  BMI (kg/m2): 20.7 (05-08 @ 16:38)  BSA (m2): 1.7 (05-08 @ 16:38)    GENERAL: NAD  EYES: EOMI, PERRLA, conjunctiva and sclera clear  ENMT: No tonsillar erythema, exudates, or enlargement  Cardiovascular: Normal S1 S2, No JVD, No murmurs, No edema  Respiratory: Lungs clear to auscultation	  Gastrointestinal:  Soft, Non-tender, + BS	  Extremities: No edema  NERVOUS SYSTEM:  Alert & Oriented X3                                    9.8    5.44  )-----------( 204      ( 09 May 2021 06:54 )             32.6     05-09    138  |  99  |  26<H>  ----------------------------<  85  4.2   |  23  |  8.48<H>    Ca    8.5      09 May 2021 09:25  Phos  4.4     05-09  Mg     2.1     05-09    TPro  7.6  /  Alb  3.2<L>  /  TBili  0.3  /  DBili  x   /  AST  42<H>  /  ALT  11  /  AlkPhos  54  05-09    proBNP:   Lipid Profile:   HgA1c:   TSH:     Consultant(s) Notes Reviewed:  [x ] YES  [ ] NO    Care Discussed with Consultants/Other Providers [ x] YES  [ ] NO    Imaging Personally Reviewed independently:  [x] YES  [ ] NO    All labs, radiologic studies, vitals, orders and medications list reviewed. Patient is seen and examined at bedside. Case discussed with medical team.

## 2021-05-09 NOTE — CONSULT NOTE ADULT - ASSESSMENT
Assessment and Plan    79 yo F with h/o HTN, DM, A fib on Eliquis, ESRD on HD (MWF) brought to ED by family for AMS    EKG: NSR, TWI leads I & AVL    1. AMS  -CT head with possible new infarct  -f/u neuro  -on IV abx for UTI  -echo from 2019 with normal LV function and Small pericardial effusion posterior and lateral to the  left ventricle. will get repeat echo    2. Afib  -on eliquis and metoprolol    3. HTN  -controlled  -c/w hydralazine and metoprolol  -continue to moitor BP    4. ESRD  -on HD  -f/u renal

## 2021-05-09 NOTE — PROGRESS NOTE ADULT - ASSESSMENT
80F hx htn, dm, A fib on eliquis, prior CVA and esrd MWF last dialyzed Friday but missed Wednesday presents with AMS    ESRD on HD MWF via avf  from [place] [facility]  Next HD 5/10/21  consent obtained from kenny in hd unit  monitor electrolyte    AMS  ? etiology  work up per team  CT head neg    anemia  epo with hd  monitor    ckd-mbd  Hectorol 2mcg with HD  monitor phos and calcium daily

## 2021-05-10 LAB
ALT FLD-CCNC: 7 U/L — SIGNIFICANT CHANGE UP (ref 4–33)
ANION GAP SERPL CALC-SCNC: 17 MMOL/L — HIGH (ref 7–14)
BUN SERPL-MCNC: 33 MG/DL — HIGH (ref 7–23)
CALCIUM SERPL-MCNC: 8.7 MG/DL — SIGNIFICANT CHANGE UP (ref 8.4–10.5)
CHLORIDE SERPL-SCNC: 99 MMOL/L — SIGNIFICANT CHANGE UP (ref 98–107)
CO2 SERPL-SCNC: 24 MMOL/L — SIGNIFICANT CHANGE UP (ref 22–31)
CREAT SERPL-MCNC: 9.97 MG/DL — HIGH (ref 0.5–1.3)
DIALYSIS INSTRUMENT RESULT - HEPATITIS B SURFACE ANTIGEN: NEGATIVE — SIGNIFICANT CHANGE UP
GLUCOSE SERPL-MCNC: 86 MG/DL — SIGNIFICANT CHANGE UP (ref 70–99)
HAV IGM SER-ACNC: SIGNIFICANT CHANGE UP
HBV CORE AB SER-ACNC: SIGNIFICANT CHANGE UP
HBV CORE IGM SER-ACNC: SIGNIFICANT CHANGE UP
HBV SURFACE AB SER-ACNC: <3 MIU/ML — LOW
HBV SURFACE AG SER-ACNC: SIGNIFICANT CHANGE UP
HCT VFR BLD CALC: 31.9 % — LOW (ref 34.5–45)
HCV AB S/CO SERPL IA: 0.17 S/CO — SIGNIFICANT CHANGE UP (ref 0–0.99)
HCV AB SERPL-IMP: SIGNIFICANT CHANGE UP
HGB BLD-MCNC: 9.6 G/DL — LOW (ref 11.5–15.5)
MAGNESIUM SERPL-MCNC: 2.3 MG/DL — SIGNIFICANT CHANGE UP (ref 1.6–2.6)
MCHC RBC-ENTMCNC: 24.5 PG — LOW (ref 27–34)
MCHC RBC-ENTMCNC: 30.1 GM/DL — LOW (ref 32–36)
MCV RBC AUTO: 81.4 FL — SIGNIFICANT CHANGE UP (ref 80–100)
NRBC # BLD: 0 /100 WBCS — SIGNIFICANT CHANGE UP
NRBC # FLD: 0 K/UL — SIGNIFICANT CHANGE UP
PHOSPHATE SERPL-MCNC: 5.8 MG/DL — HIGH (ref 2.5–4.5)
PLATELET # BLD AUTO: 163 K/UL — SIGNIFICANT CHANGE UP (ref 150–400)
POTASSIUM SERPL-MCNC: 4.3 MMOL/L — SIGNIFICANT CHANGE UP (ref 3.5–5.3)
POTASSIUM SERPL-SCNC: 4.3 MMOL/L — SIGNIFICANT CHANGE UP (ref 3.5–5.3)
RBC # BLD: 3.92 M/UL — SIGNIFICANT CHANGE UP (ref 3.8–5.2)
RBC # FLD: 15.5 % — HIGH (ref 10.3–14.5)
SODIUM SERPL-SCNC: 140 MMOL/L — SIGNIFICANT CHANGE UP (ref 135–145)
WBC # BLD: 4.38 K/UL — SIGNIFICANT CHANGE UP (ref 3.8–10.5)
WBC # FLD AUTO: 4.38 K/UL — SIGNIFICANT CHANGE UP (ref 3.8–10.5)

## 2021-05-10 PROCEDURE — 70551 MRI BRAIN STEM W/O DYE: CPT | Mod: 26

## 2021-05-10 RX ORDER — LIDOCAINE AND PRILOCAINE CREAM 25; 25 MG/G; MG/G
1 CREAM TOPICAL
Refills: 0 | Status: DISCONTINUED | OUTPATIENT
Start: 2021-05-10 | End: 2021-05-17

## 2021-05-10 RX ADMIN — DONEPEZIL HYDROCHLORIDE 10 MILLIGRAM(S): 10 TABLET, FILM COATED ORAL at 22:51

## 2021-05-10 RX ADMIN — INSULIN GLARGINE 10 UNIT(S): 100 INJECTION, SOLUTION SUBCUTANEOUS at 22:51

## 2021-05-10 RX ADMIN — Medication 25 MILLIGRAM(S): at 17:29

## 2021-05-10 RX ADMIN — DOXERCALCIFEROL 2 MICROGRAM(S): 2.5 CAPSULE ORAL at 14:58

## 2021-05-10 RX ADMIN — ERYTHROPOIETIN 10000 UNIT(S): 10000 INJECTION, SOLUTION INTRAVENOUS; SUBCUTANEOUS at 14:57

## 2021-05-10 RX ADMIN — Medication 667 MILLIGRAM(S): at 17:27

## 2021-05-10 RX ADMIN — APIXABAN 5 MILLIGRAM(S): 2.5 TABLET, FILM COATED ORAL at 06:04

## 2021-05-10 RX ADMIN — APIXABAN 5 MILLIGRAM(S): 2.5 TABLET, FILM COATED ORAL at 17:27

## 2021-05-10 RX ADMIN — ATORVASTATIN CALCIUM 80 MILLIGRAM(S): 80 TABLET, FILM COATED ORAL at 22:51

## 2021-05-10 RX ADMIN — CEFTRIAXONE 100 MILLIGRAM(S): 500 INJECTION, POWDER, FOR SOLUTION INTRAMUSCULAR; INTRAVENOUS at 22:51

## 2021-05-10 RX ADMIN — Medication 1: at 17:26

## 2021-05-10 NOTE — PROGRESS NOTE ADULT - ASSESSMENT
80F hx htn, dm, A fib on eliquis, prior CVA and esrd MWF last dialyzed Friday but missed Wednesday presents with AMS    ESRD on HD MWF via avf  from [place] [facility]  Seen in HD today. Continue HD as ordered. tolerating well. vitals stable. access working well   consent obtained from grandson in hd unit  monitor electrolyte    AMS  ? etiology  work up per team  CT head neg  metal status seem better today  f/u neuro    anemia  epo with hd  monitor    HTN  uncontrolled today  hydralazine was held sec to hd  resume meds  monitor    ckd-mbd  Hectorol 2mcg with HD  monitor phos and calcium daily

## 2021-05-10 NOTE — SWALLOW BEDSIDE ASSESSMENT ADULT - SWALLOW EVAL: DIAGNOSIS
1. Patient demonstrated functional oral phase of swallow with thin liquids characterized by adequate bolus containment, A-P transit and oral clearance. 2. Patient demonstrated mild oral dysphagia with soft solids characterized by labored mastication secondary to missing dentition, slow A-P transit and adequate oral clearance. 3. Patient demonstrated moderate oral dysphagia with regular solids characterized by labored mastication secondary to missing dentition, prolonged oral transit time and lingual residue. Residue cleared with use of liquid wash. 4. Patient demonstrated functional pharyngeal phase of swallow with soft solids, regular solids and thin liquids characterized by adequate pharyngeal swallow trigger and present hyolaryngeal elevation upon digital palpation. No overt signs or symptoms of aspiration/penetration noted.

## 2021-05-10 NOTE — PROGRESS NOTE ADULT - ASSESSMENT
81 yo RH AA F with multiple old stroke R AICA, B/L BG and thalalmi, afib on eliquis, HTN, DM, ESRD on HD p/w AMS found to have UTI and new R parietal infarct.  CTA in 2019 with L VA stenosis and mild BA stenosis. MRI at that time showed multiple old gordillo.  repeat CTH showed evolving R partial temporal infarct, A1c 7.1, .   - check coags. question compliance not checked now on eliqusi. will be abnormal.   - okay for continuation of DOAC. when she becomes 82yo dose will be 2.5mg bid. would consider adding ASA 81 at that time if not already indicated   - treatment of infection  - consider rEEG can be outpt if stable   - High dose statin therapy - atorvastatin 40mg PO daily. LDL goal <70mg/dL.  - CTA H/N  - MRI brain w/o  - need better DM and cholesterol control  - telemetry  - PT/OT/SS/SLP, OOBC  - check FS, glucose control <180  - GI/DVT ppx  - Counseling on diet, exercise, and medication adherence was done  - Counseling on smoking cessation and alcohol consumption offered when appropriate.  - Pain assessed and judicious use of narcotics when appropriate was discussed.    - Stroke education given when appropriate.  - Importance of fall prevention discussed.   - Differential diagnosis and plan of care discussed with patient and/or family and primary team  - Thank you for allowing me to participate in the care of this patient. Call with questions. José Miguel Renee MD  Vascular Neurology  Office: [phone]

## 2021-05-10 NOTE — SWALLOW BEDSIDE ASSESSMENT ADULT - COMMENTS
Neurology 5/10/2021: 79 yo RH AA F with multiple old stroke R AICA, B/L BG and thalalmi, afib on eliquis, HTN, DM, ESRD on HD p/w AMS found to have UTI and new R parietal infarct. CTA in 2019 with L VA stenosis and mild BA stenosis. MRI at that time showed multiple old gordillo. repeat CTH showed evolving R partial temporal infarct.    CT Head 5/9/2021: Evolving right-sided parietal temporal acute/subacute infarction without hemorrhagic transformation. Additional chronic infarcts and similar-appearing moderate severity chronic white matter microvascular type changes, as discussed.    CXR 5/8/2021: Cardiomegaly. Low lung volumes with grossly clear lungs.    Clinical swallow evaluation completed 7/22/2021 during prior hospitalization with recommendations of regular solids with thin liquids. Please see note for details.    Consult received and chart reviewed. Patient seen for clinical swallow evaluation. Patient received sitting upright, awake with son at bedside. Patient is alert and oriented to self and is able to follow simple commands and make wants/needs known. Patient with lower dentures at home.     Results and recommendations discussed with patient, patient’s son and RN on unit. Called out to team. PA verbalized understanding.

## 2021-05-10 NOTE — PROGRESS NOTE ADULT - ASSESSMENT
Assessment and Plan    79 yo F with h/o HTN, DM, A fib on Eliquis, ESRD on HD (MWF) brought to ED by family for AMS    EKG: NSR, TWI leads I & AVL    1. AMS  -CT head with possible new infarct, evolving  -f/u neuro  -on IV abx for UTI  -echo from 2019 with normal LV function and Small pericardial effusion posterior and lateral to the  left ventricle. will get repeat echo    2. Afib  -on eliquis  -c/w metoprolol    3. HTN  -controlled  -c/w hydralazine and metoprolol  -continue to monitor BP    4. ESRD  -on HD  -f/u renal Assessment and Plan    79 yo F with h/o HTN, DM, A fib on Eliquis, ESRD on HD (MWF) brought to ED by family for AMS    EKG: NSR, TWI leads I & AVL  Tele: NSR     1. AMS  -CT head with possible new infarct, evolving  -f/u neuro  -on IV abx for UTI  -echo from 2019 with normal LV function and Small pericardial effusion posterior and lateral to the  left ventricle. will get repeat echo with bubble study    2. Afib  -on eliquis  -c/w metoprolol    3. HTN  -controlled  -c/w hydralazine and metoprolol  -continue to monitor BP    4. ESRD  -on HD  -f/u renal

## 2021-05-11 LAB
ALBUMIN SERPL ELPH-MCNC: 3.8 G/DL — SIGNIFICANT CHANGE UP (ref 3.3–5)
ALP SERPL-CCNC: 60 U/L — SIGNIFICANT CHANGE UP (ref 40–120)
ALT FLD-CCNC: 13 U/L — SIGNIFICANT CHANGE UP (ref 4–33)
ANION GAP SERPL CALC-SCNC: 13 MMOL/L — SIGNIFICANT CHANGE UP (ref 7–14)
APTT BLD: 46.7 SEC — HIGH (ref 27–36.3)
AST SERPL-CCNC: 27 U/L — SIGNIFICANT CHANGE UP (ref 4–32)
BILIRUB SERPL-MCNC: 0.3 MG/DL — SIGNIFICANT CHANGE UP (ref 0.2–1.2)
BUN SERPL-MCNC: 20 MG/DL — SIGNIFICANT CHANGE UP (ref 7–23)
CALCIUM SERPL-MCNC: 8.9 MG/DL — SIGNIFICANT CHANGE UP (ref 8.4–10.5)
CHLORIDE SERPL-SCNC: 94 MMOL/L — LOW (ref 98–107)
CO2 SERPL-SCNC: 27 MMOL/L — SIGNIFICANT CHANGE UP (ref 22–31)
CREAT SERPL-MCNC: 6.58 MG/DL — HIGH (ref 0.5–1.3)
GLUCOSE SERPL-MCNC: 71 MG/DL — SIGNIFICANT CHANGE UP (ref 70–99)
HBV SURFACE AB SER-ACNC: <3 MIU/ML — LOW
HCT VFR BLD CALC: 33.4 % — LOW (ref 34.5–45)
HGB BLD-MCNC: 10 G/DL — LOW (ref 11.5–15.5)
INR BLD: 1.48 RATIO — HIGH (ref 0.88–1.16)
MAGNESIUM SERPL-MCNC: 2.1 MG/DL — SIGNIFICANT CHANGE UP (ref 1.6–2.6)
MCHC RBC-ENTMCNC: 24.2 PG — LOW (ref 27–34)
MCHC RBC-ENTMCNC: 29.9 GM/DL — LOW (ref 32–36)
MCV RBC AUTO: 80.7 FL — SIGNIFICANT CHANGE UP (ref 80–100)
MRSA PCR RESULT.: SIGNIFICANT CHANGE UP
NRBC # BLD: 0 /100 WBCS — SIGNIFICANT CHANGE UP
NRBC # FLD: 0 K/UL — SIGNIFICANT CHANGE UP
PHOSPHATE SERPL-MCNC: 3.5 MG/DL — SIGNIFICANT CHANGE UP (ref 2.5–4.5)
PLATELET # BLD AUTO: 201 K/UL — SIGNIFICANT CHANGE UP (ref 150–400)
POTASSIUM SERPL-MCNC: 3.6 MMOL/L — SIGNIFICANT CHANGE UP (ref 3.5–5.3)
POTASSIUM SERPL-SCNC: 3.6 MMOL/L — SIGNIFICANT CHANGE UP (ref 3.5–5.3)
PROT SERPL-MCNC: 7.7 G/DL — SIGNIFICANT CHANGE UP (ref 6–8.3)
PROTHROM AB SERPL-ACNC: 16.7 SEC — HIGH (ref 10.6–13.6)
RBC # BLD: 4.14 M/UL — SIGNIFICANT CHANGE UP (ref 3.8–5.2)
RBC # FLD: 15.2 % — HIGH (ref 10.3–14.5)
S AUREUS DNA NOSE QL NAA+PROBE: SIGNIFICANT CHANGE UP
SODIUM SERPL-SCNC: 134 MMOL/L — LOW (ref 135–145)
WBC # BLD: 5.69 K/UL — SIGNIFICANT CHANGE UP (ref 3.8–10.5)
WBC # FLD AUTO: 5.69 K/UL — SIGNIFICANT CHANGE UP (ref 3.8–10.5)

## 2021-05-11 PROCEDURE — 93970 EXTREMITY STUDY: CPT | Mod: 26

## 2021-05-11 PROCEDURE — 70498 CT ANGIOGRAPHY NECK: CPT | Mod: 26

## 2021-05-11 PROCEDURE — 70496 CT ANGIOGRAPHY HEAD: CPT | Mod: 26

## 2021-05-11 RX ADMIN — DONEPEZIL HYDROCHLORIDE 10 MILLIGRAM(S): 10 TABLET, FILM COATED ORAL at 22:48

## 2021-05-11 RX ADMIN — Medication 667 MILLIGRAM(S): at 20:14

## 2021-05-11 RX ADMIN — APIXABAN 5 MILLIGRAM(S): 2.5 TABLET, FILM COATED ORAL at 05:01

## 2021-05-11 RX ADMIN — APIXABAN 5 MILLIGRAM(S): 2.5 TABLET, FILM COATED ORAL at 20:14

## 2021-05-11 RX ADMIN — Medication 81 MILLIGRAM(S): at 12:25

## 2021-05-11 RX ADMIN — INSULIN GLARGINE 10 UNIT(S): 100 INJECTION, SOLUTION SUBCUTANEOUS at 22:48

## 2021-05-11 RX ADMIN — Medication 100 MILLIGRAM(S): at 05:01

## 2021-05-11 RX ADMIN — Medication 25 MILLIGRAM(S): at 22:48

## 2021-05-11 RX ADMIN — Medication 25 MILLIGRAM(S): at 05:01

## 2021-05-11 RX ADMIN — CEFTRIAXONE 100 MILLIGRAM(S): 500 INJECTION, POWDER, FOR SOLUTION INTRAMUSCULAR; INTRAVENOUS at 22:48

## 2021-05-11 RX ADMIN — PANTOPRAZOLE SODIUM 40 MILLIGRAM(S): 20 TABLET, DELAYED RELEASE ORAL at 05:01

## 2021-05-11 RX ADMIN — Medication 667 MILLIGRAM(S): at 12:26

## 2021-05-11 RX ADMIN — ATORVASTATIN CALCIUM 80 MILLIGRAM(S): 80 TABLET, FILM COATED ORAL at 22:51

## 2021-05-11 NOTE — PROGRESS NOTE ADULT - ASSESSMENT
79 yo RH AA F with multiple old stroke R AICA, B/L BG and thalalmi, afib on eliquis, HTN, DM, ESRD on HD p/w AMS found to have UTI and new R parietal infarct.  CTA in 2019 with L VA stenosis and mild BA stenosis. MRI at that time showed multiple old gordillo.  repeat CTH showed evolving R partial temporal infarct, A1c 7.1, . MRI brain confirms R parietal infarct   Etiology for stroke Embolic from Afib   - check coags. question compliance not checked now on eliqusi. will be abnormal.   - okay for continuation of DOAC. when she becomes 82yo dose will be 2.5mg bid. would consider adding ASA 81 at that time if not already indicated   - treatment of infection  - consider rEEG can be outpt if stable   - High dose statin therapy - atorvastatin 40mg PO daily. LDL goal <70mg/dL.  - CTA H/N  - need better DM and cholesterol control  - telemetry  - PT/OT/SS/SLP, OOBC  - check FS, glucose control <180  - GI/DVT ppx  - Counseling on diet, exercise, and medication adherence was done  - Counseling on smoking cessation and alcohol consumption offered when appropriate.  - Pain assessed and judicious use of narcotics when appropriate was discussed.    - Stroke education given when appropriate.  - Importance of fall prevention discussed.   - Differential diagnosis and plan of care discussed with patient and/or family and primary team  - Thank you for allowing me to participate in the care of this patient. Call with questions.   -   José Miguel Renee MD  Vascular Neurology  Office: [phone]

## 2021-05-11 NOTE — PROGRESS NOTE ADULT - ASSESSMENT
Assessment and Plan    81 yo F with h/o HTN, DM, A fib on Eliquis, ESRD on HD (MWF) brought to ED by family for AMS    EKG: NSR, TWI leads I & AVL  Tele: NSR     1. AMS  -CT head with possible new infarct, evolving  -f/u neuro  -on IV abx for UTI  -echo from 2019 with normal LV function and Small pericardial effusion posterior and lateral to the  left ventricle. will get repeat echo with bubble study  -MRI with acute infarct. f/u neuro re is it embolic, was on eliquis for afib. ?eliquis failure    2. Afib  -on eliquis  -c/w metoprolol    3. HTN  -controlled  -c/w hydralazine and metoprolol  -continue to monitor BP    4. ESRD  -on HD  -f/u renal

## 2021-05-11 NOTE — PROGRESS NOTE ADULT - ASSESSMENT
80F hx htn, dm, A fib on eliquis, prior CVA and esrd MWF last dialyzed Friday but missed Wednesday presents with AMS    ESRD on HD MWF via avf  from [place] [facility]  s/p HD 5/10 UF 1L  HD per schedule   consent obtained from kenny in hd unit  monitor electrolyte    AMS  ? etiology  work up per team  CT head neg  metal status seem better today  f/u neuro    anemia  epo with hd  monitor    HTN  controlled today  continue current meds  monitor    ckd-mbd  Hectorol 2mcg with HD  monitor phos and calcium daily

## 2021-05-12 LAB
ALBUMIN SERPL ELPH-MCNC: 4 G/DL — SIGNIFICANT CHANGE UP (ref 3.3–5)
ALP SERPL-CCNC: 64 U/L — SIGNIFICANT CHANGE UP (ref 40–120)
ALT FLD-CCNC: 14 U/L — SIGNIFICANT CHANGE UP (ref 4–33)
ANION GAP SERPL CALC-SCNC: 17 MMOL/L — HIGH (ref 7–14)
AST SERPL-CCNC: 29 U/L — SIGNIFICANT CHANGE UP (ref 4–32)
BASOPHILS # BLD AUTO: 0.05 K/UL — SIGNIFICANT CHANGE UP (ref 0–0.2)
BASOPHILS NFR BLD AUTO: 0.9 % — SIGNIFICANT CHANGE UP (ref 0–2)
BILIRUB SERPL-MCNC: 0.2 MG/DL — SIGNIFICANT CHANGE UP (ref 0.2–1.2)
BUN SERPL-MCNC: 25 MG/DL — HIGH (ref 7–23)
CALCIUM SERPL-MCNC: 9.4 MG/DL — SIGNIFICANT CHANGE UP (ref 8.4–10.5)
CHLORIDE SERPL-SCNC: 92 MMOL/L — LOW (ref 98–107)
CO2 SERPL-SCNC: 25 MMOL/L — SIGNIFICANT CHANGE UP (ref 22–31)
CREAT SERPL-MCNC: 8.17 MG/DL — HIGH (ref 0.5–1.3)
EOSINOPHIL # BLD AUTO: 0.33 K/UL — SIGNIFICANT CHANGE UP (ref 0–0.5)
EOSINOPHIL NFR BLD AUTO: 6 % — SIGNIFICANT CHANGE UP (ref 0–6)
GLUCOSE SERPL-MCNC: 75 MG/DL — SIGNIFICANT CHANGE UP (ref 70–99)
HCT VFR BLD CALC: 36.1 % — SIGNIFICANT CHANGE UP (ref 34.5–45)
HGB BLD-MCNC: 10.6 G/DL — LOW (ref 11.5–15.5)
IANC: 3.21 K/UL — SIGNIFICANT CHANGE UP (ref 1.5–8.5)
IMM GRANULOCYTES NFR BLD AUTO: 0.5 % — SIGNIFICANT CHANGE UP (ref 0–1.5)
INR BLD: 1.54 RATIO — HIGH (ref 0.88–1.16)
LYMPHOCYTES # BLD AUTO: 1.12 K/UL — SIGNIFICANT CHANGE UP (ref 1–3.3)
LYMPHOCYTES # BLD AUTO: 20.4 % — SIGNIFICANT CHANGE UP (ref 13–44)
MAGNESIUM SERPL-MCNC: 2.3 MG/DL — SIGNIFICANT CHANGE UP (ref 1.6–2.6)
MCHC RBC-ENTMCNC: 24 PG — LOW (ref 27–34)
MCHC RBC-ENTMCNC: 29.4 GM/DL — LOW (ref 32–36)
MCV RBC AUTO: 81.9 FL — SIGNIFICANT CHANGE UP (ref 80–100)
MONOCYTES # BLD AUTO: 0.76 K/UL — SIGNIFICANT CHANGE UP (ref 0–0.9)
MONOCYTES NFR BLD AUTO: 13.8 % — SIGNIFICANT CHANGE UP (ref 2–14)
NEUTROPHILS # BLD AUTO: 3.21 K/UL — SIGNIFICANT CHANGE UP (ref 1.8–7.4)
NEUTROPHILS NFR BLD AUTO: 58.4 % — SIGNIFICANT CHANGE UP (ref 43–77)
NRBC # BLD: 0 /100 WBCS — SIGNIFICANT CHANGE UP
NRBC # FLD: 0 K/UL — SIGNIFICANT CHANGE UP
PHOSPHATE SERPL-MCNC: 3.9 MG/DL — SIGNIFICANT CHANGE UP (ref 2.5–4.5)
PLATELET # BLD AUTO: 230 K/UL — SIGNIFICANT CHANGE UP (ref 150–400)
POTASSIUM SERPL-MCNC: 3.9 MMOL/L — SIGNIFICANT CHANGE UP (ref 3.5–5.3)
POTASSIUM SERPL-SCNC: 3.9 MMOL/L — SIGNIFICANT CHANGE UP (ref 3.5–5.3)
PROT SERPL-MCNC: 8.1 G/DL — SIGNIFICANT CHANGE UP (ref 6–8.3)
PROTHROM AB SERPL-ACNC: 17.2 SEC — HIGH (ref 10.6–13.6)
RBC # BLD: 4.41 M/UL — SIGNIFICANT CHANGE UP (ref 3.8–5.2)
RBC # FLD: 15.7 % — HIGH (ref 10.3–14.5)
SODIUM SERPL-SCNC: 134 MMOL/L — LOW (ref 135–145)
WBC # BLD: 5.5 K/UL — SIGNIFICANT CHANGE UP (ref 3.8–10.5)
WBC # FLD AUTO: 5.5 K/UL — SIGNIFICANT CHANGE UP (ref 3.8–10.5)

## 2021-05-12 PROCEDURE — 93306 TTE W/DOPPLER COMPLETE: CPT | Mod: 26

## 2021-05-12 RX ORDER — CEFUROXIME AXETIL 250 MG
500 TABLET ORAL EVERY 12 HOURS
Refills: 0 | Status: COMPLETED | OUTPATIENT
Start: 2021-05-12 | End: 2021-05-15

## 2021-05-12 RX ADMIN — Medication 81 MILLIGRAM(S): at 16:19

## 2021-05-12 RX ADMIN — ATORVASTATIN CALCIUM 80 MILLIGRAM(S): 80 TABLET, FILM COATED ORAL at 23:20

## 2021-05-12 RX ADMIN — DONEPEZIL HYDROCHLORIDE 10 MILLIGRAM(S): 10 TABLET, FILM COATED ORAL at 23:20

## 2021-05-12 RX ADMIN — Medication 667 MILLIGRAM(S): at 17:06

## 2021-05-12 RX ADMIN — LIDOCAINE AND PRILOCAINE CREAM 1 APPLICATION(S): 25; 25 CREAM TOPICAL at 10:01

## 2021-05-12 RX ADMIN — ERYTHROPOIETIN 10000 UNIT(S): 10000 INJECTION, SOLUTION INTRAVENOUS; SUBCUTANEOUS at 13:45

## 2021-05-12 RX ADMIN — Medication 500 MILLIGRAM(S): at 18:50

## 2021-05-12 RX ADMIN — APIXABAN 5 MILLIGRAM(S): 2.5 TABLET, FILM COATED ORAL at 06:05

## 2021-05-12 RX ADMIN — Medication 25 MILLIGRAM(S): at 17:10

## 2021-05-12 RX ADMIN — PANTOPRAZOLE SODIUM 40 MILLIGRAM(S): 20 TABLET, DELAYED RELEASE ORAL at 06:05

## 2021-05-12 RX ADMIN — DOXERCALCIFEROL 2 MICROGRAM(S): 2.5 CAPSULE ORAL at 13:45

## 2021-05-12 RX ADMIN — APIXABAN 5 MILLIGRAM(S): 2.5 TABLET, FILM COATED ORAL at 17:10

## 2021-05-12 RX ADMIN — Medication 667 MILLIGRAM(S): at 08:07

## 2021-05-12 RX ADMIN — INSULIN GLARGINE 10 UNIT(S): 100 INJECTION, SOLUTION SUBCUTANEOUS at 23:37

## 2021-05-12 NOTE — PROGRESS NOTE ADULT - ASSESSMENT
80F hx htn, dm, A fib on eliquis, prior CVA and esrd MWF last dialyzed Friday but missed Wednesday presents with AMS    ESRD on HD MWF via avf  from [place] [facility]  s/p HD 5/10 UF 1L  Seen in HD today. Continue HD as ordered. tolerating well. vitals stable. access working well   consent obtained from kenny in hd unit  monitor electrolyte    AMS  ? etiology  work up per team  CT head neg  metal status seem better today  f/u neuro    anemia  epo with hd  monitor    HTN  mostly controlled  currently uncontrolled. morning meds held sec to going to hd. resume current meds. no need to hold for hd.   continue current meds  monitor    ckd-mbd  Hectorol 2mcg with HD  monitor phos and calcium daily

## 2021-05-12 NOTE — CHART NOTE - NSCHARTNOTEFT_GEN_A_CORE
Called patients levi Madrid [phone] to review plan of care- all questions answered and update provided. Will Continue to closely monitor.     Luciano Zarate NP [facility]   Pager [phone]

## 2021-05-12 NOTE — PROGRESS NOTE ADULT - ASSESSMENT
Assessment and Plan    79 yo F with h/o HTN, DM, A fib on Eliquis, ESRD on HD (MWF) brought to ED by family for AMS    EKG: NSR, TWI leads I & AVL  Tele: NSR     1. AMS  -CT head with possible new infarct, evolving  -f/u neuro  -on IV abx for UTI  -echo from 2019 with normal LV function and Small pericardial effusion posterior and lateral to the  left ventricle  -echo with normal LV function. Agitated saline injection is inconclusive regarding the  presence of a patent foramen ovale.  -MRI with acute infarct. f/u neuro re is it embolic, was on eliquis for afib. ?eliquis failure vs medication noncompliance     2. Afib  -on eliquis  -c/w metoprolol    3. HTN  -controlled  -c/w hydralazine and metoprolol  -continue to monitor BP    4. ESRD  -on HD  -f/u renal

## 2021-05-12 NOTE — PROGRESS NOTE ADULT - ASSESSMENT
79 yo RH AA F with multiple old stroke R AICA, B/L BG and thalalmi, afib on eliquis, HTN, DM, ESRD on HD p/w AMS found to have UTI and new R parietal infarct.  CTA in 2019 with L VA stenosis and mild BA stenosis. MRI at that time showed multiple old gordillo.  repeat CTH showed evolving R partial temporal infarct, A1c 7.1, . MRI brain confirms R parietal infarct, CTA with mild to mod atherosclerotic disease noted. LE doppler neg for DVT  Etiology for stroke Embolic from Afib. question compliance at home   - okay for continuation of DOAC. when she becomes 80yo dose will be 2.5mg bid. would consider adding ASA 81 at that time if not already indicated   - treatment of infection  - consider rEEG can be outpt if stable   - High dose statin therapy - atorvastatin 40mg PO daily. LDL goal <70mg/dL.  - need better DM and cholesterol control  - telemetry  - PT/OT/SS/SLP, OOBC  - check FS, glucose control <180  - GI/DVT ppx  - Counseling on diet, exercise, and medication adherence was done  - Counseling on smoking cessation and alcohol consumption offered when appropriate.  - Pain assessed and judicious use of narcotics when appropriate was discussed.    - Stroke education given when appropriate.  - Importance of fall prevention discussed.   - Differential diagnosis and plan of care discussed with patient and/or family and primary team  - Thank you for allowing me to participate in the care of this patient. Call with questions.   - d/c plan from stroke standpoint.   José Miguel Renee MD  Vascular Neurology  Office: [phone]

## 2021-05-13 RX ORDER — QUETIAPINE FUMARATE 200 MG/1
25 TABLET, FILM COATED ORAL DAILY
Refills: 0 | Status: DISCONTINUED | OUTPATIENT
Start: 2021-05-13 | End: 2021-05-17

## 2021-05-13 RX ADMIN — PANTOPRAZOLE SODIUM 40 MILLIGRAM(S): 20 TABLET, DELAYED RELEASE ORAL at 08:12

## 2021-05-13 RX ADMIN — QUETIAPINE FUMARATE 25 MILLIGRAM(S): 200 TABLET, FILM COATED ORAL at 11:10

## 2021-05-13 RX ADMIN — INSULIN GLARGINE 10 UNIT(S): 100 INJECTION, SOLUTION SUBCUTANEOUS at 21:17

## 2021-05-13 RX ADMIN — Medication 500 MILLIGRAM(S): at 17:22

## 2021-05-13 RX ADMIN — Medication 667 MILLIGRAM(S): at 17:22

## 2021-05-13 RX ADMIN — Medication 100 MILLIGRAM(S): at 08:12

## 2021-05-13 RX ADMIN — ATORVASTATIN CALCIUM 80 MILLIGRAM(S): 80 TABLET, FILM COATED ORAL at 21:15

## 2021-05-13 RX ADMIN — Medication 667 MILLIGRAM(S): at 11:11

## 2021-05-13 RX ADMIN — DONEPEZIL HYDROCHLORIDE 10 MILLIGRAM(S): 10 TABLET, FILM COATED ORAL at 21:15

## 2021-05-13 RX ADMIN — APIXABAN 5 MILLIGRAM(S): 2.5 TABLET, FILM COATED ORAL at 17:22

## 2021-05-13 RX ADMIN — Medication 25 MILLIGRAM(S): at 17:23

## 2021-05-13 RX ADMIN — Medication 25 MILLIGRAM(S): at 08:12

## 2021-05-13 RX ADMIN — APIXABAN 5 MILLIGRAM(S): 2.5 TABLET, FILM COATED ORAL at 08:12

## 2021-05-13 RX ADMIN — Medication 81 MILLIGRAM(S): at 11:11

## 2021-05-13 RX ADMIN — Medication 667 MILLIGRAM(S): at 09:02

## 2021-05-13 RX ADMIN — Medication 500 MILLIGRAM(S): at 08:12

## 2021-05-13 NOTE — PHYSICAL THERAPY INITIAL EVALUATION ADULT - GENERAL OBSERVATIONS, REHAB EVAL
Patient received supine in bed this AM with nursing staff present
patient received standing in room in no distress

## 2021-05-13 NOTE — DISCHARGE NOTE PROVIDER - CARE PROVIDERS DIRECT ADDRESSES
,DirectAddress_Unknown ,DirectAddress_Unknown,DirectAddress_Unknown ,DirectAddress_Unknown,DirectAddress_Unknown,DirectAddress_Unknown,[email or web address]

## 2021-05-13 NOTE — CHART NOTE - NSCHARTNOTEFT_GEN_A_CORE
Spoke to patient's sister Ana Rosa at bedside and also spoke to patient's son Julius [phone] - all questions answered and update provided. Patient's Neurologist [phone] Dr. Magdy Kothari was also called to provide update as requested by Julius. Psychiatry consulted given impulsive behavior and will see patient in AM - will f/u recs. Case d/w Dr. Sutton.     Luciano Zarate NP [facility]   Pager [phone]

## 2021-05-13 NOTE — DISCHARGE NOTE PROVIDER - PROVIDER TOKENS
PROVIDER:[TOKEN:[[id number]]] PROVIDER:[TOKEN:[[id number]]],PROVIDER:[TOKEN:[[id number]]] PROVIDER:[TOKEN:[[id number]]],PROVIDER:[TOKEN:[[id number]]],FREE:[LAST:[Dr Arevalo],FIRST:[Susy],PHONE:[phone]],FAX:[(   )    -],FOLLOWUP:[2 weeks]],PROVIDER:[TOKEN:[[id number]],FOLLOWUP:[1-3 days]]

## 2021-05-13 NOTE — DISCHARGE NOTE PROVIDER - CARE PROVIDER_API CALL
José Miguel Renee)  Neurology; Vascular Neurology  [address] [facility], [address]  [place] [address]  Phone: [phone]  Fax: [phone]  Follow Up Time:    José Miguel Renee)  Neurology; Vascular Neurology  [address] [facility], [address]  [place] [address]  Phone: [phone]  Fax: [phone]  Follow Up Time:     Dieter Willson  CARDIOVASCULAR DISEASE  [address] [place] [address]  Phone: [phone]  Fax: [phone]  Follow Up Time:    José Miguel Renee)  Neurology; Vascular Neurology  [address] [facility], [address]  [place] [address]  Phone: [phone]  Fax: [phone]  Follow Up Time:     Dieter Willson  CARDIOVASCULAR DISEASE  [address] [place] [address]  Phone: [phone]  Fax: [phone]  Follow Up Time:     Susy Mittal  Phone: [phone]  Fax: (   )    -  Follow Up Time: 2 weeks    Nehemias Sidhu)  Internal Medicine  [address] [place] [address]  Phone: [phone]  Fax: [phone]  Follow Up Time: 1-3 days

## 2021-05-13 NOTE — PHYSICAL THERAPY INITIAL EVALUATION ADULT - MANUAL MUSCLE TESTING RESULTS, REHAB EVAL
bilateral LE grossly 3+/5 as per functional mobility, no formal MMT performed
cardiac issues/grossly assessed due to

## 2021-05-13 NOTE — PHYSICAL THERAPY INITIAL EVALUATION ADULT - PERTINENT HX OF CURRENT PROBLEM, REHAB EVAL
patient presents with AMS. PMH includes HTN, DM, A fib on Eliquis, ESRD on HD (MWF)
Patient presented with altered mental status and increased impulsiveness

## 2021-05-13 NOTE — DISCHARGE NOTE PROVIDER - HOSPITAL COURSE
81 yo F with h/o  HTN, DM, A fib on Eliquis, ESRD on HD (MWF) brought to ED by family for AMS    Hospital Course  Metabolic encephalopathy    -CT possibly evolving parietal acute./subacte infarct   -CTA in 2019 with L VA stenosis and mild BA stenosis. MRI at that time showed multiple old gordillo.  repeat CTH showed evolving R partial temporal infarct,  -MRI brain confirms R parietal infarct, CTA with mild to mod atherosclerotic disease noted. LE doppler neg for DVT  - Neurology followed  - c/w ASA and statin  - Etiology for stroke likely Embolic from Afib given question of compliance w/ Eliquis at home  - okay for continuation of DOAC per Neurology. when she becomes 82yo dose to be changed to 2.5mg bid  - c/w High dose statin therapy - atorvastatin 40mg PO daily. LDL goal <70mg/dL.  - Plan for outpatient follow up with Neurology    ESRD (end stage renal disease) on dialysis    - c/w HD   - Renal followed continue w/ outpatient follow up    UTI (urinary tract infection  - UA +, Ucx normal wes  - s/p CTX --> transition to ceftin to complete course for 7 D    Atrial fibrillation  - Rate controlled  - Continue Metoprolol and Eliquis  - Echo 5/12: ef 69%, Mild concentric left ventricular hypertrophy. Agitated saline injection is inconclusive regarding the presence of a patent foramen ovale  - Cardiology followed     Essential hypertension    -Continue hydralazine daily    Type 2 diabetes mellitus with chronic kidney disease on chronic dialysis, without long-term current use of insulin.   - Monitor FS qAC/HS  - c/w lantus 10U    Dispo- On ___ this case was reviewed with  ____, the patient is medically stable and optimized for discharge. All medications were reviewed and prescriptions were sent to mutually agreed upon pharmacy.   81 yo F with h/o  HTN, DM, A fib on Eliquis, ESRD on HD (MWF) brought to ED by family for AMS    Hospital Course  Metabolic encephalopathy- CT possibly evolving parietal acute./subacte infarct   - CTA in 2019 with L VA stenosis and mild BA stenosis. MRI at that time showed multiple old gordillo.  repeat CTH showed evolving R partial temporal infarct,  - MRI brain confirms R parietal infarct, CTA with mild to mod atherosclerotic disease noted. LE doppler neg for DVT  - Neurology followed- c/w ASA and statin  - Neurology & Psych c/s. Per Psych: baseline impulsivity following acute CVA events in the setting of severe vascular dementia  - B12, TSH- 3.72, RPR- negative, Ammonia -20  - Etiology for stroke likely Embolic from Afib given question of compliance w/ Eliquis at home  - okay for continuation of DOAC per Neurology. Eliquis changed to 2.5mg bid  - c/w High dose statin therapy - atorvastatin 40mg PO daily. LDL goal <70mg/dL  - Echo 5/12-EF 69%, Mild concentric left ventricular hypertrophy.  Agitated saline injection is inconclusive regarding the presence of a patent foramen ovale  - rEEG can be outpt if stable   - Plan for outpatient follow up with Neurology    ESRD (end stage renal disease) on dialysis - Renal following - HD as per renal   - Hectorol 2mcg with HD, monitor phos and calcium daily    UTI (urinary tract infection- UA +, Ucx normal wes  - s/p CTX --> transition to ceftin to complete course for 7 D    Atrial fibrillation- Rate controlled. Echo 5/12: ef 69%, Mild concentric left ventricular hypertrophy. Agitated saline injection is inconclusive regarding the presence of a patent foramen ovale  - Cardiology followed -continue Metoprolol and Eliquis    Essential hypertension - continue hydralazine daily    Type 2 diabetes mellitus with chronic kidney disease on chronic dialysis, without long-term current use of insulin. AgK6B-2.1%  - Monitor FS qAC/HS, c/w lantus 10U    Dispo- On ___ this case was reviewed with  ____, the patient is medically stable and optimized for discharge. All medications were reviewed and prescriptions were sent to mutually agreed upon pharmacy.   79 yo F with h/o  HTN, DM, A fib on Eliquis, ESRD on HD (MWF) brought to ED by family for AMS    Hospital Course  Metabolic encephalopathy- CT possibly evolving parietal acute./subacte infarct   - CTA in 2019 with L VA stenosis and mild BA stenosis. MRI at that time showed multiple old gordillo.  repeat CTH showed evolving R partial temporal infarct,  - MRI brain confirms R parietal infarct, CTA with mild to mod atherosclerotic disease noted. LE doppler neg for DVT  - Neurology followed- c/w ASA and statin  - Neurology & Psych c/s. Per Psych: baseline impulsivity following acute CVA events in the setting of severe vascular dementia  - B12, TSH- 3.72, RPR- negative, Ammonia -20  - Etiology for stroke likely Embolic from Afib given question of compliance w/ Eliquis at home  - okay for continuation of DOAC per Neurology. Eliquis changed to 2.5mg bid  - c/w High dose statin therapy - atorvastatin 40mg PO daily. LDL goal <70mg/dL  - Echo 5/12-EF 69%, Mild concentric left ventricular hypertrophy.  Agitated saline injection is inconclusive regarding the presence of a patent foramen ovale  - rEEG can be outpt if stable   - Plan for outpatient follow up with Neurology    ESRD (end stage renal disease) on dialysis - Renal following - HD as per renal   - Hectorol 2mcg with HD, monitor phos and calcium daily    UTI (urinary tract infection- UA +, Ucx normal wes  - s/p CTX --> transition to ceftin to complete course for 7 D    Atrial fibrillation- Rate controlled. Echo 5/12: ef 69%, Mild concentric left ventricular hypertrophy. Agitated saline injection is inconclusive regarding the presence of a patent foramen ovale  - Cardiology followed -continue Metoprolol and Eliquis    Essential hypertension - continue hydralazine daily    Type 2 diabetes mellitus with chronic kidney disease on chronic dialysis, without long-term current use of insulin. [id number]-8.1%  - Monitor FS qAC/HS, c/w lantus 10U    Dispo- On 5/17/21 this case was reviewed with Dr. Sutton, the patient is medically stable and optimized for discharge. All medications were reviewed and prescriptions were sent to mutually agreed upon pharmacy.

## 2021-05-13 NOTE — DISCHARGE NOTE PROVIDER - NSDCMRMEDTOKEN_GEN_ALL_CORE_FT
apixaban 5 mg oral tablet: 1 tab(s) orally 2 times a day  aspirin 81 mg oral tablet, chewable: 1 tab(s) orally once a day  atorvastatin 80 mg oral tablet: 1 tab(s) orally once a day  calcium acetate 667 mg oral capsule: 1 cap(s) orally 3 times a day (with meals)  clonazePAM 0.5 mg oral tablet: 1 tab(s) orally once a day (at bedtime)  cyproheptadine 4 mg oral tablet: 2 tab(s) orally once a day  donepezil 23 mg oral tablet: 1 tab(s) orally once a day (at bedtime)  hydrALAZINE 25 mg oral tablet: 1 tab(s) orally 2 times a day  Lantus Solostar Pen 100 units/mL subcutaneous solution: 10 unit(s) subcutaneous once a day (at bedtime)  Protonix 20 mg oral delayed release tablet: 1 tab(s) orally once a day  Toprol- mg oral tablet, extended release: 1 tab(s) orally once a day    apixaban 2.5 mg oral tablet: 1 tab(s) orally 2 times a day  apixaban 5 mg oral tablet: 1 tab(s) orally 2 times a day  aspirin 81 mg oral tablet, chewable: 1 tab(s) orally once a day  atorvastatin 80 mg oral tablet: 1 tab(s) orally once a day  calcium acetate 667 mg oral capsule: 1 cap(s) orally 3 times a day (with meals)  clonazePAM 0.5 mg oral tablet: 1 tab(s) orally once a day (at bedtime)  cyproheptadine 4 mg oral tablet: 2 tab(s) orally once a day  divalproex sodium 125 mg oral delayed release capsule: 2 cap(s) orally 3 times a day  donepezil 23 mg oral tablet: 1 tab(s) orally once a day (at bedtime)  hydrALAZINE 25 mg oral tablet: 1 tab(s) orally 2 times a day  hydrALAZINE 25 mg oral tablet: 1 tab(s) orally 2 times a day  Lantus Solostar Pen 100 units/mL subcutaneous solution: 10 unit(s) subcutaneous once a day (at bedtime)  pantoprazole 40 mg oral delayed release tablet: 1 tab(s) orally once a day (before a meal)  Protonix 20 mg oral delayed release tablet: 1 tab(s) orally once a day  QUEtiapine 25 mg oral tablet: 1 tab(s) orally once a day  Toprol- mg oral tablet, extended release: 1 tab(s) orally once a day    apixaban 2.5 mg oral tablet: 1 tab(s) orally 2 times a day  aspirin 81 mg oral tablet, chewable: 1 tab(s) orally once a day  atorvastatin 80 mg oral tablet: 1 tab(s) orally once a day  calcium acetate 667 mg oral capsule: 1 cap(s) orally 3 times a day (with meals)  divalproex sodium 125 mg oral delayed release capsule: 2 cap(s) orally 3 times a day  donepezil 23 mg oral tablet: 1 tab(s) orally once a day (at bedtime)  hydrALAZINE 25 mg oral tablet: 1 tab(s) orally 2 times a day  Lantus Solostar Pen 100 units/mL subcutaneous solution: 10 unit(s) subcutaneous once a day (at bedtime)  pantoprazole 40 mg oral delayed release tablet: 1 tab(s) orally once a day (before a meal)  QUEtiapine 25 mg oral tablet: 1 tab(s) orally once a day  Toprol- mg oral tablet, extended release: 1 tab(s) orally once a day

## 2021-05-13 NOTE — PROGRESS NOTE ADULT - ASSESSMENT
79 yo RH AA F with multiple old stroke R AICA, B/L BG and thalalmi, afib on eliquis, HTN, DM, ESRD on HD p/w AMS found to have UTI and new R parietal infarct.  CTA in 2019 with L VA stenosis and mild BA stenosis. MRI at that time showed multiple old gordillo.  repeat CTH showed evolving R partial temporal infarct, A1c 7.1, . MRI brain confirms R parietal infarct, CTA with mild to mod atherosclerotic disease noted. LE doppler neg for DVT  Etiology for stroke Embolic from Afib. question compliance at home   - okay for continuation of DOAC. when she becomes 80yo dose will be 2.5mg bid. would consider adding ASA 81 at that time if not already indicated   - treatment of infection  - consider rEEG can be outpt if stable   - High dose statin therapy - atorvastatin 40mg PO daily. LDL goal <70mg/dL.  - need better DM and cholesterol control  - telemetry  - PT/OT/SS/SLP, OOBC  - check FS, glucose control <180  - GI/DVT ppx  - Counseling on diet, exercise, and medication adherence was done  - Counseling on smoking cessation and alcohol consumption offered when appropriate.  - Pain assessed and judicious use of narcotics when appropriate was discussed.    - Stroke education given when appropriate.  - Importance of fall prevention discussed.   - Differential diagnosis and plan of care discussed with patient and/or family and primary team  - Thank you for allowing me to participate in the care of this patient. Call with questions.   - d/c plan from stroke standpoint.   José Miguel Renee MD  Vascular Neurology  Office: [phone]

## 2021-05-13 NOTE — PHYSICAL THERAPY INITIAL EVALUATION ADULT - DISCHARGE DISPOSITION, PT EVAL
patient at baseline level of functional mobility, no skilled PT needs
at this time due to changes in safety with gait/rehabilitation facility

## 2021-05-13 NOTE — PROGRESS NOTE ADULT - ASSESSMENT
80F hx htn, dm, A fib on eliquis, prior CVA and esrd MWF last dialyzed Friday but missed Wednesday presents with AMS    ESRD on HD MWF via avf  from [facility]  s/p HD 5/12 UF 900cc  HD per schedule  consent obtained from kenny in hd unit  monitor electrolyte    AMS  MRI + CVA  f/u neuro    anemia  epo with hd  monitor    HTN  mostly controlled  currently uncontrolled. morning meds held sec to going to hd. resume current meds. no need to hold for hd.   continue current meds  monitor    ckd-mbd  Hectorol 2mcg with HD  monitor phos and calcium daily

## 2021-05-13 NOTE — DISCHARGE NOTE PROVIDER - NSDCFUADDAPPT_GEN_ALL_CORE_FT
If you are in need of a general medicine physician and post-discharge medical follow-up for further care/recommendations you may contact the [facility] for an appointment [phone]

## 2021-05-13 NOTE — DISCHARGE NOTE PROVIDER - NSDCCPCAREPLAN_GEN_ALL_CORE_FT
PRINCIPAL DISCHARGE DIAGNOSIS  Diagnosis: Altered mental status  Assessment and Plan of Treatment: You were found to have a stroke and treated accordingly. Please continue with prescribed blood thinning agents and participate in any recommended physical or occupational therapy to optimize your recovery. Continue with a low salt/fat diet and follow up w/ your Neurologist hung 2 weeks for repeat labs and continued care. Ask your provider about available support groups for stroke survivors for emotional support.        SECONDARY DISCHARGE DIAGNOSES  Diagnosis: Atrial fibrillation, unspecified type  Assessment and Plan of Treatment: Please continue your medications as directed and follow-up with your primary provider/cardiologist to further manage your care. Monitor for signs/symptoms of uncontrolled atrial fibrillation, such as, increased heart rate, palpitations, chest pain, dizziness, or shortness of breath - Return to emergency room if these signs/symptoms are present.      Diagnosis: ESRD (end stage renal disease) on dialysis  Assessment and Plan of Treatment: Please continue to follow your dialysis schedule and refer to your primary provider/nephrologist for further care/recommendations. Continue your medications and supplementation as directed. (end stage renal disease) on dialysis    Diagnosis: Type 2 diabetes mellitus with chronic kidney disease on chronic dialysis, without long-term current use of insulin  Assessment and Plan of Treatment: Continue your medication regimen and a consistent carbohydrate diet (Meaning eating the same amount of carbohydrates at the same time each day). Monitor blood glucose levels throughout the day before meals and at bedtime. Record blood sugars and bring to outpatient providers appointment in order to be reviewed by your doctor for management modifications. If your sugars are more than 400 or less than 70 you should contact your PCP immediately. Monitor for signs/symptoms of low blood glucose, such as, dizziness, altered mental status, or cool/clammy skin. In addition, monitor for signs/symptoms of high blood glucose, such as, feeling hot, dry, fatigued, or with increased thirst/urination. Make regular podiatry appointments in order to have feet checked for wounds and uncontrolled toe nail growth to prevent infections, as well as, appointments with an ophthalmologist to monitor your vision.      Diagnosis: Essential hypertension  Assessment and Plan of Treatment: Continue blood pressure medication regimen as directed. Monitor for any visual changes, headaches or dizziness.  Monitor blood pressure regularly.  Follow up with your PCP for further management for high blood pressure.      Diagnosis: UTI (urinary tract infection)  Assessment and Plan of Treatment: You completed antibiotics. monitor for signs/symptoms of infection, such as, fever/chills, burning/pain with urination, urinary frequency/hesitancy, cloudy urine, or blood in urine.       PRINCIPAL DISCHARGE DIAGNOSIS  Diagnosis: Altered mental status  Assessment and Plan of Treatment: You were found to have a stroke and treated accordingly. Please continue with prescribed blood thinning agents and participate in any recommended physical or occupational therapy to optimize your recovery. Continue with a low salt/fat diet and follow up w/ your Neurologist hung 2 weeks for repeat labs and continued care. Ask your provider about available support groups for stroke survivors for emotional support.        SECONDARY DISCHARGE DIAGNOSES  Diagnosis: UTI (urinary tract infection)  Assessment and Plan of Treatment: You completed antibiotics. monitor for signs/symptoms of infection, such as, fever/chills, burning/pain with urination, urinary frequency/hesitancy, cloudy urine, or blood in urine.      Diagnosis: Atrial fibrillation, unspecified type  Assessment and Plan of Treatment: Please continue your medications as directed and follow-up with your primary provider/cardiologist to further manage your care. Monitor for signs/symptoms of uncontrolled atrial fibrillation, such as, increased heart rate, palpitations, chest pain, dizziness, or shortness of breath - Return to emergency room if these signs/symptoms are present.      Diagnosis: Essential hypertension  Assessment and Plan of Treatment: Continue blood pressure medication regimen as directed. Monitor for any visual changes, headaches or dizziness.  Monitor blood pressure regularly.  Follow up with your PCP for further management for high blood pressure.      Diagnosis: ESRD (end stage renal disease) on dialysis  Assessment and Plan of Treatment: Please continue to follow your dialysis schedule and refer to your primary provider/nephrologist for further care/recommendations. Continue your medications and supplementation as directed. (end stage renal disease) on dialysis    Diagnosis: Type 2 diabetes mellitus with chronic kidney disease on chronic dialysis, without long-term current use of insulin  Assessment and Plan of Treatment: Monitor blood glucose levels throughout the day before meals and at bedtime. Record blood sugars and bring to outpatient providers appointment in order to be reviewed by your doctor for management modifications. If your sugars are more than 400 or less than 70 you should contact your PCP immediately. Monitor for signs/symptoms of low blood glucose, such as, dizziness, altered mental status, or cool/clammy skin. In addition, monitor for signs/symptoms of high blood glucose, such as, feeling hot, dry, fatigued, or with increased thirst/urination. Make regular podiatry appointments in order to have feet checked for wounds and uncontrolled toe nail growth to prevent infections, as well as, appointments with an ophthalmologist to monitor your vision.

## 2021-05-14 LAB
ANION GAP SERPL CALC-SCNC: 20 MMOL/L — HIGH (ref 7–14)
BASOPHILS # BLD AUTO: 0.03 K/UL — SIGNIFICANT CHANGE UP (ref 0–0.2)
BASOPHILS NFR BLD AUTO: 0.7 % — SIGNIFICANT CHANGE UP (ref 0–2)
BUN SERPL-MCNC: 20 MG/DL — SIGNIFICANT CHANGE UP (ref 7–23)
CALCIUM SERPL-MCNC: 9.7 MG/DL — SIGNIFICANT CHANGE UP (ref 8.4–10.5)
CHLORIDE SERPL-SCNC: 92 MMOL/L — LOW (ref 98–107)
CO2 SERPL-SCNC: 25 MMOL/L — SIGNIFICANT CHANGE UP (ref 22–31)
CREAT SERPL-MCNC: 8.09 MG/DL — HIGH (ref 0.5–1.3)
EOSINOPHIL # BLD AUTO: 0.27 K/UL — SIGNIFICANT CHANGE UP (ref 0–0.5)
EOSINOPHIL NFR BLD AUTO: 6.4 % — HIGH (ref 0–6)
GLUCOSE SERPL-MCNC: 90 MG/DL — SIGNIFICANT CHANGE UP (ref 70–99)
HCT VFR BLD CALC: 38.9 % — SIGNIFICANT CHANGE UP (ref 34.5–45)
HGB BLD-MCNC: 11 G/DL — LOW (ref 11.5–15.5)
IANC: 2.38 K/UL — SIGNIFICANT CHANGE UP (ref 1.5–8.5)
IMM GRANULOCYTES NFR BLD AUTO: 0.5 % — SIGNIFICANT CHANGE UP (ref 0–1.5)
LYMPHOCYTES # BLD AUTO: 0.97 K/UL — LOW (ref 1–3.3)
LYMPHOCYTES # BLD AUTO: 22.8 % — SIGNIFICANT CHANGE UP (ref 13–44)
MAGNESIUM SERPL-MCNC: 2.5 MG/DL — SIGNIFICANT CHANGE UP (ref 1.6–2.6)
MCHC RBC-ENTMCNC: 23.6 PG — LOW (ref 27–34)
MCHC RBC-ENTMCNC: 28.3 GM/DL — LOW (ref 32–36)
MCV RBC AUTO: 83.5 FL — SIGNIFICANT CHANGE UP (ref 80–100)
MONOCYTES # BLD AUTO: 0.58 K/UL — SIGNIFICANT CHANGE UP (ref 0–0.9)
MONOCYTES NFR BLD AUTO: 13.6 % — SIGNIFICANT CHANGE UP (ref 2–14)
NEUTROPHILS # BLD AUTO: 2.38 K/UL — SIGNIFICANT CHANGE UP (ref 1.8–7.4)
NEUTROPHILS NFR BLD AUTO: 56 % — SIGNIFICANT CHANGE UP (ref 43–77)
NRBC # BLD: 0 /100 WBCS — SIGNIFICANT CHANGE UP
NRBC # FLD: 0 K/UL — SIGNIFICANT CHANGE UP
PHOSPHATE SERPL-MCNC: 4.3 MG/DL — SIGNIFICANT CHANGE UP (ref 2.5–4.5)
PLATELET # BLD AUTO: 234 K/UL — SIGNIFICANT CHANGE UP (ref 150–400)
POTASSIUM SERPL-MCNC: 4.1 MMOL/L — SIGNIFICANT CHANGE UP (ref 3.5–5.3)
POTASSIUM SERPL-SCNC: 4.1 MMOL/L — SIGNIFICANT CHANGE UP (ref 3.5–5.3)
RBC # BLD: 4.66 M/UL — SIGNIFICANT CHANGE UP (ref 3.8–5.2)
RBC # FLD: 16.6 % — HIGH (ref 10.3–14.5)
SODIUM SERPL-SCNC: 137 MMOL/L — SIGNIFICANT CHANGE UP (ref 135–145)
WBC # BLD: 4.25 K/UL — SIGNIFICANT CHANGE UP (ref 3.8–10.5)
WBC # FLD AUTO: 4.25 K/UL — SIGNIFICANT CHANGE UP (ref 3.8–10.5)

## 2021-05-14 PROCEDURE — 99223 1ST HOSP IP/OBS HIGH 75: CPT

## 2021-05-14 RX ORDER — ASPIRIN/CALCIUM CARB/MAGNESIUM 324 MG
81 TABLET ORAL DAILY
Refills: 0 | Status: DISCONTINUED | OUTPATIENT
Start: 2021-05-14 | End: 2021-05-17

## 2021-05-14 RX ORDER — APIXABAN 2.5 MG/1
2.5 TABLET, FILM COATED ORAL
Refills: 0 | Status: DISCONTINUED | OUTPATIENT
Start: 2021-05-14 | End: 2021-05-17

## 2021-05-14 RX ORDER — DIVALPROEX SODIUM 500 MG/1
250 TABLET, DELAYED RELEASE ORAL THREE TIMES A DAY
Refills: 0 | Status: DISCONTINUED | OUTPATIENT
Start: 2021-05-14 | End: 2021-05-17

## 2021-05-14 RX ORDER — CHLORHEXIDINE GLUCONATE 213 G/1000ML
1 SOLUTION TOPICAL DAILY
Refills: 0 | Status: DISCONTINUED | OUTPATIENT
Start: 2021-05-14 | End: 2021-05-17

## 2021-05-14 RX ADMIN — APIXABAN 5 MILLIGRAM(S): 2.5 TABLET, FILM COATED ORAL at 05:52

## 2021-05-14 RX ADMIN — DONEPEZIL HYDROCHLORIDE 10 MILLIGRAM(S): 10 TABLET, FILM COATED ORAL at 23:45

## 2021-05-14 RX ADMIN — Medication 100 MILLIGRAM(S): at 05:52

## 2021-05-14 RX ADMIN — LIDOCAINE AND PRILOCAINE CREAM 1 APPLICATION(S): 25; 25 CREAM TOPICAL at 06:06

## 2021-05-14 RX ADMIN — Medication 81 MILLIGRAM(S): at 12:11

## 2021-05-14 RX ADMIN — Medication 500 MILLIGRAM(S): at 23:44

## 2021-05-14 RX ADMIN — Medication 667 MILLIGRAM(S): at 09:21

## 2021-05-14 RX ADMIN — ATORVASTATIN CALCIUM 80 MILLIGRAM(S): 80 TABLET, FILM COATED ORAL at 23:46

## 2021-05-14 RX ADMIN — Medication 25 MILLIGRAM(S): at 05:52

## 2021-05-14 RX ADMIN — DOXERCALCIFEROL 2 MICROGRAM(S): 2.5 CAPSULE ORAL at 16:35

## 2021-05-14 RX ADMIN — PANTOPRAZOLE SODIUM 40 MILLIGRAM(S): 20 TABLET, DELAYED RELEASE ORAL at 06:05

## 2021-05-14 RX ADMIN — Medication 667 MILLIGRAM(S): at 12:12

## 2021-05-14 RX ADMIN — INSULIN GLARGINE 10 UNIT(S): 100 INJECTION, SOLUTION SUBCUTANEOUS at 23:32

## 2021-05-14 RX ADMIN — Medication 500 MILLIGRAM(S): at 06:05

## 2021-05-14 RX ADMIN — ONDANSETRON 4 MILLIGRAM(S): 8 TABLET, FILM COATED ORAL at 20:16

## 2021-05-14 RX ADMIN — QUETIAPINE FUMARATE 25 MILLIGRAM(S): 200 TABLET, FILM COATED ORAL at 12:30

## 2021-05-14 RX ADMIN — DIVALPROEX SODIUM 250 MILLIGRAM(S): 500 TABLET, DELAYED RELEASE ORAL at 23:45

## 2021-05-14 RX ADMIN — APIXABAN 2.5 MILLIGRAM(S): 2.5 TABLET, FILM COATED ORAL at 23:43

## 2021-05-14 NOTE — BH CONSULTATION LIAISON ASSESSMENT NOTE - CURRENT MEDICATION
MEDICATIONS  (STANDING):  apixaban 2.5 milliGRAM(s) Oral two times a day  aspirin  chewable 81 milliGRAM(s) Oral daily  atorvastatin 80 milliGRAM(s) Oral at bedtime  calcium acetate 667 milliGRAM(s) Oral three times a day with meals  cefuroxime   Tablet 500 milliGRAM(s) Oral every 12 hours  dextrose 40% Gel 15 Gram(s) Oral once  dextrose 5%. 1000 milliLiter(s) (50 mL/Hr) IV Continuous <Continuous>  dextrose 5%. 1000 milliLiter(s) (100 mL/Hr) IV Continuous <Continuous>  dextrose 50% Injectable 25 Gram(s) IV Push once  dextrose 50% Injectable 12.5 Gram(s) IV Push once  dextrose 50% Injectable 25 Gram(s) IV Push once  donepezil 10 milliGRAM(s) Oral at bedtime  doxercalciferol Injectable 2 MICROGram(s) IV Push <User Schedule>  glucagon  Injectable 1 milliGRAM(s) IntraMuscular once  hydrALAZINE 25 milliGRAM(s) Oral two times a day  insulin glargine Injectable (LANTUS) 10 Unit(s) SubCutaneous at bedtime  insulin lispro (ADMELOG) corrective regimen sliding scale   SubCutaneous three times a day before meals  insulin lispro (ADMELOG) corrective regimen sliding scale   SubCutaneous at bedtime  lidocaine/prilocaine Cream 1 Application(s) Topical <User Schedule>  metoprolol succinate  milliGRAM(s) Oral daily  pantoprazole    Tablet 40 milliGRAM(s) Oral before breakfast  QUEtiapine 25 milliGRAM(s) Oral daily    MEDICATIONS  (PRN):  ondansetron Injectable 4 milliGRAM(s) IV Push every 6 hours PRN Nausea

## 2021-05-14 NOTE — PROGRESS NOTE ADULT - ASSESSMENT
Assessment and Plan    81 yo F with h/o HTN, DM, A fib on Eliquis, ESRD on HD (MWF) brought to ED by family for AMS    EKG: NSR, TWI leads I & AVL  Tele: NSR     1. AMS  -CT head with possible new infarct, evolving  -f/u neuro  -on IV abx for UTI  -echo from 2019 with normal LV function and Small pericardial effusion posterior and lateral to the  left ventricle  -echo with normal LV function. Agitated saline injection is inconclusive regarding the  presence of a patent foramen ovale.  -MRI with acute infarct. f/u neuro re is it embolic, was on eliquis for afib. ?eliquis failure vs medication noncompliance     2. Afib  -on eliquis  -c/w metoprolol    3. HTN  -controlled  -c/w hydralazine and metoprolol  -continue to monitor BP    4. ESRD  -on HD  -f/u renal

## 2021-05-14 NOTE — PROGRESS NOTE ADULT - ASSESSMENT
81 yo RH AA F with multiple old stroke R AICA, B/L BG and thalalmi, afib on eliquis, HTN, DM, ESRD on HD p/w AMS found to have UTI and new R parietal infarct.  CTA in 2019 with L VA stenosis and mild BA stenosis. MRI at that time showed multiple old gordillo.  repeat CTH showed evolving R partial temporal infarct, A1c 7.1, . MRI brain confirms R parietal infarct, CTA with mild to mod atherosclerotic disease noted. LE doppler neg for DVT  Etiology for stroke Embolic from Afib. question compliance at home   - check B12, TSH, RPR. psych eval pending   - okay for continuation of DOAC. when she becomes 80yo dose will be 2.5mg bid. would consider adding ASA 81 at that time if not already indicated   - treatment of infection  - consider rEEG can be outpt if stable   - High dose statin therapy - atorvastatin 40mg PO daily. LDL goal <70mg/dL.  - need better DM and cholesterol control  - telemetry  - PT/OT/SS/SLP, OOBC  - check FS, glucose control <180  - GI/DVT ppx  - Counseling on diet, exercise, and medication adherence was done  - Counseling on smoking cessation and alcohol consumption offered when appropriate.  - Pain assessed and judicious use of narcotics when appropriate was discussed.    - Stroke education given when appropriate.  - Importance of fall prevention discussed.   - Differential diagnosis and plan of care discussed with patient and/or family and primary team  - Thank you for allowing me to participate in the care of this patient. Call with questions.   - d/c plan from stroke standpoint.   José Miguel Renee MD  Vascular Neurology  Office: [phone]

## 2021-05-14 NOTE — BH CONSULTATION LIAISON ASSESSMENT NOTE - DIFFERENTIAL
Likely loss of impulse control in the setting of a new baseline due acute infarct/severe vascular dementia   Unlikely agitation due to delirium or confusion associated with Alzheimer's dementia

## 2021-05-14 NOTE — BH CONSULTATION LIAISON ASSESSMENT NOTE - NSBHCONSULTRECOMMENDOTHER_PSY_A_CORE FT
-Continue Enhanced supervision as you are  -Recommend goals of care discussion with family and primary team to address neuro/medical underpinnings of patients decline in baseline associated with acute/subacute CVA in the setting of severe vascular dementia.   -Continue medical management as you are   -Continue Seroquel 25 mg PO QD and Donepezil 10 mg PO QHS   -Recommend starting Depakote  mg TID. Monitor LFT/ammonia/platelets   -Cannot leave AMA, lacks capacity  -Will continue to follow

## 2021-05-14 NOTE — BH CONSULTATION LIAISON ASSESSMENT NOTE - OTHER
Patient demonstrating impulsive behavior throughout interview. Patient also demonstrated severe inattention which limited her ability to fully participate in the interview.

## 2021-05-14 NOTE — PROGRESS NOTE ADULT - ASSESSMENT
80F hx htn, dm, A fib on eliquis, prior CVA and esrd MWF last dialyzed Friday but missed Wednesday presents with AMS    ESRD on HD MWF via avf  from [place] [facility]  s/p HD 5/12 UF 900cc  HD per schedule  consent obtained from kenny in hd unit  monitor electrolyte    AMS  MRI + CVA  f/u neuro    anemia  H b at goal for ESRD  monitor Hb    HTN  mostly controlled  continue current meds  monitor    ckd-mbd  Hectorol 2mcg with HD  monitor phos and calcium daily

## 2021-05-14 NOTE — CHART NOTE - NSCHARTNOTEFT_GEN_A_CORE
Called patients levi Madrid [phone] to review plan of care- all questions answered and update provided. Will Continue to closely monitor.     Luciano Zarate NP [facility]   Pager [phone].

## 2021-05-14 NOTE — BH CONSULTATION LIAISON ASSESSMENT NOTE - SUMMARY
81 yo F with h/o HTN, DM, A fib on Eliquis, ESRD on HD (MWF) brought to ED by family for AMS. Admitted for acute CVA. Psych consulted for impulsivity.     As per HPI: "81 yo F with h/o HTN, DM, A fib on Eliquis, ESRD on HD (MWF) brought to ED by family for AMS. Pt is poor historian, history obtained from her son Julius ([phone]). For the past week pt has been moving slower and appeared more lethargic. Yesterday she also appeared confused, kept screaming for help when nothing was wrong. She has also been making up names and sometimes had nonsensical speech. At baseline pt is AAOx3 and able to have conversation and understands what's going on around her. She usually ambulates with a cane. She has not had any recent falls. No recent fevers, chills or cough. She has no complained of any pain."     Chart reviewed. Patient discussed in AM rounds. AOx1. Patient calling out to writer "help me, help me" prior to writer engaging patient in conversation. When writer approached patient to begin eval, patient stated "I am doing fine." Exam limited due to patients gross disorganization, disorientation, inattention, and impulsivity. Patient denies any pain/discomfort or current needs. Patient states that she feels safe in her environment and denied AH/VH. Throughout eval patient demonstrated lack of control over impulsivity via an inability to hold back from grabbing onto objects that were held in front of her as she was instructed not to take objects. Patient demonstrated inappropriate reaction to objects being held up in front of her, reaching out for them rather than addressing the odd nature of the interviewer actions. Patient also grabbing onto objects near by irrationally including the writers papers and lab coat pockets. Patient likely exhibiting new baseline impulsivity following acute CVA events in the setting of severe vacular dementia. No PRNs x 24 hours.

## 2021-05-14 NOTE — BH CONSULTATION LIAISON ASSESSMENT NOTE - CASE SUMMARY
Chart reviewed. Discussed in AM rounds. Seen with trainee. Grossly impaired, only oriented to self, very impulsive and stimulus-bound, with inability to suppress any action. Exam severely limited (she is a poor historian). Has gross features of an advanced vascular dementia which is supported by her most recent head imaging (numerous CVAs in cerebellum, basal ganglia, and cortex). She has a poor neurological prognosis and new/impaired baseline. This should be communicated clearly to the family as they are in need of this education, and this can be done effectively by the primary team. Would encourage use of Depakote/Seroquel as above. There are no immediate psych needs for our continued involvement, and there are no indications for inpatient psych care or CO. Furthermore there are no psych contraindications to discharge when medically stable. Will see if here on Monday, but can leave from our standpoint.

## 2021-05-14 NOTE — BH CONSULTATION LIAISON ASSESSMENT NOTE - NSBHCHARTREVIEWVS_PSY_A_CORE FT
Vital Signs Last 24 Hrs  T(C): 36.7 (14 May 2021 10:02), Max: 36.9 (14 May 2021 05:25)  T(F): 98 (14 May 2021 10:02), Max: 98.4 (14 May 2021 05:25)  HR: 71 (14 May 2021 10:02) (71 - 90)  BP: 111/56 (14 May 2021 10:02) (111/56 - 151/67)  BP(mean): --  RR: 18 (14 May 2021 10:02) (18 - 18)  SpO2: 100% (14 May 2021 10:02) (97% - 100%)

## 2021-05-14 NOTE — BH CONSULTATION LIAISON ASSESSMENT NOTE - HPI (INCLUDE ILLNESS QUALITY, SEVERITY, DURATION, TIMING, CONTEXT, MODIFYING FACTORS, ASSOCIATED SIGNS AND SYMPTOMS)
81 yo F with h/o HTN, DM, A fib on Eliquis, ESRD on HD (MWF) brought to ED by family for AMS. Admitted for acute CVA. Psych consulted for impulsivity.     As per HPI: "81 yo F with h/o HTN, DM, A fib on Eliquis, ESRD on HD (MWF) brought to ED by family for AMS. Pt is poor historian, history obtained from her son Julius ([phone]). For the past week pt has been moving slower and appeared more lethargic. Yesterday she also appeared confused, kept screaming for help when nothing was wrong. She has also been making up names and sometimes had nonsensical speech. At baseline pt is AAOx3 and able to have conversation and understands what's going on around her. She usually ambulates with a cane. She has not had any recent falls. No recent fevers, chills or cough. She has no complained of any pain."     Chart reviewed. Patient discussed in AM rounds. AOx1. Patient calling out to writer "help me, help me" prior to writer engaging patient in conversation. When writer approached patient to begin eval, patient stated "I am doing fine." Exam limited due to patients gross disorganization, disorientation, inattention, and impulsivity. Patient denies any pain/discomfort or current needs. Patient states that she feels safe in her environment and denied AH/VH. Throughout eval patient demonstrated lack of control over impulsivity via an inability to hold back from grabbing onto objects that were held in front of her as she was instructed not to take objects. Patient demonstrated inappropriate reaction to objects being held up in front of her, reaching out for them rather than addressing the odd nature of the interviewer actions. Patient also grabbing onto objects near by irrationally including the writers papers and lab coat pockets. Patient likely exhibiting new baseline impulsivity following acute CVA events in the setting of severe vacular dementia. No PRNs x 24 hours.

## 2021-05-15 LAB
ALBUMIN SERPL ELPH-MCNC: 4.4 G/DL — SIGNIFICANT CHANGE UP (ref 3.3–5)
ALP SERPL-CCNC: 66 U/L — SIGNIFICANT CHANGE UP (ref 40–120)
ALT FLD-CCNC: 15 U/L — SIGNIFICANT CHANGE UP (ref 4–33)
AMMONIA BLD-MCNC: 20 UMOL/L — SIGNIFICANT CHANGE UP (ref 11–55)
ANION GAP SERPL CALC-SCNC: 17 MMOL/L — HIGH (ref 7–14)
AST SERPL-CCNC: 36 U/L — HIGH (ref 4–32)
BASOPHILS # BLD AUTO: 0.03 K/UL — SIGNIFICANT CHANGE UP (ref 0–0.2)
BASOPHILS NFR BLD AUTO: 0.4 % — SIGNIFICANT CHANGE UP (ref 0–2)
BILIRUB DIRECT SERPL-MCNC: <0.2 MG/DL — SIGNIFICANT CHANGE UP (ref 0–0.2)
BILIRUB INDIRECT FLD-MCNC: >0.2 MG/DL — SIGNIFICANT CHANGE UP (ref 0–1)
BILIRUB SERPL-MCNC: 0.4 MG/DL — SIGNIFICANT CHANGE UP (ref 0.2–1.2)
BUN SERPL-MCNC: 14 MG/DL — SIGNIFICANT CHANGE UP (ref 7–23)
CALCIUM SERPL-MCNC: 9.7 MG/DL — SIGNIFICANT CHANGE UP (ref 8.4–10.5)
CHLORIDE SERPL-SCNC: 90 MMOL/L — LOW (ref 98–107)
CO2 SERPL-SCNC: 28 MMOL/L — SIGNIFICANT CHANGE UP (ref 22–31)
CREAT SERPL-MCNC: 5.44 MG/DL — HIGH (ref 0.5–1.3)
EOSINOPHIL # BLD AUTO: 0.14 K/UL — SIGNIFICANT CHANGE UP (ref 0–0.5)
EOSINOPHIL NFR BLD AUTO: 2 % — SIGNIFICANT CHANGE UP (ref 0–6)
GLUCOSE SERPL-MCNC: 88 MG/DL — SIGNIFICANT CHANGE UP (ref 70–99)
HCT VFR BLD CALC: 39 % — SIGNIFICANT CHANGE UP (ref 34.5–45)
HGB BLD-MCNC: 11.3 G/DL — LOW (ref 11.5–15.5)
IANC: 4.96 K/UL — SIGNIFICANT CHANGE UP (ref 1.5–8.5)
IMM GRANULOCYTES NFR BLD AUTO: 0.3 % — SIGNIFICANT CHANGE UP (ref 0–1.5)
LYMPHOCYTES # BLD AUTO: 1.17 K/UL — SIGNIFICANT CHANGE UP (ref 1–3.3)
LYMPHOCYTES # BLD AUTO: 16.6 % — SIGNIFICANT CHANGE UP (ref 13–44)
MAGNESIUM SERPL-MCNC: 2.1 MG/DL — SIGNIFICANT CHANGE UP (ref 1.6–2.6)
MCHC RBC-ENTMCNC: 23.6 PG — LOW (ref 27–34)
MCHC RBC-ENTMCNC: 29 GM/DL — LOW (ref 32–36)
MCV RBC AUTO: 81.6 FL — SIGNIFICANT CHANGE UP (ref 80–100)
MONOCYTES # BLD AUTO: 0.73 K/UL — SIGNIFICANT CHANGE UP (ref 0–0.9)
MONOCYTES NFR BLD AUTO: 10.4 % — SIGNIFICANT CHANGE UP (ref 2–14)
NEUTROPHILS # BLD AUTO: 4.96 K/UL — SIGNIFICANT CHANGE UP (ref 1.8–7.4)
NEUTROPHILS NFR BLD AUTO: 70.3 % — SIGNIFICANT CHANGE UP (ref 43–77)
NRBC # BLD: 0 /100 WBCS — SIGNIFICANT CHANGE UP
NRBC # FLD: 0.03 K/UL — HIGH
PHOSPHATE SERPL-MCNC: 4.1 MG/DL — SIGNIFICANT CHANGE UP (ref 2.5–4.5)
PLATELET # BLD AUTO: 267 K/UL — SIGNIFICANT CHANGE UP (ref 150–400)
POTASSIUM SERPL-MCNC: 4 MMOL/L — SIGNIFICANT CHANGE UP (ref 3.5–5.3)
POTASSIUM SERPL-SCNC: 4 MMOL/L — SIGNIFICANT CHANGE UP (ref 3.5–5.3)
PROT SERPL-MCNC: 8.5 G/DL — HIGH (ref 6–8.3)
RBC # BLD: 4.78 M/UL — SIGNIFICANT CHANGE UP (ref 3.8–5.2)
RBC # FLD: 16 % — HIGH (ref 10.3–14.5)
SODIUM SERPL-SCNC: 135 MMOL/L — SIGNIFICANT CHANGE UP (ref 135–145)
T PALLIDUM AB TITR SER: NEGATIVE — SIGNIFICANT CHANGE UP
WBC # BLD: 7.05 K/UL — SIGNIFICANT CHANGE UP (ref 3.8–10.5)
WBC # FLD AUTO: 7.05 K/UL — SIGNIFICANT CHANGE UP (ref 3.8–10.5)

## 2021-05-15 RX ADMIN — DIVALPROEX SODIUM 250 MILLIGRAM(S): 500 TABLET, DELAYED RELEASE ORAL at 06:03

## 2021-05-15 RX ADMIN — DIVALPROEX SODIUM 250 MILLIGRAM(S): 500 TABLET, DELAYED RELEASE ORAL at 18:01

## 2021-05-15 RX ADMIN — Medication 500 MILLIGRAM(S): at 06:03

## 2021-05-15 RX ADMIN — CHLORHEXIDINE GLUCONATE 1 APPLICATION(S): 213 SOLUTION TOPICAL at 17:28

## 2021-05-15 RX ADMIN — APIXABAN 2.5 MILLIGRAM(S): 2.5 TABLET, FILM COATED ORAL at 06:03

## 2021-05-15 RX ADMIN — PANTOPRAZOLE SODIUM 40 MILLIGRAM(S): 20 TABLET, DELAYED RELEASE ORAL at 06:25

## 2021-05-15 RX ADMIN — Medication 81 MILLIGRAM(S): at 11:54

## 2021-05-15 RX ADMIN — DIVALPROEX SODIUM 250 MILLIGRAM(S): 500 TABLET, DELAYED RELEASE ORAL at 22:09

## 2021-05-15 RX ADMIN — APIXABAN 2.5 MILLIGRAM(S): 2.5 TABLET, FILM COATED ORAL at 18:02

## 2021-05-15 RX ADMIN — DONEPEZIL HYDROCHLORIDE 10 MILLIGRAM(S): 10 TABLET, FILM COATED ORAL at 22:09

## 2021-05-15 RX ADMIN — INSULIN GLARGINE 10 UNIT(S): 100 INJECTION, SOLUTION SUBCUTANEOUS at 22:12

## 2021-05-15 RX ADMIN — Medication 667 MILLIGRAM(S): at 11:54

## 2021-05-15 RX ADMIN — Medication 667 MILLIGRAM(S): at 18:01

## 2021-05-15 RX ADMIN — QUETIAPINE FUMARATE 25 MILLIGRAM(S): 200 TABLET, FILM COATED ORAL at 11:54

## 2021-05-15 RX ADMIN — ATORVASTATIN CALCIUM 80 MILLIGRAM(S): 80 TABLET, FILM COATED ORAL at 22:09

## 2021-05-15 RX ADMIN — Medication 667 MILLIGRAM(S): at 08:42

## 2021-05-15 NOTE — PROGRESS NOTE ADULT - ASSESSMENT
80F hx htn, dm, A fib on eliquis, prior CVA and esrd MWF last dialyzed Friday but missed Wednesday presents with AMS    ESRD on HD MWF via avf  from [facility]  Last HD was Friday   HD planned Monday   HD per schedule  consent obtained from kenny   monitor electrolyte    AMS  MRI + CVA  f/u neuro    Anemia  H b at goal for ESRD  monitor Hb    HTN  mostly controlled  continue current meds  monitor    ckd-mbd  Hectorol 2mcg with HD  monitor phos and calcium daily

## 2021-05-16 LAB
ALBUMIN SERPL ELPH-MCNC: 4.5 G/DL — SIGNIFICANT CHANGE UP (ref 3.3–5)
ALP SERPL-CCNC: 70 U/L — SIGNIFICANT CHANGE UP (ref 40–120)
ALT FLD-CCNC: 14 U/L — SIGNIFICANT CHANGE UP (ref 4–33)
ANION GAP SERPL CALC-SCNC: 19 MMOL/L — HIGH (ref 7–14)
AST SERPL-CCNC: 33 U/L — HIGH (ref 4–32)
BILIRUB DIRECT SERPL-MCNC: <0.2 MG/DL — SIGNIFICANT CHANGE UP (ref 0–0.2)
BILIRUB INDIRECT FLD-MCNC: >0.3 MG/DL — SIGNIFICANT CHANGE UP (ref 0–1)
BILIRUB SERPL-MCNC: 0.5 MG/DL — SIGNIFICANT CHANGE UP (ref 0.2–1.2)
BUN SERPL-MCNC: 22 MG/DL — SIGNIFICANT CHANGE UP (ref 7–23)
CALCIUM SERPL-MCNC: 10.2 MG/DL — SIGNIFICANT CHANGE UP (ref 8.4–10.5)
CHLORIDE SERPL-SCNC: 89 MMOL/L — LOW (ref 98–107)
CO2 SERPL-SCNC: 28 MMOL/L — SIGNIFICANT CHANGE UP (ref 22–31)
CREAT SERPL-MCNC: 8.03 MG/DL — HIGH (ref 0.5–1.3)
GLUCOSE SERPL-MCNC: 55 MG/DL — LOW (ref 70–99)
MAGNESIUM SERPL-MCNC: 2.5 MG/DL — SIGNIFICANT CHANGE UP (ref 1.6–2.6)
PHOSPHATE SERPL-MCNC: 4.5 MG/DL — SIGNIFICANT CHANGE UP (ref 2.5–4.5)
POTASSIUM SERPL-MCNC: 3.7 MMOL/L — SIGNIFICANT CHANGE UP (ref 3.5–5.3)
POTASSIUM SERPL-SCNC: 3.7 MMOL/L — SIGNIFICANT CHANGE UP (ref 3.5–5.3)
PROT SERPL-MCNC: 9 G/DL — HIGH (ref 6–8.3)
SARS-COV-2 RNA SPEC QL NAA+PROBE: SIGNIFICANT CHANGE UP
SODIUM SERPL-SCNC: 136 MMOL/L — SIGNIFICANT CHANGE UP (ref 135–145)
VIT B12 SERPL-MCNC: 2000 PG/ML — HIGH (ref 200–900)

## 2021-05-16 RX ADMIN — APIXABAN 2.5 MILLIGRAM(S): 2.5 TABLET, FILM COATED ORAL at 06:16

## 2021-05-16 RX ADMIN — APIXABAN 2.5 MILLIGRAM(S): 2.5 TABLET, FILM COATED ORAL at 18:04

## 2021-05-16 RX ADMIN — DIVALPROEX SODIUM 250 MILLIGRAM(S): 500 TABLET, DELAYED RELEASE ORAL at 21:48

## 2021-05-16 RX ADMIN — Medication 81 MILLIGRAM(S): at 11:40

## 2021-05-16 RX ADMIN — DIVALPROEX SODIUM 250 MILLIGRAM(S): 500 TABLET, DELAYED RELEASE ORAL at 11:41

## 2021-05-16 RX ADMIN — CHLORHEXIDINE GLUCONATE 1 APPLICATION(S): 213 SOLUTION TOPICAL at 11:42

## 2021-05-16 RX ADMIN — INSULIN GLARGINE 10 UNIT(S): 100 INJECTION, SOLUTION SUBCUTANEOUS at 21:53

## 2021-05-16 RX ADMIN — Medication 667 MILLIGRAM(S): at 18:04

## 2021-05-16 RX ADMIN — DONEPEZIL HYDROCHLORIDE 10 MILLIGRAM(S): 10 TABLET, FILM COATED ORAL at 21:48

## 2021-05-16 RX ADMIN — DIVALPROEX SODIUM 250 MILLIGRAM(S): 500 TABLET, DELAYED RELEASE ORAL at 06:15

## 2021-05-16 RX ADMIN — ATORVASTATIN CALCIUM 80 MILLIGRAM(S): 80 TABLET, FILM COATED ORAL at 21:54

## 2021-05-16 RX ADMIN — Medication 667 MILLIGRAM(S): at 11:40

## 2021-05-16 RX ADMIN — PANTOPRAZOLE SODIUM 40 MILLIGRAM(S): 20 TABLET, DELAYED RELEASE ORAL at 06:16

## 2021-05-16 RX ADMIN — QUETIAPINE FUMARATE 25 MILLIGRAM(S): 200 TABLET, FILM COATED ORAL at 11:40

## 2021-05-16 NOTE — PROGRESS NOTE ADULT - ASSESSMENT
80F hx htn, dm, A fib on eliquis, prior CVA and esrd MWF last dialyzed Friday but missed Wednesday presents with AMS    ESRD on HD MWF via avf  from [place] [facility]  Last HD was Friday   HD planned Monday   HD per schedule  consent obtained from kenny   monitor electrolyte    AMS  MRI + CVA  f/u neuro    Anemia  Hb at goal for ESRD  monitor Hb    HTN  mostly controlled  continue current meds  monitor    ckd-mbd  Hectorol 2mcg with HD  monitor phos and calcium daily

## 2021-05-17 VITALS
SYSTOLIC BLOOD PRESSURE: 158 MMHG | OXYGEN SATURATION: 99 % | TEMPERATURE: 98 F | RESPIRATION RATE: 17 BRPM | HEART RATE: 100 BPM | DIASTOLIC BLOOD PRESSURE: 68 MMHG

## 2021-05-17 LAB
ANION GAP SERPL CALC-SCNC: 20 MMOL/L — HIGH (ref 7–14)
BASOPHILS # BLD AUTO: 0.03 K/UL — SIGNIFICANT CHANGE UP (ref 0–0.2)
BASOPHILS NFR BLD AUTO: 0.5 % — SIGNIFICANT CHANGE UP (ref 0–2)
BUN SERPL-MCNC: 35 MG/DL — HIGH (ref 7–23)
CALCIUM SERPL-MCNC: 9.3 MG/DL — SIGNIFICANT CHANGE UP (ref 8.4–10.5)
CHLORIDE SERPL-SCNC: 89 MMOL/L — LOW (ref 98–107)
CO2 SERPL-SCNC: 24 MMOL/L — SIGNIFICANT CHANGE UP (ref 22–31)
CREAT SERPL-MCNC: 9.91 MG/DL — HIGH (ref 0.5–1.3)
EOSINOPHIL # BLD AUTO: 0.24 K/UL — SIGNIFICANT CHANGE UP (ref 0–0.5)
EOSINOPHIL NFR BLD AUTO: 3.9 % — SIGNIFICANT CHANGE UP (ref 0–6)
GLUCOSE SERPL-MCNC: 67 MG/DL — LOW (ref 70–99)
HCT VFR BLD CALC: 35.4 % — SIGNIFICANT CHANGE UP (ref 34.5–45)
HGB BLD-MCNC: 10.6 G/DL — LOW (ref 11.5–15.5)
IANC: 4.18 K/UL — SIGNIFICANT CHANGE UP (ref 1.5–8.5)
IMM GRANULOCYTES NFR BLD AUTO: 0.3 % — SIGNIFICANT CHANGE UP (ref 0–1.5)
LYMPHOCYTES # BLD AUTO: 1.04 K/UL — SIGNIFICANT CHANGE UP (ref 1–3.3)
LYMPHOCYTES # BLD AUTO: 17 % — SIGNIFICANT CHANGE UP (ref 13–44)
MAGNESIUM SERPL-MCNC: 2.4 MG/DL — SIGNIFICANT CHANGE UP (ref 1.6–2.6)
MCHC RBC-ENTMCNC: 23.9 PG — LOW (ref 27–34)
MCHC RBC-ENTMCNC: 29.9 GM/DL — LOW (ref 32–36)
MCV RBC AUTO: 79.9 FL — LOW (ref 80–100)
MONOCYTES # BLD AUTO: 0.6 K/UL — SIGNIFICANT CHANGE UP (ref 0–0.9)
MONOCYTES NFR BLD AUTO: 9.8 % — SIGNIFICANT CHANGE UP (ref 2–14)
NEUTROPHILS # BLD AUTO: 4.18 K/UL — SIGNIFICANT CHANGE UP (ref 1.8–7.4)
NEUTROPHILS NFR BLD AUTO: 68.5 % — SIGNIFICANT CHANGE UP (ref 43–77)
NRBC # BLD: 0 /100 WBCS — SIGNIFICANT CHANGE UP
NRBC # FLD: 0.02 K/UL — HIGH
PHOSPHATE SERPL-MCNC: 5 MG/DL — HIGH (ref 2.5–4.5)
PLATELET # BLD AUTO: 196 K/UL — SIGNIFICANT CHANGE UP (ref 150–400)
POTASSIUM SERPL-MCNC: 3.3 MMOL/L — LOW (ref 3.5–5.3)
POTASSIUM SERPL-SCNC: 3.3 MMOL/L — LOW (ref 3.5–5.3)
RBC # BLD: 4.43 M/UL — SIGNIFICANT CHANGE UP (ref 3.8–5.2)
RBC # FLD: 15.3 % — HIGH (ref 10.3–14.5)
SODIUM SERPL-SCNC: 133 MMOL/L — LOW (ref 135–145)
WBC # BLD: 6.11 K/UL — SIGNIFICANT CHANGE UP (ref 3.8–10.5)
WBC # FLD AUTO: 6.11 K/UL — SIGNIFICANT CHANGE UP (ref 3.8–10.5)

## 2021-05-17 RX ORDER — HYDRALAZINE HCL 50 MG
1 TABLET ORAL
Qty: 0 | Refills: 0 | DISCHARGE

## 2021-05-17 RX ORDER — DIVALPROEX SODIUM 500 MG/1
2 TABLET, DELAYED RELEASE ORAL
Qty: 0 | Refills: 0 | DISCHARGE
Start: 2021-05-17

## 2021-05-17 RX ORDER — APIXABAN 2.5 MG/1
1 TABLET, FILM COATED ORAL
Qty: 0 | Refills: 0 | DISCHARGE
Start: 2021-05-17

## 2021-05-17 RX ORDER — HYDRALAZINE HCL 50 MG
1 TABLET ORAL
Qty: 0 | Refills: 0 | DISCHARGE
Start: 2021-05-17

## 2021-05-17 RX ORDER — QUETIAPINE FUMARATE 200 MG/1
1 TABLET, FILM COATED ORAL
Qty: 0 | Refills: 0 | DISCHARGE
Start: 2021-05-17

## 2021-05-17 RX ORDER — PANTOPRAZOLE SODIUM 20 MG/1
1 TABLET, DELAYED RELEASE ORAL
Qty: 0 | Refills: 0 | DISCHARGE
Start: 2021-05-17

## 2021-05-17 RX ORDER — CYPROHEPTADINE HYDROCHLORIDE 4 MG/1
2 TABLET ORAL
Qty: 0 | Refills: 0 | DISCHARGE

## 2021-05-17 RX ORDER — CLONAZEPAM 1 MG
1 TABLET ORAL
Qty: 0 | Refills: 0 | DISCHARGE

## 2021-05-17 RX ORDER — PANTOPRAZOLE SODIUM 20 MG/1
1 TABLET, DELAYED RELEASE ORAL
Qty: 0 | Refills: 0 | DISCHARGE

## 2021-05-17 RX ORDER — APIXABAN 2.5 MG/1
1 TABLET, FILM COATED ORAL
Qty: 0 | Refills: 0 | DISCHARGE

## 2021-05-17 RX ADMIN — PANTOPRAZOLE SODIUM 40 MILLIGRAM(S): 20 TABLET, DELAYED RELEASE ORAL at 06:13

## 2021-05-17 RX ADMIN — Medication 81 MILLIGRAM(S): at 11:25

## 2021-05-17 RX ADMIN — APIXABAN 2.5 MILLIGRAM(S): 2.5 TABLET, FILM COATED ORAL at 06:14

## 2021-05-17 RX ADMIN — LIDOCAINE AND PRILOCAINE CREAM 1 APPLICATION(S): 25; 25 CREAM TOPICAL at 06:13

## 2021-05-17 RX ADMIN — DOXERCALCIFEROL 2 MICROGRAM(S): 2.5 CAPSULE ORAL at 13:27

## 2021-05-17 RX ADMIN — Medication 667 MILLIGRAM(S): at 09:13

## 2021-05-17 RX ADMIN — Medication 667 MILLIGRAM(S): at 11:29

## 2021-05-17 RX ADMIN — DIVALPROEX SODIUM 250 MILLIGRAM(S): 500 TABLET, DELAYED RELEASE ORAL at 06:14

## 2021-05-17 RX ADMIN — DOXERCALCIFEROL 2 MICROGRAM(S): 2.5 CAPSULE ORAL at 14:38

## 2021-05-17 RX ADMIN — Medication 667 MILLIGRAM(S): at 16:39

## 2021-05-17 RX ADMIN — CHLORHEXIDINE GLUCONATE 1 APPLICATION(S): 213 SOLUTION TOPICAL at 11:27

## 2021-05-17 RX ADMIN — APIXABAN 2.5 MILLIGRAM(S): 2.5 TABLET, FILM COATED ORAL at 16:57

## 2021-05-17 RX ADMIN — DIVALPROEX SODIUM 250 MILLIGRAM(S): 500 TABLET, DELAYED RELEASE ORAL at 11:34

## 2021-05-17 RX ADMIN — QUETIAPINE FUMARATE 25 MILLIGRAM(S): 200 TABLET, FILM COATED ORAL at 11:27

## 2021-05-17 NOTE — PROGRESS NOTE ADULT - ASSESSMENT
Assessment and Plan    81 yo F with h/o HTN, DM, A fib on Eliquis, ESRD on HD (MWF) brought to ED by family for AMS    EKG: NSR, TWI leads I & AVL  Tele: NSR     1. AMS  -CT head with possible new infarct, evolving  -f/u neuro  - IV abx for UTI  -echo from 2019 with normal LV function and Small pericardial effusion posterior and lateral to the  left ventricle  -echo with normal LV function. Agitated saline injection is inconclusive regarding the  presence of a patent foramen ovale.  -MRI with acute infarct. f/u neuro re is it embolic, was on eliquis for afib. ?eliquis failure vs medication noncompliance     2. Afib  -on eliquis  -c/w metoprolol    3. HTN  -controlled  -c/w hydralazine and metoprolol  -continue to monitor BP    4. ESRD  -on HD  -f/u renal

## 2021-05-17 NOTE — PROGRESS NOTE ADULT - PROBLEM SELECTOR PLAN 3
- Continue Ceftriaxone  - f/u UCx
- Continue Ceftriaxone, 7 dyas total   - oral hydration
- Continue Ceftriaxone, 7 dyas total , can switch to oral ceftin on discharge   - oral hydration

## 2021-05-17 NOTE — PROGRESS NOTE ADULT - REASON FOR ADMISSION
AMS

## 2021-05-17 NOTE — DISCHARGE NOTE NURSING/CASE MANAGEMENT/SOCIAL WORK - NSDCFUADDAPPT_GEN_ALL_CORE_FT
If you are in need of a general medicine physician and post-discharge medical follow-up for further care/recommendations you may contact the [facility] for an appointment [phone]

## 2021-05-17 NOTE — PROGRESS NOTE ADULT - SUBJECTIVE AND OBJECTIVE BOX
Dieter Willson MD  Interventional Cardiology / Advance Heart Failure and Cardiac Transplant Specialist  [place] Office : [address] [place] [address] [place] [address]  Tel:   [place] Office : [address] [facility] [place] [address]  Tel: [phone]  Cell : [phone]    Pt is lying in bed comfortable not in distress, no chest pains no SOB no palpitations  	  MEDICATIONS:  apixaban 2.5 milliGRAM(s) Oral two times a day  aspirin enteric coated 81 milliGRAM(s) Oral daily  hydrALAZINE 25 milliGRAM(s) Oral two times a day  metoprolol succinate  milliGRAM(s) Oral daily    cefuroxime   Tablet 500 milliGRAM(s) Oral every 12 hours      diVALproex Sprinkle 250 milliGRAM(s) Oral three times a day  donepezil 10 milliGRAM(s) Oral at bedtime  ondansetron Injectable 4 milliGRAM(s) IV Push every 6 hours PRN  QUEtiapine 25 milliGRAM(s) Oral daily    pantoprazole    Tablet 40 milliGRAM(s) Oral before breakfast    atorvastatin 80 milliGRAM(s) Oral at bedtime  dextrose 40% Gel 15 Gram(s) Oral once  dextrose 50% Injectable 25 Gram(s) IV Push once  dextrose 50% Injectable 12.5 Gram(s) IV Push once  dextrose 50% Injectable 25 Gram(s) IV Push once  glucagon  Injectable 1 milliGRAM(s) IntraMuscular once  insulin glargine Injectable (LANTUS) 10 Unit(s) SubCutaneous at bedtime  insulin lispro (ADMELOG) corrective regimen sliding scale   SubCutaneous three times a day before meals  insulin lispro (ADMELOG) corrective regimen sliding scale   SubCutaneous at bedtime    calcium acetate 667 milliGRAM(s) Oral three times a day with meals  chlorhexidine 4% Liquid 1 Application(s) Topical daily  dextrose 5%. 1000 milliLiter(s) IV Continuous <Continuous>  dextrose 5%. 1000 milliLiter(s) IV Continuous <Continuous>  doxercalciferol Injectable 2 MICROGram(s) IV Push <User Schedule>  lidocaine/prilocaine Cream 1 Application(s) Topical <User Schedule>      PAST MEDICAL/SURGICAL HISTORY  PAST MEDICAL & SURGICAL HISTORY:  DM (diabetes mellitus)  TYpe II    HTN (hypertension)    Hypercholesteremia    CKD (chronic kidney disease)  now on HD via R forearm AVF    Anemia    Atrial fibrillation  On Eliquis    Cerebrovascular accident (CVA)    Status post right foot surgery  hammer toe repair    AV fistula  June 2018, 2 ballooning (last one 2 weeks ago), following up on Dec 15th    H/O colonoscopy with polypectomy        SOCIAL HISTORY: Substance Use (street drugs): ( x ) never used  (  ) other:    FAMILY HISTORY:  Family history of hypertension (Sibling)    Family history of diabetes mellitus    Family history of lung cancer (Sibling)    Family history of malignant neoplasm of gastrointestinal tract         PHYSICAL EXAM:  T(C): 36.7 (05-14-21 @ 21:12), Max: 37.1 (05-14-21 @ 13:38)  HR: 74 (05-14-21 @ 21:12) (71 - 84)  BP: 108/54 (05-14-21 @ 21:12) (108/54 - 151/67)  RR: 17 (05-14-21 @ 21:12) (16 - 18)  SpO2: 100% (05-14-21 @ 21:12) (99% - 100%)  Wt(kg): --  I&O's Summary    14 May 2021 07:01  -  14 May 2021 22:44  --------------------------------------------------------  IN: 500 mL / OUT: 1200 mL / NET: -700 mL          GENERAL: NAD  EYES: EOMI, PERRLA, conjunctiva and sclera clear  ENMT: No tonsillar erythema, exudates, or enlargement; Moist mucous membranes, Good dentition, No lesions  Cardiovascular: Normal S1 S2, No JVD, No murmurs, No edema  Respiratory: Lungs clear to auscultation	  Gastrointestinal:  Soft, Non-tender, + BS	  Extremities: no edema                                    11.0   4.25  )-----------( 234      ( 14 May 2021 07:07 )             38.9     05-14    137  |  92<L>  |  20  ----------------------------<  90  4.1   |  25  |  8.09<H>    Ca    9.7      14 May 2021 07:07  Phos  4.3     05-14  Mg     2.5     05-14      proBNP:   Lipid Profile:   HgA1c:   TSH: Thyroid Stimulating Hormone, Serum: 3.72 uIU/mL (05-14 @ 07:12)      Consultant(s) Notes Reviewed:  [x ] YES  [ ] NO    Care Discussed with Consultants/Other Providers [ x] YES  [ ] NO    Imaging Personally Reviewed independently:  [x] YES  [ ] NO    All labs, radiologic studies, vitals, orders and medications list reviewed. Patient is seen and examined at bedside. Case discussed with medical team.        
Dieter Willson MD  Interventional Cardiology / Advance Heart Failure and Cardiac Transplant Specialist  [place] Office : [address] [place] [address] [place]. [address]  Tel:   [place] Office : [address] [facility] [place] [address]  Tel: [phone]  Cell : [phone]    Subjective/Overnight events: Pt is lying in bed comfortable not in distress  	  MEDICATIONS:  apixaban 5 milliGRAM(s) Oral two times a day  aspirin  chewable 81 milliGRAM(s) Oral daily  hydrALAZINE 25 milliGRAM(s) Oral two times a day  metoprolol succinate  milliGRAM(s) Oral daily    cefTRIAXone   IVPB 1000 milliGRAM(s) IV Intermittent every 24 hours      clonazePAM  Tablet 0.5 milliGRAM(s) Oral once  donepezil 10 milliGRAM(s) Oral at bedtime  ondansetron Injectable 4 milliGRAM(s) IV Push every 6 hours PRN    pantoprazole    Tablet 40 milliGRAM(s) Oral before breakfast    atorvastatin 80 milliGRAM(s) Oral at bedtime  dextrose 40% Gel 15 Gram(s) Oral once  dextrose 50% Injectable 25 Gram(s) IV Push once  dextrose 50% Injectable 12.5 Gram(s) IV Push once  dextrose 50% Injectable 25 Gram(s) IV Push once  glucagon  Injectable 1 milliGRAM(s) IntraMuscular once  insulin glargine Injectable (LANTUS) 10 Unit(s) SubCutaneous at bedtime  insulin lispro (ADMELOG) corrective regimen sliding scale   SubCutaneous three times a day before meals  insulin lispro (ADMELOG) corrective regimen sliding scale   SubCutaneous at bedtime    calcium acetate 667 milliGRAM(s) Oral three times a day with meals  dextrose 5%. 1000 milliLiter(s) IV Continuous <Continuous>  dextrose 5%. 1000 milliLiter(s) IV Continuous <Continuous>  doxercalciferol Injectable 2 MICROGram(s) IV Push <User Schedule>  epoetin raheem-epbx (RETACRIT) Injectable 08121 Unit(s) IV Push <User Schedule>  lidocaine/prilocaine Cream 1 Application(s) Topical <User Schedule>      PAST MEDICAL/SURGICAL HISTORY  PAST MEDICAL & SURGICAL HISTORY:  DM (diabetes mellitus)  TYpe II    HTN (hypertension)    Hypercholesteremia    CKD (chronic kidney disease)  now on HD via R forearm AVF    Anemia    Atrial fibrillation  On Eliquis    Cerebrovascular accident (CVA)    Status post right foot surgery  hammer toe repair    AV fistula  June 2018, 2 ballooning (last one 2 weeks ago), following up on Dec 15th    H/O colonoscopy with polypectomy        SOCIAL HISTORY: Substance Use (street drugs): ( x ) never used  (  ) other:    FAMILY HISTORY:  Family history of hypertension (Sibling)    Family history of diabetes mellitus    Family history of lung cancer (Sibling)    Family history of malignant neoplasm of gastrointestinal tract        REVIEW OF SYSTEMS:  unable to obtain    PHYSICAL EXAM:  T(C): 36.6 (05-11-21 @ 12:39), Max: 37.1 (05-10-21 @ 22:01)  HR: 80 (05-11-21 @ 12:39) (72 - 89)  BP: 134/60 (05-11-21 @ 12:39) (134/60 - 152/70)  RR: 16 (05-11-21 @ 12:39) (15 - 17)  SpO2: 99% (05-11-21 @ 12:39) (99% - 100%)  Wt(kg): --  I&O's Summary    10 May 2021 07:01  -  11 May 2021 07:00  --------------------------------------------------------  IN: 600 mL / OUT: 1600 mL / NET: -1000 mL          GENERAL: NAD  EYES: EOMI, PERRLA, conjunctiva and sclera clear  ENMT: No tonsillar erythema, exudates, or enlargement  Cardiovascular: Normal S1 S2, No JVD, No murmurs, No edema  Respiratory: Lungs clear to auscultation	  Gastrointestinal:  Soft, Non-tender, + BS	  Extremities: No edema                                    10.0   5.69  )-----------( 201      ( 11 May 2021 07:18 )             33.4     05-11    134<L>  |  94<L>  |  20  ----------------------------<  71  3.6   |  27  |  6.58<H>    Ca    8.9      11 May 2021 07:18  Phos  3.5     05-11  Mg     2.1     05-11    TPro  7.7  /  Alb  3.8  /  TBili  0.3  /  DBili  x   /  AST  27  /  ALT  13  /  AlkPhos  60  05-11    proBNP:   Lipid Profile:   HgA1c:   TSH:     Consultant(s) Notes Reviewed:  [x ] YES  [ ] NO    Care Discussed with Consultants/Other Providers [ x] YES  [ ] NO    Imaging Personally Reviewed independently:  [x] YES  [ ] NO    All labs, radiologic studies, vitals, orders and medications list reviewed. Patient is seen and examined at bedside. Case discussed with medical team.        
Dieter Willson MD  Interventional Cardiology / Advance Heart Failure and Cardiac Transplant Specialist  [place] Office : [address] [place] [address] [place]. [address]  Tel:   [place] Office : [address] [facility] [place] [address]  Tel: [phone]  Cell : [phone]    Subjective/Overnight events: Pt is lying in bed comfortable not in distress, no chest pains no SOB no palpitations  	  MEDICATIONS:  apixaban 5 milliGRAM(s) Oral two times a day  aspirin  chewable 81 milliGRAM(s) Oral daily  hydrALAZINE 25 milliGRAM(s) Oral two times a day  metoprolol succinate  milliGRAM(s) Oral daily    cefTRIAXone   IVPB 1000 milliGRAM(s) IV Intermittent every 24 hours      clonazePAM  Tablet 0.5 milliGRAM(s) Oral once  donepezil 10 milliGRAM(s) Oral at bedtime  ondansetron Injectable 4 milliGRAM(s) IV Push every 6 hours PRN    pantoprazole    Tablet 40 milliGRAM(s) Oral before breakfast    atorvastatin 80 milliGRAM(s) Oral at bedtime  dextrose 40% Gel 15 Gram(s) Oral once  dextrose 50% Injectable 25 Gram(s) IV Push once  dextrose 50% Injectable 12.5 Gram(s) IV Push once  dextrose 50% Injectable 25 Gram(s) IV Push once  glucagon  Injectable 1 milliGRAM(s) IntraMuscular once  insulin glargine Injectable (LANTUS) 10 Unit(s) SubCutaneous at bedtime  insulin lispro (ADMELOG) corrective regimen sliding scale   SubCutaneous three times a day before meals  insulin lispro (ADMELOG) corrective regimen sliding scale   SubCutaneous at bedtime    calcium acetate 667 milliGRAM(s) Oral three times a day with meals  dextrose 5%. 1000 milliLiter(s) IV Continuous <Continuous>  dextrose 5%. 1000 milliLiter(s) IV Continuous <Continuous>  doxercalciferol Injectable 2 MICROGram(s) IV Push <User Schedule>  epoetin raheem-epbx (RETACRIT) Injectable 38409 Unit(s) IV Push <User Schedule>  lidocaine/prilocaine Cream 1 Application(s) Topical <User Schedule>      PAST MEDICAL/SURGICAL HISTORY  PAST MEDICAL & SURGICAL HISTORY:  DM (diabetes mellitus)  TYpe II    HTN (hypertension)    Hypercholesteremia    CKD (chronic kidney disease)  now on HD via R forearm AVF    Anemia    Atrial fibrillation  On Eliquis    Cerebrovascular accident (CVA)    Status post right foot surgery  hammer toe repair    AV fistula  June 2018, 2 ballooning (last one 2 weeks ago), following up on Dec 15th    H/O colonoscopy with polypectomy        SOCIAL HISTORY: Substance Use (street drugs): ( x ) never used  (  ) other:    FAMILY HISTORY:  Family history of hypertension (Sibling)    Family history of diabetes mellitus    Family history of lung cancer (Sibling)    Family history of malignant neoplasm of gastrointestinal tract        REVIEW OF SYSTEMS:  CONSTITUTIONAL: No fever, weight loss, or fatigue  EYES: No eye pain, visual disturbances, or discharge  ENMT:  No difficulty hearing, tinnitus, vertigo; No sinus or throat pain  BREASTS: No pain, masses, or nipple discharge  GASTROINTESTINAL: No abdominal or epigastric pain. No nausea, vomiting, or hematemesis; No diarrhea or constipation. No melena or hematochezia.  GENITOURINARY: No dysuria, frequency, hematuria, or incontinence  NEUROLOGICAL: No headaches, memory loss, loss of strength, numbness, or tremors  ENDOCRINE: No heat or cold intolerance; No hair loss  MUSCULOSKELETAL: No joint pain or swelling; No muscle, back, or extremity pain  PSYCHIATRIC: No depression, anxiety, mood swings, or difficulty sleeping  HEME/LYMPH: No easy bruising, or bleeding gums  All others negative    PHYSICAL EXAM:  T(C): 36.7 (05-09-21 @ 23:06), Max: 36.9 (05-09-21 @ 14:42)  HR: 78 (05-09-21 @ 23:06) (75 - 78)  BP: 150/74 (05-09-21 @ 23:06) (131/65 - 150/74)  RR: 18 (05-09-21 @ 23:06) (17 - 18)  SpO2: 100% (05-09-21 @ 23:06) (99% - 100%)  Wt(kg): --  I&O's Summary      GENERAL: NAD  EYES: EOMI, PERRLA, conjunctiva and sclera clear  ENMT: No tonsillar erythema, exudates, or enlargement  Cardiovascular: Normal S1 S2, No JVD, No murmurs, No edema  Respiratory: Lungs clear to auscultation	  Gastrointestinal:  Soft, Non-tender, + BS	  Extremities: No edema                                    9.6    4.38  )-----------( 163      ( 10 May 2021 08:10 )             31.9     05-10    140  |  99  |  33<H>  ----------------------------<  86  4.3   |  24  |  9.97<H>    Ca    8.7      10 May 2021 08:10  Phos  5.8     05-10  Mg     2.3     05-10    TPro  x   /  Alb  x   /  TBili  x   /  DBili  x   /  AST  x   /  ALT  7   /  AlkPhos  x   05-10    proBNP:   Lipid Profile:   HgA1c:   TSH:     Consultant(s) Notes Reviewed:  [x ] YES  [ ] NO    Care Discussed with Consultants/Other Providers [ x] YES  [ ] NO    Imaging Personally Reviewed independently:  [x] YES  [ ] NO    All labs, radiologic studies, vitals, orders and medications list reviewed. Patient is seen and examined at bedside. Case discussed with medical team.        
Dieter Willson MD  Interventional Cardiology / Advance Heart Failure and Cardiac Transplant Specialist  [place] Office : [address] [place] [address] [place] [address]  Tel:   [place] Office : [address] [facility] [place] [address]  Tel: [phone]  Cell : [phone]    Pt is lying in bed comfortable not in distress, no chest pains no SOB no palpitations  	  MEDICATIONS:  apixaban 2.5 milliGRAM(s) Oral two times a day  aspirin enteric coated 81 milliGRAM(s) Oral daily  hydrALAZINE 25 milliGRAM(s) Oral two times a day  metoprolol succinate  milliGRAM(s) Oral daily        diVALproex Sprinkle 250 milliGRAM(s) Oral three times a day  donepezil 10 milliGRAM(s) Oral at bedtime  ondansetron Injectable 4 milliGRAM(s) IV Push every 6 hours PRN  QUEtiapine 25 milliGRAM(s) Oral daily    pantoprazole    Tablet 40 milliGRAM(s) Oral before breakfast    atorvastatin 80 milliGRAM(s) Oral at bedtime  dextrose 40% Gel 15 Gram(s) Oral once  dextrose 50% Injectable 25 Gram(s) IV Push once  dextrose 50% Injectable 12.5 Gram(s) IV Push once  dextrose 50% Injectable 25 Gram(s) IV Push once  glucagon  Injectable 1 milliGRAM(s) IntraMuscular once  insulin glargine Injectable (LANTUS) 10 Unit(s) SubCutaneous at bedtime  insulin lispro (ADMELOG) corrective regimen sliding scale   SubCutaneous three times a day before meals  insulin lispro (ADMELOG) corrective regimen sliding scale   SubCutaneous at bedtime    calcium acetate 667 milliGRAM(s) Oral three times a day with meals  chlorhexidine 4% Liquid 1 Application(s) Topical daily  dextrose 5%. 1000 milliLiter(s) IV Continuous <Continuous>  dextrose 5%. 1000 milliLiter(s) IV Continuous <Continuous>  doxercalciferol Injectable 2 MICROGram(s) IV Push <User Schedule>  lidocaine/prilocaine Cream 1 Application(s) Topical <User Schedule>      PAST MEDICAL/SURGICAL HISTORY  PAST MEDICAL & SURGICAL HISTORY:  DM (diabetes mellitus)  TYpe II    HTN (hypertension)    Hypercholesteremia    CKD (chronic kidney disease)  now on HD via R forearm AVF    Anemia    Atrial fibrillation  On Eliquis    Cerebrovascular accident (CVA)    Status post right foot surgery  hammer toe repair    AV fistula  June 2018, 2 ballooning (last one 2 weeks ago), following up on Dec 15th    H/O colonoscopy with polypectomy        SOCIAL HISTORY: Substance Use (street drugs): ( x ) never used  (  ) other:    FAMILY HISTORY:  Family history of hypertension (Sibling)    Family history of diabetes mellitus    Family history of lung cancer (Sibling)    Family history of malignant neoplasm of gastrointestinal tract         PHYSICAL EXAM:  T(C): 36.5 (05-16-21 @ 11:38), Max: 36.9 (05-15-21 @ 21:58)  HR: 96 (05-16-21 @ 11:38) (84 - 96)  BP: 113/93 (05-16-21 @ 11:38) (98/54 - 125/60)  RR: 17 (05-16-21 @ 11:38) (16 - 18)  SpO2: 97% (05-16-21 @ 11:38) (97% - 100%)  Wt(kg): --  I&O's Summary           EYES: EOMI, PERRLA, conjunctiva and sclera clear  ENMT: No tonsillar erythema, exudates, or enlargement; Moist mucous membranes, Good dentition, No lesions  Cardiovascular: Normal S1 S2, No JVD, No murmurs, No edema  Respiratory: Lungs clear to auscultation	  Gastrointestinal:  Soft, Non-tender, + BS	  Extremities: left sided weakness                                    11.3   7.05  )-----------( 267      ( 15 May 2021 06:11 )             39.0     05-16    136  |  89<L>  |  22  ----------------------------<  55<L>  3.7   |  28  |  8.03<H>    Ca    10.2      16 May 2021 06:58  Phos  4.5     05-16  Mg     2.5     05-16    TPro  9.0<H>  /  Alb  4.5  /  TBili  0.5  /  DBili  <0.2  /  AST  33<H>  /  ALT  14  /  AlkPhos  70  05-16    proBNP:   Lipid Profile:   HgA1c:   TSH:     Consultant(s) Notes Reviewed:  [x ] YES  [ ] NO    Care Discussed with Consultants/Other Providers [ x] YES  [ ] NO    Imaging Personally Reviewed independently:  [x] YES  [ ] NO    All labs, radiologic studies, vitals, orders and medications list reviewed. Patient is seen and examined at bedside. Case discussed with medical team.        
Dieter Willson MD  Interventional Cardiology / Advance Heart Failure and Cardiac Transplant Specialist  [place] Office : [address] [place] [address] [place]. [address]  Tel:   [place] Office : [address] [facility] [place] [address]  Tel: [phone]  Cell : [phone]    Subjective/Overnight events: Pt is lying in bed comfortable not in distress, no chest pains no SOB no palpitations  	  MEDICATIONS:  apixaban 5 milliGRAM(s) Oral two times a day  aspirin  chewable 81 milliGRAM(s) Oral daily  hydrALAZINE 25 milliGRAM(s) Oral two times a day  metoprolol succinate  milliGRAM(s) Oral daily    cefTRIAXone   IVPB 1000 milliGRAM(s) IV Intermittent every 24 hours      clonazePAM  Tablet 0.5 milliGRAM(s) Oral once  donepezil 10 milliGRAM(s) Oral at bedtime  ondansetron Injectable 4 milliGRAM(s) IV Push every 6 hours PRN    pantoprazole    Tablet 40 milliGRAM(s) Oral before breakfast    atorvastatin 80 milliGRAM(s) Oral at bedtime  dextrose 40% Gel 15 Gram(s) Oral once  dextrose 50% Injectable 25 Gram(s) IV Push once  dextrose 50% Injectable 12.5 Gram(s) IV Push once  dextrose 50% Injectable 25 Gram(s) IV Push once  glucagon  Injectable 1 milliGRAM(s) IntraMuscular once  insulin glargine Injectable (LANTUS) 10 Unit(s) SubCutaneous at bedtime  insulin lispro (ADMELOG) corrective regimen sliding scale   SubCutaneous three times a day before meals  insulin lispro (ADMELOG) corrective regimen sliding scale   SubCutaneous at bedtime    calcium acetate 667 milliGRAM(s) Oral three times a day with meals  dextrose 5%. 1000 milliLiter(s) IV Continuous <Continuous>  dextrose 5%. 1000 milliLiter(s) IV Continuous <Continuous>  doxercalciferol Injectable 2 MICROGram(s) IV Push <User Schedule>  epoetin raheem-epbx (RETACRIT) Injectable 82769 Unit(s) IV Push <User Schedule>  lidocaine/prilocaine Cream 1 Application(s) Topical <User Schedule>      PAST MEDICAL/SURGICAL HISTORY  PAST MEDICAL & SURGICAL HISTORY:  DM (diabetes mellitus)  TYpe II    HTN (hypertension)    Hypercholesteremia    CKD (chronic kidney disease)  now on HD via R forearm AVF    Anemia    Atrial fibrillation  On Eliquis    Cerebrovascular accident (CVA)    Status post right foot surgery  hammer toe repair    AV fistula  June 2018, 2 ballooning (last one 2 weeks ago), following up on Dec 15th    H/O colonoscopy with polypectomy        SOCIAL HISTORY: Substance Use (street drugs): ( x ) never used  (  ) other:    FAMILY HISTORY:  Family history of hypertension (Sibling)    Family history of diabetes mellitus    Family history of lung cancer (Sibling)    Family history of malignant neoplasm of gastrointestinal tract        REVIEW OF SYSTEMS:  CONSTITUTIONAL: No fever, weight loss, or fatigue  EYES: No eye pain, visual disturbances, or discharge  ENMT:  No difficulty hearing, tinnitus, vertigo; No sinus or throat pain  BREASTS: No pain, masses, or nipple discharge  GASTROINTESTINAL: No abdominal or epigastric pain. No nausea, vomiting, or hematemesis; No diarrhea or constipation. No melena or hematochezia.  GENITOURINARY: No dysuria, frequency, hematuria, or incontinence  NEUROLOGICAL: No headaches, memory loss, loss of strength, numbness, or tremors  ENDOCRINE: No heat or cold intolerance; No hair loss  MUSCULOSKELETAL: No joint pain or swelling; No muscle, back, or extremity pain  PSYCHIATRIC: No depression, anxiety, mood swings, or difficulty sleeping  HEME/LYMPH: No easy bruising, or bleeding gums  All others negative    PHYSICAL EXAM:  T(C): 36.3 (05-12-21 @ 06:03), Max: 36.6 (05-11-21 @ 12:39)  HR: 83 (05-12-21 @ 06:03) (73 - 83)  BP: 164/69 (05-12-21 @ 06:03) (134/60 - 164/69)  RR: 15 (05-12-21 @ 06:03) (15 - 16)  SpO2: 100% (05-12-21 @ 06:03) (99% - 100%)  Wt(kg): --  I&O's Summary        cEYES: EOMI, PERRLA, conjunctiva and sclera clear  ENMT: No tonsillar erythema, exudates, or enlargement  Cardiovascular: Normal S1 S2, No JVD, No murmurs, No edema  Respiratory: Lungs clear to auscultation	  Gastrointestinal:  Soft, Non-tender, + BS	  Extremities: No edema                                    10.6   5.50  )-----------( 230      ( 12 May 2021 07:13 )             36.1     05-12    134<L>  |  92<L>  |  25<H>  ----------------------------<  75  3.9   |  25  |  8.17<H>    Ca    9.4      12 May 2021 07:13  Phos  3.9     05-12  Mg     2.3     05-12    TPro  8.1  /  Alb  4.0  /  TBili  0.2  /  DBili  x   /  AST  29  /  ALT  14  /  AlkPhos  64  05-12    proBNP:   Lipid Profile:   HgA1c:   TSH:     Consultant(s) Notes Reviewed:  [x ] YES  [ ] NO    Care Discussed with Consultants/Other Providers [ x] YES  [ ] NO    Imaging Personally Reviewed independently:  [x] YES  [ ] NO    All labs, radiologic studies, vitals, orders and medications list reviewed. Patient is seen and examined at bedside. Case discussed with medical team.        
Dieter Willson MD  Interventional Cardiology / Advance Heart Failure and Cardiac Transplant Specialist  [place] Office : [address] [place] [address] [place]. [address]  Tel:   [place] Office : [address] [facility] [place] [address]  Tel: [phone]  Cell : [phone]    Pt is lying in bed comfortable not in distress confused  	  MEDICATIONS:  apixaban 2.5 milliGRAM(s) Oral two times a day  aspirin enteric coated 81 milliGRAM(s) Oral daily  hydrALAZINE 25 milliGRAM(s) Oral two times a day  metoprolol succinate  milliGRAM(s) Oral daily        diVALproex Sprinkle 250 milliGRAM(s) Oral three times a day  donepezil 10 milliGRAM(s) Oral at bedtime  ondansetron Injectable 4 milliGRAM(s) IV Push every 6 hours PRN  QUEtiapine 25 milliGRAM(s) Oral daily    pantoprazole    Tablet 40 milliGRAM(s) Oral before breakfast    atorvastatin 80 milliGRAM(s) Oral at bedtime  dextrose 40% Gel 15 Gram(s) Oral once  dextrose 50% Injectable 25 Gram(s) IV Push once  dextrose 50% Injectable 12.5 Gram(s) IV Push once  dextrose 50% Injectable 25 Gram(s) IV Push once  glucagon  Injectable 1 milliGRAM(s) IntraMuscular once  insulin glargine Injectable (LANTUS) 10 Unit(s) SubCutaneous at bedtime  insulin lispro (ADMELOG) corrective regimen sliding scale   SubCutaneous three times a day before meals  insulin lispro (ADMELOG) corrective regimen sliding scale   SubCutaneous at bedtime    calcium acetate 667 milliGRAM(s) Oral three times a day with meals  chlorhexidine 4% Liquid 1 Application(s) Topical daily  dextrose 5%. 1000 milliLiter(s) IV Continuous <Continuous>  dextrose 5%. 1000 milliLiter(s) IV Continuous <Continuous>  doxercalciferol Injectable 2 MICROGram(s) IV Push <User Schedule>  lidocaine/prilocaine Cream 1 Application(s) Topical <User Schedule>      PAST MEDICAL/SURGICAL HISTORY  PAST MEDICAL & SURGICAL HISTORY:  DM (diabetes mellitus)  TYpe II    HTN (hypertension)    Hypercholesteremia    CKD (chronic kidney disease)  now on HD via R forearm AVF    Anemia    Atrial fibrillation  On Eliquis    Cerebrovascular accident (CVA)    Status post right foot surgery  hammer toe repair    AV fistula  June 2018, 2 ballooning (last one 2 weeks ago), following up on Dec 15th    H/O colonoscopy with polypectomy        SOCIAL HISTORY: Substance Use (street drugs): ( x ) never used  (  ) other:    FAMILY HISTORY:  Family history of hypertension (Sibling)    Family history of diabetes mellitus    Family history of lung cancer (Sibling)    Family history of malignant neoplasm of gastrointestinal tract         PHYSICAL EXAM:  T(C): 36.6 (05-15-21 @ 09:47), Max: 37.2 (05-15-21 @ 05:12)  HR: 87 (05-15-21 @ 09:47) (73 - 90)  BP: 105/60 (05-15-21 @ 09:47) (103/65 - 143/63)  RR: 18 (05-15-21 @ 09:47) (16 - 18)  SpO2: 97% (05-15-21 @ 09:47) (97% - 100%)  Wt(kg): --  I&O's Summary    14 May 2021 07:01  -  15 May 2021 07:00  --------------------------------------------------------  IN: 500 mL / OUT: 1200 mL / NET: -700 mL          GENERAL: NAD  EYES: EOMI, PERRLA, conjunctiva and sclera clear  ENMT: No tonsillar erythema, exudates, or enlargement; Moist mucous membranes, Good dentition, No lesions  Cardiovascular: Normal S1 S2, No JVD, No murmurs, No edema  Respiratory: Lungs clear to auscultation	  Gastrointestinal:  Soft, Non-tender, + BS	  Extremities: no edema                                    11.3   7.05  )-----------( 267      ( 15 May 2021 06:11 )             39.0     05-15    135  |  90<L>  |  14  ----------------------------<  88  4.0   |  28  |  5.44<H>    Ca    9.7      15 May 2021 06:11  Phos  4.1     05-15  Mg     2.1     05-15    TPro  8.5<H>  /  Alb  4.4  /  TBili  0.4  /  DBili  <0.2  /  AST  36<H>  /  ALT  15  /  AlkPhos  66  05-15    proBNP:   Lipid Profile:   HgA1c:   TSH:     Consultant(s) Notes Reviewed:  [x ] YES  [ ] NO    Care Discussed with Consultants/Other Providers [ x] YES  [ ] NO    Imaging Personally Reviewed independently:  [x] YES  [ ] NO    All labs, radiologic studies, vitals, orders and medications list reviewed. Patient is seen and examined at bedside. Case discussed with medical team.        
Name of Patient : VINCENT GROSS  MRN: [id number]  DATE OF SERVICE: 05-11-21 @ 16:28    Subjective: Patient seen and examined. No new events except as noted.   awake, demented     REVIEW OF SYSTEMS:    CONSTITUTIONAL: No weakness, fevers or chills  EYES/ENT: No visual changes;  No vertigo or throat pain   NECK: No pain or stiffness  RESPIRATORY: No cough, wheezing, hemoptysis; No shortness of breath  CARDIOVASCULAR: No chest pain or palpitations  GASTROINTESTINAL: No abdominal or epigastric pain. No nausea, vomiting, or hematemesis; No diarrhea or constipation. No melena or hematochezia.  GENITOURINARY: No dysuria, frequency or hematuria  NEUROLOGICAL: No numbness or weakness  SKIN: No itching, burning, rashes, or lesions   All other review of systems is negative unless indicated above.    MEDICATIONS:  MEDICATIONS  (STANDING):  apixaban 5 milliGRAM(s) Oral two times a day  aspirin  chewable 81 milliGRAM(s) Oral daily  atorvastatin 80 milliGRAM(s) Oral at bedtime  calcium acetate 667 milliGRAM(s) Oral three times a day with meals  cefTRIAXone   IVPB 1000 milliGRAM(s) IV Intermittent every 24 hours  clonazePAM  Tablet 0.5 milliGRAM(s) Oral once  dextrose 40% Gel 15 Gram(s) Oral once  dextrose 5%. 1000 milliLiter(s) (50 mL/Hr) IV Continuous <Continuous>  dextrose 5%. 1000 milliLiter(s) (100 mL/Hr) IV Continuous <Continuous>  dextrose 50% Injectable 25 Gram(s) IV Push once  dextrose 50% Injectable 12.5 Gram(s) IV Push once  dextrose 50% Injectable 25 Gram(s) IV Push once  donepezil 10 milliGRAM(s) Oral at bedtime  doxercalciferol Injectable 2 MICROGram(s) IV Push <User Schedule>  epoetin raheem-epbx (RETACRIT) Injectable 78146 Unit(s) IV Push <User Schedule>  glucagon  Injectable 1 milliGRAM(s) IntraMuscular once  hydrALAZINE 25 milliGRAM(s) Oral two times a day  insulin glargine Injectable (LANTUS) 10 Unit(s) SubCutaneous at bedtime  insulin lispro (ADMELOG) corrective regimen sliding scale   SubCutaneous three times a day before meals  insulin lispro (ADMELOG) corrective regimen sliding scale   SubCutaneous at bedtime  lidocaine/prilocaine Cream 1 Application(s) Topical <User Schedule>  metoprolol succinate  milliGRAM(s) Oral daily  pantoprazole    Tablet 40 milliGRAM(s) Oral before breakfast      PHYSICAL EXAM:  T(C): 36.6 (05-11-21 @ 12:39), Max: 37.1 (05-10-21 @ 22:01)  HR: 80 (05-11-21 @ 12:39) (80 - 89)  BP: 134/60 (05-11-21 @ 12:39) (134/60 - 150/76)  RR: 16 (05-11-21 @ 12:39) (15 - 16)  SpO2: 99% (05-11-21 @ 12:39) (99% - 100%)  Wt(kg): --  I&O's Summary    10 May 2021 07:01  -  11 May 2021 07:00  --------------------------------------------------------  IN: 600 mL / OUT: 1600 mL / NET: -1000 mL          Appearance: awake, demented 	  HEENT:  PERRLA   Lymphatic: No lymphadenopathy   Cardiovascular: Normal S1 S2, no JVD  Respiratory: normal effort , clear  Gastrointestinal:  Soft, Non-tender  Skin: No rashes,  warm to touch  Psychiatry:  Mood & affect appropriate  Musculuskeletal: No edema      All labs, Imaging and EKGs personally reviewed       05-10-21 @ 07:01  -  05-11-21 @ 07:00  --------------------------------------------------------  IN: 600 mL / OUT: 1600 mL / NET: -1000 mL                          10.0   5.69  )-----------( 201      ( 11 May 2021 07:18 )             33.4               05-11    134<L>  |  94<L>  |  20  ----------------------------<  71  3.6   |  27  |  6.58<H>    Ca    8.9      11 May 2021 07:18  Phos  3.5     05-11  Mg     2.1     05-11    TPro  7.7  /  Alb  3.8  /  TBili  0.3  /  DBili  x   /  AST  27  /  ALT  13  /  AlkPhos  60  05-11           < from: MR Head No Cont (05.10.21 @ 21:38) >  INTERPRETATION:  Clinical indication: Follow-up infarct.    MRI of the brain was performed using sagittal T1, axial T1 T2 T2 FLAIR diffusion and susceptibility weighted sequence.    This exam is compared with prior brain MRI performed on July 22, 2019 an prior head CT performed on May 9, 2021    This exam is of limited quality due to motion.    Extensive parenchymal volume loss is seen.    Abnormal T2 prolongation with restricted diffusion is seen involving the right parietal cortical subcortical region. This is compatible with an acute infarct. No hemorrhagic transformation is seen this time. There is localized mass effect seen consisting of sulcal effacement.    The visualized paranasal sinuses mastoid and middle ear regions appear clear.    IMPRESSION: This exam is somewhat limited diagnostic quality due to motion.    Acute infarct as described above.                
Name of Patient : VINCENT GROSS  MRN: [id number]  DATE OF SERVICE: 05-14-21 @ 11:30    Subjective: Patient seen and examined. No new events except as noted.   doing okay     REVIEW OF SYSTEMS:    CONSTITUTIONAL: No weakness, fevers or chills  EYES/ENT: No visual changes;  No vertigo or throat pain   NECK: No pain or stiffness  RESPIRATORY: No cough, wheezing, hemoptysis; No shortness of breath  CARDIOVASCULAR: No chest pain or palpitations  GASTROINTESTINAL: No abdominal or epigastric pain. No nausea, vomiting, or hematemesis; No diarrhea or constipation. No melena or hematochezia.  GENITOURINARY: No dysuria, frequency or hematuria  NEUROLOGICAL: No numbness or weakness  SKIN: No itching, burning, rashes, or lesions   All other review of systems is negative unless indicated above.    MEDICATIONS:  MEDICATIONS  (STANDING):  apixaban 5 milliGRAM(s) Oral two times a day  aspirin  chewable 81 milliGRAM(s) Oral daily  atorvastatin 80 milliGRAM(s) Oral at bedtime  calcium acetate 667 milliGRAM(s) Oral three times a day with meals  cefuroxime   Tablet 500 milliGRAM(s) Oral every 12 hours  dextrose 40% Gel 15 Gram(s) Oral once  dextrose 5%. 1000 milliLiter(s) (50 mL/Hr) IV Continuous <Continuous>  dextrose 5%. 1000 milliLiter(s) (100 mL/Hr) IV Continuous <Continuous>  dextrose 50% Injectable 25 Gram(s) IV Push once  dextrose 50% Injectable 12.5 Gram(s) IV Push once  dextrose 50% Injectable 25 Gram(s) IV Push once  donepezil 10 milliGRAM(s) Oral at bedtime  doxercalciferol Injectable 2 MICROGram(s) IV Push <User Schedule>  glucagon  Injectable 1 milliGRAM(s) IntraMuscular once  hydrALAZINE 25 milliGRAM(s) Oral two times a day  insulin glargine Injectable (LANTUS) 10 Unit(s) SubCutaneous at bedtime  insulin lispro (ADMELOG) corrective regimen sliding scale   SubCutaneous three times a day before meals  insulin lispro (ADMELOG) corrective regimen sliding scale   SubCutaneous at bedtime  lidocaine/prilocaine Cream 1 Application(s) Topical <User Schedule>  metoprolol succinate  milliGRAM(s) Oral daily  pantoprazole    Tablet 40 milliGRAM(s) Oral before breakfast  QUEtiapine 25 milliGRAM(s) Oral daily      PHYSICAL EXAM:  T(C): 36.7 (05-14-21 @ 10:02), Max: 36.9 (05-14-21 @ 05:25)  HR: 71 (05-14-21 @ 10:02) (71 - 90)  BP: 111/56 (05-14-21 @ 10:02) (111/56 - 151/67)  RR: 18 (05-14-21 @ 10:02) (18 - 18)  SpO2: 100% (05-14-21 @ 10:02) (97% - 100%)  Wt(kg): --  I&O's Summary        Appearance: Normal	  HEENT:  PERRLA   Lymphatic: No lymphadenopathy   Cardiovascular: Normal S1 S2, no JVD  Respiratory: normal effort , clear  Gastrointestinal:  Soft, Non-tender  Skin: No rashes,  warm to touch  Psychiatry:  Mood & affect appropriate  Musculuskeletal: No edema      All labs, Imaging and EKGs personally reviewed                             11.0   4.25  )-----------( 234      ( 14 May 2021 07:07 )             38.9               05-14    137  |  92<L>  |  20  ----------------------------<  90  4.1   |  25  |  8.09<H>    Ca    9.7      14 May 2021 07:07  Phos  4.3     05-14  Mg     2.5     05-14                                           
Neurology Progress Note    S: Patient seen and examined. No new events overnight. confused. states she thinks she's dying     Medication:  apixaban 5 milliGRAM(s) Oral two times a day  aspirin  chewable 81 milliGRAM(s) Oral daily  atorvastatin 80 milliGRAM(s) Oral at bedtime  calcium acetate 667 milliGRAM(s) Oral three times a day with meals  cefTRIAXone   IVPB 1000 milliGRAM(s) IV Intermittent every 24 hours  clonazePAM  Tablet 0.5 milliGRAM(s) Oral once  dextrose 40% Gel 15 Gram(s) Oral once  dextrose 5%. 1000 milliLiter(s) IV Continuous <Continuous>  dextrose 5%. 1000 milliLiter(s) IV Continuous <Continuous>  dextrose 50% Injectable 25 Gram(s) IV Push once  dextrose 50% Injectable 12.5 Gram(s) IV Push once  dextrose 50% Injectable 25 Gram(s) IV Push once  donepezil 10 milliGRAM(s) Oral at bedtime  doxercalciferol Injectable 2 MICROGram(s) IV Push <User Schedule>  epoetin raheem-epbx (RETACRIT) Injectable 48962 Unit(s) IV Push <User Schedule>  glucagon  Injectable 1 milliGRAM(s) IntraMuscular once  hydrALAZINE 25 milliGRAM(s) Oral two times a day  insulin glargine Injectable (LANTUS) 10 Unit(s) SubCutaneous at bedtime  insulin lispro (ADMELOG) corrective regimen sliding scale   SubCutaneous three times a day before meals  insulin lispro (ADMELOG) corrective regimen sliding scale   SubCutaneous at bedtime  metoprolol succinate  milliGRAM(s) Oral daily  ondansetron Injectable 4 milliGRAM(s) IV Push every 6 hours PRN  pantoprazole    Tablet 40 milliGRAM(s) Oral before breakfast      Vitals:  Vital Signs Last 24 Hrs  T(C): 36.7 (09 May 2021 23:06), Max: 36.9 (09 May 2021 14:42)  T(F): 98 (09 May 2021 23:06), Max: 98.4 (09 May 2021 14:42)  HR: 78 (09 May 2021 23:06) (75 - 78)  BP: 150/74 (09 May 2021 23:06) (131/65 - 150/74)  BP(mean): --  RR: 18 (09 May 2021 23:06) (17 - 18)  SpO2: 100% (09 May 2021 23:06) (99% - 100%)    General Exam:   General Appearance: Appropriately dressed and in no acute distress       Head: Normocephalic, atraumatic and no dysmorphic features  Ear, Nose, and Throat: Moist mucous membranes  CVS: S1S2+  Resp: No SOB, no wheeze or rhonchi  Abd: soft NTND  Extremities: No edema, no cyanosis  Skin: No bruises, no rashes     Neurological Exam:  MS: Awake, alert, oriented to person, "hospital", stated it was 1981. Normal affect. Follows most commands. Moderate level of distractibility and some poor attention.     Language: Speech is clear, fluent with good repetition & comprehension.    CNs: Shutting eyes unable to assess pupillary reactivity. VFF intact to blink to threat. EOMI no nystagmus. V1-3 intact to LT. No facial asymmetry b/l, full eye closure strength b/l. Symmetric palate elevation in midline. Shoulder shrug intact b/l. Tongue midline, normal movements, no atrophy.    Motor: Normal muscle bulk & tone. No noticeable tremor or seizure. Slight L pronator drift.              Deltoid	Biceps	Triceps	   R	4	5	5	5	 	  L	4	5	5	5    	H-Flex	H-Ext	K-Flex	K-Ext	D-Flex	P-Flex  R	4	4	5	5	5	5	 	   L	4	4	5	5	5	5	     Sensation: Intact to LT b/l throughout.     Reflexes:              Biceps(C5)       BR(C6)     Triceps(C7)               Patellar(L4)    Achilles(S1)    Plantar Resp  R	1	          1	             1		        1		    1		Down   L	1	          1	             1		        1		    1		Down     Coordination: No dysmetria to FTN    Gait: Deferred    I personally reviewed the below data/images/labs:      CBC Full  -  ( 10 May 2021 08:10 )  WBC Count : 4.38 K/uL  RBC Count : 3.92 M/uL  Hemoglobin : 9.6 g/dL  Hematocrit : 31.9 %  Platelet Count - Automated : 163 K/uL  Mean Cell Volume : 81.4 fL  Mean Cell Hemoglobin : 24.5 pg  Mean Cell Hemoglobin Concentration : 30.1 gm/dL  Auto Neutrophil # : x  Auto Lymphocyte # : x  Auto Monocyte # : x  Auto Eosinophil # : x  Auto Basophil # : x  Auto Neutrophil % : x  Auto Lymphocyte % : x  Auto Monocyte % : x  Auto Eosinophil % : x  Auto Basophil % : x    05-10    140  |  99  |  33<H>  ----------------------------<  86  4.3   |  24  |  9.97<H>    Ca    8.7      10 May 2021 08:10  Phos  5.8     05-10  Mg     2.3     05-10    TPro  x   /  Alb  x   /  TBili  x   /  DBili  x   /  AST  x   /  ALT  7   /  AlkPhos  x   05-10    LIVER FUNCTIONS - ( 10 May 2021 08:10 )  Alb: x     / Pro: x     / ALK PHOS: x     / ALT: 7 U/L / AST: x     / GGT: x             Urinalysis Basic - ( 08 May 2021 11:19 )    Color: Yellow / Appearance: Slightly Turbid / S.017 / pH: x  Gluc: x / Ketone: Negative  / Bili: Negative / Urobili: <2 mg/dL   Blood: x / Protein: >600 mg/dL / Nitrite: Negative   Leuk Esterase: Moderate / RBC: 1 /HPF / WBC 61 /HPF   Sq Epi: x / Non Sq Epi: 9 /HPF / Bacteria: Few    < from: CT Head No Cont (21 @ 12:32) >  IMPRESSION:    Region of decreased attenuation in the right parietal lobe with associated sulcal effacement suspicious for acute infarction.    No CT evidence for reva hemorrhagic transformation.    < end of copied text >    < from: CT Head No Cont (21 @ 17:30) >    EXAM:  CT BRAIN        PROCEDURE DATE:  May  9 2021         INTERPRETATION:  .    CLINICAL INFORMATION: Hx CVA , with poss infarct yesterday on CT.    TECHNIQUE: Multiple axial CT images of the head were obtained without contrast. Sagittal and coronal reconstructed images were acquired from the source data.    COMPARISON: Prior CT study of the head from 2021. Prior brain MRI study from 2019.    FINDINGS: An evolving acute/subacute infarct is again noted within the right parietal temporal lobes. There is no gross hemorrhagic transformation.    Additional wedge-shaped chronic infarct is again noted within the right cerebellar hemisphere.    Additional chronic lacunar infarcts are noted within the left basal ganglia.    There is no acute intracranial hemorrhage, shift of the midline structures, herniation, or hydrocephalus.    There is diffuse cerebral volume loss with prominence of the sulci, fissures, and cisternal spaces which is normal for the patient's age. 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    The paranasal sinuses and mastoid air cells are clear. The calvarium is intact. There is evidence of bilateral cataract removal.    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    Additional chronic infarcts and similar-appearing moderate severity chronic white matter microvascular type changes, as discussed.        DARA VILLELA MD; Attending Radiologist  This document has been electronically signed. May  9 2021  5:42PM    < end of copied text >    
Neurology Progress Note    S: Patient seen and examined. No new events overnight. more alert. but still mildly confused     Medication:  MEDICATIONS  (STANDING):  apixaban 5 milliGRAM(s) Oral two times a day  aspirin  chewable 81 milliGRAM(s) Oral daily  atorvastatin 80 milliGRAM(s) Oral at bedtime  calcium acetate 667 milliGRAM(s) Oral three times a day with meals  cefTRIAXone   IVPB 1000 milliGRAM(s) IV Intermittent every 24 hours  clonazePAM  Tablet 0.5 milliGRAM(s) Oral once  dextrose 40% Gel 15 Gram(s) Oral once  dextrose 5%. 1000 milliLiter(s) (50 mL/Hr) IV Continuous <Continuous>  dextrose 5%. 1000 milliLiter(s) (100 mL/Hr) IV Continuous <Continuous>  dextrose 50% Injectable 25 Gram(s) IV Push once  dextrose 50% Injectable 25 Gram(s) IV Push once  dextrose 50% Injectable 12.5 Gram(s) IV Push once  donepezil 10 milliGRAM(s) Oral at bedtime  doxercalciferol Injectable 2 MICROGram(s) IV Push <User Schedule>  epoetin raheem-epbx (RETACRIT) Injectable 79341 Unit(s) IV Push <User Schedule>  glucagon  Injectable 1 milliGRAM(s) IntraMuscular once  hydrALAZINE 25 milliGRAM(s) Oral two times a day  insulin glargine Injectable (LANTUS) 10 Unit(s) SubCutaneous at bedtime  insulin lispro (ADMELOG) corrective regimen sliding scale   SubCutaneous three times a day before meals  insulin lispro (ADMELOG) corrective regimen sliding scale   SubCutaneous at bedtime  lidocaine/prilocaine Cream 1 Application(s) Topical <User Schedule>  metoprolol succinate  milliGRAM(s) Oral daily  pantoprazole    Tablet 40 milliGRAM(s) Oral before breakfast    MEDICATIONS  (PRN):  ondansetron Injectable 4 milliGRAM(s) IV Push every 6 hours PRN Nausea      Vitals:  Vital Signs Last 24 Hrs  T(C): 36.3 (12 May 2021 06:03), Max: 36.6 (11 May 2021 12:39)  T(F): 97.3 (12 May 2021 06:03), Max: 97.8 (11 May 2021 12:39)  HR: 83 (12 May 2021 06:03) (73 - 83)  BP: 164/69 (12 May 2021 06:03) (134/60 - 164/69)  BP(mean): --  RR: 15 (12 May 2021 06:03) (15 - 16)  SpO2: 100% (12 May 2021 06:03) (99% - 100%)    General Exam:   General Appearance: Appropriately dressed and in no acute distress       Head: Normocephalic, atraumatic and no dysmorphic features  Ear, Nose, and Throat: Moist mucous membranes  CVS: S1S2+  Resp: No SOB, no wheeze or rhonchi  Abd: soft NTND  Extremities: No edema, no cyanosis  Skin: No bruises, no rashes     Neurological Exam:  MS: Awake, alert, oriented to person, "hospital", stated it was 1981. Normal affect. Follows most commands. Moderate level of distractibility and some poor attention.     Language: Speech is clear, fluent with good repetition & comprehension.    CNs: Shutting eyes unable to assess pupillary reactivity. VFF intact to blink to threat. EOMI no nystagmus. V1-3 intact to LT. No facial asymmetry b/l, full eye closure strength b/l. Symmetric palate elevation in midline. Shoulder shrug intact b/l. Tongue midline, normal movements, no atrophy.    Motor: Normal muscle bulk & tone. No noticeable tremor or seizure. Slight L pronator drift.              Deltoid	Biceps	Triceps	   R	4	5	5	5	 	  L	4	5	5	5    	H-Flex	H-Ext	K-Flex	K-Ext	D-Flex	P-Flex  R	4	4	5	5	5	5	 	   L	4	4	5	5	5	5	     Sensation: Intact to LT b/l throughout.     Reflexes:              Biceps(C5)       BR(C6)     Triceps(C7)               Patellar(L4)    Achilles(S1)    Plantar Resp  R	1	          1	             1		        1		    1		Down   L	1	          1	             1		        1		    1		Down     Coordination: No dysmetria to FTN    Gait: Deferred    I personally reviewed the below data/images/labs:    CBC Full  -  ( 12 May 2021 07:13 )  WBC Count : 5.50 K/uL  RBC Count : 4.41 M/uL  Hemoglobin : 10.6 g/dL  Hematocrit : 36.1 %  Platelet Count - Automated : 230 K/uL  Mean Cell Volume : 81.9 fL  Mean Cell Hemoglobin : 24.0 pg  Mean Cell Hemoglobin Concentration : 29.4 gm/dL  Auto Neutrophil # : 3.21 K/uL  Auto Lymphocyte # : 1.12 K/uL  Auto Monocyte # : 0.76 K/uL  Auto Eosinophil # : 0.33 K/uL  Auto Basophil # : 0.05 K/uL  Auto Neutrophil % : 58.4 %  Auto Lymphocyte % : 20.4 %  Auto Monocyte % : 13.8 %  Auto Eosinophil % : 6.0 %  Auto Basophil % : 0.9 %        134<L>  |  92<L>  |  25<H>  ----------------------------<  75  3.9   |  25  |  8.17<H>    Ca    9.4      12 May 2021 07:13  Phos  3.9       Mg     2.3         TPro  8.1  /  Alb  4.0  /  TBili  0.2  /  DBili  x   /  AST  29  /  ALT  14  /  AlkPhos  64  05-      Urinalysis Basic - ( 08 May 2021 11:19 )    Color: Yellow / Appearance: Slightly Turbid / S.017 / pH: x  Gluc: x / Ketone: Negative  / Bili: Negative / Urobili: <2 mg/dL   Blood: x / Protein: >600 mg/dL / Nitrite: Negative   Leuk Esterase: Moderate / RBC: 1 /HPF / WBC 61 /HPF   Sq Epi: x / Non Sq Epi: 9 /HPF / Bacteria: Few    < from: CT Head No Cont (21 @ 12:32) >  IMPRESSION:    Region of decreased attenuation in the right parietal lobe with associated sulcal effacement suspicious for acute infarction.    No CT evidence for reva hemorrhagic transformation.    < end of copied text >    < from: CT Head No Cont (21 @ 17:30) >    EXAM:  CT BRAIN        PROCEDURE DATE:  May  9 2021         INTERPRETATION:  .    CLINICAL INFORMATION: Hx CVA , with poss infarct yesterday on CT.    TECHNIQUE: Multiple axial CT images of the head were obtained without contrast. Sagittal and coronal reconstructed images were acquired from the source data.    COMPARISON: Prior CT study of the head from 2021. Prior brain MRI study from 2019.    FINDINGS: An evolving acute/subacute infarct is again noted within the right parietal temporal lobes. There is no gross hemorrhagic transformation.    Additional wedge-shaped chronic infarct is again noted within the right cerebellar hemisphere.    Additional chronic lacunar infarcts are noted within the left basal ganglia.    There is no acute intracranial hemorrhage, shift of the midline structures, herniation, or hydrocephalus.    There is diffuse cerebral volume loss with prominence of the sulci, fissures, and cisternal spaces which is normal for the patient's age. 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    The paranasal sinuses and mastoid air cells are clear. The calvarium is intact. There is evidence of bilateral cataract removal.    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    Additional chronic infarcts and similar-appearing moderate severity chronic white matter microvascular type changes, as discussed.        DARA VILLELA MD; Attending Radiologist  This document has been electronically signed. May  9 2021  5:42PM    < end of copied text >    < from: MR Head No Cont (05.10.21 @ 21:38) >    EXAM:  MR BRAIN        PROCEDURE DATE:  May 10 2021         INTERPRETATION:  Clinical indication: Follow-up infarct.    MRI of the brain was performed using sagittal T1, axial T1 T2 T2 FLAIR diffusion and susceptibility weighted sequence.    This exam is compared with prior brain MRI performed on 2019 an prior head CT performed on May 9, 2021    This exam is of limited quality due to motion.    Extensive parenchymal volume loss is seen.    Abnormal T2 prolongation with restricted diffusion is seen involving the right parietal cortical subcortical region. This is compatible with an acute infarct. No hemorrhagic transformation is seen this time. There is localized mass effect seen consisting of sulcal effacement.    The visualized paranasal sinuses mastoid and middle ear regions appear clear.    IMPRESSION: This exam is somewhat limited diagnostic quality due to motion.    Acute infarct as described above.              MARVIN BARRAZA M.D., ATTENDING RADIOLOGIST  This document hasbeen electronically signed. May 11 2021  8:20AM    < end of copied text >  `< from: CT Angio Neck w/ IV Cont (21 @ 10:54) >    EXAM:  CT ANGIO BRAIN (W)AW IC      EXAM:  CT ANGIO NECK (W)AW IC        PROCEDURE DATE:  May 11 2021         INTERPRETATION:  Clinical indication: Acute infarct seen on brain MRI.    Following injection of approximately 90 cc of Omnipaque 350 IV CT a of the neck and [place] were performed. Coronal and sagittal reconstructions were performed as well.    Abnormal low-attenuation involving the right cerebellar region, right parietal and right basal ganglia region are again seen is well as old lacunar infarcts involving left basal ganglia region.    Calcification is seen involving both proximal internal carotid artery regions.    Evaluation of the distal right common carotid artery region appears normal. Mild narrowing of proximal right internal carotid artery is seen. Evaluation of the proximal right external carotid arteries appear normal. Evaluation of the distal left common carotid artery appears normal. Evaluation of the proximal left internal carotid artery demonstrate mild narrowing. Evaluation of the proximal left external carotid artery appears normal. A normal dominant appearing right vertebral artery is seen. 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 Short segment of stenosis versus hypoplasia is again seen involving the distalmost aspect the left vertebral artery.    Evaluation of both distal internal carotid arteries demonstrates calcified plaques. Mild narrowing of both distal internal carotid artery seen. Hypoplastic right A1 segment is seen. Irregularity seen involving both A2 segments is now seen which could be compatible atherosclerotic change. Evaluation of both middle cerebral arteries appear normal. Both posterior cerebral arteries demonstrate decreased flow related signal distally which could be compatible areas of of stenosis. There is evidence of mild stenosis seen involving the distal aspect of the basilar artery which is new when compared with the prior exam.    IMPRESSION: Mild narrowing of both proximal internal carotid arteries.    Short segment of narrowing involving the distal left vertebral artery is again seen and unchanged.    Irregularity is seen involving both A2 segments which is new when compared prior exam. This could be compatible atherosclerotic change. Mild narrowing of the distal basilar artery is seen which is compatible with mild stenosis. This is new when compared with the prior exam.    Areas of stenosis is suspected involving both distal posterior cerebral arteries.              MARVIN BARRAZA M.D., ATTENDING RADIOLOGIST  This document has been electronically signed. May 11 2021 11:30AM    < end of copied text >  `
[facility] [place] [facility]   MD Nehemias Busch MD, D.O. Ruoru Wong, PA    From 7 AM - 5 PM:  OFFICE: [phone]  Dr. Jacobson cell: [phone]  Dr. Sidhu cell: [phone]  Dr. Ross cell: [phone]  MALINDA Shepard cell: [phone]    From 5 PM - 7 AM: Answering Service: [phone]  Date of service: 05-16-21 @ 15:22    RENAL FOLLOW UP NOTE  --------------------------------------------------------------------------------  HPI:  Pt seen and examined at bedside.   Denies SOB, chest pain     PAST HISTORY  --------------------------------------------------------------------------------  No significant changes to PMH, PSH, FHx, SHx, unless otherwise noted    ALLERGIES & MEDICATIONS  --------------------------------------------------------------------------------  Allergies    No Known Allergies    Intolerances      Standing Inpatient Medications  apixaban 2.5 milliGRAM(s) Oral two times a day  aspirin enteric coated 81 milliGRAM(s) Oral daily  atorvastatin 80 milliGRAM(s) Oral at bedtime  calcium acetate 667 milliGRAM(s) Oral three times a day with meals  chlorhexidine 4% Liquid 1 Application(s) Topical daily  dextrose 40% Gel 15 Gram(s) Oral once  dextrose 5%. 1000 milliLiter(s) IV Continuous <Continuous>  dextrose 5%. 1000 milliLiter(s) IV Continuous <Continuous>  dextrose 50% Injectable 25 Gram(s) IV Push once  dextrose 50% Injectable 12.5 Gram(s) IV Push once  dextrose 50% Injectable 25 Gram(s) IV Push once  diVALproex Sprinkle 250 milliGRAM(s) Oral three times a day  donepezil 10 milliGRAM(s) Oral at bedtime  doxercalciferol Injectable 2 MICROGram(s) IV Push <User Schedule>  glucagon  Injectable 1 milliGRAM(s) IntraMuscular once  hydrALAZINE 25 milliGRAM(s) Oral two times a day  insulin glargine Injectable (LANTUS) 10 Unit(s) SubCutaneous at bedtime  insulin lispro (ADMELOG) corrective regimen sliding scale   SubCutaneous three times a day before meals  insulin lispro (ADMELOG) corrective regimen sliding scale   SubCutaneous at bedtime  lidocaine/prilocaine Cream 1 Application(s) Topical <User Schedule>  metoprolol succinate  milliGRAM(s) Oral daily  pantoprazole    Tablet 40 milliGRAM(s) Oral before breakfast  QUEtiapine 25 milliGRAM(s) Oral daily    PRN Inpatient Medications  ondansetron Injectable 4 milliGRAM(s) IV Push every 6 hours PRN      REVIEW OF SYSTEMS  --------------------------------------------------------------------------------  General: no fever  CVS: no chest pain  RESP: no sob, no cough  ABD: no abdominal pain  : no dysuria,  MSK: no edema     VITALS/PHYSICAL EXAM  --------------------------------------------------------------------------------  T(C): 36.5 (05-16-21 @ 11:38), Max: 36.9 (05-15-21 @ 21:58)  HR: 96 (05-16-21 @ 11:38) (84 - 96)  BP: 113/93 (05-16-21 @ 11:38) (98/54 - 125/60)  RR: 17 (05-16-21 @ 11:38) (16 - 18)  SpO2: 97% (05-16-21 @ 11:38) (97% - 100%)  Wt(kg): --        Physical Exam:  	Gen: NAD  	HEENT: MMM  	Pulm: CTA B/L  	CV: S1S2  	Abd: Soft, +BS  	Ext: No LE edema B/L                      Neuro: Awake   	Skin: Warm and Dry   	Vascular access:avf    LABS/STUDIES  --------------------------------------------------------------------------------              11.3   7.05  >-----------<  267      [05-15-21 @ 06:11]              39.0     136  |  89  |  22  ----------------------------<  55      [05-16-21 @ 06:58]  3.7   |  28  |  8.03        Ca     10.2     [05-16-21 @ 06:58]      Mg     2.5     [05-16-21 @ 06:58]      Phos  4.5     [05-16-21 @ 06:58]    TPro  9.0  /  Alb  4.5  /  TBili  0.5  /  DBili  <0.2  /  AST  33  /  ALT  14  /  AlkPhos  70  [05-16-21 @ 06:58]    Creatinine Trend:  SCr 8.03 [05-16 @ 06:58]  SCr 5.44 [05-15 @ 06:11]  SCr 8.09 [05-14 @ 07:07]  SCr 8.17 [05-12 @ 07:13]  SCr 6.58 [05-11 @ 07:18]    Urinalysis - [05-08-21 @ 11:19]      Color Yellow / Appearance Slightly Turbid / SG 1.017 / pH 8.5      Gluc Negative / Ketone Negative  / Bili Negative / Urobili <2 mg/dL       Blood Trace / Protein >600 mg/dL / Leuk Est Moderate / Nitrite Negative      RBC 1 / WBC 61 / Hyaline 2 / Gran  / Sq Epi  / Non Sq Epi 9 / Bacteria Few      PTH -- (Ca --)      [05-09-21 @ 06:54]   421  HbA1c 5.8      [12-10-19 @ 06:41]  TSH 3.72      [05-14-21 @ 07:12]  Lipid: chol 219, , HDL 65, LDL --      [05-09-21 @ 06:54]    HBsAb <3.0      [05-11-21 @ 00:01]  HBsAg Nonreact      [05-10-21 @ 09:26]  HBcAb Nonreact      [05-10-21 @ 09:26]  HCV 0.17, Nonreact      [05-10-21 @ 09:26]    Syphilis Screen (Treponema Pallidum Ab) Negative      [05-15-21 @ 10:20]  
[facility] [place] [facility]   MD JAJA SMITH DO ANAM SIDDIQUI ANGELA WONG, PA    TEL:  OFFICE: [phone]  DR CASTILLO CELL: [phone]  DR. HARRISON CELL: [phone]  DR. RILEY CELL: [phone]  LIZZY DHILLON CELL: [phone]    From 5pm-7am Answering Service [phone]    -- RENAL FOLLOW UP NOTE ---Date of Service 05-12-21 @ 12:57    Patient is a 80y old  Female who presents with a chief complaint of AMS (12 May 2021 11:57)      Patient seen and examined in HD. No chest pain/sob    VITALS:  T(F): 99.5 (05-12-21 @ 11:30), Max: 99.5 (05-12-21 @ 11:30)  HR: 72 (05-12-21 @ 11:30)  BP: 170/77 (05-12-21 @ 11:30)  RR: 18 (05-12-21 @ 11:30)  SpO2: 100% (05-12-21 @ 06:03)  Wt(kg): --  flow 400        PHYSICAL EXAM:  Constitutional: NAD  Neck: No JVD  Respiratory: CTAB, no wheezes, rales or rhonchi  Cardiovascular: S1, S2, RRR  Gastrointestinal: BS+, soft, NT/ND  Extremities: No peripheral edema    Hospital Medications:   MEDICATIONS  (STANDING):  apixaban 5 milliGRAM(s) Oral two times a day  aspirin  chewable 81 milliGRAM(s) Oral daily  atorvastatin 80 milliGRAM(s) Oral at bedtime  calcium acetate 667 milliGRAM(s) Oral three times a day with meals  cefuroxime   Tablet 500 milliGRAM(s) Oral every 12 hours  clonazePAM  Tablet 0.5 milliGRAM(s) Oral once  dextrose 40% Gel 15 Gram(s) Oral once  dextrose 5%. 1000 milliLiter(s) (50 mL/Hr) IV Continuous <Continuous>  dextrose 5%. 1000 milliLiter(s) (100 mL/Hr) IV Continuous <Continuous>  dextrose 50% Injectable 25 Gram(s) IV Push once  dextrose 50% Injectable 12.5 Gram(s) IV Push once  dextrose 50% Injectable 25 Gram(s) IV Push once  donepezil 10 milliGRAM(s) Oral at bedtime  doxercalciferol Injectable 2 MICROGram(s) IV Push <User Schedule>  epoetin raheem-epbx (RETACRIT) Injectable 40212 Unit(s) IV Push <User Schedule>  glucagon  Injectable 1 milliGRAM(s) IntraMuscular once  hydrALAZINE 25 milliGRAM(s) Oral two times a day  insulin glargine Injectable (LANTUS) 10 Unit(s) SubCutaneous at bedtime  insulin lispro (ADMELOG) corrective regimen sliding scale   SubCutaneous three times a day before meals  insulin lispro (ADMELOG) corrective regimen sliding scale   SubCutaneous at bedtime  lidocaine/prilocaine Cream 1 Application(s) Topical <User Schedule>  metoprolol succinate  milliGRAM(s) Oral daily  pantoprazole    Tablet 40 milliGRAM(s) Oral before breakfast      LABS:  05-12    134<L>  |  92<L>  |  25<H>  ----------------------------<  75  3.9   |  25  |  8.17<H>    Ca    9.4      12 May 2021 07:13  Phos  3.9     05-12  Mg     2.3     05-12    TPro  8.1  /  Alb  4.0  /  TBili  0.2  /  DBili      /  AST  29  /  ALT  14  /  AlkPhos  64  05-12    Creatinine Trend: 8.17 <--, 6.58 <--, 9.97 <--, 8.48 <--, 8.30 <--, 7.45 <--    Albumin, Serum: 4.0 g/dL (05-12 @ 07:13)  Phosphorus Level, Serum: 3.9 mg/dL (05-12 @ 07:13)                              10.6   5.50  )-----------( 230      ( 12 May 2021 07:13 )             36.1     Urine Studies:  Urinalysis - [05-08-21 @ 11:19]      Color Yellow / Appearance Slightly Turbid / SG 1.017 / pH 8.5      Gluc Negative / Ketone Negative  / Bili Negative / Urobili <2 mg/dL       Blood Trace / Protein >600 mg/dL / Leuk Est Moderate / Nitrite Negative      RBC 1 / WBC 61 / Hyaline 2 / Gran  / Sq Epi  / Non Sq Epi 9 / Bacteria Few      PTH -- (Ca --)      [05-09-21 @ 06:54]   421  HbA1c 5.8      [12-10-19 @ 06:41]  Lipid: chol 219, , HDL 65, LDL --      [05-09-21 @ 06:54]    HBsAb <3.0      [05-11-21 @ 00:01]  HBsAg Nonreact      [05-10-21 @ 09:26]  HBcAb Nonreact      [05-10-21 @ 09:26]  HCV 0.17, Nonreact      [05-10-21 @ 09:26]      RADIOLOGY & ADDITIONAL STUDIES:  
Renal Follow Up Note-------Date of Service-----05--09--2021    Patient seen and examined at bedside. No chest pain/sob    VITALS:  T(F): 98.6 (05-09-21 @ 06:26), Max: 99.1 (05-08-21 @ 15:11)  HR: 68 (05-09-21 @ 06:26)  BP: 138/62 (05-09-21 @ 06:26)  RR: 18 (05-09-21 @ 06:26)  SpO2: 100% (05-09-21 @ 06:26)  Wt(kg): --    Height (cm): 170.2 (05-08 @ 16:38)  Weight (kg): 59.9 (05-08 @ 16:38)  BMI (kg/m2): 20.7 (05-08 @ 16:38)  BSA (m2): 1.7 (05-08 @ 16:38)    PHYSICAL EXAM:  Constitutional: NAD  Neck: No JVD  Respiratory: CTAB, no wheezes, rales or rhonchi  Cardiovascular: S1, S2, RRR  Gastrointestinal: BS+, soft, NT/ND  Extremities: No peripheral edema    Hospital Medications:   MEDICATIONS  (STANDING):  apixaban 5 milliGRAM(s) Oral two times a day  aspirin  chewable 81 milliGRAM(s) Oral daily  atorvastatin 80 milliGRAM(s) Oral at bedtime  calcium acetate 667 milliGRAM(s) Oral three times a day with meals  cefTRIAXone   IVPB 1000 milliGRAM(s) IV Intermittent every 24 hours  clonazePAM  Tablet 0.5 milliGRAM(s) Oral once  dextrose 40% Gel 15 Gram(s) Oral once  dextrose 5%. 1000 milliLiter(s) (50 mL/Hr) IV Continuous <Continuous>  dextrose 5%. 1000 milliLiter(s) (100 mL/Hr) IV Continuous <Continuous>  dextrose 50% Injectable 25 Gram(s) IV Push once  dextrose 50% Injectable 12.5 Gram(s) IV Push once  dextrose 50% Injectable 25 Gram(s) IV Push once  donepezil 10 milliGRAM(s) Oral at bedtime  epoetin raheem-epbx (RETACRIT) Injectable 18013 Unit(s) IV Push <User Schedule>  glucagon  Injectable 1 milliGRAM(s) IntraMuscular once  hydrALAZINE 25 milliGRAM(s) Oral two times a day  insulin glargine Injectable (LANTUS) 10 Unit(s) SubCutaneous at bedtime  insulin lispro (ADMELOG) corrective regimen sliding scale   SubCutaneous three times a day before meals  insulin lispro (ADMELOG) corrective regimen sliding scale   SubCutaneous at bedtime  metoprolol succinate  milliGRAM(s) Oral daily  pantoprazole    Tablet 40 milliGRAM(s) Oral before breakfast      LABS:  05-09    138  |  99  |  26<H>  ----------------------------<  85  4.2   |  23  |  8.48<H>    Ca    8.5      09 May 2021 09:25  Phos  4.4     05-09  Mg     2.1     05-09    TPro  7.6  /  Alb  3.2<L>  /  TBili  0.3  /  DBili      /  AST  42<H>  /  ALT  11  /  AlkPhos  54  05-09    Creatinine Trend: 8.48 <--, 8.30 <--, 7.45 <--  Phosphorus Level, Serum: 4.4 mg/dL (05-09 @ 09:25)  Albumin, Serum: 3.2 g/dL (05-09 @ 06:54)  Phosphorus Level, Serum: 4.4 mg/dL (05-09 @ 06:54)    calcium--  intact [id number]  parathyroid hormone intact, serum--                            9.8    5.44  )-----------( 204      ( 09 May 2021 06:54 )             32.6     Urine Studies:      Urinalysis - [05-08-21 @ 11:19]      Color Yellow / Appearance Slightly Turbid / SG 1.017 / pH 8.5      Gluc Negative / Ketone Negative  / Bili Negative / Urobili <2 mg/dL       Blood Trace / Protein >600 mg/dL / Leuk Est Moderate / Nitrite Negative      RBC 1 / WBC 61 / Hyaline 2 / Gran  / Sq Epi  / Non Sq Epi 9 / Bacteria Few      PTH -- (Ca --)      [05-09-21 @ 06:54]   421  HbA1c 5.8      [12-10-19 @ 06:41]  Lipid: chol 219, , HDL 65, LDL --      [05-09-21 @ 06:54]        RADIOLOGY & ADDITIONAL STUDIES:  
[facility] [place] [facility] [place]   MD JAJA SMITH DO ANAM SIDDIQUI ANGELA WONG, PA    TEL:  OFFICE: [phone]  DR CASTILLO CELL: [phone]  DR. HARRISON CELL: [phone]  DR. RILEY CELL: [phone]  LIZZY DHILLON CELL: [phone]    From 5pm-7am Answering Service [phone]    -- RENAL FOLLOW UP NOTE ---Date of Service 05-13-21 @ 13:39    Patient is a 80y old  Female who presents with a chief complaint of AMS (13 May 2021 10:45)      Patient seen and examined at bedside. No chest pain/sob    VITALS:  T(F): 98.1 (05-13-21 @ 06:00), Max: 98.5 (05-12-21 @ 16:05)  HR: 94 (05-13-21 @ 06:00)  BP: 140/68 (05-13-21 @ 06:00)  RR: 17 (05-13-21 @ 06:00)  SpO2: 98% (05-13-21 @ 06:00)  Wt(kg): --    05-12 @ 07:01  -  05-13 @ 07:00  --------------------------------------------------------  IN: 500 mL / OUT: 1400 mL / NET: -900 mL          PHYSICAL EXAM:  Constitutional: NAD  Neck: No JVD  Respiratory: CTAB, no wheezes, rales or rhonchi  Cardiovascular: S1, S2, RRR  Gastrointestinal: BS+, soft, NT/ND  Extremities: No peripheral edema    Hospital Medications:   MEDICATIONS  (STANDING):  apixaban 5 milliGRAM(s) Oral two times a day  aspirin  chewable 81 milliGRAM(s) Oral daily  atorvastatin 80 milliGRAM(s) Oral at bedtime  calcium acetate 667 milliGRAM(s) Oral three times a day with meals  cefuroxime   Tablet 500 milliGRAM(s) Oral every 12 hours  clonazePAM  Tablet 0.5 milliGRAM(s) Oral once  dextrose 40% Gel 15 Gram(s) Oral once  dextrose 5%. 1000 milliLiter(s) (50 mL/Hr) IV Continuous <Continuous>  dextrose 5%. 1000 milliLiter(s) (100 mL/Hr) IV Continuous <Continuous>  dextrose 50% Injectable 25 Gram(s) IV Push once  dextrose 50% Injectable 12.5 Gram(s) IV Push once  dextrose 50% Injectable 25 Gram(s) IV Push once  donepezil 10 milliGRAM(s) Oral at bedtime  doxercalciferol Injectable 2 MICROGram(s) IV Push <User Schedule>  epoetin raheem-epbx (RETACRIT) Injectable 01463 Unit(s) IV Push <User Schedule>  glucagon  Injectable 1 milliGRAM(s) IntraMuscular once  hydrALAZINE 25 milliGRAM(s) Oral two times a day  insulin glargine Injectable (LANTUS) 10 Unit(s) SubCutaneous at bedtime  insulin lispro (ADMELOG) corrective regimen sliding scale   SubCutaneous three times a day before meals  insulin lispro (ADMELOG) corrective regimen sliding scale   SubCutaneous at bedtime  lidocaine/prilocaine Cream 1 Application(s) Topical <User Schedule>  metoprolol succinate  milliGRAM(s) Oral daily  pantoprazole    Tablet 40 milliGRAM(s) Oral before breakfast  QUEtiapine 25 milliGRAM(s) Oral daily      LABS:  05-12    134<L>  |  92<L>  |  25<H>  ----------------------------<  75  3.9   |  25  |  8.17<H>    Ca    9.4      12 May 2021 07:13  Phos  3.9     05-12  Mg     2.3     05-12    TPro  8.1  /  Alb  4.0  /  TBili  0.2  /  DBili      /  AST  29  /  ALT  14  /  AlkPhos  64  05-12    Creatinine Trend: 8.17 <--, 6.58 <--, 9.97 <--, 8.48 <--, 8.30 <--, 7.45 <--                                10.6   5.50  )-----------( 230      ( 12 May 2021 07:13 )             36.1     Urine Studies:  Urinalysis - [05-08-21 @ 11:19]      Color Yellow / Appearance Slightly Turbid / SG 1.017 / pH 8.5      Gluc Negative / Ketone Negative  / Bili Negative / Urobili <2 mg/dL       Blood Trace / Protein >600 mg/dL / Leuk Est Moderate / Nitrite Negative      RBC 1 / WBC 61 / Hyaline 2 / Gran  / Sq Epi  / Non Sq Epi 9 / Bacteria Few      PTH -- (Ca --)      [05-09-21 @ 06:54]   421  HbA1c 5.8      [12-10-19 @ 06:41]  Lipid: chol 219, , HDL 65, LDL --      [05-09-21 @ 06:54]    HBsAb <3.0      [05-11-21 @ 00:01]  HBsAg Nonreact      [05-10-21 @ 09:26]  HBcAb Nonreact      [05-10-21 @ 09:26]  HCV 0.17, Nonreact      [05-10-21 @ 09:26]      RADIOLOGY & ADDITIONAL STUDIES:  
Neurology Progress Note    S: Patient seen and examined. No new events overnight. more alert. but still mildly confused no significant change. d/c plan    Medication:  MEDICATIONS  (STANDING):  apixaban 5 milliGRAM(s) Oral two times a day  aspirin  chewable 81 milliGRAM(s) Oral daily  atorvastatin 80 milliGRAM(s) Oral at bedtime  calcium acetate 667 milliGRAM(s) Oral three times a day with meals  cefTRIAXone   IVPB 1000 milliGRAM(s) IV Intermittent every 24 hours  clonazePAM  Tablet 0.5 milliGRAM(s) Oral once  dextrose 40% Gel 15 Gram(s) Oral once  dextrose 5%. 1000 milliLiter(s) (50 mL/Hr) IV Continuous <Continuous>  dextrose 5%. 1000 milliLiter(s) (100 mL/Hr) IV Continuous <Continuous>  dextrose 50% Injectable 25 Gram(s) IV Push once  dextrose 50% Injectable 25 Gram(s) IV Push once  dextrose 50% Injectable 12.5 Gram(s) IV Push once  donepezil 10 milliGRAM(s) Oral at bedtime  doxercalciferol Injectable 2 MICROGram(s) IV Push <User Schedule>  epoetin raheem-epbx (RETACRIT) Injectable 22894 Unit(s) IV Push <User Schedule>  glucagon  Injectable 1 milliGRAM(s) IntraMuscular once  hydrALAZINE 25 milliGRAM(s) Oral two times a day  insulin glargine Injectable (LANTUS) 10 Unit(s) SubCutaneous at bedtime  insulin lispro (ADMELOG) corrective regimen sliding scale   SubCutaneous three times a day before meals  insulin lispro (ADMELOG) corrective regimen sliding scale   SubCutaneous at bedtime  lidocaine/prilocaine Cream 1 Application(s) Topical <User Schedule>  metoprolol succinate  milliGRAM(s) Oral daily  pantoprazole    Tablet 40 milliGRAM(s) Oral before breakfast    MEDICATIONS  (PRN):  ondansetron Injectable 4 milliGRAM(s) IV Push every 6 hours PRN Nausea      Vitals:  Vital Signs Last 24 Hrs  T(C): 36.7 (13 May 2021 06:00), Max: 37.5 (12 May 2021 11:30)  T(F): 98.1 (13 May 2021 06:00), Max: 99.5 (12 May 2021 11:30)  HR: 94 (13 May 2021 06:00) (72 - 96)  BP: 140/68 (13 May 2021 06:00) (130/69 - 170/77)  BP(mean): --  RR: 17 (13 May 2021 06:00) (16 - 18)  SpO2: 98% (13 May 2021 06:00) (98% - 99%)    General Exam:   General Appearance: Appropriately dressed and in no acute distress       Head: Normocephalic, atraumatic and no dysmorphic features  Ear, Nose, and Throat: Moist mucous membranes  CVS: S1S2+  Resp: No SOB, no wheeze or rhonchi  Abd: soft NTND  Extremities: No edema, no cyanosis  Skin: No bruises, no rashes     Neurological Exam:  MS: Awake, alert, oriented to person, "hospital", stated it was April 1981. Normal affect. Follows most commands. Moderate level of distractibility and some poor attention.     Language: Speech is clear, fluent with good repetition & comprehension.    CNs: Shutting eyes unable to assess pupillary reactivity. VFF intact to blink to threat. EOMI no nystagmus. V1-3 intact to LT. No facial asymmetry b/l, full eye closure strength b/l. Symmetric palate elevation in midline. Shoulder shrug intact b/l. Tongue midline, normal movements, no atrophy.    Motor: Normal muscle bulk & tone. No noticeable tremor or seizure. Slight L pronator drift.              Deltoid	Biceps	Triceps	   R	4	5	5	5	 	  L	4	5	5	5    	H-Flex	H-Ext	K-Flex	K-Ext	D-Flex	P-Flex  R	4	4	5	5	5	5	 	   L	4	4	5	5	5	5	     Sensation: Intact to LT b/l throughout.     Reflexes:              Biceps(C5)       BR(C6)     Triceps(C7)               Patellar(L4)    Achilles(S1)    Plantar Resp  R	1	          1	             1		        1		    1		Down   L	1	          1	             1		        1		    1		Down     Coordination: No dysmetria to FTN    Gait: Deferred    I personally reviewed the below data/images/labs:  CBC Full  -  ( 12 May 2021 07:13 )  WBC Count : 5.50 K/uL  RBC Count : 4.41 M/uL  Hemoglobin : 10.6 g/dL  Hematocrit : 36.1 %  Platelet Count - Automated : 230 K/uL  Mean Cell Volume : 81.9 fL  Mean Cell Hemoglobin : 24.0 pg  Mean Cell Hemoglobin Concentration : 29.4 gm/dL  Auto Neutrophil # : 3.21 K/uL  Auto Lymphocyte # : 1.12 K/uL  Auto Monocyte # : 0.76 K/uL  Auto Eosinophil # : 0.33 K/uL  Auto Basophil # : 0.05 K/uL  Auto Neutrophil % : 58.4 %  Auto Lymphocyte % : 20.4 %  Auto Monocyte % : 13.8 %  Auto Eosinophil % : 6.0 %  Auto Basophil % : 0.9 %    05-12    134<L>  |  92<L>  |  25<H>  ----------------------------<  75  3.9   |  25  |  8.17<H>    Ca    9.4      12 May 2021 07:13  Phos  3.9     05-12  Mg     2.3     05-12    TPro  8.1  /  Alb  4.0  /  TBili  0.2  /  DBili  x   /  AST  29  /  ALT  14  /  AlkPhos  64  05-12    < from: CT Head No Cont (05.08.21 @ 12:32) >  IMPRESSION:    Region of decreased attenuation in the right parietal lobe with associated sulcal effacement suspicious for acute infarction.    No CT evidence for reva hemorrhagic transformation.    < end of copied text >    < from: CT Head No Cont (05.09.21 @ 17:30) >    EXAM:  CT BRAIN        PROCEDURE DATE:  May  9 2021         INTERPRETATION:  .    CLINICAL INFORMATION: Hx CVA , with poss infarct yesterday on CT.    TECHNIQUE: Multiple axial CT images of the head were obtained without contrast. Sagittal and coronal reconstructed images were acquired from the source data.    COMPARISON: Prior CT study of the head from 5/8/2021. Prior brain MRI study from 7/22/2019.    FINDINGS: An evolving acute/subacute infarct is again noted within the right parietal temporal lobes. There is no gross hemorrhagic transformation.    Additional wedge-shaped chronic infarct is again noted within the right cerebellar hemisphere.    Additional chronic lacunar infarcts are noted within the left basal ganglia.    There is no acute intracranial hemorrhage, shift of the midline structures, herniation, or hydrocephalus.    There is diffuse cerebral volume loss with prominence of the sulci, fissures, and cisternal spaces which is normal for the patient's age. 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    The paranasal sinuses and mastoid air cells are clear. The calvarium is intact. There is evidence of bilateral cataract removal.    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    Additional chronic infarcts and similar-appearing moderate severity chronic white matter microvascular type changes, as discussed.        DARA VILLELA MD; Attending Radiologist  This document has been electronically signed. May  9 2021  5:42PM    < end of copied text >    < from: MR Head No Cont (05.10.21 @ 21:38) >    EXAM:  MR BRAIN        PROCEDURE DATE:  May 10 2021         INTERPRETATION:  Clinical indication: Follow-up infarct.    MRI of the brain was performed using sagittal T1, axial T1 T2 T2 FLAIR diffusion and susceptibility weighted sequence.    This exam is compared with prior brain MRI performed on July 22, 2019 an prior head CT performed on May 9, 2021    This exam is of limited quality due to motion.    Extensive parenchymal volume loss is seen.    Abnormal T2 prolongation with restricted diffusion is seen involving the right parietal cortical subcortical region. This is compatible with an acute infarct. No hemorrhagic transformation is seen this time. There is localized mass effect seen consisting of sulcal effacement.    The visualized paranasal sinuses mastoid and middle ear regions appear clear.    IMPRESSION: This exam is somewhat limited diagnostic quality due to motion.    Acute infarct as described above.              MARVIN BARRAZA M.D., ATTENDING RADIOLOGIST  This document hasbeen electronically signed. May 11 2021  8:20AM    < end of copied text >  `< from: CT Angio Neck w/ IV Cont (05.11.21 @ 10:54) >    EXAM:  CT ANGIO BRAIN (W)AW IC      EXAM:  CT ANGIO NECK (W)AW IC        PROCEDURE DATE:  May 11 2021         INTERPRETATION:  Clinical indication: Acute infarct seen on brain MRI.    Following injection of approximately 90 cc of Omnipaque 350 IV CT a of the neck and [place] were performed. Coronal and sagittal reconstructions were performed as well.    Abnormal low-attenuation involving the right cerebellar region, right parietal and right basal ganglia region are again seen is well as old lacunar infarcts involving left basal ganglia region.    Calcification is seen involving both proximal internal carotid artery regions.    Evaluation of the distal right common carotid artery region appears normal. Mild narrowing of proximal right internal carotid artery is seen. Evaluation of the proximal right external carotid arteries appear normal. Evaluation of the distal left common carotid artery appears normal. Evaluation of the proximal left internal carotid artery demonstrate mild narrowing. Evaluation of the proximal left external carotid artery appears normal. A normal dominant appearing right vertebral artery is seen. 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 Short segment of stenosis versus hypoplasia is again seen involving the distalmost aspect the left vertebral artery.    Evaluation of both distal internal carotid arteries demonstrates calcified plaques. Mild narrowing of both distal internal carotid artery seen. Hypoplastic right A1 segment is seen. Irregularity seen involving both A2 segments is now seen which could be compatible atherosclerotic change. Evaluation of both middle cerebral arteries appear normal. Both posterior cerebral arteries demonstrate decreased flow related signal distally which could be compatible areas of of stenosis. There is evidence of mild stenosis seen involving the distal aspect of the basilar artery which is new when compared with the prior exam.    IMPRESSION: Mild narrowing of both proximal internal carotid arteries.    Short segment of narrowing involving the distal left vertebral artery is again seen and unchanged.    Irregularity is seen involving both A2 segments which is new when compared prior exam. This could be compatible atherosclerotic change. Mild narrowing of the distal basilar artery is seen which is compatible with mild stenosis. This is new when compared with the prior exam.    Areas of stenosis is suspected involving both distal posterior cerebral arteries.              MARVIN BARRAZA M.D., ATTENDING RADIOLOGIST  This document has been electronically signed. May 11 2021 11:30AM    < end of copied text >  `
[facility]   MD JAJA SMITH DO ANAM SIDDIQUI ANGELA WONG, PA    TEL:  OFFICE: [phone]  DR CASTILLO CELL: [phone]  DR. HARRISON CELL: [phone]  DR. RILEY CELL: [phone]  LIZZY DHILLON CELL: [phone]    From 5pm-7am Answering Service [phone]    -- RENAL FOLLOW UP NOTE ---Date of Service 05-10-21 @ 12:59    Patient is a 80y old  Female who presents with a chief complaint of AMS (10 May 2021 10:55)      Patient seen and examined in HD. No chest pain/sob    VITALS:  T(F): 98.8 (05-10-21 @ 12:05), Max: 98.8 (05-10-21 @ 12:05)  HR: 77 (05-10-21 @ 12:05)  BP: 172/90 (05-10-21 @ 12:05)  RR: 17 (05-10-21 @ 12:05)  SpO2: 100% (05-10-21 @ 12:05)  Wt(kg): --  flow 375      PHYSICAL EXAM:  Constitutional: NAD  Neck: No JVD  Respiratory: CTAB, no wheezes, rales or rhonchi  Cardiovascular: S1, S2, RRR  Gastrointestinal: BS+, soft, NT/ND  Extremities: No peripheral edema    Hospital Medications:   MEDICATIONS  (STANDING):  apixaban 5 milliGRAM(s) Oral two times a day  aspirin  chewable 81 milliGRAM(s) Oral daily  atorvastatin 80 milliGRAM(s) Oral at bedtime  calcium acetate 667 milliGRAM(s) Oral three times a day with meals  cefTRIAXone   IVPB 1000 milliGRAM(s) IV Intermittent every 24 hours  clonazePAM  Tablet 0.5 milliGRAM(s) Oral once  dextrose 40% Gel 15 Gram(s) Oral once  dextrose 5%. 1000 milliLiter(s) (50 mL/Hr) IV Continuous <Continuous>  dextrose 5%. 1000 milliLiter(s) (100 mL/Hr) IV Continuous <Continuous>  dextrose 50% Injectable 25 Gram(s) IV Push once  dextrose 50% Injectable 12.5 Gram(s) IV Push once  dextrose 50% Injectable 25 Gram(s) IV Push once  donepezil 10 milliGRAM(s) Oral at bedtime  doxercalciferol Injectable 2 MICROGram(s) IV Push <User Schedule>  epoetin raheem-epbx (RETACRIT) Injectable 09587 Unit(s) IV Push <User Schedule>  glucagon  Injectable 1 milliGRAM(s) IntraMuscular once  hydrALAZINE 25 milliGRAM(s) Oral two times a day  insulin glargine Injectable (LANTUS) 10 Unit(s) SubCutaneous at bedtime  insulin lispro (ADMELOG) corrective regimen sliding scale   SubCutaneous three times a day before meals  insulin lispro (ADMELOG) corrective regimen sliding scale   SubCutaneous at bedtime  lidocaine/prilocaine Cream 1 Application(s) Topical <User Schedule>  metoprolol succinate  milliGRAM(s) Oral daily  pantoprazole    Tablet 40 milliGRAM(s) Oral before breakfast      LABS:  05-10    140  |  99  |  33<H>  ----------------------------<  86  4.3   |  24  |  9.97<H>    Ca    8.7      10 May 2021 08:10  Phos  5.8     05-10  Mg     2.3     05-10    TPro      /  Alb      /  TBili      /  DBili      /  AST      /  ALT  7   /  AlkPhos      05-10    Creatinine Trend: 9.97 <--, 8.48 <--, 8.30 <--, 7.45 <--    Phosphorus Level, Serum: 5.8 mg/dL (05-10 @ 08:10)                              9.6    4.38  )-----------( 163      ( 10 May 2021 08:10 )             31.9     Urine Studies:  Urinalysis - [05-08-21 @ 11:19]      Color Yellow / Appearance Slightly Turbid / SG 1.017 / pH 8.5      Gluc Negative / Ketone Negative  / Bili Negative / Urobili <2 mg/dL       Blood Trace / Protein >600 mg/dL / Leuk Est Moderate / Nitrite Negative      RBC 1 / WBC 61 / Hyaline 2 / Gran  / Sq Epi  / Non Sq Epi 9 / Bacteria Few      PTH -- (Ca --)      [05-09-21 @ 06:54]   421  HbA1c 5.8      [12-10-19 @ 06:41]  Lipid: chol 219, , HDL 65, LDL --      [05-09-21 @ 06:54]    HBsAg Nonreact      [05-10-21 @ 09:26]  HBcAb Nonreact      [05-10-21 @ 09:26]  HCV 0.17, Nonreact      [05-10-21 @ 09:26]      RADIOLOGY & ADDITIONAL STUDIES:  
[facility] [place] [facility]   MD FREDA SMITH MD RUORU WONG, PA    TEL:  OFFICE: [phone]  DR CASTILLO CELL: [phone]  STEVEN DHILLON CELL: [phone]  DR. RILEY CELL: [phone]  DR. HARRISON CELL: [phone]    FROM 5 PM - 7 AM PLEASE CALL ANSWERING SERVICE: [phone]    RENAL FOLLOW UP NOTE--Date of Service 05-14-21 @ 08:52  --------------------------------------------------------------------------------  HPI:      Pt seen and examined at bedside.       PAST HISTORY  --------------------------------------------------------------------------------  No significant changes to PMH, PSH, FHx, SHx, unless otherwise noted    ALLERGIES & MEDICATIONS  --------------------------------------------------------------------------------  Allergies    No Known Allergies    Intolerances      Standing Inpatient Medications  apixaban 5 milliGRAM(s) Oral two times a day  aspirin  chewable 81 milliGRAM(s) Oral daily  atorvastatin 80 milliGRAM(s) Oral at bedtime  calcium acetate 667 milliGRAM(s) Oral three times a day with meals  cefuroxime   Tablet 500 milliGRAM(s) Oral every 12 hours  dextrose 40% Gel 15 Gram(s) Oral once  dextrose 5%. 1000 milliLiter(s) IV Continuous <Continuous>  dextrose 5%. 1000 milliLiter(s) IV Continuous <Continuous>  dextrose 50% Injectable 25 Gram(s) IV Push once  dextrose 50% Injectable 12.5 Gram(s) IV Push once  dextrose 50% Injectable 25 Gram(s) IV Push once  donepezil 10 milliGRAM(s) Oral at bedtime  doxercalciferol Injectable 2 MICROGram(s) IV Push <User Schedule>  epoetin raheem-epbx (RETACRIT) Injectable 08016 Unit(s) IV Push <User Schedule>  glucagon  Injectable 1 milliGRAM(s) IntraMuscular once  hydrALAZINE 25 milliGRAM(s) Oral two times a day  insulin glargine Injectable (LANTUS) 10 Unit(s) SubCutaneous at bedtime  insulin lispro (ADMELOG) corrective regimen sliding scale   SubCutaneous three times a day before meals  insulin lispro (ADMELOG) corrective regimen sliding scale   SubCutaneous at bedtime  lidocaine/prilocaine Cream 1 Application(s) Topical <User Schedule>  metoprolol succinate  milliGRAM(s) Oral daily  pantoprazole    Tablet 40 milliGRAM(s) Oral before breakfast  QUEtiapine 25 milliGRAM(s) Oral daily    PRN Inpatient Medications  ondansetron Injectable 4 milliGRAM(s) IV Push every 6 hours PRN      REVIEW OF SYSTEMS  --------------------------------------------------------------------------------  General: no fever    MSK: no edema     VITALS/PHYSICAL EXAM  --------------------------------------------------------------------------------  T(C): 36.9 (05-14-21 @ 05:25), Max: 36.9 (05-14-21 @ 05:25)  HR: 84 (05-14-21 @ 05:25) (84 - 90)  BP: 151/67 (05-14-21 @ 05:25) (114/85 - 151/67)  RR: 18 (05-14-21 @ 05:25) (18 - 18)  SpO2: 99% (05-14-21 @ 05:25) (97% - 100%)  Wt(kg): --        Physical Exam:  	Gen: NAD  	HEENT: MMM  	Pulm: CTA B/L  	CV: S1S2  	Abd: Soft, +BS  	Ext: No LE edema B/L                      Neuro: Awake   	Skin: Warm and Dry   	Vascular access: AVF           no nikunj  LABS/STUDIES  --------------------------------------------------------------------------------              11.0   4.25  >-----------<  234      [05-14-21 @ 07:07]              38.9     137  |  92  |  20  ----------------------------<  90      [05-14-21 @ 07:07]  4.1   |  25  |  8.09        Ca     9.7     [05-14-21 @ 07:07]      Mg     2.5     [05-14-21 @ 07:07]      Phos  4.3     [05-14-21 @ 07:07]            Creatinine Trend:  SCr 8.09 [05-14 @ 07:07]  SCr 8.17 [05-12 @ 07:13]  SCr 6.58 [05-11 @ 07:18]  SCr 9.97 [05-10 @ 08:10]  SCr 8.48 [05-09 @ 09:25]    Urinalysis - [05-08-21 @ 11:19]      Color Yellow / Appearance Slightly Turbid / SG 1.017 / pH 8.5      Gluc Negative / Ketone Negative  / Bili Negative / Urobili <2 mg/dL       Blood Trace / Protein >600 mg/dL / Leuk Est Moderate / Nitrite Negative      RBC 1 / WBC 61 / Hyaline 2 / Gran  / Sq Epi  / Non Sq Epi 9 / Bacteria Few      PTH -- (Ca --)      [05-09-21 @ 06:54]   421  HbA1c 5.8      [12-10-19 @ 06:41]  Lipid: chol 219, , HDL 65, LDL --      [05-09-21 @ 06:54]    HBsAb <3.0      [05-11-21 @ 00:01]  HBsAg Nonreact      [05-10-21 @ 09:26]  HBcAb Nonreact      [05-10-21 @ 09:26]  HCV 0.17, Nonreact      [05-10-21 @ 09:26]    
[facility] [place] [facility]   MD JAJA SMITH DO ANAM SIDDIQUI ANGELA WONG, PA    TEL:  OFFICE: [phone]  DR CASTILLO CELL: [phone]  DR. HARRISON CELL: [phone]  DR. RILEY CELL: [phone]  LIZZY DHILLON CELL: [phone]    From 5pm-7am Answering Service [phone]    -- RENAL FOLLOW UP NOTE ---Date of Service 05-17-21 @ 12:01    Patient is a 80y old  Female who presents with a chief complaint of AMS (17 May 2021 10:46)      Patient seen and examined at bedside. No chest pain/sob    VITALS:  T(F): 97.6 (05-17-21 @ 04:00), Max: 97.8 (05-17-21 @ 00:47)  HR: 80 (05-17-21 @ 04:00)  BP: 128/64 (05-17-21 @ 04:00)  RR: 17 (05-17-21 @ 04:00)  SpO2: 98% (05-17-21 @ 04:00)  Wt(kg): --        PHYSICAL EXAM:  Constitutional: NAD  Neck: No JVD  Respiratory: CTAB, no wheezes, rales or rhonchi  Cardiovascular: S1, S2, RRR  Gastrointestinal: BS+, soft, NT/ND  Extremities: No peripheral edema    Hospital Medications:   MEDICATIONS  (STANDING):  apixaban 2.5 milliGRAM(s) Oral two times a day  aspirin enteric coated 81 milliGRAM(s) Oral daily  atorvastatin 80 milliGRAM(s) Oral at bedtime  calcium acetate 667 milliGRAM(s) Oral three times a day with meals  chlorhexidine 4% Liquid 1 Application(s) Topical daily  dextrose 40% Gel 15 Gram(s) Oral once  dextrose 5%. 1000 milliLiter(s) (50 mL/Hr) IV Continuous <Continuous>  dextrose 5%. 1000 milliLiter(s) (100 mL/Hr) IV Continuous <Continuous>  dextrose 50% Injectable 25 Gram(s) IV Push once  dextrose 50% Injectable 12.5 Gram(s) IV Push once  dextrose 50% Injectable 25 Gram(s) IV Push once  diVALproex Sprinkle 250 milliGRAM(s) Oral three times a day  donepezil 10 milliGRAM(s) Oral at bedtime  doxercalciferol Injectable 2 MICROGram(s) IV Push <User Schedule>  glucagon  Injectable 1 milliGRAM(s) IntraMuscular once  hydrALAZINE 25 milliGRAM(s) Oral two times a day  insulin glargine Injectable (LANTUS) 10 Unit(s) SubCutaneous at bedtime  insulin lispro (ADMELOG) corrective regimen sliding scale   SubCutaneous three times a day before meals  insulin lispro (ADMELOG) corrective regimen sliding scale   SubCutaneous at bedtime  lidocaine/prilocaine Cream 1 Application(s) Topical <User Schedule>  metoprolol succinate  milliGRAM(s) Oral daily  pantoprazole    Tablet 40 milliGRAM(s) Oral before breakfast  QUEtiapine 25 milliGRAM(s) Oral daily      LABS:  05-16    136  |  89<L>  |  22  ----------------------------<  55<L>  3.7   |  28  |  8.03<H>    Ca    10.2      16 May 2021 06:58  Phos  4.5     05-16  Mg     2.5     05-16    TPro  9.0<H>  /  Alb  4.5  /  TBili  0.5  /  DBili  <0.2  /  AST  33<H>  /  ALT  14  /  AlkPhos  70  05-16    Creatinine Trend: 8.03 <--, 5.44 <--, 8.09 <--, 8.17 <--, 6.58 <--            Urine Studies:  Urinalysis - [05-08-21 @ 11:19]      Color Yellow / Appearance Slightly Turbid / SG 1.017 / pH 8.5      Gluc Negative / Ketone Negative  / Bili Negative / Urobili <2 mg/dL       Blood Trace / Protein >600 mg/dL / Leuk Est Moderate / Nitrite Negative      RBC 1 / WBC 61 / Hyaline 2 / Gran  / Sq Epi  / Non Sq Epi 9 / Bacteria Few      PTH -- (Ca --)      [05-09-21 @ 06:54]   421  HbA1c 5.8      [12-10-19 @ 06:41]  TSH 3.72      [05-14-21 @ 07:12]  Lipid: chol 219, , HDL 65, LDL --      [05-09-21 @ 06:54]    HBsAb <3.0      [05-11-21 @ 00:01]  HBsAg Nonreact      [05-10-21 @ 09:26]  HBcAb Nonreact      [05-10-21 @ 09:26]  HCV 0.17, Nonreact      [05-10-21 @ 09:26]    Syphilis Screen (Treponema Pallidum Ab) Negative      [05-15-21 @ 10:20]    RADIOLOGY & ADDITIONAL STUDIES:  
[facility] [place] [facility]   MD JAJA SMITH DO ANAM SIDDIQUI ANGELA WONG, PA    TEL:  OFFICE: [phone]  DR CASTILLO CELL: [phone]  DR. HARRISON CELL: [phone]  DR. RILEY CELL: [phone]  LIZZY DHILLON CELL: [phone]    From 5pm-7am Answering Service [phone]    -- RENAL FOLLOW UP NOTE ---Date of Service 05-11-21 @ 14:00    Patient is a 80y old  Female who presents with a chief complaint of AMS (11 May 2021 09:31)      Patient seen and examined at bedside. No chest pain/sob    VITALS:  T(F): 97.8 (05-11-21 @ 12:39), Max: 98.8 (05-10-21 @ 22:01)  HR: 80 (05-11-21 @ 12:39)  BP: 134/60 (05-11-21 @ 12:39)  RR: 16 (05-11-21 @ 12:39)  SpO2: 99% (05-11-21 @ 12:39)  Wt(kg): --    05-10 @ 07:01  -  05-11 @ 07:00  --------------------------------------------------------  IN: 600 mL / OUT: 1600 mL / NET: -1000 mL          PHYSICAL EXAM:  Constitutional: NAD  Neck: No JVD  Respiratory: CTAB, no wheezes, rales or rhonchi  Cardiovascular: S1, S2, RRR  Gastrointestinal: BS+, soft, NT/ND  Extremities: No peripheral edema    Hospital Medications:   MEDICATIONS  (STANDING):  apixaban 5 milliGRAM(s) Oral two times a day  aspirin  chewable 81 milliGRAM(s) Oral daily  atorvastatin 80 milliGRAM(s) Oral at bedtime  calcium acetate 667 milliGRAM(s) Oral three times a day with meals  cefTRIAXone   IVPB 1000 milliGRAM(s) IV Intermittent every 24 hours  clonazePAM  Tablet 0.5 milliGRAM(s) Oral once  dextrose 40% Gel 15 Gram(s) Oral once  dextrose 5%. 1000 milliLiter(s) (50 mL/Hr) IV Continuous <Continuous>  dextrose 5%. 1000 milliLiter(s) (100 mL/Hr) IV Continuous <Continuous>  dextrose 50% Injectable 25 Gram(s) IV Push once  dextrose 50% Injectable 12.5 Gram(s) IV Push once  dextrose 50% Injectable 25 Gram(s) IV Push once  donepezil 10 milliGRAM(s) Oral at bedtime  doxercalciferol Injectable 2 MICROGram(s) IV Push <User Schedule>  epoetin raheem-epbx (RETACRIT) Injectable 42379 Unit(s) IV Push <User Schedule>  glucagon  Injectable 1 milliGRAM(s) IntraMuscular once  hydrALAZINE 25 milliGRAM(s) Oral two times a day  insulin glargine Injectable (LANTUS) 10 Unit(s) SubCutaneous at bedtime  insulin lispro (ADMELOG) corrective regimen sliding scale   SubCutaneous three times a day before meals  insulin lispro (ADMELOG) corrective regimen sliding scale   SubCutaneous at bedtime  lidocaine/prilocaine Cream 1 Application(s) Topical <User Schedule>  metoprolol succinate  milliGRAM(s) Oral daily  pantoprazole    Tablet 40 milliGRAM(s) Oral before breakfast      LABS:  05-11    134<L>  |  94<L>  |  20  ----------------------------<  71  3.6   |  27  |  6.58<H>    Ca    8.9      11 May 2021 07:18  Phos  3.5     05-11  Mg     2.1     05-11    TPro  7.7  /  Alb  3.8  /  TBili  0.3  /  DBili      /  AST  27  /  ALT  13  /  AlkPhos  60  05-11    Creatinine Trend: 6.58 <--, 9.97 <--, 8.48 <--, 8.30 <--, 7.45 <--    Albumin, Serum: 3.8 g/dL (05-11 @ 07:18)  Phosphorus Level, Serum: 3.5 mg/dL (05-11 @ 07:18)                              10.0   5.69  )-----------( 201      ( 11 May 2021 07:18 )             33.4     Urine Studies:  Urinalysis - [05-08-21 @ 11:19]      Color Yellow / Appearance Slightly Turbid / SG 1.017 / pH 8.5      Gluc Negative / Ketone Negative  / Bili Negative / Urobili <2 mg/dL       Blood Trace / Protein >600 mg/dL / Leuk Est Moderate / Nitrite Negative      RBC 1 / WBC 61 / Hyaline 2 / Gran  / Sq Epi  / Non Sq Epi 9 / Bacteria Few      PTH -- (Ca --)      [05-09-21 @ 06:54]   421  HbA1c 5.8      [12-10-19 @ 06:41]  Lipid: chol 219, , HDL 65, LDL --      [05-09-21 @ 06:54]    HBsAb <3.0      [05-11-21 @ 00:01]  HBsAg Nonreact      [05-10-21 @ 09:26]  HBcAb Nonreact      [05-10-21 @ 09:26]  HCV 0.17, Nonreact      [05-10-21 @ 09:26]      RADIOLOGY & ADDITIONAL STUDIES:  
Neurology Progress Note    S: Patient seen and examined. No new events overnight. more alert. still mildly confused no significant change. d/c plan.     Medication:  MEDICATIONS  (STANDING):  apixaban 2.5 milliGRAM(s) Oral two times a day  aspirin enteric coated 81 milliGRAM(s) Oral daily  atorvastatin 80 milliGRAM(s) Oral at bedtime  calcium acetate 667 milliGRAM(s) Oral three times a day with meals  chlorhexidine 4% Liquid 1 Application(s) Topical daily  dextrose 40% Gel 15 Gram(s) Oral once  dextrose 5%. 1000 milliLiter(s) (50 mL/Hr) IV Continuous <Continuous>  dextrose 5%. 1000 milliLiter(s) (100 mL/Hr) IV Continuous <Continuous>  dextrose 50% Injectable 25 Gram(s) IV Push once  dextrose 50% Injectable 12.5 Gram(s) IV Push once  dextrose 50% Injectable 25 Gram(s) IV Push once  diVALproex Sprinkle 250 milliGRAM(s) Oral three times a day  donepezil 10 milliGRAM(s) Oral at bedtime  doxercalciferol Injectable 2 MICROGram(s) IV Push <User Schedule>  glucagon  Injectable 1 milliGRAM(s) IntraMuscular once  hydrALAZINE 25 milliGRAM(s) Oral two times a day  insulin glargine Injectable (LANTUS) 10 Unit(s) SubCutaneous at bedtime  insulin lispro (ADMELOG) corrective regimen sliding scale   SubCutaneous three times a day before meals  insulin lispro (ADMELOG) corrective regimen sliding scale   SubCutaneous at bedtime  lidocaine/prilocaine Cream 1 Application(s) Topical <User Schedule>  metoprolol succinate  milliGRAM(s) Oral daily  pantoprazole    Tablet 40 milliGRAM(s) Oral before breakfast  QUEtiapine 25 milliGRAM(s) Oral daily    MEDICATIONS  (PRN):  ondansetron Injectable 4 milliGRAM(s) IV Push every 6 hours PRN Nausea      Vitals:  Vital Signs Last 24 Hrs  T(C): 36.4 (17 May 2021 04:00), Max: 36.6 (17 May 2021 00:47)  T(F): 97.6 (17 May 2021 04:00), Max: 97.8 (17 May 2021 00:47)  HR: 80 (17 May 2021 04:00) (79 - 96)  BP: 128/64 (17 May 2021 04:00) (108/59 - 128/64)  BP(mean): --  RR: 17 (17 May 2021 04:00) (17 - 18)  SpO2: 98% (17 May 2021 04:00) (97% - 98%)    General Exam:   General Appearance: Appropriately dressed and in no acute distress       Head: Normocephalic, atraumatic and no dysmorphic features  Ear, Nose, and Throat: Moist mucous membranes  CVS: S1S2+  Resp: No SOB, no wheeze or rhonchi  Abd: soft NTND  Extremities: No edema, no cyanosis  Skin: No bruises, no rashes     Neurological Exam:  MS: Awake, alert, oriented to person, "hospital", stated it was April 1981. Normal affect. Follows most commands. Moderate level of distractibility and some poor attention.     Language: Speech is clear, fluent with good repetition & comprehension.    CNs: Shutting eyes unable to assess pupillary reactivity. VFF intact to blink to threat. EOMI no nystagmus. V1-3 intact to LT. No facial asymmetry b/l, full eye closure strength b/l. Symmetric palate elevation in midline. Shoulder shrug intact b/l. Tongue midline, normal movements, no atrophy.    Motor: Normal muscle bulk & tone. No noticeable tremor or seizure. Slight L pronator drift.              Deltoid	Biceps	Triceps	   R	4	5	5	5	 	  L	4	5	5	5    	H-Flex	H-Ext	K-Flex	K-Ext	D-Flex	P-Flex  R	4	4	5	5	5	5	 	   L	4	4	5	5	5	5	     Sensation: Intact to LT b/l throughout.     Reflexes:              Biceps(C5)       BR(C6)     Triceps(C7)               Patellar(L4)    Achilles(S1)    Plantar Resp  R	1	          1	             1		        1		    1		Down   L	1	          1	             1		        1		    1		Down     Coordination: No dysmetria to FTN    Gait: Deferred    I personally reviewed the below data/images/labs:        05-16    136  |  89<L>  |  22  ----------------------------<  55<L>  3.7   |  28  |  8.03<H>    Ca    10.2      16 May 2021 06:58  Phos  4.5     05-16  Mg     2.5     05-16    TPro  9.0<H>  /  Alb  4.5  /  TBili  0.5  /  DBili  <0.2  /  AST  33<H>  /  ALT  14  /  AlkPhos  70  05-16      < from: CT Head No Cont (05.08.21 @ 12:32) >  IMPRESSION:    Region of decreased attenuation in the right parietal lobe with associated sulcal effacement suspicious for acute infarction.    No CT evidence for reva hemorrhagic transformation.    < end of copied text >    < from: CT Head No Cont (05.09.21 @ 17:30) >    EXAM:  CT BRAIN        PROCEDURE DATE:  May  9 2021         INTERPRETATION:  .    CLINICAL INFORMATION: Hx CVA , with poss infarct yesterday on CT.    TECHNIQUE: Multiple axial CT images of the head were obtained without contrast. Sagittal and coronal reconstructed images were acquired from the source data.    COMPARISON: Prior CT study of the head from 5/8/2021. Prior brain MRI study from 7/22/2019.    FINDINGS: An evolving acute/subacute infarct is again noted within the right parietal temporal lobes. There is no gross hemorrhagic transformation.    Additional wedge-shaped chronic infarct is again noted within the right cerebellar hemisphere.    Additional chronic lacunar infarcts are noted within the left basal ganglia.    There is no acute intracranial hemorrhage, shift of the midline structures, herniation, or hydrocephalus.    There is diffuse cerebral volume loss with prominence of the sulci, fissures, and cisternal spaces which is normal for the patient's age. 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    The paranasal sinuses and mastoid air cells are clear. The calvarium is intact. There is evidence of bilateral cataract removal.    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    Additional chronic infarcts and similar-appearing moderate severity chronic white matter microvascular type changes, as discussed.        DARA VILLELA MD; Attending Radiologist  This document has been electronically signed. May  9 2021  5:42PM    < end of copied text >    < from: MR Head No Cont (05.10.21 @ 21:38) >    EXAM:  MR BRAIN        PROCEDURE DATE:  May 10 2021         INTERPRETATION:  Clinical indication: Follow-up infarct.    MRI of the brain was performed using sagittal T1, axial T1 T2 T2 FLAIR diffusion and susceptibility weighted sequence.    This exam is compared with prior brain MRI performed on July 22, 2019 an prior head CT performed on May 9, 2021    This exam is of limited quality due to motion.    Extensive parenchymal volume loss is seen.    Abnormal T2 prolongation with restricted diffusion is seen involving the right parietal cortical subcortical region. This is compatible with an acute infarct. No hemorrhagic transformation is seen this time. There is localized mass effect seen consisting of sulcal effacement.    The visualized paranasal sinuses mastoid and middle ear regions appear clear.    IMPRESSION: This exam is somewhat limited diagnostic quality due to motion.    Acute infarct as described above.              MARVIN BARRAZA M.D., ATTENDING RADIOLOGIST  This document hasbeen electronically signed. May 11 2021  8:20AM    < end of copied text >  `< from: CT Angio Neck w/ IV Cont (05.11.21 @ 10:54) >    EXAM:  CT ANGIO BRAIN (W)AW IC      EXAM:  CT ANGIO NECK (W)AW IC        PROCEDURE DATE:  May 11 2021         INTERPRETATION:  Clinical indication: Acute infarct seen on brain MRI.    Following injection of approximately 90 cc of Omnipaque 350 IV CT a of the neck and [place] were performed. Coronal and sagittal reconstructions were performed as well.    Abnormal low-attenuation involving the right cerebellar region, right parietal and right basal ganglia region are again seen is well as old lacunar infarcts involving left basal ganglia region.    Calcification is seen involving both proximal internal carotid artery regions.    Evaluation of the distal right common carotid artery region appears normal. Mild narrowing of proximal right internal carotid artery is seen. Evaluation of the proximal right external carotid arteries appear normal. Evaluation of the distal left common carotid artery appears normal. Evaluation of the proximal left internal carotid artery demonstrate mild narrowing. Evaluation of the proximal left external carotid artery appears normal. A normal dominant appearing right vertebral artery is seen. 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 Short segment of stenosis versus hypoplasia is again seen involving the distalmost aspect the left vertebral artery.    Evaluation of both distal internal carotid arteries demonstrates calcified plaques. Mild narrowing of both distal internal carotid artery seen. Hypoplastic right A1 segment is seen. Irregularity seen involving both A2 segments is now seen which could be compatible atherosclerotic change. Evaluation of both middle cerebral arteries appear normal. Both posterior cerebral arteries demonstrate decreased flow related signal distally which could be compatible areas of of stenosis. There is evidence of mild stenosis seen involving the distal aspect of the basilar artery which is new when compared with the prior exam.    IMPRESSION: Mild narrowing of both proximal internal carotid arteries.    Short segment of narrowing involving the distal left vertebral artery is again seen and unchanged.    Irregularity is seen involving both A2 segments which is new when compared prior exam. This could be compatible atherosclerotic change. Mild narrowing of the distal basilar artery is seen which is compatible with mild stenosis. This is new when compared with the prior exam.    Areas of stenosis is suspected involving both distal posterior cerebral arteries.              MARVIN BARRAZA M.D., ATTENDING RADIOLOGIST  This document has been electronically signed. May 11 2021 11:30AM    < end of copied text >  `
Neurology Progress Note    S: Patient seen and examined. No new events overnight. more alert. still mildly confused no significant change. d/c plan.     Medication:  MEDICATIONS  (STANDING):  apixaban 5 milliGRAM(s) Oral two times a day  aspirin  chewable 81 milliGRAM(s) Oral daily  atorvastatin 80 milliGRAM(s) Oral at bedtime  calcium acetate 667 milliGRAM(s) Oral three times a day with meals  cefTRIAXone   IVPB 1000 milliGRAM(s) IV Intermittent every 24 hours  clonazePAM  Tablet 0.5 milliGRAM(s) Oral once  dextrose 40% Gel 15 Gram(s) Oral once  dextrose 5%. 1000 milliLiter(s) (50 mL/Hr) IV Continuous <Continuous>  dextrose 5%. 1000 milliLiter(s) (100 mL/Hr) IV Continuous <Continuous>  dextrose 50% Injectable 25 Gram(s) IV Push once  dextrose 50% Injectable 25 Gram(s) IV Push once  dextrose 50% Injectable 12.5 Gram(s) IV Push once  donepezil 10 milliGRAM(s) Oral at bedtime  doxercalciferol Injectable 2 MICROGram(s) IV Push <User Schedule>  epoetin raheem-epbx (RETACRIT) Injectable 78471 Unit(s) IV Push <User Schedule>  glucagon  Injectable 1 milliGRAM(s) IntraMuscular once  hydrALAZINE 25 milliGRAM(s) Oral two times a day  insulin glargine Injectable (LANTUS) 10 Unit(s) SubCutaneous at bedtime  insulin lispro (ADMELOG) corrective regimen sliding scale   SubCutaneous three times a day before meals  insulin lispro (ADMELOG) corrective regimen sliding scale   SubCutaneous at bedtime  lidocaine/prilocaine Cream 1 Application(s) Topical <User Schedule>  metoprolol succinate  milliGRAM(s) Oral daily  pantoprazole    Tablet 40 milliGRAM(s) Oral before breakfast    MEDICATIONS  (PRN):  ondansetron Injectable 4 milliGRAM(s) IV Push every 6 hours PRN Nausea      Vitals:  Orthostatic VS  Vital Signs Last 24 Hrs  T(C): 36.9 (14 May 2021 05:25), Max: 36.9 (14 May 2021 05:25)  T(F): 98.4 (14 May 2021 05:25), Max: 98.4 (14 May 2021 05:25)  HR: 84 (14 May 2021 05:25) (84 - 90)  BP: 151/67 (14 May 2021 05:25) (114/85 - 151/67)  BP(mean): --  RR: 18 (14 May 2021 05:25) (18 - 18)  SpO2: 99% (14 May 2021 05:25) (97% - 100%)    General Exam:   General Appearance: Appropriately dressed and in no acute distress       Head: Normocephalic, atraumatic and no dysmorphic features  Ear, Nose, and Throat: Moist mucous membranes  CVS: S1S2+  Resp: No SOB, no wheeze or rhonchi  Abd: soft NTND  Extremities: No edema, no cyanosis  Skin: No bruises, no rashes     Neurological Exam:  MS: Awake, alert, oriented to person, "hospital", stated it was April 1981. Normal affect. Follows most commands. Moderate level of distractibility and some poor attention.     Language: Speech is clear, fluent with good repetition & comprehension.    CNs: Shutting eyes unable to assess pupillary reactivity. VFF intact to blink to threat. EOMI no nystagmus. V1-3 intact to LT. No facial asymmetry b/l, full eye closure strength b/l. Symmetric palate elevation in midline. Shoulder shrug intact b/l. Tongue midline, normal movements, no atrophy.    Motor: Normal muscle bulk & tone. No noticeable tremor or seizure. Slight L pronator drift.              Deltoid	Biceps	Triceps	   R	4	5	5	5	 	  L	4	5	5	5    	H-Flex	H-Ext	K-Flex	K-Ext	D-Flex	P-Flex  R	4	4	5	5	5	5	 	   L	4	4	5	5	5	5	     Sensation: Intact to LT b/l throughout.     Reflexes:              Biceps(C5)       BR(C6)     Triceps(C7)               Patellar(L4)    Achilles(S1)    Plantar Resp  R	1	          1	             1		        1		    1		Down   L	1	          1	             1		        1		    1		Down     Coordination: No dysmetria to FTN    Gait: Deferred    I personally reviewed the below data/images/labs:    CBC Full  -  ( 14 May 2021 07:07 )  WBC Count : 4.25 K/uL  RBC Count : 4.66 M/uL  Hemoglobin : 11.0 g/dL  Hematocrit : 38.9 %  Platelet Count - Automated : 234 K/uL  Mean Cell Volume : 83.5 fL  Mean Cell Hemoglobin : 23.6 pg  Mean Cell Hemoglobin Concentration : 28.3 gm/dL  Auto Neutrophil # : 2.38 K/uL  Auto Lymphocyte # : 0.97 K/uL  Auto Monocyte # : 0.58 K/uL  Auto Eosinophil # : 0.27 K/uL  Auto Basophil # : 0.03 K/uL  Auto Neutrophil % : 56.0 %  Auto Lymphocyte % : 22.8 %  Auto Monocyte % : 13.6 %  Auto Eosinophil % : 6.4 %  Auto Basophil % : 0.7 %    05-14    137  |  92<L>  |  20  ----------------------------<  90  4.1   |  25  |  8.09<H>    Ca    9.7      14 May 2021 07:07  Phos  4.3     05-14  Mg     2.5     05-14        < from: CT Head No Cont (05.08.21 @ 12:32) >  IMPRESSION:    Region of decreased attenuation in the right parietal lobe with associated sulcal effacement suspicious for acute infarction.    No CT evidence for reva hemorrhagic transformation.    < end of copied text >    < from: CT Head No Cont (05.09.21 @ 17:30) >    EXAM:  CT BRAIN        PROCEDURE DATE:  May  9 2021         INTERPRETATION:  .    CLINICAL INFORMATION: Hx CVA , with poss infarct yesterday on CT.    TECHNIQUE: Multiple axial CT images of the head were obtained without contrast. Sagittal and coronal reconstructed images were acquired from the source data.    COMPARISON: Prior CT study of the head from 5/8/2021. Prior brain MRI study from 7/22/2019.    FINDINGS: An evolving acute/subacute infarct is again noted within the right parietal temporal lobes. There is no gross hemorrhagic transformation.    Additional wedge-shaped chronic infarct is again noted within the right cerebellar hemisphere.    Additional chronic lacunar infarcts are noted within the left basal ganglia.    There is no acute intracranial hemorrhage, shift of the midline structures, herniation, or hydrocephalus.    There is diffuse cerebral volume loss with prominence of the sulci, fissures, and cisternal spaces which is normal for the patient's age. 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    The paranasal sinuses and mastoid air cells are clear. The calvarium is intact. There is evidence of bilateral cataract removal.    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    Additional chronic infarcts and similar-appearing moderate severity chronic white matter microvascular type changes, as discussed.        DARA VILLELA MD; Attending Radiologist  This document has been electronically signed. May  9 2021  5:42PM    < end of copied text >    < from: MR Head No Cont (05.10.21 @ 21:38) >    EXAM:  MR BRAIN        PROCEDURE DATE:  May 10 2021         INTERPRETATION:  Clinical indication: Follow-up infarct.    MRI of the brain was performed using sagittal T1, axial T1 T2 T2 FLAIR diffusion and susceptibility weighted sequence.    This exam is compared with prior brain MRI performed on July 22, 2019 an prior head CT performed on May 9, 2021    This exam is of limited quality due to motion.    Extensive parenchymal volume loss is seen.    Abnormal T2 prolongation with restricted diffusion is seen involving the right parietal cortical subcortical region. This is compatible with an acute infarct. No hemorrhagic transformation is seen this time. There is localized mass effect seen consisting of sulcal effacement.    The visualized paranasal sinuses mastoid and middle ear regions appear clear.    IMPRESSION: This exam is somewhat limited diagnostic quality due to motion.    Acute infarct as described above.              MARVIN BARRAZA M.D., ATTENDING RADIOLOGIST  This document hasbeen electronically signed. May 11 2021  8:20AM    < end of copied text >  `< from: CT Angio Neck w/ IV Cont (05.11.21 @ 10:54) >    EXAM:  CT ANGIO BRAIN (W)AW IC      EXAM:  CT ANGIO NECK (W)AW IC        PROCEDURE DATE:  May 11 2021         INTERPRETATION:  Clinical indication: Acute infarct seen on brain MRI.    Following injection of approximately 90 cc of Omnipaque 350 IV CT a of the neck and [place] were performed. Coronal and sagittal reconstructions were performed as well.    Abnormal low-attenuation involving the right cerebellar region, right parietal and right basal ganglia region are again seen is well as old lacunar infarcts involving left basal ganglia region.    Calcification is seen involving both proximal internal carotid artery regions.    Evaluation of the distal right common carotid artery region appears normal. Mild narrowing of proximal right internal carotid artery is seen. Evaluation of the proximal right external carotid arteries appear normal. Evaluation of the distal left common carotid artery appears normal. Evaluation of the proximal left internal carotid artery demonstrate mild narrowing. Evaluation of the proximal left external carotid artery appears normal. A normal dominant appearing right vertebral artery is seen. 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 Short segment of stenosis versus hypoplasia is again seen involving the distalmost aspect the left vertebral artery.    Evaluation of both distal internal carotid arteries demonstrates calcified plaques. Mild narrowing of both distal internal carotid artery seen. Hypoplastic right A1 segment is seen. Irregularity seen involving both A2 segments is now seen which could be compatible atherosclerotic change. Evaluation of both middle cerebral arteries appear normal. Both posterior cerebral arteries demonstrate decreased flow related signal distally which could be compatible areas of of stenosis. There is evidence of mild stenosis seen involving the distal aspect of the basilar artery which is new when compared with the prior exam.    IMPRESSION: Mild narrowing of both proximal internal carotid arteries.    Short segment of narrowing involving the distal left vertebral artery is again seen and unchanged.    Irregularity is seen involving both A2 segments which is new when compared prior exam. This could be compatible atherosclerotic change. Mild narrowing of the distal basilar artery is seen which is compatible with mild stenosis. This is new when compared with the prior exam.    Areas of stenosis is suspected involving both distal posterior cerebral arteries.              MARVIN BARRAZA M.D., ATTENDING RADIOLOGIST  This document has been electronically signed. May 11 2021 11:30AM    < end of copied text >  `
  Dieter Willson MD  Interventional Cardiology / Endovascular Specialist  [place] Office : [address] [place] [address] [place] [address]  Tel:   [place] Office : [address] [facility] [place] [address]  Tel: [phone]  Cell : [phone]    Subjective/Overnight events: Patient lying in bed comfortably. No acute distress  	  MEDICATIONS:  apixaban 2.5 milliGRAM(s) Oral two times a day  aspirin enteric coated 81 milliGRAM(s) Oral daily  hydrALAZINE 25 milliGRAM(s) Oral two times a day  metoprolol succinate  milliGRAM(s) Oral daily        diVALproex Sprinkle 250 milliGRAM(s) Oral three times a day  donepezil 10 milliGRAM(s) Oral at bedtime  ondansetron Injectable 4 milliGRAM(s) IV Push every 6 hours PRN  QUEtiapine 25 milliGRAM(s) Oral daily    pantoprazole    Tablet 40 milliGRAM(s) Oral before breakfast    atorvastatin 80 milliGRAM(s) Oral at bedtime  dextrose 40% Gel 15 Gram(s) Oral once  dextrose 50% Injectable 25 Gram(s) IV Push once  dextrose 50% Injectable 12.5 Gram(s) IV Push once  dextrose 50% Injectable 25 Gram(s) IV Push once  glucagon  Injectable 1 milliGRAM(s) IntraMuscular once  insulin glargine Injectable (LANTUS) 10 Unit(s) SubCutaneous at bedtime  insulin lispro (ADMELOG) corrective regimen sliding scale   SubCutaneous three times a day before meals  insulin lispro (ADMELOG) corrective regimen sliding scale   SubCutaneous at bedtime    calcium acetate 667 milliGRAM(s) Oral three times a day with meals  chlorhexidine 4% Liquid 1 Application(s) Topical daily  dextrose 5%. 1000 milliLiter(s) IV Continuous <Continuous>  dextrose 5%. 1000 milliLiter(s) IV Continuous <Continuous>  doxercalciferol Injectable 2 MICROGram(s) IV Push <User Schedule>  lidocaine/prilocaine Cream 1 Application(s) Topical <User Schedule>      PAST MEDICAL/SURGICAL HISTORY  PAST MEDICAL & SURGICAL HISTORY:  DM (diabetes mellitus)  TYpe II    HTN (hypertension)    Hypercholesteremia    CKD (chronic kidney disease)  now on HD via R forearm AVF    Anemia    Atrial fibrillation  On Eliquis    Cerebrovascular accident (CVA)    Status post right foot surgery  hammer toe repair    AV fistula  June 2018, 2 ballooning (last one 2 weeks ago), following up on Dec 15th    H/O colonoscopy with polypectomy        SOCIAL HISTORY: Substance Use (street drugs): ( x ) never used  (  ) other:    FAMILY HISTORY:  Family history of hypertension (Sibling)    Family history of diabetes mellitus    Family history of lung cancer (Sibling)    Family history of malignant neoplasm of gastrointestinal tract        REVIEW OF SYSTEMS:  unable to obtain    PHYSICAL EXAM:  T(C): 36.4 (05-17-21 @ 04:00), Max: 36.6 (05-17-21 @ 00:47)  HR: 80 (05-17-21 @ 04:00) (79 - 96)  BP: 128/64 (05-17-21 @ 04:00) (108/59 - 128/64)  RR: 17 (05-17-21 @ 04:00) (17 - 18)  SpO2: 98% (05-17-21 @ 04:00) (97% - 98%)  Wt(kg): --  I&O's Summary        EYES: EOMI, PERRLA, conjunctiva and sclera clear  ENMT: No tonsillar erythema, exudates, or enlargement  Cardiovascular: Normal S1 S2, No JVD, No murmurs, No edema  Respiratory: Lungs clear to auscultation	  Gastrointestinal:  Soft, Non-tender, + BS	  Extremities: left sided weakness                05-16    136  |  89<L>  |  22  ----------------------------<  55<L>  3.7   |  28  |  8.03<H>    Ca    10.2      16 May 2021 06:58  Phos  4.5     05-16  Mg     2.5     05-16    TPro  9.0<H>  /  Alb  4.5  /  TBili  0.5  /  DBili  <0.2  /  AST  33<H>  /  ALT  14  /  AlkPhos  70  05-16    proBNP:   Lipid Profile:   HgA1c:   TSH:     Consultant(s) Notes Reviewed:  [x ] YES  [ ] NO    Care Discussed with Consultants/Other Providers [ x] YES  [ ] NO    Imaging Personally Reviewed independently:  [x] YES  [ ] NO    All labs, radiologic studies, vitals, orders and medications list reviewed. Patient is seen and examined at bedside. Case discussed with medical team.              
Neurology Progress Note    S: Patient seen and examined. No new events overnight. more alert.     Medication:  MEDICATIONS  (STANDING):  apixaban 5 milliGRAM(s) Oral two times a day  aspirin  chewable 81 milliGRAM(s) Oral daily  atorvastatin 80 milliGRAM(s) Oral at bedtime  calcium acetate 667 milliGRAM(s) Oral three times a day with meals  cefTRIAXone   IVPB 1000 milliGRAM(s) IV Intermittent every 24 hours  clonazePAM  Tablet 0.5 milliGRAM(s) Oral once  dextrose 40% Gel 15 Gram(s) Oral once  dextrose 5%. 1000 milliLiter(s) (50 mL/Hr) IV Continuous <Continuous>  dextrose 5%. 1000 milliLiter(s) (100 mL/Hr) IV Continuous <Continuous>  dextrose 50% Injectable 25 Gram(s) IV Push once  dextrose 50% Injectable 25 Gram(s) IV Push once  dextrose 50% Injectable 12.5 Gram(s) IV Push once  donepezil 10 milliGRAM(s) Oral at bedtime  doxercalciferol Injectable 2 MICROGram(s) IV Push <User Schedule>  epoetin raheem-epbx (RETACRIT) Injectable 97179 Unit(s) IV Push <User Schedule>  glucagon  Injectable 1 milliGRAM(s) IntraMuscular once  hydrALAZINE 25 milliGRAM(s) Oral two times a day  insulin glargine Injectable (LANTUS) 10 Unit(s) SubCutaneous at bedtime  insulin lispro (ADMELOG) corrective regimen sliding scale   SubCutaneous three times a day before meals  insulin lispro (ADMELOG) corrective regimen sliding scale   SubCutaneous at bedtime  lidocaine/prilocaine Cream 1 Application(s) Topical <User Schedule>  metoprolol succinate  milliGRAM(s) Oral daily  pantoprazole    Tablet 40 milliGRAM(s) Oral before breakfast    MEDICATIONS  (PRN):  ondansetron Injectable 4 milliGRAM(s) IV Push every 6 hours PRN Nausea      Vitals:  VVital Signs Last 24 Hrs  T(C): 37.1 (11 May 2021 05:00), Max: 37.1 (10 May 2021 12:05)  T(F): 98.7 (11 May 2021 05:00), Max: 98.8 (10 May 2021 12:05)  HR: 80 (11 May 2021 05:00) (72 - 89)  BP: 141/76 (11 May 2021 05:00) (141/76 - 162/90)  BP(mean): --  RR: 15 (11 May 2021 05:00) (15 - 17)  SpO2: 100% (11 May 2021 05:00) (100% - 100%)    General Exam:   General Appearance: Appropriately dressed and in no acute distress       Head: Normocephalic, atraumatic and no dysmorphic features  Ear, Nose, and Throat: Moist mucous membranes  CVS: S1S2+  Resp: No SOB, no wheeze or rhonchi  Abd: soft NTND  Extremities: No edema, no cyanosis  Skin: No bruises, no rashes     Neurological Exam:  MS: Awake, alert, oriented to person, "hospital", stated it was 1981. Normal affect. Follows most commands. Moderate level of distractibility and some poor attention.     Language: Speech is clear, fluent with good repetition & comprehension.    CNs: Shutting eyes unable to assess pupillary reactivity. VFF intact to blink to threat. EOMI no nystagmus. V1-3 intact to LT. No facial asymmetry b/l, full eye closure strength b/l. Symmetric palate elevation in midline. Shoulder shrug intact b/l. Tongue midline, normal movements, no atrophy.    Motor: Normal muscle bulk & tone. No noticeable tremor or seizure. Slight L pronator drift.              Deltoid	Biceps	Triceps	   R	4	5	5	5	 	  L	4	5	5	5    	H-Flex	H-Ext	K-Flex	K-Ext	D-Flex	P-Flex  R	4	4	5	5	5	5	 	   L	4	4	5	5	5	5	     Sensation: Intact to LT b/l throughout.     Reflexes:              Biceps(C5)       BR(C6)     Triceps(C7)               Patellar(L4)    Achilles(S1)    Plantar Resp  R	1	          1	             1		        1		    1		Down   L	1	          1	             1		        1		    1		Down     Coordination: No dysmetria to FTN    Gait: Deferred    I personally reviewed the below data/images/labs:      CBC Full  -  ( 11 May 2021 07:18 )  WBC Count : 5.69 K/uL  RBC Count : 4.14 M/uL  Hemoglobin : 10.0 g/dL  Hematocrit : 33.4 %  Platelet Count - Automated : 201 K/uL  Mean Cell Volume : 80.7 fL  Mean Cell Hemoglobin : 24.2 pg  Mean Cell Hemoglobin Concentration : 29.9 gm/dL  Auto Neutrophil # : x  Auto Lymphocyte # : x  Auto Monocyte # : x  Auto Eosinophil # : x  Auto Basophil # : x  Auto Neutrophil % : x  Auto Lymphocyte % : x  Auto Monocyte % : x  Auto Eosinophil % : x  Auto Basophil % : x    05    134<L>  |  94<L>  |  20  ----------------------------<  71  3.6   |  27  |  6.58<H>    Ca    8.9      11 May 2021 07:18  Phos  3.5       Mg     2.1         TPro  7.7  /  Alb  3.8  /  TBili  0.3  /  DBili  x   /  AST  27  /  ALT  13  /  AlkPhos  60          Urinalysis Basic - ( 08 May 2021 11:19 )    Color: Yellow / Appearance: Slightly Turbid / S.017 / pH: x  Gluc: x / Ketone: Negative  / Bili: Negative / Urobili: <2 mg/dL   Blood: x / Protein: >600 mg/dL / Nitrite: Negative   Leuk Esterase: Moderate / RBC: 1 /HPF / WBC 61 /HPF   Sq Epi: x / Non Sq Epi: 9 /HPF / Bacteria: Few    < from: CT Head No Cont (21 @ 12:32) >  IMPRESSION:    Region of decreased attenuation in the right parietal lobe with associated sulcal effacement suspicious for acute infarction.    No CT evidence for reva hemorrhagic transformation.    < end of copied text >    < from: CT Head No Cont (21 @ 17:30) >    EXAM:  CT BRAIN        PROCEDURE DATE:  May  9 2021         INTERPRETATION:  .    CLINICAL INFORMATION: Hx CVA , with poss infarct yesterday on CT.    TECHNIQUE: Multiple axial CT images of the head were obtained without contrast. Sagittal and coronal reconstructed images were acquired from the source data.    COMPARISON: Prior CT study of the head from 2021. Prior brain MRI study from 2019.    FINDINGS: An evolving acute/subacute infarct is again noted within the right parietal temporal lobes. There is no gross hemorrhagic transformation.    Additional wedge-shaped chronic infarct is again noted within the right cerebellar hemisphere.    Additional chronic lacunar infarcts are noted within the left basal ganglia.    There is no acute intracranial hemorrhage, shift of the midline structures, herniation, or hydrocephalus.    There is diffuse cerebral volume loss with prominence of the sulci, fissures, and cisternal spaces which is normal for the patient's age. 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    The paranasal sinuses and mastoid air cells are clear. The calvarium is intact. There is evidence of bilateral cataract removal.    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    Additional chronic infarcts and similar-appearing moderate severity chronic white matter microvascular type changes, as discussed.        DARA VILLELA MD; Attending Radiologist  This document has been electronically signed. May  9 2021  5:42PM    < end of copied text >    
[facility] [place] [facility] [place] [facility]   MD Nehemias Busch MD, D.O. Ruoru Wong, PA    From 7 AM - 5 PM:  OFFICE: [phone]  Dr. Jacobson cell: [phone]  Dr. Sidhu cell: [phone]  Dr. Ross cell: [phone]  MALINDA Shepard cell: [phone]    From 5 PM - 7 AM: Answering Service: [phone]  Date of service: 05-15-21 @ 16:04    RENAL FOLLOW UP NOTE  --------------------------------------------------------------------------------  HPI:  Pt seen and examined at bedside.   [place] SOB, chest pain     PAST HISTORY  --------------------------------------------------------------------------------  No significant changes to PMH, PSH, FHx, SHx, unless otherwise noted    ALLERGIES & MEDICATIONS  --------------------------------------------------------------------------------  Allergies    No Known Allergies    Intolerances      Standing Inpatient Medications  apixaban 2.5 milliGRAM(s) Oral two times a day  aspirin enteric coated 81 milliGRAM(s) Oral daily  atorvastatin 80 milliGRAM(s) Oral at bedtime  calcium acetate 667 milliGRAM(s) Oral three times a day with meals  chlorhexidine 4% Liquid 1 Application(s) Topical daily  dextrose 40% Gel 15 Gram(s) Oral once  dextrose 5%. 1000 milliLiter(s) IV Continuous <Continuous>  dextrose 5%. 1000 milliLiter(s) IV Continuous <Continuous>  dextrose 50% Injectable 25 Gram(s) IV Push once  dextrose 50% Injectable 12.5 Gram(s) IV Push once  dextrose 50% Injectable 25 Gram(s) IV Push once  diVALproex Sprinkle 250 milliGRAM(s) Oral three times a day  donepezil 10 milliGRAM(s) Oral at bedtime  doxercalciferol Injectable 2 MICROGram(s) IV Push <User Schedule>  glucagon  Injectable 1 milliGRAM(s) IntraMuscular once  hydrALAZINE 25 milliGRAM(s) Oral two times a day  insulin glargine Injectable (LANTUS) 10 Unit(s) SubCutaneous at bedtime  insulin lispro (ADMELOG) corrective regimen sliding scale   SubCutaneous three times a day before meals  insulin lispro (ADMELOG) corrective regimen sliding scale   SubCutaneous at bedtime  lidocaine/prilocaine Cream 1 Application(s) Topical <User Schedule>  metoprolol succinate  milliGRAM(s) Oral daily  pantoprazole    Tablet 40 milliGRAM(s) Oral before breakfast  QUEtiapine 25 milliGRAM(s) Oral daily    PRN Inpatient Medications  ondansetron Injectable 4 milliGRAM(s) IV Push every 6 hours PRN      REVIEW OF SYSTEMS  --------------------------------------------------------------------------------  General: no fever  CVS: no chest pain  RESP: no sob, no cough  ABD: no abdominal pain  : no dysuria,  MSK: no edema     VITALS/PHYSICAL EXAM  --------------------------------------------------------------------------------  T(C): 36.6 (05-15-21 @ 09:47), Max: 37.2 (05-15-21 @ 05:12)  HR: 87 (05-15-21 @ 09:47) (74 - 90)  BP: 105/60 (05-15-21 @ 09:47) (103/65 - 129/64)  RR: 18 (05-15-21 @ 09:47) (16 - 18)  SpO2: 97% (05-15-21 @ 09:47) (97% - 100%)  Wt(kg): --        05-14-21 @ 07:01  -  05-15-21 @ 07:00  --------------------------------------------------------  IN: 500 mL / OUT: 1200 mL / NET: -700 mL      Physical Exam:  	Gen: NAD  	HEENT: MMM  	Pulm: CTA B/L  	CV: S1S2  	Abd: Soft, +BS  	Ext: No LE edema B/L                      Neuro: Awake   	Skin: Warm and Dry   	Vascular access:avf    LABS/STUDIES  --------------------------------------------------------------------------------              11.3   7.05  >-----------<  267      [05-15-21 @ 06:11]              39.0     135  |  90  |  14  ----------------------------<  88      [05-15-21 @ 06:11]  4.0   |  28  |  5.44        Ca     9.7     [05-15-21 @ 06:11]      Mg     2.1     [05-15-21 @ 06:11]      Phos  4.1     [05-15-21 @ 06:11]    TPro  8.5  /  Alb  4.4  /  TBili  0.4  /  DBili  <0.2  /  AST  36  /  ALT  15  /  AlkPhos  66  [05-15-21 @ 06:11]    Creatinine Trend:  SCr 5.44 [05-15 @ 06:11]  SCr 8.09 [05-14 @ 07:07]  SCr 8.17 [05-12 @ 07:13]  SCr 6.58 [05-11 @ 07:18]  SCr 9.97 [05-10 @ 08:10]    Urinalysis - [05-08-21 @ 11:19]      Color Yellow / Appearance Slightly Turbid / SG 1.017 / pH 8.5      Gluc Negative / Ketone Negative  / Bili Negative / Urobili <2 mg/dL       Blood Trace / Protein >600 mg/dL / Leuk Est Moderate / Nitrite Negative      RBC 1 / WBC 61 / Hyaline 2 / Gran  / Sq Epi  / Non Sq Epi 9 / Bacteria Few      PTH -- (Ca --)      [05-09-21 @ 06:54]   421  HbA1c 5.8      [12-10-19 @ 06:41]  TSH 3.72      [05-14-21 @ 07:12]  Lipid: chol 219, , HDL 65, LDL --      [05-09-21 @ 06:54]    HBsAb <3.0      [05-11-21 @ 00:01]  HBsAg Nonreact      [05-10-21 @ 09:26]  HBcAb Nonreact      [05-10-21 @ 09:26]  HCV 0.17, Nonreact      [05-10-21 @ 09:26]    Syphilis Screen (Treponema Pallidum Ab) Negative      [05-15-21 @ 10:20]  
Name of Patient : VINCENT GROSS  MRN: [id number]  DATE OF SERVICE: 05-16-21    Subjective: Patient seen and examined. No new events except as noted.   doing okay     REVIEW OF SYSTEMS:    CONSTITUTIONAL: No weakness, fevers or chills  EYES/ENT: No visual changes;  No vertigo or throat pain   NECK: No pain or stiffness  RESPIRATORY: No cough, wheezing, hemoptysis; No shortness of breath  CARDIOVASCULAR: No chest pain or palpitations  GASTROINTESTINAL: No abdominal or epigastric pain. No nausea, vomiting, or hematemesis; No diarrhea or constipation. No melena or hematochezia.  GENITOURINARY: No dysuria, frequency or hematuria  NEUROLOGICAL: No numbness or weakness  SKIN: No itching, burning, rashes, or lesions   All other review of systems is negative unless indicated above.    MEDICATIONS:  MEDICATIONS  (STANDING):  apixaban 2.5 milliGRAM(s) Oral two times a day  aspirin enteric coated 81 milliGRAM(s) Oral daily  atorvastatin 80 milliGRAM(s) Oral at bedtime  calcium acetate 667 milliGRAM(s) Oral three times a day with meals  chlorhexidine 4% Liquid 1 Application(s) Topical daily  dextrose 40% Gel 15 Gram(s) Oral once  dextrose 5%. 1000 milliLiter(s) (50 mL/Hr) IV Continuous <Continuous>  dextrose 5%. 1000 milliLiter(s) (100 mL/Hr) IV Continuous <Continuous>  dextrose 50% Injectable 25 Gram(s) IV Push once  dextrose 50% Injectable 12.5 Gram(s) IV Push once  dextrose 50% Injectable 25 Gram(s) IV Push once  diVALproex Sprinkle 250 milliGRAM(s) Oral three times a day  donepezil 10 milliGRAM(s) Oral at bedtime  doxercalciferol Injectable 2 MICROGram(s) IV Push <User Schedule>  glucagon  Injectable 1 milliGRAM(s) IntraMuscular once  hydrALAZINE 25 milliGRAM(s) Oral two times a day  insulin glargine Injectable (LANTUS) 10 Unit(s) SubCutaneous at bedtime  insulin lispro (ADMELOG) corrective regimen sliding scale   SubCutaneous three times a day before meals  insulin lispro (ADMELOG) corrective regimen sliding scale   SubCutaneous at bedtime  lidocaine/prilocaine Cream 1 Application(s) Topical <User Schedule>  metoprolol succinate  milliGRAM(s) Oral daily  pantoprazole    Tablet 40 milliGRAM(s) Oral before breakfast  QUEtiapine 25 milliGRAM(s) Oral daily      PHYSICAL EXAM:  T(C): 36.5 (05-16-21 @ 11:38), Max: 36.7 (05-16-21 @ 02:11)  HR: 87 (05-16-21 @ 16:50) (84 - 96)  BP: 108/59 (05-16-21 @ 16:50) (100/60 - 113/93)  RR: 17 (05-16-21 @ 11:38) (16 - 17)  SpO2: 97% (05-16-21 @ 11:38) (97% - 100%)  Wt(kg): --  I&O's Summary        Appearance: Normal	  HEENT:  PERRLA   Lymphatic: No lymphadenopathy   Cardiovascular: Normal S1 S2, no JVD  Respiratory: normal effort , clear  Gastrointestinal:  Soft, Non-tender  Skin: No rashes,  warm to touch  Psychiatry:  Mood & affect appropriate  Musculuskeletal: No edema      All labs, Imaging and EKGs personally reviewed                             11.3   7.05  )-----------( 267      ( 15 May 2021 06:11 )             39.0               05-16    136  |  89<L>  |  22  ----------------------------<  55<L>  3.7   |  28  |  8.03<H>    Ca    10.2      16 May 2021 06:58  Phos  4.5     05-16  Mg     2.5     05-16    TPro  9.0<H>  /  Alb  4.5  /  TBili  0.5  /  DBili  <0.2  /  AST  33<H>  /  ALT  14  /  AlkPhos  70  05-16                                         
Name of Patient : VINCENT GROSS  MRN: [id number]  DATE OF SERVICE: 05-10-21     Subjective: Patient seen and examined. No new events except as noted.   doing okay     REVIEW OF SYSTEMS:  DEMENTED, CALM     MEDICATIONS:  MEDICATIONS  (STANDING):  apixaban 5 milliGRAM(s) Oral two times a day  aspirin  chewable 81 milliGRAM(s) Oral daily  atorvastatin 80 milliGRAM(s) Oral at bedtime  calcium acetate 667 milliGRAM(s) Oral three times a day with meals  cefTRIAXone   IVPB 1000 milliGRAM(s) IV Intermittent every 24 hours  clonazePAM  Tablet 0.5 milliGRAM(s) Oral once  dextrose 40% Gel 15 Gram(s) Oral once  dextrose 5%. 1000 milliLiter(s) (50 mL/Hr) IV Continuous <Continuous>  dextrose 5%. 1000 milliLiter(s) (100 mL/Hr) IV Continuous <Continuous>  dextrose 50% Injectable 25 Gram(s) IV Push once  dextrose 50% Injectable 12.5 Gram(s) IV Push once  dextrose 50% Injectable 25 Gram(s) IV Push once  donepezil 10 milliGRAM(s) Oral at bedtime  doxercalciferol Injectable 2 MICROGram(s) IV Push <User Schedule>  epoetin raheem-epbx (RETACRIT) Injectable 29741 Unit(s) IV Push <User Schedule>  glucagon  Injectable 1 milliGRAM(s) IntraMuscular once  hydrALAZINE 25 milliGRAM(s) Oral two times a day  insulin glargine Injectable (LANTUS) 10 Unit(s) SubCutaneous at bedtime  insulin lispro (ADMELOG) corrective regimen sliding scale   SubCutaneous three times a day before meals  insulin lispro (ADMELOG) corrective regimen sliding scale   SubCutaneous at bedtime  lidocaine/prilocaine Cream 1 Application(s) Topical <User Schedule>  metoprolol succinate  milliGRAM(s) Oral daily  pantoprazole    Tablet 40 milliGRAM(s) Oral before breakfast      PHYSICAL EXAM:  T(C): 37 (05-10-21 @ 14:50), Max: 37.1 (05-10-21 @ 12:05)  HR: 72 (05-10-21 @ 14:50) (72 - 78)  BP: 152/70 (05-10-21 @ 14:50) (150/74 - 162/90)  RR: 17 (05-10-21 @ 14:50) (17 - 18)  SpO2: 100% (05-10-21 @ 14:50) (100% - 100%)  Wt(kg): --  I&O's Summary    10 May 2021 07:01  -  10 May 2021 18:33  --------------------------------------------------------  IN: 600 mL / OUT: 1600 mL / NET: -1000 mL          Appearance: awake, demented, calm   HEENT:  PERRLA   Lymphatic: No lymphadenopathy   Cardiovascular: Normal S1 S2, no JVD  Respiratory: normal effort , clear  Gastrointestinal:  Soft, Non-tender  Skin: No rashes,  warm to touch  Psychiatry:  Mood & affect appropriate  Musculuskeletal: No edema      All labs, Imaging and EKGs personally reviewed         05-10-21 @ 07:01  -  05-10-21 @ 18:33  --------------------------------------------------------  IN: 600 mL / OUT: 1600 mL / NET: -1000 mL                          9.6    4.38  )-----------( 163      ( 10 May 2021 08:10 )             31.9               05-10    140  |  99  |  33<H>  ----------------------------<  86  4.3   |  24  |  9.97<H>    Ca    8.7      10 May 2021 08:10  Phos  5.8     05-10  Mg     2.3     05-10    TPro  x   /  Alb  x   /  TBili  x   /  DBili  x   /  AST  x   /  ALT  7   /  AlkPhos  x   05-10                                 
Name of Patient : VINCENT GROSS  MRN: [id number]  DATE OF SERVICE: 05-12-21 @ 11:57    Subjective: Patient seen and examined. No new events except as noted.   doing okay     REVIEW OF SYSTEMS:    CONSTITUTIONAL: No weakness, fevers or chills  EYES/ENT: No visual changes;  No vertigo or throat pain   NECK: No pain or stiffness  RESPIRATORY: No cough, wheezing, hemoptysis; No shortness of breath  CARDIOVASCULAR: No chest pain or palpitations  GASTROINTESTINAL: No abdominal or epigastric pain.   GENITOURINARY: No dysuria, frequency or hematuria  NEUROLOGICAL: No numbness or weakness  SKIN: No itching, burning, rashes, or lesions   All other review of systems is negative unless indicated above.    MEDICATIONS:  MEDICATIONS  (STANDING):  apixaban 5 milliGRAM(s) Oral two times a day  aspirin  chewable 81 milliGRAM(s) Oral daily  atorvastatin 80 milliGRAM(s) Oral at bedtime  calcium acetate 667 milliGRAM(s) Oral three times a day with meals  cefuroxime   Tablet 500 milliGRAM(s) Oral every 12 hours  clonazePAM  Tablet 0.5 milliGRAM(s) Oral once  dextrose 40% Gel 15 Gram(s) Oral once  dextrose 5%. 1000 milliLiter(s) (50 mL/Hr) IV Continuous <Continuous>  dextrose 5%. 1000 milliLiter(s) (100 mL/Hr) IV Continuous <Continuous>  dextrose 50% Injectable 25 Gram(s) IV Push once  dextrose 50% Injectable 12.5 Gram(s) IV Push once  dextrose 50% Injectable 25 Gram(s) IV Push once  donepezil 10 milliGRAM(s) Oral at bedtime  doxercalciferol Injectable 2 MICROGram(s) IV Push <User Schedule>  epoetin raheem-epbx (RETACRIT) Injectable 49722 Unit(s) IV Push <User Schedule>  glucagon  Injectable 1 milliGRAM(s) IntraMuscular once  hydrALAZINE 25 milliGRAM(s) Oral two times a day  insulin glargine Injectable (LANTUS) 10 Unit(s) SubCutaneous at bedtime  insulin lispro (ADMELOG) corrective regimen sliding scale   SubCutaneous three times a day before meals  insulin lispro (ADMELOG) corrective regimen sliding scale   SubCutaneous at bedtime  lidocaine/prilocaine Cream 1 Application(s) Topical <User Schedule>  metoprolol succinate  milliGRAM(s) Oral daily  pantoprazole    Tablet 40 milliGRAM(s) Oral before breakfast      PHYSICAL EXAM:  T(C): 36.3 (05-12-21 @ 06:03), Max: 36.6 (05-11-21 @ 12:39)  HR: 83 (05-12-21 @ 06:03) (73 - 83)  BP: 164/69 (05-12-21 @ 06:03) (134/60 - 164/69)  RR: 15 (05-12-21 @ 06:03) (15 - 16)  SpO2: 100% (05-12-21 @ 06:03) (99% - 100%)  Wt(kg): --  I&O's Summary        Appearance: Normal	  HEENT:  PERRLA   Lymphatic: No lymphadenopathy   Cardiovascular: Normal S1 S2, no JVD  Respiratory: normal effort , clear  Gastrointestinal:  Soft, Non-tender  Skin: No rashes,  warm to touch  Psychiatry:  Mood & affect appropriate  Musculuskeletal: No edema      All labs, Imaging and EKGs personally reviewed                           10.6   5.50  )-----------( 230      ( 12 May 2021 07:13 )             36.1               05-12    134<L>  |  92<L>  |  25<H>  ----------------------------<  75  3.9   |  25  |  8.17<H>    Ca    9.4      12 May 2021 07:13  Phos  3.9     05-12  Mg     2.3     05-12    TPro  8.1  /  Alb  4.0  /  TBili  0.2  /  DBili  x   /  AST  29  /  ALT  14  /  AlkPhos  64  05-12    PT/INR - ( 12 May 2021 07:13 )   PT: 17.2 sec;   INR: 1.54 ratio         PTT - ( 11 May 2021 17:48 )  PTT:46.7 sec                                       
Name of Patient : VINCENT GROSS  MRN: [id number]  DATE OF SERVICE: 05-17-21 @ 10:29    Subjective: Patient seen and examined. No new events except as noted.     REVIEW OF SYSTEMS:    CONSTITUTIONAL: No weakness, fevers or chills  EYES/ENT: No visual changes;  No vertigo or throat pain   NECK: No pain or stiffness  RESPIRATORY: No cough, wheezing, hemoptysis; No shortness of breath  CARDIOVASCULAR: No chest pain or palpitations  GASTROINTESTINAL: No abdominal or epigastric pain. No nausea, vomiting, or hematemesis; No diarrhea or constipation. No melena or hematochezia.  GENITOURINARY: No dysuria, frequency or hematuria  NEUROLOGICAL: No numbness or weakness  SKIN: No itching, burning, rashes, or lesions   All other review of systems is negative unless indicated above.    MEDICATIONS:  MEDICATIONS  (STANDING):  apixaban 2.5 milliGRAM(s) Oral two times a day  aspirin enteric coated 81 milliGRAM(s) Oral daily  atorvastatin 80 milliGRAM(s) Oral at bedtime  calcium acetate 667 milliGRAM(s) Oral three times a day with meals  chlorhexidine 4% Liquid 1 Application(s) Topical daily  dextrose 40% Gel 15 Gram(s) Oral once  dextrose 5%. 1000 milliLiter(s) (50 mL/Hr) IV Continuous <Continuous>  dextrose 5%. 1000 milliLiter(s) (100 mL/Hr) IV Continuous <Continuous>  dextrose 50% Injectable 25 Gram(s) IV Push once  dextrose 50% Injectable 12.5 Gram(s) IV Push once  dextrose 50% Injectable 25 Gram(s) IV Push once  diVALproex Sprinkle 250 milliGRAM(s) Oral three times a day  donepezil 10 milliGRAM(s) Oral at bedtime  doxercalciferol Injectable 2 MICROGram(s) IV Push <User Schedule>  glucagon  Injectable 1 milliGRAM(s) IntraMuscular once  hydrALAZINE 25 milliGRAM(s) Oral two times a day  insulin glargine Injectable (LANTUS) 10 Unit(s) SubCutaneous at bedtime  insulin lispro (ADMELOG) corrective regimen sliding scale   SubCutaneous three times a day before meals  insulin lispro (ADMELOG) corrective regimen sliding scale   SubCutaneous at bedtime  lidocaine/prilocaine Cream 1 Application(s) Topical <User Schedule>  metoprolol succinate  milliGRAM(s) Oral daily  pantoprazole    Tablet 40 milliGRAM(s) Oral before breakfast  QUEtiapine 25 milliGRAM(s) Oral daily      PHYSICAL EXAM:  T(C): 36.4 (05-17-21 @ 04:00), Max: 36.6 (05-17-21 @ 00:47)  HR: 80 (05-17-21 @ 04:00) (79 - 96)  BP: 128/64 (05-17-21 @ 04:00) (108/59 - 128/64)  RR: 17 (05-17-21 @ 04:00) (17 - 18)  SpO2: 98% (05-17-21 @ 04:00) (97% - 98%)  Wt(kg): --  I&O's Summary        Appearance: Normal	  HEENT:  PERRLA   Lymphatic: No lymphadenopathy   Cardiovascular: Normal S1 S2, no JVD  Respiratory: normal effort , clear  Gastrointestinal:  Soft, Non-tender  Skin: No rashes,  warm to touch  Psychiatry:  Mood & affect appropriate  Musculuskeletal: No edema      All labs, Imaging and EKGs personally reviewed                 05-16    136  |  89<L>  |  22  ----------------------------<  55<L>  3.7   |  28  |  8.03<H>    Ca    10.2      16 May 2021 06:58  Phos  4.5     05-16  Mg     2.5     05-16    TPro  9.0<H>  /  Alb  4.5  /  TBili  0.5  /  DBili  <0.2  /  AST  33<H>  /  ALT  14  /  AlkPhos  70  05-16                                           
Name of Patient : VINCENT GROSS  MRN: [id number]  DATE OF SERVICE: 05-15-21 @ 12:07    Subjective: Patient seen and examined. No new events except as noted.   doing okay     REVIEW OF SYSTEMS:    CONSTITUTIONAL: No weakness, fevers or chills  EYES/ENT: No visual changes;  No vertigo or throat pain   NECK: No pain or stiffness  RESPIRATORY: No cough, wheezing, hemoptysis; No shortness of breath  CARDIOVASCULAR: No chest pain or palpitations  GASTROINTESTINAL: No abdominal or epigastric pain. No nausea, vomiting, or hematemesis; No diarrhea or constipation. No melena or hematochezia.  GENITOURINARY: No dysuria, frequency or hematuria  NEUROLOGICAL: No numbness or weakness  SKIN: No itching, burning, rashes, or lesions   All other review of systems is negative unless indicated above.    MEDICATIONS:  MEDICATIONS  (STANDING):  apixaban 2.5 milliGRAM(s) Oral two times a day  aspirin enteric coated 81 milliGRAM(s) Oral daily  atorvastatin 80 milliGRAM(s) Oral at bedtime  calcium acetate 667 milliGRAM(s) Oral three times a day with meals  chlorhexidine 4% Liquid 1 Application(s) Topical daily  dextrose 40% Gel 15 Gram(s) Oral once  dextrose 5%. 1000 milliLiter(s) (50 mL/Hr) IV Continuous <Continuous>  dextrose 5%. 1000 milliLiter(s) (100 mL/Hr) IV Continuous <Continuous>  dextrose 50% Injectable 25 Gram(s) IV Push once  dextrose 50% Injectable 12.5 Gram(s) IV Push once  dextrose 50% Injectable 25 Gram(s) IV Push once  diVALproex Sprinkle 250 milliGRAM(s) Oral three times a day  donepezil 10 milliGRAM(s) Oral at bedtime  doxercalciferol Injectable 2 MICROGram(s) IV Push <User Schedule>  glucagon  Injectable 1 milliGRAM(s) IntraMuscular once  hydrALAZINE 25 milliGRAM(s) Oral two times a day  insulin glargine Injectable (LANTUS) 10 Unit(s) SubCutaneous at bedtime  insulin lispro (ADMELOG) corrective regimen sliding scale   SubCutaneous three times a day before meals  insulin lispro (ADMELOG) corrective regimen sliding scale   SubCutaneous at bedtime  lidocaine/prilocaine Cream 1 Application(s) Topical <User Schedule>  metoprolol succinate  milliGRAM(s) Oral daily  pantoprazole    Tablet 40 milliGRAM(s) Oral before breakfast  QUEtiapine 25 milliGRAM(s) Oral daily      PHYSICAL EXAM:  T(C): 36.6 (05-15-21 @ 09:47), Max: 37.2 (05-15-21 @ 05:12)  HR: 87 (05-15-21 @ 09:47) (73 - 90)  BP: 105/60 (05-15-21 @ 09:47) (103/65 - 143/63)  RR: 18 (05-15-21 @ 09:47) (16 - 18)  SpO2: 97% (05-15-21 @ 09:47) (97% - 100%)  Wt(kg): --  I&O's Summary    14 May 2021 07:01  -  15 May 2021 07:00  --------------------------------------------------------  IN: 500 mL / OUT: 1200 mL / NET: -700 mL          Appearance: Normal	  HEENT:  PERRLA   Lymphatic: No lymphadenopathy   Cardiovascular: Normal S1 S2, no JVD  Respiratory: normal effort , clear  Gastrointestinal:  Soft, Non-tender  Skin: No rashes,  warm to touch  Psychiatry:  Mood & affect appropriate  Musculuskeletal: No edema      All labs, Imaging and EKGs personally reviewed       05-14-21 @ 07:01  -  05-15-21 @ 07:00  --------------------------------------------------------  IN: 500 mL / OUT: 1200 mL / NET: -700 mL                            11.3   7.05  )-----------( 267      ( 15 May 2021 06:11 )             39.0               05-15    135  |  90<L>  |  14  ----------------------------<  88  4.0   |  28  |  5.44<H>    Ca    9.7      15 May 2021 06:11  Phos  4.1     05-15  Mg     2.1     05-15    TPro  8.5<H>  /  Alb  4.4  /  TBili  0.4  /  DBili  <0.2  /  AST  36<H>  /  ALT  15  /  AlkPhos  66  05-15                               
Name of Patient : VINCENT GROSS  MRN: [id number]  DATE OF SERVICE: 05-13-21 @ 10:45    Subjective: Patient seen and examined. No new events except as noted.   calm,  implosive     REVIEW OF SYSTEMS:    CONSTITUTIONAL: No weakness, fevers or chills  EYES/ENT: No visual changes;  No vertigo or throat pain   NECK: No pain or stiffness  RESPIRATORY: No cough, wheezing, hemoptysis; No shortness of breath  CARDIOVASCULAR: No chest pain or palpitations  GASTROINTESTINAL: No abdominal or epigastric pain. No nausea, vomiting, or hematemesis; No diarrhea or constipation. No melena or hematochezia.  GENITOURINARY: No dysuria, frequency or hematuria  NEUROLOGICAL: No numbness or weakness  SKIN: No itching, burning, rashes, or lesions   All other review of systems is negative unless indicated above.    MEDICATIONS:  MEDICATIONS  (STANDING):  apixaban 5 milliGRAM(s) Oral two times a day  aspirin  chewable 81 milliGRAM(s) Oral daily  atorvastatin 80 milliGRAM(s) Oral at bedtime  calcium acetate 667 milliGRAM(s) Oral three times a day with meals  cefuroxime   Tablet 500 milliGRAM(s) Oral every 12 hours  clonazePAM  Tablet 0.5 milliGRAM(s) Oral once  dextrose 40% Gel 15 Gram(s) Oral once  dextrose 5%. 1000 milliLiter(s) (50 mL/Hr) IV Continuous <Continuous>  dextrose 5%. 1000 milliLiter(s) (100 mL/Hr) IV Continuous <Continuous>  dextrose 50% Injectable 25 Gram(s) IV Push once  dextrose 50% Injectable 12.5 Gram(s) IV Push once  dextrose 50% Injectable 25 Gram(s) IV Push once  donepezil 10 milliGRAM(s) Oral at bedtime  doxercalciferol Injectable 2 MICROGram(s) IV Push <User Schedule>  epoetin raheem-epbx (RETACRIT) Injectable 76033 Unit(s) IV Push <User Schedule>  glucagon  Injectable 1 milliGRAM(s) IntraMuscular once  hydrALAZINE 25 milliGRAM(s) Oral two times a day  insulin glargine Injectable (LANTUS) 10 Unit(s) SubCutaneous at bedtime  insulin lispro (ADMELOG) corrective regimen sliding scale   SubCutaneous three times a day before meals  insulin lispro (ADMELOG) corrective regimen sliding scale   SubCutaneous at bedtime  lidocaine/prilocaine Cream 1 Application(s) Topical <User Schedule>  metoprolol succinate  milliGRAM(s) Oral daily  pantoprazole    Tablet 40 milliGRAM(s) Oral before breakfast      PHYSICAL EXAM:  T(C): 36.7 (05-13-21 @ 06:00), Max: 37.5 (05-12-21 @ 11:30)  HR: 94 (05-13-21 @ 06:00) (72 - 96)  BP: 140/68 (05-13-21 @ 06:00) (130/69 - 170/77)  RR: 17 (05-13-21 @ 06:00) (16 - 18)  SpO2: 98% (05-13-21 @ 06:00) (98% - 99%)  Wt(kg): --  I&O's Summary    12 May 2021 07:01  -  13 May 2021 07:00  --------------------------------------------------------  IN: 500 mL / OUT: 1400 mL / NET: -900 mL          Appearance: Normal	  HEENT:  PERRLA   Lymphatic: No lymphadenopathy   Cardiovascular: Normal S1 S2, no JVD  Respiratory: normal effort , clear  Gastrointestinal:  Soft, Non-tender  Skin: No rashes,  warm to touch  Psychiatry:  Mood & affect appropriate  Musculuskeletal: No edema      All labs, Imaging and EKGs personally reviewed     05-12-21 @ 07:01  -  05-13-21 @ 07:00  --------------------------------------------------------  IN: 500 mL / OUT: 1400 mL / NET: -900 mL                            10.6   5.50  )-----------( 230      ( 12 May 2021 07:13 )             36.1               05-12    134<L>  |  92<L>  |  25<H>  ----------------------------<  75  3.9   |  25  |  8.17<H>    Ca    9.4      12 May 2021 07:13  Phos  3.9     05-12  Mg     2.3     05-12    TPro  8.1  /  Alb  4.0  /  TBili  0.2  /  DBili  x   /  AST  29  /  ALT  14  /  AlkPhos  64  05-12    PT/INR - ( 12 May 2021 07:13 )   PT: 17.2 sec;   INR: 1.54 ratio         PTT - ( 11 May 2021 17:48 )  PTT:46.7 sec

## 2021-05-17 NOTE — PROGRESS NOTE ADULT - PROBLEM SELECTOR PLAN 5
- Monitor BP  - Continue home meds

## 2021-05-17 NOTE — PROGRESS NOTE ADULT - PROBLEM SELECTOR PLAN 1
- acute CVA  - MRI noted  - cont AC  - Neurology follow up appreciated  - ASA and statin  - fall precautions  - PT eval  - Med compliance issue  - psych eval for impulsiveness  - Discussed with patient's son Julius over the phone  - D/C planing
- Etiology unclear. CTH showing "region of decreased attenuation in the right parietal lobe with associated sulcal effacement suspicious for acute infarction"   -acute CVA  - neuroog follow up appreciated
- acute CVA  - MRI noted  - cont AC  - Neurology follow up appreciated  - ASA and statin  fall precautons  PT eval
- acute CVA  - MRI noted  - cont AC  - Neurology follow up appreciated  - ASA and statin  - fall precautions  - PT eval  - Med compliance issue  - psych eval for impulsiveness  - Discussed with patient's son Julius over the phone  - D/C planing
- acute CVA  - MRI noted  - cont AC  - Neurology follow up appreciated  - ASA and statin  - fall precautions  - PT eval  - Med compliance issue  - psych eval for impulsiveness  - Discussed with patient's son Julius over the phone  - D/C planing
- acute CVA  - MRI noted  - cont AC  - Neurology follow up appreciated  - ASA and statin  fall precautons  PT eval  med compliance issue
- acute CVA  - MRI noted  - cont AC  - Neurology follow up appreciated  - ASA and statin  - fall precautions  - PT eval  - Med compliance issue  - psych eval for impulsiveness  - Discussed with patient's son Julius over the phone  - D/C planing
- acute CVA  - MRI noted  - cont AC  - Neurology follow up appreciated  - ASA and statin  fall precautons  PT eval  med compliance issue  - psych eval for impulsiveness  - start Seroquel and monitor for now

## 2021-05-17 NOTE — PROGRESS NOTE ADULT - PROBLEM SELECTOR PLAN 6
- Monitor FSG qAC/HS  - Lantus 10u qHS + SSI
- Monitor FSG qAC/HS  - insulin as toleated fo rBS contorl
- Monitor FSG qAC/HS  - insulin as tolerated for BS control
- Monitor FSG qAC/HS  - insulin as toleated fo rBS contorl
- Monitor FSG qAC/HS  - insulin as tolerated for BS control

## 2021-05-17 NOTE — PROGRESS NOTE ADULT - PROBLEM SELECTOR PLAN 4
- Rate controlled  - Continue Metoprolol and Eliquis  - card follow up
- Rate controlled  - Continue Metoprolol and Eliquis  - card folow up
- Rate controlled  - Continue Metoprolol and Eliquis  - card follow up

## 2021-05-17 NOTE — PROGRESS NOTE ADULT - ASSESSMENT
79 yo RH AA F with multiple old stroke R AICA, B/L BG and thalalmi, afib on eliquis, HTN, DM, ESRD on HD p/w AMS found to have UTI and new R parietal infarct.  CTA in 2019 with L VA stenosis and mild BA stenosis. MRI at that time showed multiple old gordillo.  repeat CTH showed evolving R partial temporal infarct, A1c 7.1, . MRI brain confirms R parietal infarct, CTA with mild to mod atherosclerotic disease noted. LE doppler neg for DVT  Etiology for stroke Embolic from Afib. question compliance at home   - check B12, TSH, RPR. psych eval pending   - okay for continuation of DOAC. when she becomes 82yo dose will be 2.5mg bid. would consider adding ASA 81 at that time if not already indicated   - treatment of infection  - consider rEEG can be outpt if stable   - High dose statin therapy - atorvastatin 40mg PO daily. LDL goal <70mg/dL.  - need better DM and cholesterol control  - telemetry  - PT/OT/SS/SLP, OOBC  - check FS, glucose control <180  - GI/DVT ppx  - Counseling on diet, exercise, and medication adherence was done  - Counseling on smoking cessation and alcohol consumption offered when appropriate.  - Pain assessed and judicious use of narcotics when appropriate was discussed.    - Stroke education given when appropriate.  - Importance of fall prevention discussed.   - Differential diagnosis and plan of care discussed with patient and/or family and primary team  - Thank you for allowing me to participate in the care of this patient. Call with questions.   - d/c plan from stroke standpoint.   José Miguel Renee MD  Vascular Neurology  Office: [phone]

## 2021-05-17 NOTE — PROGRESS NOTE ADULT - NSICDXPILOT_GEN_ALL_CORE
[place]

## 2021-05-17 NOTE — PROGRESS NOTE ADULT - PROBLEM SELECTOR PROBLEM 4
Atrial fibrillation, unspecified type
[No Acute Distress] : no acute distress
[Normal Oropharynx] : normal oropharynx
[Normal Appearance] : normal appearance
[No Neck Mass] : no neck mass
[Normal Rate/Rhythm] : normal rate/rhythm
[Normal S1, S2] : normal s1, s2
Atrial fibrillation, unspecified type
[No Murmurs] : no murmurs
[No Resp Distress] : no resp distress
[No Acc Muscle Use] : no acc muscle use
[Normal to Percussion] : normal to percussion
[No Abnormalities] : no abnormalities
[Benign] : benign
[Normal Gait] : normal gait
[No Clubbing] : no clubbing
[No Cyanosis] : no cyanosis
[No Edema] : no edema
[Normal Color/ Pigmentation] : normal color/ pigmentation
[No Focal Deficits] : no focal deficits
[Oriented x3] : oriented x3
[Normal Affect] : normal affect
[TextBox_68] : moderate air entry bilat

## 2021-05-17 NOTE — DISCHARGE NOTE NURSING/CASE MANAGEMENT/SOCIAL WORK - PATIENT PORTAL LINK FT
You can access the FollowMyHealth Patient Portal offered by [facility] by registering at the following website: [email or web address]. By joining [facility]’s FollowMyHealth portal, you will also be able to view your health information using other applications (apps) compatible with our system.

## 2021-05-17 NOTE — PROGRESS NOTE ADULT - ATTENDING COMMENTS
ESRD: as planned
agree with above
pt seen and examined agree with above plan
pt seen and examined agree with plan above
seen on dialysis
seen on dialysis
HD today
pt seen and examined agree with plan above
HD As per schedule

## 2021-05-17 NOTE — PROGRESS NOTE ADULT - PROBLEM SELECTOR PLAN 2
- HD as per renal   - Renal following   - Will continue to follow. Monitor lytes

## 2021-05-17 NOTE — CHART NOTE - NSCHARTNOTEFT_GEN_A_CORE
Attempted to see patient for Length Of Stay initial assessment. Pt is off floor at dialysis. Per chart, plan to d/c patient today to rehab. Will f/u as feasible.

## 2021-05-17 NOTE — PROGRESS NOTE ADULT - PROVIDER SPECIALTY LIST ADULT
Nephrology
Cardiology
Nephrology
Neurology
Cardiology
Nephrology
Nephrology
Neurology
Neurology
Cardiology
Cardiology
Internal Medicine
Neurology
Cardiology
Internal Medicine
Nephrology
Nephrology
Neurology
Neurology
Internal Medicine

## 2021-05-17 NOTE — PROGRESS NOTE ADULT - PROBLEM SELECTOR PROBLEM 6
Type 2 diabetes mellitus with chronic kidney disease on chronic dialysis, without long-term current use of insulin

## 2021-05-17 NOTE — PROGRESS NOTE ADULT - ASSESSMENT
80F hx htn, dm, A fib on eliquis, prior CVA and esrd MWF last dialyzed Friday but missed Wednesday presents with AMS    ESRD on HD MWF via avf  from [facility]  Last HD was Friday   HD planned today  HD per schedule  consent obtained from kenny   monitor electrolyte    AMS  MRI + CVA  f/u neuro    Anemia  Hb at goal for ESRD  monitor Hb    HTN  mostly controlled  continue current meds  monitor    ckd-mbd  Hectorol 2mcg with HD  monitor phos and calcium daily

## 2021-05-31 ENCOUNTER — INPATIENT (INPATIENT)
Facility: HOSPITAL | Age: 81
LOS: 8 days | Discharge: OTHER INSTITUTION | End: 2021-06-09
Attending: INTERNAL MEDICINE | Admitting: INTERNAL MEDICINE
Payer: MEDICARE

## 2021-05-31 VITALS
HEIGHT: 67 IN | RESPIRATION RATE: 18 BRPM | OXYGEN SATURATION: 97 % | DIASTOLIC BLOOD PRESSURE: 60 MMHG | SYSTOLIC BLOOD PRESSURE: 112 MMHG | HEART RATE: 95 BPM

## 2021-05-31 DIAGNOSIS — I77.0 ARTERIOVENOUS FISTULA, ACQUIRED: Chronic | ICD-10-CM

## 2021-05-31 DIAGNOSIS — T82.868A THROMBOSIS DUE TO VASCULAR PROSTHETIC DEVICES, IMPLANTS AND GRAFTS, INITIAL ENCOUNTER: ICD-10-CM

## 2021-05-31 DIAGNOSIS — N18.6 END STAGE RENAL DISEASE: ICD-10-CM

## 2021-05-31 DIAGNOSIS — Z98.890 OTHER SPECIFIED POSTPROCEDURAL STATES: Chronic | ICD-10-CM

## 2021-05-31 DIAGNOSIS — D72.829 ELEVATED WHITE BLOOD CELL COUNT, UNSPECIFIED: ICD-10-CM

## 2021-05-31 DIAGNOSIS — I48.91 UNSPECIFIED ATRIAL FIBRILLATION: ICD-10-CM

## 2021-05-31 DIAGNOSIS — Z29.9 ENCOUNTER FOR PROPHYLACTIC MEASURES, UNSPECIFIED: ICD-10-CM

## 2021-05-31 DIAGNOSIS — I63.9 CEREBRAL INFARCTION, UNSPECIFIED: ICD-10-CM

## 2021-05-31 DIAGNOSIS — I10 ESSENTIAL (PRIMARY) HYPERTENSION: ICD-10-CM

## 2021-05-31 DIAGNOSIS — E11.9 TYPE 2 DIABETES MELLITUS WITHOUT COMPLICATIONS: ICD-10-CM

## 2021-05-31 LAB
ALBUMIN SERPL ELPH-MCNC: 4 G/DL — SIGNIFICANT CHANGE UP (ref 3.3–5)
ALP SERPL-CCNC: 65 U/L — SIGNIFICANT CHANGE UP (ref 40–120)
ALT FLD-CCNC: 21 U/L — SIGNIFICANT CHANGE UP (ref 4–33)
ANION GAP SERPL CALC-SCNC: 25 MMOL/L — HIGH (ref 7–14)
APPEARANCE UR: ABNORMAL
AST SERPL-CCNC: 36 U/L — HIGH (ref 4–32)
BASE EXCESS BLDV CALC-SCNC: 1.7 MMOL/L — SIGNIFICANT CHANGE UP (ref -3–2)
BASOPHILS # BLD AUTO: 0.04 K/UL — SIGNIFICANT CHANGE UP (ref 0–0.2)
BASOPHILS NFR BLD AUTO: 0.3 % — SIGNIFICANT CHANGE UP (ref 0–2)
BILIRUB SERPL-MCNC: 0.4 MG/DL — SIGNIFICANT CHANGE UP (ref 0.2–1.2)
BILIRUB UR-MCNC: NEGATIVE — SIGNIFICANT CHANGE UP
BLOOD GAS VENOUS - CREATININE: 12.6 MG/DL — HIGH (ref 0.5–1.3)
BLOOD GAS VENOUS COMPREHENSIVE RESULT: SIGNIFICANT CHANGE UP
BUN SERPL-MCNC: 86 MG/DL — HIGH (ref 7–23)
CALCIUM SERPL-MCNC: 9.1 MG/DL — SIGNIFICANT CHANGE UP (ref 8.4–10.5)
CHLORIDE BLDV-SCNC: 98 MMOL/L — SIGNIFICANT CHANGE UP (ref 96–108)
CHLORIDE SERPL-SCNC: 92 MMOL/L — LOW (ref 98–107)
CO2 SERPL-SCNC: 24 MMOL/L — SIGNIFICANT CHANGE UP (ref 22–31)
COLOR SPEC: ABNORMAL
CREAT SERPL-MCNC: 11.85 MG/DL — HIGH (ref 0.5–1.3)
DIFF PNL FLD: ABNORMAL
EOSINOPHIL # BLD AUTO: 0.01 K/UL — SIGNIFICANT CHANGE UP (ref 0–0.5)
EOSINOPHIL NFR BLD AUTO: 0.1 % — SIGNIFICANT CHANGE UP (ref 0–6)
GAS PNL BLDV: 144 MMOL/L — SIGNIFICANT CHANGE UP (ref 136–146)
GLUCOSE BLDV-MCNC: 182 MG/DL — HIGH (ref 70–99)
GLUCOSE SERPL-MCNC: 189 MG/DL — HIGH (ref 70–99)
GLUCOSE UR QL: NEGATIVE — SIGNIFICANT CHANGE UP
HCO3 BLDV-SCNC: 24 MMOL/L — SIGNIFICANT CHANGE UP (ref 20–27)
HCT VFR BLD CALC: 32.7 % — LOW (ref 34.5–45)
HCT VFR BLDA CALC: 32.9 % — LOW (ref 34.5–46.5)
HGB BLD CALC-MCNC: 10.7 G/DL — LOW (ref 11.5–15.5)
HGB BLD-MCNC: 10 G/DL — LOW (ref 11.5–15.5)
IANC: 13.18 K/UL — HIGH (ref 1.5–8.5)
IMM GRANULOCYTES NFR BLD AUTO: 0.8 % — SIGNIFICANT CHANGE UP (ref 0–1.5)
KETONES UR-MCNC: NEGATIVE — SIGNIFICANT CHANGE UP
LACTATE BLDV-MCNC: 1.7 MMOL/L — SIGNIFICANT CHANGE UP (ref 0.5–2)
LEUKOCYTE ESTERASE UR-ACNC: ABNORMAL
LYMPHOCYTES # BLD AUTO: 0.61 K/UL — LOW (ref 1–3.3)
LYMPHOCYTES # BLD AUTO: 4.1 % — LOW (ref 13–44)
MCHC RBC-ENTMCNC: 24.4 PG — LOW (ref 27–34)
MCHC RBC-ENTMCNC: 30.6 GM/DL — LOW (ref 32–36)
MCV RBC AUTO: 79.8 FL — LOW (ref 80–100)
MONOCYTES # BLD AUTO: 0.89 K/UL — SIGNIFICANT CHANGE UP (ref 0–0.9)
MONOCYTES NFR BLD AUTO: 6 % — SIGNIFICANT CHANGE UP (ref 2–14)
NEUTROPHILS # BLD AUTO: 13.18 K/UL — HIGH (ref 1.8–7.4)
NEUTROPHILS NFR BLD AUTO: 88.7 % — HIGH (ref 43–77)
NITRITE UR-MCNC: NEGATIVE — SIGNIFICANT CHANGE UP
NRBC # BLD: 0 /100 WBCS — SIGNIFICANT CHANGE UP
NRBC # FLD: 0.02 K/UL — HIGH
PCO2 BLDV: 51 MMHG — SIGNIFICANT CHANGE UP (ref 41–51)
PH BLDV: 7.34 — SIGNIFICANT CHANGE UP (ref 7.32–7.43)
PH UR: 7.5 — SIGNIFICANT CHANGE UP (ref 5–8)
PLATELET # BLD AUTO: 234 K/UL — SIGNIFICANT CHANGE UP (ref 150–400)
PO2 BLDV: 24 MMHG — LOW (ref 35–40)
POTASSIUM BLDV-SCNC: 4 MMOL/L — SIGNIFICANT CHANGE UP (ref 3.4–4.5)
POTASSIUM SERPL-MCNC: 4.2 MMOL/L — SIGNIFICANT CHANGE UP (ref 3.5–5.3)
POTASSIUM SERPL-SCNC: 4.2 MMOL/L — SIGNIFICANT CHANGE UP (ref 3.5–5.3)
PROT SERPL-MCNC: 8 G/DL — SIGNIFICANT CHANGE UP (ref 6–8.3)
PROT UR-MCNC: ABNORMAL
RBC # BLD: 4.1 M/UL — SIGNIFICANT CHANGE UP (ref 3.8–5.2)
RBC # FLD: 16.8 % — HIGH (ref 10.3–14.5)
SAO2 % BLDV: 27.1 % — LOW (ref 60–85)
SARS-COV-2 RNA SPEC QL NAA+PROBE: SIGNIFICANT CHANGE UP
SODIUM SERPL-SCNC: 141 MMOL/L — SIGNIFICANT CHANGE UP (ref 135–145)
SP GR SPEC: 1.02 — SIGNIFICANT CHANGE UP (ref 1.01–1.02)
UROBILINOGEN FLD QL: SIGNIFICANT CHANGE UP
WBC # BLD: 14.85 K/UL — HIGH (ref 3.8–10.5)
WBC # FLD AUTO: 14.85 K/UL — HIGH (ref 3.8–10.5)

## 2021-05-31 PROCEDURE — 99222 1ST HOSP IP/OBS MODERATE 55: CPT

## 2021-05-31 PROCEDURE — 99285 EMERGENCY DEPT VISIT HI MDM: CPT

## 2021-05-31 RX ORDER — GLUCAGON INJECTION, SOLUTION 0.5 MG/.1ML
1 INJECTION, SOLUTION SUBCUTANEOUS ONCE
Refills: 0 | Status: DISCONTINUED | OUTPATIENT
Start: 2021-05-31 | End: 2021-06-08

## 2021-05-31 RX ORDER — ASPIRIN/CALCIUM CARB/MAGNESIUM 324 MG
81 TABLET ORAL DAILY
Refills: 0 | Status: DISCONTINUED | OUTPATIENT
Start: 2021-05-31 | End: 2021-06-02

## 2021-05-31 RX ORDER — DEXTROSE 50 % IN WATER 50 %
25 SYRINGE (ML) INTRAVENOUS ONCE
Refills: 0 | Status: DISCONTINUED | OUTPATIENT
Start: 2021-05-31 | End: 2021-06-08

## 2021-05-31 RX ORDER — METOPROLOL TARTRATE 50 MG
100 TABLET ORAL DAILY
Refills: 0 | Status: DISCONTINUED | OUTPATIENT
Start: 2021-05-31 | End: 2021-05-31

## 2021-05-31 RX ORDER — VALPROIC ACID (AS SODIUM SALT) 250 MG/5ML
250 SOLUTION, ORAL ORAL THREE TIMES A DAY
Refills: 0 | Status: DISCONTINUED | OUTPATIENT
Start: 2021-05-31 | End: 2021-06-09

## 2021-05-31 RX ORDER — HYDRALAZINE HCL 50 MG
25 TABLET ORAL
Refills: 0 | Status: DISCONTINUED | OUTPATIENT
Start: 2021-05-31 | End: 2021-06-08

## 2021-05-31 RX ORDER — QUETIAPINE FUMARATE 200 MG/1
25 TABLET, FILM COATED ORAL DAILY
Refills: 0 | Status: DISCONTINUED | OUTPATIENT
Start: 2021-05-31 | End: 2021-06-08

## 2021-05-31 RX ORDER — METOPROLOL TARTRATE 50 MG
2.5 TABLET ORAL EVERY 6 HOURS
Refills: 0 | Status: DISCONTINUED | OUTPATIENT
Start: 2021-05-31 | End: 2021-06-08

## 2021-05-31 RX ORDER — SODIUM CHLORIDE 9 MG/ML
1000 INJECTION, SOLUTION INTRAVENOUS
Refills: 0 | Status: DISCONTINUED | OUTPATIENT
Start: 2021-05-31 | End: 2021-06-08

## 2021-05-31 RX ORDER — DEXTROSE 50 % IN WATER 50 %
12.5 SYRINGE (ML) INTRAVENOUS ONCE
Refills: 0 | Status: DISCONTINUED | OUTPATIENT
Start: 2021-05-31 | End: 2021-06-08

## 2021-05-31 RX ORDER — APIXABAN 2.5 MG/1
2.5 TABLET, FILM COATED ORAL EVERY 12 HOURS
Refills: 0 | Status: DISCONTINUED | OUTPATIENT
Start: 2021-05-31 | End: 2021-06-08

## 2021-05-31 RX ORDER — CALCIUM ACETATE 667 MG
667 TABLET ORAL
Refills: 0 | Status: DISCONTINUED | OUTPATIENT
Start: 2021-05-31 | End: 2021-06-08

## 2021-05-31 RX ORDER — CEFTRIAXONE 500 MG/1
1000 INJECTION, POWDER, FOR SOLUTION INTRAMUSCULAR; INTRAVENOUS EVERY 24 HOURS
Refills: 0 | Status: COMPLETED | OUTPATIENT
Start: 2021-05-31 | End: 2021-06-03

## 2021-05-31 RX ORDER — DEXTROSE 50 % IN WATER 50 %
15 SYRINGE (ML) INTRAVENOUS ONCE
Refills: 0 | Status: DISCONTINUED | OUTPATIENT
Start: 2021-05-31 | End: 2021-06-08

## 2021-05-31 RX ORDER — DONEPEZIL HYDROCHLORIDE 10 MG/1
10 TABLET, FILM COATED ORAL AT BEDTIME
Refills: 0 | Status: DISCONTINUED | OUTPATIENT
Start: 2021-05-31 | End: 2021-06-08

## 2021-05-31 RX ORDER — ATORVASTATIN CALCIUM 80 MG/1
80 TABLET, FILM COATED ORAL AT BEDTIME
Refills: 0 | Status: DISCONTINUED | OUTPATIENT
Start: 2021-05-31 | End: 2021-06-08

## 2021-05-31 RX ORDER — HALOPERIDOL DECANOATE 100 MG/ML
5 INJECTION INTRAMUSCULAR ONCE
Refills: 0 | Status: COMPLETED | OUTPATIENT
Start: 2021-05-31 | End: 2021-05-31

## 2021-05-31 RX ORDER — DIVALPROEX SODIUM 500 MG/1
250 TABLET, DELAYED RELEASE ORAL THREE TIMES A DAY
Refills: 0 | Status: DISCONTINUED | OUTPATIENT
Start: 2021-05-31 | End: 2021-05-31

## 2021-05-31 RX ORDER — INSULIN LISPRO 100/ML
VIAL (ML) SUBCUTANEOUS
Refills: 0 | Status: DISCONTINUED | OUTPATIENT
Start: 2021-05-31 | End: 2021-06-01

## 2021-05-31 RX ORDER — PANTOPRAZOLE SODIUM 20 MG/1
40 TABLET, DELAYED RELEASE ORAL
Refills: 0 | Status: DISCONTINUED | OUTPATIENT
Start: 2021-05-31 | End: 2021-06-08

## 2021-05-31 RX ORDER — INSULIN GLARGINE 100 [IU]/ML
10 INJECTION, SOLUTION SUBCUTANEOUS AT BEDTIME
Refills: 0 | Status: DISCONTINUED | OUTPATIENT
Start: 2021-05-31 | End: 2021-06-06

## 2021-05-31 RX ADMIN — INSULIN GLARGINE 10 UNIT(S): 100 INJECTION, SOLUTION SUBCUTANEOUS at 22:30

## 2021-05-31 RX ADMIN — HALOPERIDOL DECANOATE 5 MILLIGRAM(S): 100 INJECTION INTRAMUSCULAR at 16:05

## 2021-05-31 RX ADMIN — Medication 4: at 22:30

## 2021-05-31 RX ADMIN — Medication 26.25 MILLIGRAM(S): at 22:19

## 2021-05-31 RX ADMIN — CEFTRIAXONE 100 MILLIGRAM(S): 500 INJECTION, POWDER, FOR SOLUTION INTRAMUSCULAR; INTRAVENOUS at 22:19

## 2021-05-31 NOTE — CONSULT NOTE ADULT - ASSESSMENT
ESRD:  Access is occluded. Will need thrombectomy or permacath.  No need for urgent HD.    PLAN:  - Vascular follow up.  - HD after access is working.  - Renal diet.    Leukocytosis:  No fever or obvious source.  - Monitor WBC.

## 2021-05-31 NOTE — ED ADULT TRIAGE NOTE - CHIEF COMPLAINT QUOTE
Pt arrives from NH for clogged dialysis port. As per EMS pt is suppose to get her shunt replaced. Pt unable to answer questions. As per EMS this is pts baseline. unable to obtain temp in triage.,

## 2021-05-31 NOTE — ED PROVIDER NOTE - CLINICAL SUMMARY MEDICAL DECISION MAKING FREE TEXT BOX
k 4 on the gas, plan to admit for tunneled catheter and dialysis in morning give lack of availability of IR on holiday. vascular consulted. pending final recs, case discussed with kamilah

## 2021-05-31 NOTE — ED ADULT NURSE NOTE - OBJECTIVE STATEMENT
Pt BIBEMS for clogged dialysis cath, R arm. Pt is A&Ox0/bedbound, nonverbal. Pt slightly agitated, but otherwise no apparent distress noted. No skin breakdown, skin intact. incontinence care provided. MD bahena in progress, continue to monitor.

## 2021-05-31 NOTE — CONSULT NOTE ADULT - SUBJECTIVE AND OBJECTIVE BOX
SURGERY CONSULT NOTE     HPI: 81 year old female with PMH of CVA causing right hemiparesis, HTN, DM, afib on eliquis, ESRD who presented from nursing home due to clotted fistula. Patient is nonverbal, history obtained from sister and son. She was transferred to the hospital. Per the son, the patient had an incomplete dialysis session on Friday due to poor cooperation and then today they were unable to access fistula due to clot. She follows with Dr Garcia, her vascular surgeon, who does not come here. Recently (unknown when per son) had a fistulogram with balloon angioplasty,       PMHx: DM (diabetes mellitus)    HTN (hypertension)    Hypercholesteremia    CKD (chronic kidney disease)    Anemia    Atrial fibrillation    Cerebrovascular accident (CVA)      PSHx: Status post right foot surgery    AV fistula    H/O colonoscopy with polypectomy      Medications (inpatient): apixaban 2.5 milliGRAM(s) Oral every 12 hours  aspirin  chewable 81 milliGRAM(s) Oral daily  atorvastatin 80 milliGRAM(s) Oral at bedtime  calcium acetate 667 milliGRAM(s) Oral three times a day with meals  dextrose 40% Gel 15 Gram(s) Oral once  dextrose 5%. 1000 milliLiter(s) IV Continuous <Continuous>  dextrose 5%. 1000 milliLiter(s) IV Continuous <Continuous>  dextrose 50% Injectable 25 Gram(s) IV Push once  dextrose 50% Injectable 12.5 Gram(s) IV Push once  dextrose 50% Injectable 25 Gram(s) IV Push once  diVALproex Sprinkle 250 milliGRAM(s) Oral three times a day  donepezil 10 milliGRAM(s) Oral at bedtime  glucagon  Injectable 1 milliGRAM(s) IntraMuscular once  hydrALAZINE 25 milliGRAM(s) Oral two times a day  insulin glargine Injectable (LANTUS) 10 Unit(s) SubCutaneous at bedtime  insulin lispro (ADMELOG) corrective regimen sliding scale   SubCutaneous three times a day before meals  metoprolol succinate  milliGRAM(s) Oral daily  pantoprazole    Tablet 40 milliGRAM(s) Oral before breakfast  QUEtiapine 25 milliGRAM(s) Oral daily    Medications (PRN):  Allergies: No Known Allergies  (Intolerances: )  Social Hx:   Family Hx: Family history of hypertension (Sibling)    Family history of diabetes mellitus    Family history of lung cancer (Sibling)    Family history of malignant neoplasm of gastrointestinal tract        Physical Exam  T(C): 36.6  HR: 86 (86 - 95)  BP: 131/66 (112/60 - 134/60)  RR: 18 (18 - 26)  SpO2: 99% (97% - 99%)  Tmax: T(C): , Max: 36.6 (05-31-21 @ 15:47)    General: contracted  Neuro: AxOx0, cannot move R side, uncooperative  Respiratory: airway patent, respirations unlabored  CVS: regular rate and rhythm  Abdomen: soft, nontender, nondistended  Extremities: no edema, sensation and movement grossly intact  Skin: warm, dry, appropriate color    Labs:                        10.0   14.85 )-----------( 234      ( 31 May 2021 15:39 )             32.7       05-31    141  |  92<L>  |  86<H>  ----------------------------<  189<H>  4.2   |  24  |  11.85<H>    Ca    9.1      31 May 2021 15:39    TPro  8.0  /  Alb  4.0  /  TBili  0.4  /  DBili  x   /  AST  36<H>  /  ALT  21  /  AlkPhos  65  05-31            Imaging and other studies:

## 2021-05-31 NOTE — ED PROVIDER NOTE - ATTENDING CONTRIBUTION TO CARE
agree with above hpi  vital signs: T(C): --  HR: 95 (05-31-21 @ 14:00) (95 - 95)  BP: 112/60 (05-31-21 @ 14:00) (112/60 - 112/60)  BP(mean): --  RR: 18 (05-31-21 @ 14:00) (18 - 18)  SpO2: 97% (05-31-21 @ 14:00) (97% - 97%)  on my exam  GEN - awake, occasionally moans incoherently, nonverbal, does not follow directions  HEAD - NC/AT   EYES- PERRL, EOMI  ENT: Airway patent, mmm, no oropharyngeal lesions.   NECK: Neck supple, no masses.  PULMONARY - CTA b/l, symmetric breath sounds.   CARDIAC -s1s2, RRR, no M,G,R  ABDOMEN - +BS, nondistended, no guarding  BACK - no CVA tenderness, Normal  spine   EXTREMITIES - RUE fistula with no thrill, cap refill brisk, no overlying erythema/warmth/induration, FROM,  no edema   SKIN - no rash or bruising   NEUROLOGIC - awake, moves ext spontaneously, does not follow directions, face symmetric  PSYCH - no insight into condition  a/p-patient presents to the ed with nonfunctional fistula-per nh-tried to access dialysis fistula but no thrill so could not access. D/w patients family who requested transfer to hospital for hd and fistula care. Patient per ems and nh reports at baseline ms currently. Awake, does not engage in meaningful dialogue, moans incoherently at times, moves all extremities. Fistula present with no palpable thrill. remainder of extremity perfused. plan for labs, ekg, vascular US, vascular consult, nephro consult-eval for elec disturbances, organ dysfunction, eval fistula, plan d/w dr. chandler and vascular surgery, as well as discussed with patients family member via phone by dr. laguna who is agreeable to plan.

## 2021-05-31 NOTE — CONSULT NOTE ADULT - SUBJECTIVE AND OBJECTIVE BOX
VINCENT GROSS  81y  Patient is a 81y old  Female who presents with a chief complaint of AVF malfunction (31 May 2021 18:24)    HPI:  Sent from NH with occluded access. Last HD on Friday.  No history obtained from her.    HEALTH ISSUES - PROBLEM Dx:  AV fistula thrombosis, initial encounter  AV fistula thrombosis, initial encounter    Leukocytosis  Leukocytosis    Cerebrovascular accident (CVA)  Cerebrovascular accident (CVA)    ESRD (end stage renal disease)  ESRD (end stage renal disease)    DM (diabetes mellitus)  DM (diabetes mellitus)    Atrial fibrillation  Atrial fibrillation    HTN (hypertension)  HTN (hypertension)    Prophylactic measure  Prophylactic measure          MEDICATIONS  (STANDING):  apixaban 2.5 milliGRAM(s) Oral every 12 hours  aspirin  chewable 81 milliGRAM(s) Oral daily  atorvastatin 80 milliGRAM(s) Oral at bedtime  calcium acetate 667 milliGRAM(s) Oral three times a day with meals  dextrose 40% Gel 15 Gram(s) Oral once  dextrose 5%. 1000 milliLiter(s) (50 mL/Hr) IV Continuous <Continuous>  dextrose 5%. 1000 milliLiter(s) (100 mL/Hr) IV Continuous <Continuous>  dextrose 50% Injectable 25 Gram(s) IV Push once  dextrose 50% Injectable 12.5 Gram(s) IV Push once  dextrose 50% Injectable 25 Gram(s) IV Push once  diVALproex Sprinkle 250 milliGRAM(s) Oral three times a day  donepezil 10 milliGRAM(s) Oral at bedtime  glucagon  Injectable 1 milliGRAM(s) IntraMuscular once  hydrALAZINE 25 milliGRAM(s) Oral two times a day  insulin glargine Injectable (LANTUS) 10 Unit(s) SubCutaneous at bedtime  insulin lispro (ADMELOG) corrective regimen sliding scale   SubCutaneous three times a day before meals  metoprolol succinate  milliGRAM(s) Oral daily  pantoprazole    Tablet 40 milliGRAM(s) Oral before breakfast  QUEtiapine 25 milliGRAM(s) Oral daily    MEDICATIONS  (PRN):    Vital Signs Last 24 Hrs  T(C): 36.6 (31 May 2021 15:47), Max: 36.6 (31 May 2021 15:47)  T(F): 97.8 (31 May 2021 15:47), Max: 97.8 (31 May 2021 15:47)  HR: 86 (31 May 2021 17:06) (86 - 95)  BP: 131/66 (31 May 2021 17:06) (112/60 - 134/60)  BP(mean): --  RR: 18 (31 May 2021 17:06) (18 - 26)  SpO2: 99% (31 May 2021 17:06) (97% - 99%)  Daily Height in cm: 170.18 (31 May 2021 14:00)    Daily     PHYSICAL EXAM:  Constitutional: She  appears comfortable and not distressed. Not diaphoretic.    Respiratory: The lungs are clear to auscultation. No dullness and expansion is normal.    Cardiovascular: S1 and S2 are normal. No mummurs, rubs or gallops are present.    Gastrointestinal: The abdomen is soft. No tenderness is present. No masses are present. Bowel sounds are normal.    Genitourinary: The bladder is not distended. No CVA tenderness is present.    Extremities: No edema is noted. No deformities are present.    Neurological: Confused and non-verbal.    Skin: No lesions are seen  or palpated.                            10.0   14.85 )-----------( 234      ( 31 May 2021 15:39 )             32.7     05-    141  |  92<L>  |  86<H>  ----------------------------<  189<H>  4.2   |  24  |  11.85<H>    Ca    9.1      31 May 2021 15:39    TPro  8.0  /  Alb  4.0  /  TBili  0.4  /  DBili  x   /  AST  36<H>  /  ALT  21  /  AlkPhos  65  05-    Urinalysis Basic - ( 31 May 2021 19:13 )    Color: Dark Brown / Appearance: Turbid / S.019 / pH: x  Gluc: x / Ketone: Negative  / Bili: Negative / Urobili: <2 mg/dL   Blood: x / Protein: 300 mg/dL / Nitrite: Negative   Leuk Esterase: Large / RBC: 5 /HPF / WBC >10 /HPF   Sq Epi: x / Non Sq Epi: 4 /HPF / Bacteria: Many

## 2021-05-31 NOTE — H&P ADULT - HISTORY OF PRESENT ILLNESS
Patient is a 82 Y/O Female with PMHx of CVA recent ischemic stroke, HTN, DM, A fib on Eliquis, ESRD on HD (MWF) brought to ED after malfunctioning AVF for HD today. unable to do HD. was transferred to the hospital. patient at baseline is mildly confused and unable to give history. denies of any discomfort.

## 2021-05-31 NOTE — ED PROVIDER NOTE - OBJECTIVE STATEMENT
81 y F pmh cva with right sided hemiplegia, esrd on hd mwf, [facility] home resident  pw clotted r sided fistula  did not receive dialsyis  patient non verbal, unable to obtain further history from ems

## 2021-05-31 NOTE — H&P ADULT - NSHPPHYSICALEXAM_GEN_ALL_CORE
Vital Signs Last 24 Hrs  T(C): 36.6 (31 May 2021 15:47), Max: 36.6 (31 May 2021 15:47)  T(F): 97.8 (31 May 2021 15:47), Max: 97.8 (31 May 2021 15:47)  HR: 86 (31 May 2021 17:06) (86 - 95)  BP: 131/66 (31 May 2021 17:06) (112/60 - 134/60)  BP(mean): --  RR: 18 (31 May 2021 17:06) (18 - 26)  SpO2: 99% (31 May 2021 17:06) (97% - 99%)    Appearance: Normal	  HEENT:   Normal oral mucosa  Lymphatic: No lymphadenopathy , no edema  Cardiovascular: Normal S1 S2, No JVD  Respiratory: Lungs clear to auscultation, normal effort 	  Gastrointestinal:  Soft, Non-tender, + BS	  Skin: No rashes, No ecchymoses, No cyanosis, warm to touch  Musculoskeletal: Normal range of motion, normal strength  Psychiatry:  Mood & affect appropriate  Ext: No edema

## 2021-05-31 NOTE — ED PROVIDER NOTE - PHYSICAL EXAMINATION
CONSTITUTIONAL: Non-toxic, non-diaphoretic, in no apparent distress, chronically ill appearing  HEAD: Normocephalic; atruamatic  EYES: EOM intact   ENMT: External appears normal; normal oropharynx, moist  NECK: grossly normal active ROM,  CARD: No cyanosis, good peripheral perfusion, RRR  RESP: Normal chest excursion with respiration; no increased work of breathing  ABD: non-distended   EXT: moving all extremities, right arm with clotted fistula, not thrill +++palpable cords  SKIN: Warm, dry, no rash  NEURO:  right sided hemiplegia, no facial droop, no dysarthria      cn2-12 intact

## 2021-05-31 NOTE — CONSULT NOTE ADULT - ASSESSMENT
81 year old female presenting from her nursing home with clotted AVF    Plan:  - Pending arterial duplex of fistula  - IR for permacath placement for dialysis, no need for urgent dialysis  - Fistulogram planning    Vascular Surgery [id number] 81 year old female presenting from her nursing home with clotted R AVF    Plan:  - Pending arterial duplex of fistula  - IR for permacath placement for dialysis, no need for urgent dialysis  - Fistulogram planning    Vascular Surgery [id number]

## 2021-06-01 LAB
A1C WITH ESTIMATED AVERAGE GLUCOSE RESULT: 6.5 % — HIGH (ref 4–5.6)
ALBUMIN SERPL ELPH-MCNC: 3.7 G/DL — SIGNIFICANT CHANGE UP (ref 3.3–5)
ALP SERPL-CCNC: 75 U/L — SIGNIFICANT CHANGE UP (ref 40–120)
ALT FLD-CCNC: 15 U/L — SIGNIFICANT CHANGE UP (ref 4–33)
ANION GAP SERPL CALC-SCNC: 29 MMOL/L — HIGH (ref 7–14)
APTT BLD: 29.7 SEC — SIGNIFICANT CHANGE UP (ref 27–36.3)
AST SERPL-CCNC: 41 U/L — HIGH (ref 4–32)
BASOPHILS # BLD AUTO: 0.03 K/UL — SIGNIFICANT CHANGE UP (ref 0–0.2)
BASOPHILS NFR BLD AUTO: 0.2 % — SIGNIFICANT CHANGE UP (ref 0–2)
BILIRUB SERPL-MCNC: 0.4 MG/DL — SIGNIFICANT CHANGE UP (ref 0.2–1.2)
BLOOD GAS ARTERIAL - LYTES,HGB,ICA,LACT RESULT: SIGNIFICANT CHANGE UP
BUN SERPL-MCNC: 98 MG/DL — HIGH (ref 7–23)
CALCIUM SERPL-MCNC: 9.2 MG/DL — SIGNIFICANT CHANGE UP (ref 8.4–10.5)
CHLORIDE SERPL-SCNC: 93 MMOL/L — LOW (ref 98–107)
CO2 SERPL-SCNC: 21 MMOL/L — LOW (ref 22–31)
COVID-19 SPIKE DOMAIN AB INTERP: POSITIVE
COVID-19 SPIKE DOMAIN ANTIBODY RESULT: >250 U/ML — HIGH
CREAT SERPL-MCNC: 13.22 MG/DL — HIGH (ref 0.5–1.3)
EOSINOPHIL # BLD AUTO: 0 K/UL — SIGNIFICANT CHANGE UP (ref 0–0.5)
EOSINOPHIL NFR BLD AUTO: 0 % — SIGNIFICANT CHANGE UP (ref 0–6)
ESTIMATED AVERAGE GLUCOSE: 140 MG/DL — HIGH (ref 68–114)
GLUCOSE SERPL-MCNC: 213 MG/DL — HIGH (ref 70–99)
HCT VFR BLD CALC: 36 % — SIGNIFICANT CHANGE UP (ref 34.5–45)
HGB BLD-MCNC: 10.5 G/DL — LOW (ref 11.5–15.5)
IANC: 12.33 K/UL — HIGH (ref 1.5–8.5)
IMM GRANULOCYTES NFR BLD AUTO: 0.6 % — SIGNIFICANT CHANGE UP (ref 0–1.5)
INR BLD: 1.34 RATIO — HIGH (ref 0.88–1.16)
LYMPHOCYTES # BLD AUTO: 0.57 K/UL — LOW (ref 1–3.3)
LYMPHOCYTES # BLD AUTO: 4.1 % — LOW (ref 13–44)
MCHC RBC-ENTMCNC: 23.9 PG — LOW (ref 27–34)
MCHC RBC-ENTMCNC: 29.2 GM/DL — LOW (ref 32–36)
MCV RBC AUTO: 81.8 FL — SIGNIFICANT CHANGE UP (ref 80–100)
MONOCYTES # BLD AUTO: 0.93 K/UL — HIGH (ref 0–0.9)
MONOCYTES NFR BLD AUTO: 6.7 % — SIGNIFICANT CHANGE UP (ref 2–14)
NEUTROPHILS # BLD AUTO: 12.33 K/UL — HIGH (ref 1.8–7.4)
NEUTROPHILS NFR BLD AUTO: 88.4 % — HIGH (ref 43–77)
NRBC # BLD: 0 /100 WBCS — SIGNIFICANT CHANGE UP
NRBC # FLD: 0.04 K/UL — HIGH
PLATELET # BLD AUTO: 240 K/UL — SIGNIFICANT CHANGE UP (ref 150–400)
POTASSIUM SERPL-MCNC: 4.4 MMOL/L — SIGNIFICANT CHANGE UP (ref 3.5–5.3)
POTASSIUM SERPL-SCNC: 4.4 MMOL/L — SIGNIFICANT CHANGE UP (ref 3.5–5.3)
PROT SERPL-MCNC: 8 G/DL — SIGNIFICANT CHANGE UP (ref 6–8.3)
PROTHROM AB SERPL-ACNC: 15.2 SEC — HIGH (ref 10.6–13.6)
RBC # BLD: 4.4 M/UL — SIGNIFICANT CHANGE UP (ref 3.8–5.2)
RBC # FLD: 16.8 % — HIGH (ref 10.3–14.5)
SARS-COV-2 IGG+IGM SERPL QL IA: >250 U/ML — HIGH
SARS-COV-2 IGG+IGM SERPL QL IA: POSITIVE
SODIUM SERPL-SCNC: 143 MMOL/L — SIGNIFICANT CHANGE UP (ref 135–145)
WBC # BLD: 13.95 K/UL — HIGH (ref 3.8–10.5)
WBC # FLD AUTO: 13.95 K/UL — HIGH (ref 3.8–10.5)

## 2021-06-01 PROCEDURE — 76937 US GUIDE VASCULAR ACCESS: CPT | Mod: 26

## 2021-06-01 PROCEDURE — 99447 NTRPROF PH1/NTRNET/EHR 11-20: CPT

## 2021-06-01 PROCEDURE — 77001 FLUOROGUIDE FOR VEIN DEVICE: CPT | Mod: 26,GC

## 2021-06-01 PROCEDURE — 36556 INSERT NON-TUNNEL CV CATH: CPT

## 2021-06-01 RX ORDER — CHLORHEXIDINE GLUCONATE 213 G/1000ML
1 SOLUTION TOPICAL
Refills: 0 | Status: DISCONTINUED | OUTPATIENT
Start: 2021-06-01 | End: 2021-06-03

## 2021-06-01 RX ORDER — DEXTROSE 50 % IN WATER 50 %
25 SYRINGE (ML) INTRAVENOUS ONCE
Refills: 0 | Status: COMPLETED | OUTPATIENT
Start: 2021-06-01 | End: 2021-06-01

## 2021-06-01 RX ORDER — SODIUM CHLORIDE 9 MG/ML
1000 INJECTION, SOLUTION INTRAVENOUS
Refills: 0 | Status: DISCONTINUED | OUTPATIENT
Start: 2021-06-01 | End: 2021-06-08

## 2021-06-01 RX ORDER — INSULIN LISPRO 100/ML
VIAL (ML) SUBCUTANEOUS EVERY 6 HOURS
Refills: 0 | Status: DISCONTINUED | OUTPATIENT
Start: 2021-06-01 | End: 2021-06-08

## 2021-06-01 RX ORDER — DOXERCALCIFEROL 2.5 UG/1
2 CAPSULE ORAL ONCE
Refills: 0 | Status: COMPLETED | OUTPATIENT
Start: 2021-06-01 | End: 2021-06-01

## 2021-06-01 RX ORDER — HALOPERIDOL DECANOATE 100 MG/ML
2 INJECTION INTRAMUSCULAR ONCE
Refills: 0 | Status: COMPLETED | OUTPATIENT
Start: 2021-06-01 | End: 2021-06-01

## 2021-06-01 RX ORDER — SODIUM CHLORIDE 9 MG/ML
10 INJECTION INTRAMUSCULAR; INTRAVENOUS; SUBCUTANEOUS
Refills: 0 | Status: DISCONTINUED | OUTPATIENT
Start: 2021-06-01 | End: 2021-06-09

## 2021-06-01 RX ADMIN — HALOPERIDOL DECANOATE 2 MILLIGRAM(S): 100 INJECTION INTRAMUSCULAR at 16:53

## 2021-06-01 RX ADMIN — Medication 26.25 MILLIGRAM(S): at 05:34

## 2021-06-01 RX ADMIN — Medication 25 GRAM(S): at 03:58

## 2021-06-01 RX ADMIN — INSULIN GLARGINE 10 UNIT(S): 100 INJECTION, SOLUTION SUBCUTANEOUS at 22:11

## 2021-06-01 RX ADMIN — Medication 2.5 MILLIGRAM(S): at 01:00

## 2021-06-01 RX ADMIN — DOXERCALCIFEROL 2 MICROGRAM(S): 2.5 CAPSULE ORAL at 21:11

## 2021-06-01 RX ADMIN — SODIUM CHLORIDE 40 MILLILITER(S): 9 INJECTION, SOLUTION INTRAVENOUS at 15:44

## 2021-06-01 RX ADMIN — Medication 26.25 MILLIGRAM(S): at 12:54

## 2021-06-01 RX ADMIN — Medication 2.5 MILLIGRAM(S): at 12:54

## 2021-06-01 NOTE — CONSULT NOTE ADULT - SUBJECTIVE AND OBJECTIVE BOX
Patient is a 81y old  Female who presents with a chief complaint of AVF malfunction (2021 12:31)      HPI:    Patient is a 82 Y/O Female with PMHx of CVA recent ischemic stroke, HTN, DM, A fib on Eliquis, ESRD on HD (MWF) brought to ED after malfunctioning AVF for HD today. unable to do HD. was transferred to the hospital. patient at baseline is mildly confused and unable to give history. denies of any discomfort. 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  Pt afebrile.  WBC 14 --> 13.  UA large LE, mod blood, WBC >10.  Covid pcr (-).  Pt for eventual permacath for dialysis access.  ID consult called for elevated WBC.           REVIEW OF SYSTEMS:    CONSTITUTIONAL: No fever, weight loss, or fatigue  EYES: No eye pain, visual disturbances, or discharge  ENMT:  No sore throat  NECK: No pain or stiffness  RESPIRATORY: No cough, wheezing, chills or hemoptysis; No shortness of breath  CARDIOVASCULAR: No chest pain, palpitations, dizziness, or leg swelling  GASTROINTESTINAL: No abdominal or epigastric pain. No nausea, vomiting, or hematemesis; No diarrhea or constipation. No melena or hematochezia.  GENITOURINARY: No dysuria, frequency, hematuria, or incontinence  NEUROLOGICAL: No headaches, memory loss, loss of strength, numbness, or tremors  SKIN: No itching, burning, rashes, or lesions   LYMPH NODES: No enlarged glands  MUSCULOSKELETAL: No joint pain or swelling; No muscle, back, or extremity pain      PAST MEDICAL & SURGICAL HISTORY:  DM (diabetes mellitus)  TYpe II    HTN (hypertension)    Hypercholesteremia    CKD (chronic kidney disease)  now on HD via R forearm AVF    Anemia    Atrial fibrillation  On Eliquis    Cerebrovascular accident (CVA)    Status post right foot surgery  hammer toe repair    AV fistula  2018, 2 ballooning (last one 2 weeks ago), following up on Dec 15th    H/O colonoscopy with polypectomy        Allergies    No Known Allergies    Intolerances        FAMILY HISTORY:  Family history of hypertension (Sibling)    Family history of diabetes mellitus    Family history of lung cancer (Sibling)    Family history of malignant neoplasm of gastrointestinal tract        SOCIAL HISTORY:        MEDICATIONS  (STANDING):  apixaban 2.5 milliGRAM(s) Oral every 12 hours  aspirin  chewable 81 milliGRAM(s) Oral daily  atorvastatin 80 milliGRAM(s) Oral at bedtime  calcium acetate 667 milliGRAM(s) Oral three times a day with meals  cefTRIAXone   IVPB 1000 milliGRAM(s) IV Intermittent every 24 hours  chlorhexidine 4% Liquid 1 Application(s) Topical <User Schedule>  dextrose 40% Gel 15 Gram(s) Oral once  dextrose 5% + sodium chloride 0.45%. 1000 milliLiter(s) (40 mL/Hr) IV Continuous <Continuous>  dextrose 5%. 1000 milliLiter(s) (100 mL/Hr) IV Continuous <Continuous>  dextrose 5%. 1000 milliLiter(s) (50 mL/Hr) IV Continuous <Continuous>  dextrose 50% Injectable 25 Gram(s) IV Push once  dextrose 50% Injectable 12.5 Gram(s) IV Push once  dextrose 50% Injectable 25 Gram(s) IV Push once  donepezil 10 milliGRAM(s) Oral at bedtime  doxercalciferol Injectable 2 MICROGram(s) IV Push once  glucagon  Injectable 1 milliGRAM(s) IntraMuscular once  hydrALAZINE 25 milliGRAM(s) Oral two times a day  insulin glargine Injectable (LANTUS) 10 Unit(s) SubCutaneous at bedtime  insulin lispro (ADMELOG) corrective regimen sliding scale   SubCutaneous every 6 hours  metoprolol tartrate Injectable 2.5 milliGRAM(s) IV Push every 6 hours  pantoprazole    Tablet 40 milliGRAM(s) Oral before breakfast  QUEtiapine 25 milliGRAM(s) Oral daily  valproate sodium IVPB 250 milliGRAM(s) IV Intermittent three times a day    MEDICATIONS  (PRN):  sodium chloride 0.9% lock flush 10 milliLiter(s) IV Push every 1 hour PRN Pre/post blood products, medications, blood draw, and to maintain line patency      Vital Signs Last 24 Hrs  T(C): 36.4 (2021 12:47), Max: 36.8 (31 May 2021 20:32)  T(F): 97.6 (2021 12:47), Max: 98.3 (31 May 2021 20:32)  HR: 100 (2021 12:47) (84 - 109)  BP: 150/81 (2021 12:47) (105/58 - 167/66)  BP(mean): --  RR: 18 (2021 12:47) (17 - 19)  SpO2: 96% (2021 12:47) (96% - 99%)    PHYSICAL EXAM:    GENERAL: NAD, well-groomed  HEAD:  Atraumatic, Normocephalic  EYES: EOMI, PERRLA, conjunctiva and sclera clear  ENMT: No tonsillar erythema, exudates, or enlargement; Moist mucous membranes  NECK: Supple, No JVD  CHEST/LUNG: Clear to percussion bilaterally; No rales, rhonchi, wheezing, or rubs  HEART: Regular rate and rhythm; No murmurs, rubs, or gallops  ABDOMEN: Soft, Nontender, Nondistended; Bowel sounds present  EXTREMITIES:  2+ Peripheral Pulses, No clubbing, cyanosis, or edema  LYMPH: No lymphadenopathy noted  SKIN: No rashes or lesions    LABS:  CBC Full  -  ( 2021 06:55 )  WBC Count : 13.95 K/uL  RBC Count : 4.40 M/uL  Hemoglobin : 10.5 g/dL  Hematocrit : 36.0 %  Platelet Count - Automated : 240 K/uL  Mean Cell Volume : 81.8 fL  Mean Cell Hemoglobin : 23.9 pg  Mean Cell Hemoglobin Concentration : 29.2 gm/dL  Auto Neutrophil # : 12.33 K/uL  Auto Lymphocyte # : 0.57 K/uL  Auto Monocyte # : 0.93 K/uL  Auto Eosinophil # : 0.00 K/uL  Auto Basophil # : 0.03 K/uL  Auto Neutrophil % : 88.4 %  Auto Lymphocyte % : 4.1 %  Auto Monocyte % : 6.7 %  Auto Eosinophil % : 0.0 %  Auto Basophil % : 0.2 %      -    143  |  93<L>  |  98<H>  ----------------------------<  213<H>  4.4   |  21<L>  |  13.22<H>    Ca    9.2      2021 06:55    TPro  8.0  /  Alb  3.7  /  TBili  0.4  /  DBili  x   /  AST  41<H>  /  ALT  15  /  AlkPhos  75  06-      LIVER FUNCTIONS - ( 2021 06:55 )  Alb: 3.7 g/dL / Pro: 8.0 g/dL / ALK PHOS: 75 U/L / ALT: 15 U/L / AST: 41 U/L / GGT: x                               MICROBIOLOGY:        Urinalysis Basic - ( 31 May 2021 19:13 )    Color: Dark Brown / Appearance: Turbid / S.019 / pH: x  Gluc: x / Ketone: Negative  / Bili: Negative / Urobili: <2 mg/dL   Blood: x / Protein: 300 mg/dL / Nitrite: Negative   Leuk Esterase: Large / RBC: 5 /HPF / WBC >10 /HPF   Sq Epi: x / Non Sq Epi: 4 /HPF / Bacteria: Many      COVID-19 PCR (21 @ 15:39)   COVID-19 PCR: NotDetec: You can help in the fight against COVID-19. [facility] may contact   you to see if you are interested in voluntarily participating in one of   our clinical trials.   Testing is performed using polymerase chain reaction (PCR) or   transcription mediated amplification (TMA). This COVID-19 (SARS-CoV-2)   nucleic acid amplification test was validated by [facility] and is   in use under the FDA Emergency Use Authorization (EUA) for clinical labs   CLIA-certified to perform high complexity testing. Test results should be   correlated with clinical presentation, patient history, and epidemiology.     COVID-19 George Domain Antibody (21 @ 09:48)   COVID-19 George Domain Antibody Result: >250.00: Roche ECLIA Total AB ([place])   NOTE: This result index represents a total antibody measurement, which   includes IgG, IgA and IgM.   Measures Receptor Binding Domain of the George Protein   Negative <= 0.79 U/mL   Positive >= 0.80 U/mL U/mL   COVID-19 George Domain AB Interp: Positive: This test has been authorized for emergency use by the FDA. [facility] has validated this test to be accurate.   Results from antibody testing should not be used to inform infection   status.   A positive result is consistent with vaccination, prior infection, or   rarely be due to past or present infection with non-SARS-CoV-2   coronavirus strains, such as coronavirus HKU1, NL63, OC43, A3 or 229E.   A negative result does not rule out SARS-CoV-2 infection, particularly in   those who have been in recent contact with the virus.       RADIOLOGY:

## 2021-06-01 NOTE — CONSULT NOTE ADULT - SUBJECTIVE AND OBJECTIVE BOX
Vascular & Interventional Radiology Brief Consult Note    Evaluate for Procedure: Tunneled central venous catheter placement.     HPI: 81y Female with ESRD via AVF presents with inability to undergo HD due to clotted AVF.     Allergies:   Medications (Abx/Cardiac/Anticoagulation/Blood Products)    cefTRIAXone   IVPB: 100 mL/Hr IV Intermittent (05-31 @ 22:19)  metoprolol tartrate Injectable: 2.5 milliGRAM(s) IV Push (06-01 @ 01:00)    Data:  170.2  57.9  T(C): 36.5  HR: 109  BP: 167/66  RR: 19  SpO2: 99%    -WBC 14.85 / HgB 10.0 / Hct 32.7 / Plt 234  -Na 141 / Cl 92 / BUN 86 / Glucose 189  -K 4.2 / CO2 24 / Cr 11.85  -ALT 21 / Alk Phos 65 / T.Bili 0.4    Imaging: n/a

## 2021-06-01 NOTE — CHART NOTE - NSCHARTNOTEFT_GEN_A_CORE
PRE-INTERVENTIONAL RADIOLOGY PROCEDURE NOTE    Patient Age: 81  Patient Gender: Female     Procedure (including site / side if known):     Diagnosis / Indication: Non tunneled dialysis catheter     Interventional Radiology Attending Physician: Dr. James     Ordering Attending Physician: Dr. Sutton     Pertinent medical history: CVA recent ischemic stroke, HTN, DM, A fib on Eliquis, ESRD on HD (MWF)     Pertinent labs:                       10.0   14.85 )-----------( 234      ( 31 May 2021 15:39 )             32.7   05-31    141  |  92<L>  |  86<H>  ----------------------------<  189<H>  4.2   |  24  |  11.85<H>    Ca    9.1      31 May 2021 15:39    TPro  8.0  /  Alb  4.0  /  TBili  0.4  /  DBili  x   /  AST  36<H>  /  ALT  21  /  AlkPhos  65  05-31      Patient aware.           Contact #: [phone]

## 2021-06-01 NOTE — PATIENT PROFILE ADULT - VISION (WITH CORRECTIVE LENSES IF THE PATIENT USUALLY WEARS THEM):
unable to confirm/Partially impaired: cannot see medication labels or newsprint, but can see obstacles in path, and the surrounding layout; can count fingers at arm's length

## 2021-06-01 NOTE — PROGRESS NOTE ADULT - SUBJECTIVE AND OBJECTIVE BOX
VASCULAR SURGERY PROGRESS NOTE    INTERVAL EVENTS: No major lab abnormalities necessitating emergent dialysis. Leukocytosis and positive UA indicating UTI. Planning for nontunneled catheter placement       OBJECTIVE:    Vital Signs Last 24 Hrs  T(C): 36.3 (2021 05:33), Max: 36.8 (31 May 2021 20:32)  T(F): 97.4 (2021 05:33), Max: 98.3 (31 May 2021 20:32)  HR: 98 (2021 05:33) (84 - 109)  BP: 105/73 (2021 05:33) (105/58 - 167/66)  BP(mean): --  RR: 17 (2021 05:33) (17 - 26)  SpO2: 97% (2021 05:33) (97% - 99%)    General Appearance: No acute distress, AxOx0, right hemiplegia   Chest: non-labored breathing, no respiratory distress  CV: Pulse regular presently  Abdomen: Soft, non-tender, non-distended  Extremities: warm, no palpable thrill over fistula, palpable pulse    I&O's Summary    I&O's Detail        LABS:                        10.5   13.95 )-----------( 240      ( 2021 06:55 )             36.0     05-31    141  |  92<L>  |  86<H>  ----------------------------<  189<H>  4.2   |  24  |  11.85<H>    Ca    9.1      31 May 2021 15:39    TPro  8.0  /  Alb  4.0  /  TBili  0.4  /  DBili  x   /  AST  36<H>  /  ALT  21  /  AlkPhos  65  05-31    PT/INR - ( 2021 06:55 )   PT: 15.2 sec;   INR: 1.34 ratio         PTT - ( 2021 06:55 )  PTT:29.7 sec  Urinalysis Basic - ( 31 May 2021 19:13 )    Color: Dark Brown / Appearance: Turbid / S.019 / pH: x  Gluc: x / Ketone: Negative  / Bili: Negative / Urobili: <2 mg/dL   Blood: x / Protein: 300 mg/dL / Nitrite: Negative   Leuk Esterase: Large / RBC: 5 /HPF / WBC >10 /HPF   Sq Epi: x / Non Sq Epi: 4 /HPF / Bacteria: Many        RADIOLOGY & ADDITIONAL STUDIES:

## 2021-06-01 NOTE — SWALLOW BEDSIDE ASSESSMENT ADULT - SWALLOW EVAL: DIAGNOSIS
SLP provided trials of puree and thin liquids, patient accepted only one trial of puree presenting with severe oral dysphagia and refusing further PO trials.  Oral phase characterized by reduced oral aperture, reduced utensil stripping, no bolus manipulation, no anterior to posterior transport with bolus remaining in anterior oral cavity.  SLP administered oral cavity suctioning via Yankauer to remove bolus.  Patient refused further trials pushing SLP’s arm away.

## 2021-06-01 NOTE — SWALLOW BEDSIDE ASSESSMENT ADULT - COMMENTS
As per H&P: Patient is a 80 Y/O Female with PMHx of CVA recent ischemic stroke, HTN, DM, A fib on Eliquis, ESRD on HD (MWF) brought to ED after malfunctioning AVF for HD today. unable to do HD. was transferred to the hospital. patient at baseline is mildly confused and unable to give history. denies of any discomfort.    Patient recently seen by this service on prior admission for clinical swallow evaluation on 5/10/21 with recommendation of soft solids and thin liquids, please refer to report for full details.      SLP received patient in bed, awake, alert, not following directions or answering questions. Patient restless moving around in bed.

## 2021-06-01 NOTE — CONSULT NOTE ADULT - ASSESSMENT
Assessment/Plan:   81y Female with ESRD on HD via AVF presents with thrombosed AVF. Tunneled HD catheter (permcath) requested however would not be able to placed tunneled central venous catheter in the setting possible infection. Patient noted to have leukocytosis of unclear etiology. Blood cultures drawn.   - if patient requires HD, can plan for nontunneled central venous catheter 6/1 in IR.   - can place order under Dr. James   - patient does not require NPO status for this procedure  - AM labs - CBC/INR/BMP   - would recommend full septic workup and potential ID clearance prior to tunneled catheter placement   - Covid negative 5/31  - patient being followed by vascular surgery, pending AVF duplex.   - declot as per vascular.

## 2021-06-01 NOTE — CHART NOTE - NSCHARTNOTEFT_GEN_A_CORE
Spoke with IR fellow. States patient is not a candidate for PermCath in am 2/2 to up-trending leukocytosis and pending blood cultures. Patient is able to receive non tunneled dialysis catheter for emergent dialysis access. IR note placed and IR order in. Will follow up with attending.     MALINDA Gold  Department of Medicine   In House # [id number]

## 2021-06-01 NOTE — SWALLOW BEDSIDE ASSESSMENT ADULT - SWALLOW EVAL: RECOMMENDED DIET
1. PO is contraindicated, consideration for short term alternate means of nutrition/hydration/medication 2. Reconsult this service as patient is medically optimized to reassess for PO diet candidacy

## 2021-06-01 NOTE — CHART NOTE - NSCHARTNOTEFT_GEN_A_CORE
Discussed case with medical attending. Speech and swallow recommending NPO as patient not participating in speech and swallow evaluation. Started patient on D5 and half normal saline at 40 cc/hr per medical attending recommendations. Renal team made aware and in agreement. Team to continue to monitor.    INCOMPLETE NOTE Discussed case with medical attending. Speech and swallow recommending NPO as patient not participating in speech and swallow evaluation. Started patient on D5 and half normal saline at 40 cc/hr per medical attending recommendations. Renal team made aware and in agreement. Team to continue to monitor.

## 2021-06-01 NOTE — PROGRESS NOTE ADULT - ASSESSMENT
80F hx htn, dm, A fib on eliquis, prior CVA and esrd MWF  present from nursing home with clotted AVF    ESRD on HD MWF  from nursing home    clotted avf f/u vascular  IR for shiely today  HD after access  monitor electrolyte    AMS  recent CVA  very confused today  ? repeat ct head  ? sec to sepsis on iv antibiotic     Anemia  Hb at goal for ESRD  recent cva no epo   monitor Hb    HTN  soft  monitor    ckd-mbd  Hectorol 2mcg with HD  monitor phos and calcium daily

## 2021-06-01 NOTE — CHART NOTE - NSCHARTNOTEFT_GEN_A_CORE
LATE CHART NOTE: Discussed case with Dr. Sutton. Patient with increased confusion today. Patient A&O x 0-1 at baseline (given dementia and recent stroke) but per history able to follow very simple commands like open your eyes, open your mouth, etc. Today patient A&O x 0 and unable to follow simple commands. Patient seen laying in bed without clothes and very restless. Patient not co-operating with physical exam. Vital signs stable. Discussed with medical attending, STAT CT Head ordered. Neurology called by medical attending. Blood cultures sent 5/31 and results pending. UA + for leukocytes. Patient started on IV Ceftriaxone 5/31/2021 for urinary tract infection. COVID PCR negative. Team to continue to monitor. LATE CHART NOTE: Discussed case with Dr. Sutton. Patient with increased confusion today. Patient A&O x 0-1 at baseline (given dementia and recent stroke) but per history able to follow very simple commands like open your eyes, open your mouth, etc. Today patient A&O x 0 and unable to follow simple commands. Patient seen laying in bed without clothes and very restless. Patient not co-operating with physical exam. Vital signs stable. Discussed with medical attending, STAT CT Head ordered. Neurology called by medical attending. Blood cultures sent 5/31 and results pending. UA + for leukocytes, patient started on IV Ceftriaxone 5/31/2021 empirically for urinary tract infection. Chest xray ordered. COVID PCR negative. Team to continue to monitor.

## 2021-06-01 NOTE — CONSULT NOTE ADULT - ASSESSMENT
Patient is a 80 Y/O Female with PMHx of CVA recent ischemic stroke, HTN, DM, A fib on Eliquis, ESRD on HD (MWF) brought to ED after malfunctioning AVF for HD today. unable to do HD. was transferred to the hospital. patient at baseline is mildly confused and unable to give history. denies of any discomfort. 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  Pt afebrile.  WBC 14 --> 13.  UA large LE, mod blood, WBC >10.  Covid pcr (-).  Pt for eventual permacath for dialysis access.  ID consult called for elevated WBC.      Leukocytosis:    - WBC elevated on admission.  Pt otherwise afebrile.  Pending eventual permacath.  For temporary Shiley.    - UA (+) LE, mod bld and WBCs.  f/u Ucx.  Pt started on rocephin empirically.  f/u  bcx x 2.      - Check chest xray.    - Monitor WBC, and temp curve    Will follow,    Nahomy Ramsey  [phone]

## 2021-06-01 NOTE — PROGRESS NOTE ADULT - ASSESSMENT
81 year old female presenting from her nursing home with clotted AVF    Plan:  - Pending arterial duplex of fistula  - IR for nontunnelled catheter placement for dialysis in setting of active infection   - Fistulogram planning    Vascular Surgery [id number]

## 2021-06-01 NOTE — PROGRESS NOTE ADULT - SUBJECTIVE AND OBJECTIVE BOX
Name of Patient : VINCENT GROSS  MRN: [id number]  DATE OF SERVICE: 21 @ 19:30    Subjective: Patient seen and examined. No new events except as noted.   awake however confused and agitated       MEDICATIONS:  MEDICATIONS  (STANDING):  apixaban 2.5 milliGRAM(s) Oral every 12 hours  aspirin  chewable 81 milliGRAM(s) Oral daily  atorvastatin 80 milliGRAM(s) Oral at bedtime  calcium acetate 667 milliGRAM(s) Oral three times a day with meals  cefTRIAXone   IVPB 1000 milliGRAM(s) IV Intermittent every 24 hours  chlorhexidine 4% Liquid 1 Application(s) Topical <User Schedule>  dextrose 40% Gel 15 Gram(s) Oral once  dextrose 5% + sodium chloride 0.45%. 1000 milliLiter(s) (40 mL/Hr) IV Continuous <Continuous>  dextrose 5%. 1000 milliLiter(s) (100 mL/Hr) IV Continuous <Continuous>  dextrose 5%. 1000 milliLiter(s) (50 mL/Hr) IV Continuous <Continuous>  dextrose 50% Injectable 25 Gram(s) IV Push once  dextrose 50% Injectable 12.5 Gram(s) IV Push once  dextrose 50% Injectable 25 Gram(s) IV Push once  donepezil 10 milliGRAM(s) Oral at bedtime  doxercalciferol Injectable 2 MICROGram(s) IV Push once  glucagon  Injectable 1 milliGRAM(s) IntraMuscular once  hydrALAZINE 25 milliGRAM(s) Oral two times a day  insulin glargine Injectable (LANTUS) 10 Unit(s) SubCutaneous at bedtime  insulin lispro (ADMELOG) corrective regimen sliding scale   SubCutaneous every 6 hours  metoprolol tartrate Injectable 2.5 milliGRAM(s) IV Push every 6 hours  pantoprazole    Tablet 40 milliGRAM(s) Oral before breakfast  QUEtiapine 25 milliGRAM(s) Oral daily  valproate sodium IVPB 250 milliGRAM(s) IV Intermittent three times a day      PHYSICAL EXAM:  T(C): 36.4 (21 @ 12:47), Max: 36.8 (21 @ 20:32)  HR: 100 (21 @ 12:47) (84 - 109)  BP: 150/81 (21 @ 12:47) (105/58 - 167/66)  RR: 18 (21 @ 12:47) (17 - 19)  SpO2: 96% (21 @ 12:47) (96% - 99%)  Wt(kg): --  I&O's Summary      Weight (kg): 57.9 ( @ 00:55)    Appearance: awake, confused, not following commands   HEENT:  PERRLA   Lymphatic: No lymphadenopathy   Cardiovascular: Normal S1 S2, no JVD  Respiratory: normal effort , clear  Gastrointestinal:  Soft, Non-tender  Skin: No rashes,  warm to touch  Psychiatry:  Mood & affect appropriate  Musculuskeletal: No edema      All labs, Imaging and EKGs personally reviewed                             10.5   13.95 )-----------( 240      ( 2021 06:55 )             36.0               06-    143  |  93<L>  |  98<H>  ----------------------------<  213<H>  4.4   |  21<L>  |  13.22<H>    Ca    9.2      2021 06:55    TPro  8.0  /  Alb  3.7  /  TBili  0.4  /  DBili  x   /  AST  41<H>  /  ALT  15  /  AlkPhos  75  06    PT/INR - ( 2021 06:55 )   PT: 15.2 sec;   INR: 1.34 ratio         PTT - ( 2021 06:55 )  PTT:29.7 sec                   Urinalysis Basic - ( 31 May 2021 19:13 )    Color: Dark Brown / Appearance: Turbid / S.019 / pH: x  Gluc: x / Ketone: Negative  / Bili: Negative / Urobili: <2 mg/dL   Blood: x / Protein: 300 mg/dL / Nitrite: Negative   Leuk Esterase: Large / RBC: 5 /HPF / WBC >10 /HPF   Sq Epi: x / Non Sq Epi: 4 /HPF / Bacteria: Many

## 2021-06-02 LAB
ALBUMIN SERPL ELPH-MCNC: 3.9 G/DL — SIGNIFICANT CHANGE UP (ref 3.3–5)
ALP SERPL-CCNC: 69 U/L — SIGNIFICANT CHANGE UP (ref 40–120)
ALT FLD-CCNC: 16 U/L — SIGNIFICANT CHANGE UP (ref 4–33)
ANION GAP SERPL CALC-SCNC: 28 MMOL/L — HIGH (ref 7–14)
AST SERPL-CCNC: 36 U/L — HIGH (ref 4–32)
BASOPHILS # BLD AUTO: 0.02 K/UL — SIGNIFICANT CHANGE UP (ref 0–0.2)
BASOPHILS NFR BLD AUTO: 0.1 % — SIGNIFICANT CHANGE UP (ref 0–2)
BILIRUB SERPL-MCNC: 0.4 MG/DL — SIGNIFICANT CHANGE UP (ref 0.2–1.2)
BUN SERPL-MCNC: 47 MG/DL — HIGH (ref 7–23)
CALCIUM SERPL-MCNC: 9 MG/DL — SIGNIFICANT CHANGE UP (ref 8.4–10.5)
CHLORIDE SERPL-SCNC: 93 MMOL/L — LOW (ref 98–107)
CO2 SERPL-SCNC: 19 MMOL/L — LOW (ref 22–31)
CREAT SERPL-MCNC: 7.85 MG/DL — HIGH (ref 0.5–1.3)
EOSINOPHIL # BLD AUTO: 0 K/UL — SIGNIFICANT CHANGE UP (ref 0–0.5)
EOSINOPHIL NFR BLD AUTO: 0 % — SIGNIFICANT CHANGE UP (ref 0–6)
GLUCOSE SERPL-MCNC: 144 MG/DL — HIGH (ref 70–99)
HCT VFR BLD CALC: 32.3 % — LOW (ref 34.5–45)
HGB BLD-MCNC: 9.7 G/DL — LOW (ref 11.5–15.5)
IANC: 14.9 K/UL — HIGH (ref 1.5–8.5)
IMM GRANULOCYTES NFR BLD AUTO: 0.9 % — SIGNIFICANT CHANGE UP (ref 0–1.5)
LYMPHOCYTES # BLD AUTO: 0.38 K/UL — LOW (ref 1–3.3)
LYMPHOCYTES # BLD AUTO: 2.3 % — LOW (ref 13–44)
MAGNESIUM SERPL-MCNC: 2.3 MG/DL — SIGNIFICANT CHANGE UP (ref 1.6–2.6)
MCHC RBC-ENTMCNC: 24.6 PG — LOW (ref 27–34)
MCHC RBC-ENTMCNC: 30 GM/DL — LOW (ref 32–36)
MCV RBC AUTO: 81.8 FL — SIGNIFICANT CHANGE UP (ref 80–100)
MONOCYTES # BLD AUTO: 0.87 K/UL — SIGNIFICANT CHANGE UP (ref 0–0.9)
MONOCYTES NFR BLD AUTO: 5.3 % — SIGNIFICANT CHANGE UP (ref 2–14)
MRSA PCR RESULT.: SIGNIFICANT CHANGE UP
NEUTROPHILS # BLD AUTO: 14.9 K/UL — HIGH (ref 1.8–7.4)
NEUTROPHILS NFR BLD AUTO: 91.4 % — HIGH (ref 43–77)
NRBC # BLD: 0 /100 WBCS — SIGNIFICANT CHANGE UP
NRBC # FLD: 0.12 K/UL — HIGH
PHOSPHATE SERPL-MCNC: 5.4 MG/DL — HIGH (ref 2.5–4.5)
PLATELET # BLD AUTO: 241 K/UL — SIGNIFICANT CHANGE UP (ref 150–400)
POTASSIUM SERPL-MCNC: 3.8 MMOL/L — SIGNIFICANT CHANGE UP (ref 3.5–5.3)
POTASSIUM SERPL-SCNC: 3.8 MMOL/L — SIGNIFICANT CHANGE UP (ref 3.5–5.3)
PROCALCITONIN SERPL-MCNC: 0.87 NG/ML — HIGH (ref 0.02–0.1)
PROT SERPL-MCNC: 7.6 G/DL — SIGNIFICANT CHANGE UP (ref 6–8.3)
RBC # BLD: 3.95 M/UL — SIGNIFICANT CHANGE UP (ref 3.8–5.2)
RBC # FLD: 16.8 % — HIGH (ref 10.3–14.5)
S AUREUS DNA NOSE QL NAA+PROBE: SIGNIFICANT CHANGE UP
SODIUM SERPL-SCNC: 140 MMOL/L — SIGNIFICANT CHANGE UP (ref 135–145)
WBC # BLD: 16.32 K/UL — HIGH (ref 3.8–10.5)
WBC # FLD AUTO: 16.32 K/UL — HIGH (ref 3.8–10.5)

## 2021-06-02 PROCEDURE — 70450 CT HEAD/BRAIN W/O DYE: CPT | Mod: 26

## 2021-06-02 PROCEDURE — 71045 X-RAY EXAM CHEST 1 VIEW: CPT | Mod: 26

## 2021-06-02 PROCEDURE — 95813 EEG EXTND MNTR 61-119 MIN: CPT | Mod: 26

## 2021-06-02 RX ORDER — HALOPERIDOL DECANOATE 100 MG/ML
2 INJECTION INTRAMUSCULAR ONCE
Refills: 0 | Status: COMPLETED | OUTPATIENT
Start: 2021-06-02 | End: 2021-06-02

## 2021-06-02 RX ORDER — ASPIRIN/CALCIUM CARB/MAGNESIUM 324 MG
81 TABLET ORAL DAILY
Refills: 0 | Status: DISCONTINUED | OUTPATIENT
Start: 2021-06-02 | End: 2021-06-08

## 2021-06-02 RX ADMIN — INSULIN GLARGINE 10 UNIT(S): 100 INJECTION, SOLUTION SUBCUTANEOUS at 22:51

## 2021-06-02 RX ADMIN — Medication 1 MILLIGRAM(S): at 08:44

## 2021-06-02 RX ADMIN — Medication 2.5 MILLIGRAM(S): at 05:41

## 2021-06-02 RX ADMIN — CEFTRIAXONE 100 MILLIGRAM(S): 500 INJECTION, POWDER, FOR SOLUTION INTRAMUSCULAR; INTRAVENOUS at 02:15

## 2021-06-02 RX ADMIN — Medication 26.25 MILLIGRAM(S): at 02:40

## 2021-06-02 RX ADMIN — SODIUM CHLORIDE 40 MILLILITER(S): 9 INJECTION, SOLUTION INTRAVENOUS at 18:25

## 2021-06-02 RX ADMIN — Medication 26.25 MILLIGRAM(S): at 21:32

## 2021-06-02 RX ADMIN — Medication 2.5 MILLIGRAM(S): at 18:24

## 2021-06-02 RX ADMIN — Medication 1: at 05:41

## 2021-06-02 RX ADMIN — CHLORHEXIDINE GLUCONATE 1 APPLICATION(S): 213 SOLUTION TOPICAL at 05:42

## 2021-06-02 RX ADMIN — HALOPERIDOL DECANOATE 2 MILLIGRAM(S): 100 INJECTION INTRAMUSCULAR at 08:44

## 2021-06-02 NOTE — PROGRESS NOTE ADULT - ASSESSMENT
Patient is a 80 Y/O Female with PMHx of CVA recent ischemic stroke, HTN, DM, A fib on Eliquis, ESRD on HD (MWF) brought to ED after malfunctioning AVF for HD today. unable to do HD. was transferred to the hospital. patient at baseline is mildly confused and unable to give history. denies of any discomfort. 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  Pt afebrile.  WBC 14 --> 13.  UA large LE, mod blood, WBC >10.  Covid pcr (-).  Pt for eventual permacath for dialysis access.  ID consult called for elevated WBC.      Leukocytosis:    - WBC elevated on admission.  Pt otherwise afebrile.  Pending eventual permacath.  For temporary Shiley.    - UA (+) LE, mod bld and WBCs.  f/u Ucx - growing Enterobacter.  Pt started on rocephin empirically.   bcx x 2 ngtd.      - Check chest xray - clear.    - Monitor WBC, and temp curve    Acute CVA:    - CT head with acute cva.  On eliquis and ASA.  Pending repeat CT head and EEG.  Neuro following.     Will follow,    Nahomy Ramsey  [phone]

## 2021-06-02 NOTE — CHART NOTE - NSCHARTNOTEFT_GEN_A_CORE
Spoke to patient's sister and son (HCP) at bedside - In setting of new CVA and recent prolonged hospitalization potential to focus on comfort - Palliative to see in AM and call Julius for GOC - Would need to discuss stopping HD if comfort/Hospice is route - Plan discussed with Dr. Sutton

## 2021-06-02 NOTE — CONSULT NOTE ADULT - ASSESSMENT
82 Y/O Female with PMHx of CVA recent ischemic stroke, HTN, DM, A fib on Eliquis, ESRD on HD (MWF) brought to ED after malfunctioning AVF for HD today and worsening AMS    EKG: None in chart    1. AMS  -CT head with acute MCA infarct  -echo from previous admission with normal LV function. Agitated saline injection is inconclusive regarding the  presence of a patent foramen ovale.  -f/u neuro     2. Afib  -on eliquis  -c/w metoprolol    3. HTN  -controlled  -c/w hydralazine and metoprolol  -continue to monitor BP    4. ESRD  -on HD  -f/u renal

## 2021-06-02 NOTE — PROGRESS NOTE ADULT - SUBJECTIVE AND OBJECTIVE BOX
[facility] [place] [facility]   MD JAJA SMITH DO ANAM SIDDIQUI ANGELA WONG, PA    TEL:  OFFICE: [phone]  DR CASTILLO CELL: [phone]  DR. HARRISON CELL: [phone]  DR. RILEY CELL: [phone]  LIZZY DHILLON CELL: [phone]    From 5pm-7am Answering Service [phone]    -- RENAL FOLLOW UP NOTE ---Date of Service 06-02-21 @ 11:39    Patient is a 81y old  Female who presents with a chief complaint of AVF malfunction (02 Jun 2021 08:39)      Patient seen and examined at bedside.    VITALS:  T(F): 98.5 (06-02-21 @ 05:31), Max: 98.5 (06-02-21 @ 05:31)  HR: 108 (06-02-21 @ 05:31)  BP: 137/59 (06-02-21 @ 05:31)  RR: 20 (06-02-21 @ 05:31)  SpO2: 100% (06-02-21 @ 05:31)  Wt(kg): --    06-01 @ 07:01  -  06-02 @ 07:00  --------------------------------------------------------  IN: 900 mL / OUT: 137 mL / NET: 763 mL          PHYSICAL EXAM:  Constitutional: lethargic. responding to painful stimuli  Neck: No JVD  Respiratory: CTAB, no wheezes, rales or rhonchi  Cardiovascular: S1, S2, RRR  Gastrointestinal: BS+, soft, NT/ND  Extremities: No peripheral edema    Hospital Medications:   MEDICATIONS  (STANDING):  apixaban 2.5 milliGRAM(s) Oral every 12 hours  aspirin enteric coated 81 milliGRAM(s) Oral daily  atorvastatin 80 milliGRAM(s) Oral at bedtime  calcium acetate 667 milliGRAM(s) Oral three times a day with meals  cefTRIAXone   IVPB 1000 milliGRAM(s) IV Intermittent every 24 hours  chlorhexidine 4% Liquid 1 Application(s) Topical <User Schedule>  dextrose 40% Gel 15 Gram(s) Oral once  dextrose 5% + sodium chloride 0.45%. 1000 milliLiter(s) (40 mL/Hr) IV Continuous <Continuous>  dextrose 5%. 1000 milliLiter(s) (100 mL/Hr) IV Continuous <Continuous>  dextrose 5%. 1000 milliLiter(s) (50 mL/Hr) IV Continuous <Continuous>  dextrose 50% Injectable 25 Gram(s) IV Push once  dextrose 50% Injectable 12.5 Gram(s) IV Push once  dextrose 50% Injectable 25 Gram(s) IV Push once  donepezil 10 milliGRAM(s) Oral at bedtime  glucagon  Injectable 1 milliGRAM(s) IntraMuscular once  hydrALAZINE 25 milliGRAM(s) Oral two times a day  insulin glargine Injectable (LANTUS) 10 Unit(s) SubCutaneous at bedtime  insulin lispro (ADMELOG) corrective regimen sliding scale   SubCutaneous every 6 hours  metoprolol tartrate Injectable 2.5 milliGRAM(s) IV Push every 6 hours  pantoprazole    Tablet 40 milliGRAM(s) Oral before breakfast  QUEtiapine 25 milliGRAM(s) Oral daily  valproate sodium IVPB 250 milliGRAM(s) IV Intermittent three times a day      LABS:  06-02    140  |  93<L>  |  47<H>  ----------------------------<  144<H>  3.8   |  19<L>  |  7.85<H>    Ca    9.0      02 Jun 2021 02:13  Phos  5.4     06-02  Mg     2.3     06-02    TPro  7.6  /  Alb  3.9  /  TBili  0.4  /  DBili      /  AST  36<H>  /  ALT  16  /  AlkPhos  69  06-02    Creatinine Trend: 7.85 <--, 13.22 <--, 11.85 <--    Albumin, Serum: 3.9 g/dL (06-02 @ 02:13)  Phosphorus Level, Serum: 5.4 mg/dL (06-02 @ 02:13)                              9.7    16.32 )-----------( 241      ( 02 Jun 2021 02:13 )             32.3     Urine Studies:  Urinalysis - [05-31-21 @ 19:13]      Color Dark Brown / Appearance Turbid / SG 1.019 / pH 7.5      Gluc Negative / Ketone Negative  / Bili Negative / Urobili <2 mg/dL       Blood Moderate / Protein 300 mg/dL / Leuk Est Large / Nitrite Negative      RBC 5 / WBC >10 / Hyaline  / Gran  / Sq Epi  / Non Sq Epi 4 / Bacteria Many      PTH -- (Ca --)      [05-09-21 @ 06:54]   421  HbA1c 5.8      [12-10-19 @ 06:41]  TSH 3.72      [05-14-21 @ 07:12]  Lipid: chol 219, , HDL 65, LDL --      [05-09-21 @ 06:54]    HBsAb <3.0      [05-11-21 @ 00:01]  HBsAg Nonreact      [05-10-21 @ 09:26]  HBcAb Nonreact      [05-10-21 @ 09:26]  HCV 0.17, Nonreact      [05-10-21 @ 09:26]    Syphilis Screen (Treponema Pallidum Ab) Negative      [05-15-21 @ 10:20]    RADIOLOGY & ADDITIONAL STUDIES:

## 2021-06-02 NOTE — CONSULT NOTE ADULT - SUBJECTIVE AND OBJECTIVE BOX
Neurology Consult    Reason for Consult: Patient is a 81y old  Female who presents with a chief complaint of AVF malfunction. AMS       HPI:  82 yo RH AA F with multiple old stroke R AICA and R parietal (recent), B/L BG and thalami, afib on eliquis, HTN, DM, ESRD on HD p/w malfunctioning AVF for HD. confused.  last admission, A1c 7.1, . MRI brain R parietal infarct, CTA with mild to mod atherosclerotic disease noted. LE doppler neg for DVT. Etiology for stroke Embolic from Afib. called for AMS. RRT last night for hypoxia to 80s improved on NRB.  + leukocytosis CXR neg.         PAST MEDICAL & SURGICAL HISTORY:  DM (diabetes mellitus)  TYpe II    HTN (hypertension)    Hypercholesteremia    CKD (chronic kidney disease)  now on HD via R forearm AVF    Anemia    Atrial fibrillation  On Eliquis    Cerebrovascular accident (CVA)    Status post right foot surgery  hammer toe repair    AV fistula  2018, 2 ballooning (last one 2 weeks ago), following up on Dec 15th    H/O colonoscopy with polypectomy        Allergies: Allergies    No Known Allergies    Intolerances        Social History: Denies toxic habits including tobacco, ETOH or illicit drugs.    Family History: FAMILY HISTORY:  Family history of hypertension (Sibling)    Family history of diabetes mellitus    Family history of lung cancer (Sibling)    Family history of malignant neoplasm of gastrointestinal tract    . No family history of strokes    Medications: MEDICATIONS  (STANDING):  apixaban 2.5 milliGRAM(s) Oral every 12 hours  aspirin enteric coated 81 milliGRAM(s) Oral daily  atorvastatin 80 milliGRAM(s) Oral at bedtime  calcium acetate 667 milliGRAM(s) Oral three times a day with meals  cefTRIAXone   IVPB 1000 milliGRAM(s) IV Intermittent every 24 hours  chlorhexidine 4% Liquid 1 Application(s) Topical <User Schedule>  dextrose 40% Gel 15 Gram(s) Oral once  dextrose 5% + sodium chloride 0.45%. 1000 milliLiter(s) (40 mL/Hr) IV Continuous <Continuous>  dextrose 5%. 1000 milliLiter(s) (100 mL/Hr) IV Continuous <Continuous>  dextrose 5%. 1000 milliLiter(s) (50 mL/Hr) IV Continuous <Continuous>  dextrose 50% Injectable 25 Gram(s) IV Push once  dextrose 50% Injectable 12.5 Gram(s) IV Push once  dextrose 50% Injectable 25 Gram(s) IV Push once  donepezil 10 milliGRAM(s) Oral at bedtime  glucagon  Injectable 1 milliGRAM(s) IntraMuscular once  hydrALAZINE 25 milliGRAM(s) Oral two times a day  insulin glargine Injectable (LANTUS) 10 Unit(s) SubCutaneous at bedtime  insulin lispro (ADMELOG) corrective regimen sliding scale   SubCutaneous every 6 hours  metoprolol tartrate Injectable 2.5 milliGRAM(s) IV Push every 6 hours  pantoprazole    Tablet 40 milliGRAM(s) Oral before breakfast  QUEtiapine 25 milliGRAM(s) Oral daily  valproate sodium IVPB 250 milliGRAM(s) IV Intermittent three times a day    MEDICATIONS  (PRN):  sodium chloride 0.9% lock flush 10 milliLiter(s) IV Push every 1 hour PRN Pre/post blood products, medications, blood draw, and to maintain line patency      Review of Systems:  unable given mental status and confusion       Vitals:  Vital Signs Last 24 Hrs  T(C): 36.9 (2021 05:31), Max: 36.9 (2021 05:31)  T(F): 98.5 (2021 05:31), Max: 98.5 (2021 05:31)  HR: 108 (2021 05:31) (99 - 108)  BP: 137/59 (2021 05:31) (117/56 - 150/81)  BP(mean): --  RR: 20 (2021 05:31) (16 - 27)  SpO2: 100% (2021 05:31) (85% - 100%)    General Exam:   General Appearance: Appropriately dressed and in no acute distress       Head: Normocephalic, atraumatic and no dysmorphic features  Ear, Nose, and Throat: Moist mucous membranes  CVS: S1S2+  Resp: No SOB, no wheeze or rhonchi  GI: soft NT/ND  Extremities: No edema or cyanosis  Skin: No bruises or rashes     Neurological Exam:  General Exam:   General Appearance: Appropriately dressed and in no acute distress       Head: Normocephalic, atraumatic and no dysmorphic features  Ear, Nose, and Throat: Moist mucous membranes  CVS: S1S2+  Resp: No SOB, no wheeze or rhonchi  Abd: soft NTND  Extremities: No edema, no cyanosis  Skin: No bruises, no rashes     Neurological Exam:  MS: Awake, alert, oriented to person, "hospital", stated it was 1981. Normal affect. Follows most commands. Moderate level of distractibility and some poor attention.     Language: Speech is clear, fluent with good repetition & comprehension.    CNs: Shutting eyes unable to assess pupillary reactivity. VFF intact to blink to threat. EOMI no nystagmus. V1-3 intact to LT. No facial asymmetry b/l, full eye closure strength b/l. Symmetric palate elevation in midline. Shoulder shrug intact b/l. Tongue midline, normal movements, no atrophy.    Motor: Normal muscle bulk & tone. No noticeable tremor or seizure. Slight L pronator drift.              Deltoid	Biceps	Triceps	   R	4	5	5	5	 	  L	4	5	5	5    	H-Flex	H-Ext	K-Flex	K-Ext	D-Flex	P-Flex  R	4	4	5	5	5	5	 	   L	4	4	5	5	5	5	     Sensation: Intact to LT b/l throughout.     Reflexes:              Biceps(C5)       BR(C6)     Triceps(C7)               Patellar(L4)    Achilles(S1)    Plantar Resp  R	1	          1	             1		        1		    1		Down   L	1	          1	             1		        1		    1		Down     Coordination: No dysmetria to FTN    Gait: Deferred      Data/Labs/Imaging which I personally reviewed.     Labs:     CBC Full  -  ( 2021 02:13 )  WBC Count : 16.32 K/uL  RBC Count : 3.95 M/uL  Hemoglobin : 9.7 g/dL  Hematocrit : 32.3 %  Platelet Count - Automated : 241 K/uL  Mean Cell Volume : 81.8 fL  Mean Cell Hemoglobin : 24.6 pg  Mean Cell Hemoglobin Concentration : 30.0 gm/dL  Auto Neutrophil # : 14.90 K/uL  Auto Lymphocyte # : 0.38 K/uL  Auto Monocyte # : 0.87 K/uL  Auto Eosinophil # : 0.00 K/uL  Auto Basophil # : 0.02 K/uL  Auto Neutrophil % : 91.4 %  Auto Lymphocyte % : 2.3 %  Auto Monocyte % : 5.3 %  Auto Eosinophil % : 0.0 %  Auto Basophil % : 0.1 %    06-02    140  |  93<L>  |  47<H>  ----------------------------<  144<H>  3.8   |  19<L>  |  7.85<H>    Ca    9.0      2021 02:13  Phos  5.4     06-02  Mg     2.3     06-02    TPro  7.6  /  Alb  3.9  /  TBili  0.4  /  DBili  x   /  AST  36<H>  /  ALT  16  /  AlkPhos  69  06-02    LIVER FUNCTIONS - ( 2021 02:13 )  Alb: 3.9 g/dL / Pro: 7.6 g/dL / ALK PHOS: 69 U/L / ALT: 16 U/L / AST: 36 U/L / GGT: x           PT/INR - ( 2021 06:55 )   PT: 15.2 sec;   INR: 1.34 ratio         PTT - ( 2021 06:55 )  PTT:29.7 sec  Urinalysis Basic - ( 31 May 2021 19:13 )    Color: Dark Brown / Appearance: Turbid / S.019 / pH: x  Gluc: x / Ketone: Negative  / Bili: Negative / Urobili: <2 mg/dL   Blood: x / Protein: 300 mg/dL / Nitrite: Negative   Leuk Esterase: Large / RBC: 5 /HPF / WBC >10 /HPF   Sq Epi: x / Non Sq Epi: 4 /HPF / Bacteria: Many          < from: CT Head No Cont (21 @ 12:32) >  IMPRESSION:    Region of decreased attenuation in the right parietal lobe with associated sulcal effacement suspicious for acute infarction.    No CT evidence for reva hemorrhagic transformation.    < end of copied text >    < from: CT Head No Cont (21 @ 17:30) >    EXAM:  CT BRAIN        PROCEDURE DATE:  May  9 2021         INTERPRETATION:  .    CLINICAL INFORMATION: Hx CVA , with poss infarct yesterday on CT.    TECHNIQUE: Multiple axial CT images of the head were obtained without contrast. Sagittal and coronal reconstructed images were acquired from the source data.    COMPARISON: Prior CT study of the head from 2021. Prior brain MRI study from 2019.    FINDINGS: An evolving acute/subacute infarct is again noted within the right parietal temporal lobes. There is no gross hemorrhagic transformation.    Additional wedge-shaped chronic infarct is again noted within the right cerebellar hemisphere.    Additional chronic lacunar infarcts are noted within the left basal ganglia.    There is no acute intracranial hemorrhage, shift of the midline structures, herniation, or hydrocephalus.    There is diffuse cerebral volume loss with prominence of the sulci, fissures, and cisternal spaces which is normal for the patient's age. 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    The paranasal sinuses and mastoid air cells are clear. The calvarium is intact. There is evidence of bilateral cataract removal.    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    Additional chronic infarcts and similar-appearing moderate severity chronic white matter microvascular type changes, as discussed.        DARA VILLELA MD; Attending Radiologist  This document has been electronically signed. May  9 2021  5:42PM    < end of copied text >    < from: MR Head No Cont (05.10.21 @ 21:38) >    EXAM:  MR BRAIN        PROCEDURE DATE:  May 10 2021         INTERPRETATION:  Clinical indication: Follow-up infarct.    MRI of the brain was performed using sagittal T1, axial T1 T2 T2 FLAIR diffusion and susceptibility weighted sequence.    This exam is compared with prior brain MRI performed on 2019 an prior head CT performed on May 9, 2021    This exam is of limited quality due to motion.    Extensive parenchymal volume loss is seen.    Abnormal T2 prolongation with restricted diffusion is seen involving the right parietal cortical subcortical region. This is compatible with an acute infarct. No hemorrhagic transformation is seen this time. There is localized mass effect seen consisting of sulcal effacement.    The visualized paranasal sinuses mastoid and middle ear regions appear clear.    IMPRESSION: This exam is somewhat limited diagnostic quality due to motion.    Acute infarct as described above.              MARVIN BARRAZA M.D., ATTENDING RADIOLOGIST  This document hasbeen electronically signed. May 11 2021  8:20AM    < end of copied text >  `< from: CT Angio Neck w/ IV Cont (21 @ 10:54) >    EXAM:  CT ANGIO BRAIN (W)AW IC      EXAM:  CT ANGIO NECK (W)AW IC        PROCEDURE DATE:  May 11 2021         INTERPRETATION:  Clinical indication: Acute infarct seen on brain MRI.    Following injection of approximately 90 cc of Omnipaque 350 IV CT a of the neck and [place] were performed. Coronal and sagittal reconstructions were performed as well.    Abnormal low-attenuation involving the right cerebellar region, right parietal and right basal ganglia region are again seen is well as old lacunar infarcts involving left basal ganglia region.    Calcification is seen involving both proximal internal carotid artery regions.    Evaluation of the distal right common carotid artery region appears normal. Mild narrowing of proximal right internal carotid artery is seen. Evaluation of the proximal right external carotid arteries appear normal. Evaluation of the distal left common carotid artery appears normal. Evaluation of the proximal left internal carotid artery demonstrate mild narrowing. Evaluation of the proximal left external carotid artery appears normal. A normal dominant appearing right vertebral artery is seen. 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 Short segment of stenosis versus hypoplasia is again seen involving the distalmost aspect the left vertebral artery.    Evaluation of both distal internal carotid arteries demonstrates calcified plaques. Mild narrowing of both distal internal carotid artery seen. Hypoplastic right A1 segment is seen. Irregularity seen involving both A2 segments is now seen which could be compatible atherosclerotic change. Evaluation of both middle cerebral arteries appear normal. Both posterior cerebral arteries demonstrate decreased flow related signal distally which could be compatible areas of of stenosis. There is evidence of mild stenosis seen involving the distal aspect of the basilar artery which is new when compared with the prior exam.    IMPRESSION: Mild narrowing of both proximal internal carotid arteries.    Short segment of narrowing involving the distal left vertebral artery is again seen and unchanged.    Irregularity is seen involving both A2 segments which is new when compared prior exam. This could be compatible atherosclerotic change. Mild narrowing of the distal basilar artery is seen which is compatible with mild stenosis. This is new when compared with the prior exam.    Areas of stenosis is suspected involving both distal posterior cerebral arteries.              MARVIN BARRAZA M.D., ATTENDING RADIOLOGIST  This document has been electronically signed. May 11 2021 11:30AM    < end of copied text >  `   Neurology Consult    Reason for Consult: Patient is a 81y old  Female who presents with a chief complaint of AVF malfunction. AMS       HPI:  80 yo RH AA F with multiple old stroke R AICA and R parietal (recent), B/L BG and thalami, afib on eliquis, HTN, DM, ESRD on HD p/w malfunctioning AVF for HD. confused.  last admission, A1c 7.1, . MRI brain R parietal infarct, CTA with mild to mod atherosclerotic disease noted. LE doppler neg for DVT. Etiology for stroke Embolic from Afib. called for AMS. RRT last night for hypoxia to 80s improved on NRB.  + leukocytosis CXR neg.         PAST MEDICAL & SURGICAL HISTORY:  DM (diabetes mellitus)  TYpe II    HTN (hypertension)    Hypercholesteremia    CKD (chronic kidney disease)  now on HD via R forearm AVF    Anemia    Atrial fibrillation  On Eliquis    Cerebrovascular accident (CVA)    Status post right foot surgery  hammer toe repair    AV fistula  2018, 2 ballooning (last one 2 weeks ago), following up on Dec 15th    H/O colonoscopy with polypectomy        Allergies: Allergies    No Known Allergies    Intolerances        Social History: Denies toxic habits including tobacco, ETOH or illicit drugs.    Family History: FAMILY HISTORY:  Family history of hypertension (Sibling)    Family history of diabetes mellitus    Family history of lung cancer (Sibling)    Family history of malignant neoplasm of gastrointestinal tract    . No family history of strokes    Medications: MEDICATIONS  (STANDING):  apixaban 2.5 milliGRAM(s) Oral every 12 hours  aspirin enteric coated 81 milliGRAM(s) Oral daily  atorvastatin 80 milliGRAM(s) Oral at bedtime  calcium acetate 667 milliGRAM(s) Oral three times a day with meals  cefTRIAXone   IVPB 1000 milliGRAM(s) IV Intermittent every 24 hours  chlorhexidine 4% Liquid 1 Application(s) Topical <User Schedule>  dextrose 40% Gel 15 Gram(s) Oral once  dextrose 5% + sodium chloride 0.45%. 1000 milliLiter(s) (40 mL/Hr) IV Continuous <Continuous>  dextrose 5%. 1000 milliLiter(s) (100 mL/Hr) IV Continuous <Continuous>  dextrose 5%. 1000 milliLiter(s) (50 mL/Hr) IV Continuous <Continuous>  dextrose 50% Injectable 25 Gram(s) IV Push once  dextrose 50% Injectable 12.5 Gram(s) IV Push once  dextrose 50% Injectable 25 Gram(s) IV Push once  donepezil 10 milliGRAM(s) Oral at bedtime  glucagon  Injectable 1 milliGRAM(s) IntraMuscular once  hydrALAZINE 25 milliGRAM(s) Oral two times a day  insulin glargine Injectable (LANTUS) 10 Unit(s) SubCutaneous at bedtime  insulin lispro (ADMELOG) corrective regimen sliding scale   SubCutaneous every 6 hours  metoprolol tartrate Injectable 2.5 milliGRAM(s) IV Push every 6 hours  pantoprazole    Tablet 40 milliGRAM(s) Oral before breakfast  QUEtiapine 25 milliGRAM(s) Oral daily  valproate sodium IVPB 250 milliGRAM(s) IV Intermittent three times a day    MEDICATIONS  (PRN):  sodium chloride 0.9% lock flush 10 milliLiter(s) IV Push every 1 hour PRN Pre/post blood products, medications, blood draw, and to maintain line patency      Review of Systems:  unable given mental status and confusion       Vitals:  Vital Signs Last 24 Hrs  T(C): 36.9 (2021 05:31), Max: 36.9 (2021 05:31)  T(F): 98.5 (2021 05:31), Max: 98.5 (2021 05:31)  HR: 108 (2021 05:31) (99 - 108)  BP: 137/59 (2021 05:31) (117/56 - 150/81)  BP(mean): --  RR: 20 (2021 05:31) (16 - 27)  SpO2: 100% (2021 05:31) (85% - 100%)    General Exam:   General Appearance: Appropriately dressed and in no acute distress       Head: Normocephalic, atraumatic and no dysmorphic features  Ear, Nose, and Throat: Moist mucous membranes  CVS: S1S2+  Resp: No SOB, no wheeze or rhonchi  GI: soft NT/ND  Extremities: No edema or cyanosis  Skin: No bruises or rashes     Neurological Exam:  General Exam:   General Appearance: Appropriately dressed and in no acute distress       Head: Normocephalic, atraumatic and no dysmorphic features  Ear, Nose, and Throat: Moist mucous membranes  CVS: S1S2+  Resp: No SOB, no wheeze or rhonchi  Abd: soft NTND  Extremities: No edema, no cyanosis  Skin: No bruises, no rashes     Neurological Exam:  MS: Awake, alert, oriented to person, "hospital", stated it was 1981. Normal affect. Follows most commands. Moderate level of distractibility and some poor attention.     Language: Speech is clear, fluent with good repetition & comprehension.    CNs: Shutting eyes unable to assess pupillary reactivity. VFF intact to blink to threat. EOMI no nystagmus. V1-3 intact to LT. No facial asymmetry b/l, full eye closure strength b/l. Symmetric palate elevation in midline. Shoulder shrug intact b/l. Tongue midline, normal movements, no atrophy.    Motor: moving L >R but poor effort throughout     Sensation: Intact to LT b/l throughout.     Reflexes:              Biceps(C5)       BR(C6)     Triceps(C7)               Patellar(L4)    Achilles(S1)    Plantar Resp  R	1	          1	             1		        1		    1		Down   L	1	          1	             1		        1		    1		Down     Coordination: No dysmetria to FTN    Gait: Deferred      Data/Labs/Imaging which I personally reviewed.     Labs:     CBC Full  -  ( 2021 02:13 )  WBC Count : 16.32 K/uL  RBC Count : 3.95 M/uL  Hemoglobin : 9.7 g/dL  Hematocrit : 32.3 %  Platelet Count - Automated : 241 K/uL  Mean Cell Volume : 81.8 fL  Mean Cell Hemoglobin : 24.6 pg  Mean Cell Hemoglobin Concentration : 30.0 gm/dL  Auto Neutrophil # : 14.90 K/uL  Auto Lymphocyte # : 0.38 K/uL  Auto Monocyte # : 0.87 K/uL  Auto Eosinophil # : 0.00 K/uL  Auto Basophil # : 0.02 K/uL  Auto Neutrophil % : 91.4 %  Auto Lymphocyte % : 2.3 %  Auto Monocyte % : 5.3 %  Auto Eosinophil % : 0.0 %  Auto Basophil % : 0.1 %    06-02    140  |  93<L>  |  47<H>  ----------------------------<  144<H>  3.8   |  19<L>  |  7.85<H>    Ca    9.0      2021 02:13  Phos  5.4     06-02  Mg     2.3     06-02    TPro  7.6  /  Alb  3.9  /  TBili  0.4  /  DBili  x   /  AST  36<H>  /  ALT  16  /  AlkPhos  69  06-02    LIVER FUNCTIONS - ( 2021 02:13 )  Alb: 3.9 g/dL / Pro: 7.6 g/dL / ALK PHOS: 69 U/L / ALT: 16 U/L / AST: 36 U/L / GGT: x           PT/INR - ( 2021 06:55 )   PT: 15.2 sec;   INR: 1.34 ratio         PTT - ( 2021 06:55 )  PTT:29.7 sec  Urinalysis Basic - ( 31 May 2021 19:13 )    Color: Dark Brown / Appearance: Turbid / S.019 / pH: x  Gluc: x / Ketone: Negative  / Bili: Negative / Urobili: <2 mg/dL   Blood: x / Protein: 300 mg/dL / Nitrite: Negative   Leuk Esterase: Large / RBC: 5 /HPF / WBC >10 /HPF   Sq Epi: x / Non Sq Epi: 4 /HPF / Bacteria: Many          < from: CT Head No Cont (21 @ 12:32) >  IMPRESSION:    Region of decreased attenuation in the right parietal lobe with associated sulcal effacement suspicious for acute infarction.    No CT evidence for reva hemorrhagic transformation.    < end of copied text >    < from: CT Head No Cont (21 @ 17:30) >    EXAM:  CT BRAIN        PROCEDURE DATE:  May  9 2021         INTERPRETATION:  .    CLINICAL INFORMATION: Hx CVA , with poss infarct yesterday on CT.    TECHNIQUE: Multiple axial CT images of the head were obtained without contrast. Sagittal and coronal reconstructed images were acquired from the source data.    COMPARISON: Prior CT study of the head from 2021. Prior brain MRI study from 2019.    FINDINGS: An evolving acute/subacute infarct is again noted within the right parietal temporal lobes. There is no gross hemorrhagic transformation.    Additional wedge-shaped chronic infarct is again noted within the right cerebellar hemisphere.    Additional chronic lacunar infarcts are noted within the left basal ganglia.    There is no acute intracranial hemorrhage, shift of the midline structures, herniation, or hydrocephalus.    There is diffuse cerebral volume loss with prominence of the sulci, fissures, and cisternal spaces which is normal for the patient's age. 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    The paranasal sinuses and mastoid air cells are clear. The calvarium is intact. There is evidence of bilateral cataract removal.    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    Additional chronic infarcts and similar-appearing moderate severity chronic white matter microvascular type changes, as discussed.        DARA VILLELA MD; Attending Radiologist  This document has been electronically signed. May  9 2021  5:42PM    < end of copied text >    < from: MR Head No Cont (05.10.21 @ 21:38) >    EXAM:  MR BRAIN        PROCEDURE DATE:  May 10 2021         INTERPRETATION:  Clinical indication: Follow-up infarct.    MRI of the brain was performed using sagittal T1, axial T1 T2 T2 FLAIR diffusion and susceptibility weighted sequence.    This exam is compared with prior brain MRI performed on 2019 an prior head CT performed on May 9, 2021    This exam is of limited quality due to motion.    Extensive parenchymal volume loss is seen.    Abnormal T2 prolongation with restricted diffusion is seen involving the right parietal cortical subcortical region. This is compatible with an acute infarct. No hemorrhagic transformation is seen this time. There is localized mass effect seen consisting of sulcal effacement.    The visualized paranasal sinuses mastoid and middle ear regions appear clear.    IMPRESSION: This exam is somewhat limited diagnostic quality due to motion.    Acute infarct as described above.              MARVIN BARRAZA M.D., ATTENDING RADIOLOGIST  This document hasbeen electronically signed. May 11 2021  8:20AM    < end of copied text >  `< from: CT Angio Neck w/ IV Cont (21 @ 10:54) >    EXAM:  CT ANGIO BRAIN (W)AW IC      EXAM:  CT ANGIO NECK (W)AW IC        PROCEDURE DATE:  May 11 2021         INTERPRETATION:  Clinical indication: Acute infarct seen on brain MRI.    Following injection of approximately 90 cc of Omnipaque 350 IV CT a of the neck and [place] were performed. Coronal and sagittal reconstructions were performed as well.    Abnormal low-attenuation involving the right cerebellar region, right parietal and right basal ganglia region are again seen is well as old lacunar infarcts involving left basal ganglia region.    Calcification is seen involving both proximal internal carotid artery regions.    Evaluation of the distal right common carotid artery region appears normal. Mild narrowing of proximal right internal carotid artery is seen. Evaluation of the proximal right external carotid arteries appear normal. Evaluation of the distal left common carotid artery appears normal. Evaluation of the proximal left internal carotid artery demonstrate mild narrowing. Evaluation of the proximal left external carotid artery appears normal. A normal dominant appearing right vertebral artery is seen. 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 Short segment of stenosis versus hypoplasia is again seen involving the distalmost aspect the left vertebral artery.    Evaluation of both distal internal carotid arteries demonstrates calcified plaques. Mild narrowing of both distal internal carotid artery seen. Hypoplastic right A1 segment is seen. Irregularity seen involving both A2 segments is now seen which could be compatible atherosclerotic change. Evaluation of both middle cerebral arteries appear normal. Both posterior cerebral arteries demonstrate decreased flow related signal distally which could be compatible areas of of stenosis. There is evidence of mild stenosis seen involving the distal aspect of the basilar artery which is new when compared with the prior exam.    IMPRESSION: Mild narrowing of both proximal internal carotid arteries.    Short segment of narrowing involving the distal left vertebral artery is again seen and unchanged.    Irregularity is seen involving both A2 segments which is new when compared prior exam. This could be compatible atherosclerotic change. Mild narrowing of the distal basilar artery is seen which is compatible with mild stenosis. This is new when compared with the prior exam.    Areas of stenosis is suspected involving both distal posterior cerebral arteries.              MARVIN BARRAZA M.D., ATTENDING RADIOLOGIST  This document has been electronically signed. May 11 2021 11:30AM    < end of copied text >  `

## 2021-06-02 NOTE — PROGRESS NOTE ADULT - SUBJECTIVE AND OBJECTIVE BOX
Name of Patient : VINCENT GROSS  MRN: [id number]  DATE OF SERVICE: 21     Subjective: Patient seen and examined. No new events except as noted.   worsening mental stat   sister at the bedside  CT head showed new stroked     REVIEW OF SYSTEMS:  nonverbal     MEDICATIONS:  MEDICATIONS  (STANDING):  apixaban 2.5 milliGRAM(s) Oral every 12 hours  aspirin enteric coated 81 milliGRAM(s) Oral daily  atorvastatin 80 milliGRAM(s) Oral at bedtime  calcium acetate 667 milliGRAM(s) Oral three times a day with meals  cefTRIAXone   IVPB 1000 milliGRAM(s) IV Intermittent every 24 hours  chlorhexidine 4% Liquid 1 Application(s) Topical <User Schedule>  dextrose 40% Gel 15 Gram(s) Oral once  dextrose 5% + sodium chloride 0.45%. 1000 milliLiter(s) (40 mL/Hr) IV Continuous <Continuous>  dextrose 5%. 1000 milliLiter(s) (100 mL/Hr) IV Continuous <Continuous>  dextrose 5%. 1000 milliLiter(s) (50 mL/Hr) IV Continuous <Continuous>  dextrose 50% Injectable 25 Gram(s) IV Push once  dextrose 50% Injectable 12.5 Gram(s) IV Push once  dextrose 50% Injectable 25 Gram(s) IV Push once  donepezil 10 milliGRAM(s) Oral at bedtime  glucagon  Injectable 1 milliGRAM(s) IntraMuscular once  hydrALAZINE 25 milliGRAM(s) Oral two times a day  insulin glargine Injectable (LANTUS) 10 Unit(s) SubCutaneous at bedtime  insulin lispro (ADMELOG) corrective regimen sliding scale   SubCutaneous every 6 hours  metoprolol tartrate Injectable 2.5 milliGRAM(s) IV Push every 6 hours  pantoprazole    Tablet 40 milliGRAM(s) Oral before breakfast  QUEtiapine 25 milliGRAM(s) Oral daily  valproate sodium IVPB 250 milliGRAM(s) IV Intermittent three times a day      PHYSICAL EXAM:  T(C): 36.7 (21 @ 18:13), Max: 36.9 (21 @ 05:31)  HR: 107 (21 @ 18:13) (92 - 108)  BP: 125/51 (21 @ 18:13) (95/42 - 137/59)  RR: 16 (21 @ 18:13) (16 - 27)  SpO2: 98% (21 @ 18:13) (85% - 100%)  Wt(kg): --  I&O's Summary    2021 07:01  -  2021 07:00  --------------------------------------------------------  IN: 900 mL / OUT: 137 mL / NET: 763 mL          Appearance: lethargic 	  HEENT:  dry OM   Lymphatic: No lymphadenopathy   Cardiovascular: Normal S1 S2  Respiratory: normal effort , clear  Gastrointestinal:  Soft, Non-tender  Skin: No rashes,  warm to touch  Psychiatry:  lethargic   Musculuskeletal: No edema      All labs, Imaging and EKGs personally reviewed       21 @ 07:01  -  21 @ 07:00  --------------------------------------------------------  IN: 900 mL / OUT: 137 mL / NET: 763 mL                            9.7    16.32 )-----------( 241      ( 2021 02:13 )             32.3               06-02    140  |  93<L>  |  47<H>  ----------------------------<  144<H>  3.8   |  19<L>  |  7.85<H>    Ca    9.0      2021 02:13  Phos  5.4     06-02  Mg     2.3     06-02    TPro  7.6  /  Alb  3.9  /  TBili  0.4  /  DBili  x   /  AST  36<H>  /  ALT  16  /  AlkPhos  69  06-02    PT/INR - ( 2021 06:55 )   PT: 15.2 sec;   INR: 1.34 ratio         PTT - ( 2021 06:55 )  PTT:29.7 sec                 ABG - ( 2021 23:54 )  pH, Arterial: 7.57  pH, Blood: x     /  pCO2: 21    /  pO2: 234   / HCO3: 23    / Base Excess: -2.7  /  SaO2: 99.8              Urinalysis Basic - ( 31 May 2021 19:13 )    Color: Dark Brown / Appearance: Turbid / S.019 / pH: x  Gluc: x / Ketone: Negative  / Bili: Negative / Urobili: <2 mg/dL   Blood: x / Protein: 300 mg/dL / Nitrite: Negative   Leuk Esterase: Large / RBC: 5 /HPF / WBC >10 /HPF   Sq Epi: x / Non Sq Epi: 4 /HPF / Bacteria: Many

## 2021-06-02 NOTE — CHART NOTE - NSCHARTNOTEFT_GEN_A_CORE
Rapid response called for hypoxia. Patient placed on NRB with improvement of SpO2. CXR and ABG ordered. Currently afebrile. Patient now on NC and saturating well. Will place patient on  and continue to monitor closely.     ABG - ( 01 Jun 2021 23:54 )  pH, Arterial: 7.57  pH, Blood: x     /  pCO2: 21    /  pO2: 234   / HCO3: 23    / Base Excess: -2.7  /  SaO2: 99.8 Rapid response called for hypoxia. Patient placed on NRB with improvement of SpO2. CXR and ABG ordered. Currently afebrile. Patient now on NC and saturating well. Will place patient on  and continue to monitor closely.     ABG - ( 01 Jun 2021 23:54 )  pH, Arterial: 7.57  pH, Blood: x     /  pCO2: 21    /  pO2: 234   / HCO3: 23    / Base Excess: -2.7  /  SaO2: 99.8                          9.7    16.32 )-----------( 241      ( 02 Jun 2021 02:13 )             32.3       06-02    140  |  93<L>  |  47<H>  ----------------------------<  144<H>  3.8   |  19<L>  |  7.85<H>    Ca    9.0      02 Jun 2021 02:13  Phos  5.4     06-02  Mg     2.3     06-02    TPro  7.6  /  Alb  3.9  /  TBili  0.4  /  DBili  x   /  AST  36<H>  /  ALT  16  /  AlkPhos  69  06-02      Procalcitonin, Serum: 0.87

## 2021-06-02 NOTE — CONSULT NOTE ADULT - ATTENDING COMMENTS
pt seen and examined agree with plan above
malfunctioning AVF  will get duplex  may need fistulogram based on duplex result

## 2021-06-02 NOTE — CONSULT NOTE ADULT - ASSESSMENT
82 yo RH AA F with multiple old stroke R AICA and R parietal (recent), B/L BG and thalami, afib on eliquis, HTN, DM, ESRD on HD p/w malfunctioning AVF for HD. confused.  last admission, A1c 7.1, . MRI brain R parietal infarct, CTA with mild to mod atherosclerotic disease noted. LE doppler neg for DVT. Etiology for stroke Embolic from Afib. called for AMS. RRT last night for hypoxia to 80s improved on NRB.  + leukocytosis CXR neg.   + UA  ETiology of confusion likely toxic metabolic encephalopathy  - ceftriaxone for UTI  - okay for continuation of DOAC, eliquis  2.5mg bid. also on ASA 81  - CTH  - EEG  - r/o infection  - pulmo eval hypoxia   - High dose statin therapy - atorvastatin 40mg PO daily. LDL goal <70mg/dL.  - need better DM and cholesterol control  - telemetry  - PT/OT/SS/SLP, OOBC  - check FS, glucose control <180  - GI/DVT ppx  - Counseling on diet, exercise, and medication adherence was done  - Counseling on smoking cessation and alcohol consumption offered when appropriate.  - Pain assessed and judicious use of narcotics when appropriate was discussed.    - Stroke education given when appropriate.  - Importance of fall prevention discussed.   - Differential diagnosis and plan of care discussed with patient and/or family and primary team  - Thank you for allowing me to participate in the care of this patient. Call with questions.    José Miguel Renee MD  Vascular Neurology  Office: [phone]      80 yo RH AA F with multiple old stroke R AICA and R parietal (recent), B/L BG and thalami, afib on eliquis, HTN, DM, ESRD on HD p/w malfunctioning AVF for HD. confused.  last admission, A1c 7.1, . MRI brain R parietal infarct, CTA with mild to mod atherosclerotic disease noted. LE doppler neg for DVT. Etiology for stroke Embolic from Afib. called for AMS. RRT last night for hypoxia to 80s improved on NRB.  + leukocytosis CXR neg.   + UA  ETiology of confusion likely toxic metabolic encephalopathy  - ceftriaxone for UTI  - okay for continuation of DOAC, eliquis  2.5mg bid. also on ASA 81  - CTH  - EEG  - r/o infection  - pulmo eval hypoxia   - High dose statin therapy - atorvastatin 40mg PO daily. LDL goal <70mg/dL.  - need better DM and cholesterol control  - telemetry  - PT/OT/SS/SLP, OOBC  - check FS, glucose control <180  - GI/DVT ppx  - Counseling on diet, exercise, and medication adherence was done  - Counseling on smoking cessation and alcohol consumption offered when appropriate.  - Pain assessed and judicious use of narcotics when appropriate was discussed.    - Stroke education given when appropriate.  - Importance of fall prevention discussed.   - Differential diagnosis and plan of care discussed with patient and/or family and primary team  - Thank you for allowing me to participate in the care of this patient. Call with questions.    José Miguel Renee MD  Vascular Neurology  Office: [phone]     1014 Addendum CTH with new subacute appearing L MCA infarct noted would hold AC for now, ASA okay, repeat CTH tomorrow to decide on AC.  consider malignancy workup.

## 2021-06-02 NOTE — PROGRESS NOTE ADULT - ASSESSMENT
80F hx htn, dm, A fib on eliquis, prior CVA and esrd MWF  present from nursing home with clotted AVF    ESRD on HD MWF  from nursing home    clotted avf f/u vascular  s/p IR for shiely 6/1 s/p HD 6/1 + 700cc. given fluid sec to hypotension  comfortable right now. hd tmr  monitor electrolyte    AMS  recent CVA  very confused today  ct head noted. f/u neuro    Anemia  Hb at goal for ESRD  recent cva no epo   monitor Hb    HTN  controlled  monitor    ckd-mbd  Hectorol 2mcg with HD  monitor phos and calcium daily

## 2021-06-02 NOTE — PROGRESS NOTE ADULT - PROBLEM SELECTOR PLAN 1
vascular eval appreciated  charles rios for agitation   US  neuro eval appreciated   pee confusion, CT head showed acute ischemic stroke, repeat ct head tomorrow, hodl AC for now   malignancy and lupus W/U. discuses with family regarding GOC  palliative eval

## 2021-06-02 NOTE — CONSULT NOTE ADULT - SUBJECTIVE AND OBJECTIVE BOX
Dieter Willson MD  Interventional Cardiology / Advance Heart Failure and Cardiac Transplant Specialist  [place] Office : [address] [place] [address] [place] [address]  Tel:   [place] Office : [address] [facility] [place] [address]  Tel: [phone]  Cell : [phone]    HISTORY OF PRESENTING ILLNESS:  80 Y/O Female with PMHx of CVA recent ischemic stroke, HTN, DM, A fib on Eliquis, ESRD on HD (MWF) brought to ED after malfunctioning AVF for HD today. unable to do HD. was transferred to the hospital. patient at baseline is mildly confused and unable to give history. denies of any discomfort. Sister at bedside states patient has been having loss of appetite and declining mental function. States takes eliquis regularly at home but not all medications. Sister states patient denied any chest pain, SOB or palpitations.   	  MEDICATIONS:  apixaban 2.5 milliGRAM(s) Oral every 12 hours  aspirin enteric coated 81 milliGRAM(s) Oral daily  hydrALAZINE 25 milliGRAM(s) Oral two times a day  metoprolol tartrate Injectable 2.5 milliGRAM(s) IV Push every 6 hours    cefTRIAXone   IVPB 1000 milliGRAM(s) IV Intermittent every 24 hours      donepezil 10 milliGRAM(s) Oral at bedtime  QUEtiapine 25 milliGRAM(s) Oral daily  valproate sodium IVPB 250 milliGRAM(s) IV Intermittent three times a day    pantoprazole    Tablet 40 milliGRAM(s) Oral before breakfast    atorvastatin 80 milliGRAM(s) Oral at bedtime  dextrose 40% Gel 15 Gram(s) Oral once  dextrose 50% Injectable 25 Gram(s) IV Push once  dextrose 50% Injectable 12.5 Gram(s) IV Push once  dextrose 50% Injectable 25 Gram(s) IV Push once  glucagon  Injectable 1 milliGRAM(s) IntraMuscular once  insulin glargine Injectable (LANTUS) 10 Unit(s) SubCutaneous at bedtime  insulin lispro (ADMELOG) corrective regimen sliding scale   SubCutaneous every 6 hours    calcium acetate 667 milliGRAM(s) Oral three times a day with meals  chlorhexidine 4% Liquid 1 Application(s) Topical <User Schedule>  dextrose 5% + sodium chloride 0.45%. 1000 milliLiter(s) IV Continuous <Continuous>  dextrose 5%. 1000 milliLiter(s) IV Continuous <Continuous>  dextrose 5%. 1000 milliLiter(s) IV Continuous <Continuous>  sodium chloride 0.9% lock flush 10 milliLiter(s) IV Push every 1 hour PRN      PAST MEDICAL/SURGICAL HISTORY  PAST MEDICAL & SURGICAL HISTORY:  DM (diabetes mellitus)  TYpe II    HTN (hypertension)    Hypercholesteremia    CKD (chronic kidney disease)  now on HD via R forearm AVF    Anemia    Atrial fibrillation  On Eliquis    Cerebrovascular accident (CVA)    Status post right foot surgery  hammer toe repair    AV fistula  June 2018, 2 ballooning (last one 2 weeks ago), following up on Dec 15th    H/O colonoscopy with polypectomy        SOCIAL HISTORY: Substance Use (street drugs): ( x ) never used  (  ) other:    FAMILY HISTORY:  Family history of hypertension (Sibling)    Family history of diabetes mellitus    Family history of lung cancer (Sibling)    Family history of malignant neoplasm of gastrointestinal tract        REVIEW OF SYSTEMS:  unable to obtain    PHYSICAL EXAM:  T(C): 36.9 (06-02-21 @ 05:31), Max: 36.9 (06-02-21 @ 05:31)  HR: 108 (06-02-21 @ 05:31) (99 - 108)  BP: 137/59 (06-02-21 @ 05:31) (117/56 - 150/81)  RR: 20 (06-02-21 @ 05:31) (16 - 27)  SpO2: 100% (06-02-21 @ 05:31) (85% - 100%)  Wt(kg): --  I&O's Summary    01 Jun 2021 07:01  -  02 Jun 2021 07:00  --------------------------------------------------------  IN: 900 mL / OUT: 137 mL / NET: 763 mL            EYES: onjunctiva and sclera clear  ENMT: No tonsillar erythema, exudates, or enlargement  Cardiovascular: Normal S1 S2, No JVD, No murmurs, No edema  Respiratory: Lungs clear to auscultation	  Gastrointestinal:  Soft, Non-tender, + BS	  Extremities:no edema                                    9.7    16.32 )-----------( 241      ( 02 Jun 2021 02:13 )             32.3     06-02    140  |  93<L>  |  47<H>  ----------------------------<  144<H>  3.8   |  19<L>  |  7.85<H>    Ca    9.0      02 Jun 2021 02:13  Phos  5.4     06-02  Mg     2.3     06-02    TPro  7.6  /  Alb  3.9  /  TBili  0.4  /  DBili  x   /  AST  36<H>  /  ALT  16  /  AlkPhos  69  06-02    proBNP:   Lipid Profile:   HgA1c:   TSH:     Consultant(s) Notes Reviewed:  [x ] YES  [ ] NO    Care Discussed with Consultants/Other Providers [ x] YES  [ ] NO    Imaging Personally Reviewed independently:  [x] YES  [ ] NO    All labs, radiologic studies, vitals, orders and medications list reviewed. Patient is seen and examined at bedside. Case discussed with medical team.

## 2021-06-02 NOTE — PROGRESS NOTE ADULT - SUBJECTIVE AND OBJECTIVE BOX
Infectious Diseases progress note:    Subjective: Events noted.  RRT last night for hypoxia to 80s improved on NRB.  + leukocytosis CXR neg.  Repeat ct head with acute cva.  Pt on rocephin for UTI.  Nonverbal      ROS:  Nonverbal, unable to assess    Allergies    No Known Allergies    Intolerances        ANTIBIOTICS/RELEVANT:  antimicrobials  cefTRIAXone   IVPB 1000 milliGRAM(s) IV Intermittent every 24 hours    immunologic:    OTHER:  apixaban 2.5 milliGRAM(s) Oral every 12 hours  aspirin enteric coated 81 milliGRAM(s) Oral daily  atorvastatin 80 milliGRAM(s) Oral at bedtime  calcium acetate 667 milliGRAM(s) Oral three times a day with meals  chlorhexidine 4% Liquid 1 Application(s) Topical <User Schedule>  dextrose 40% Gel 15 Gram(s) Oral once  dextrose 5% + sodium chloride 0.45%. 1000 milliLiter(s) IV Continuous <Continuous>  dextrose 5%. 1000 milliLiter(s) IV Continuous <Continuous>  dextrose 5%. 1000 milliLiter(s) IV Continuous <Continuous>  dextrose 50% Injectable 25 Gram(s) IV Push once  dextrose 50% Injectable 12.5 Gram(s) IV Push once  dextrose 50% Injectable 25 Gram(s) IV Push once  donepezil 10 milliGRAM(s) Oral at bedtime  glucagon  Injectable 1 milliGRAM(s) IntraMuscular once  hydrALAZINE 25 milliGRAM(s) Oral two times a day  insulin glargine Injectable (LANTUS) 10 Unit(s) SubCutaneous at bedtime  insulin lispro (ADMELOG) corrective regimen sliding scale   SubCutaneous every 6 hours  metoprolol tartrate Injectable 2.5 milliGRAM(s) IV Push every 6 hours  pantoprazole    Tablet 40 milliGRAM(s) Oral before breakfast  QUEtiapine 25 milliGRAM(s) Oral daily  sodium chloride 0.9% lock flush 10 milliLiter(s) IV Push every 1 hour PRN  valproate sodium IVPB 250 milliGRAM(s) IV Intermittent three times a day      Objective:  Vital Signs Last 24 Hrs  T(C): 36.9 (02 Jun 2021 21:07), Max: 36.9 (02 Jun 2021 05:31)  T(F): 98.4 (02 Jun 2021 21:07), Max: 98.5 (02 Jun 2021 05:31)  HR: 107 (02 Jun 2021 21:07) (92 - 108)  BP: 134/67 (02 Jun 2021 21:07) (95/42 - 137/59)  BP(mean): --  RR: 18 (02 Jun 2021 21:07) (16 - 20)  SpO2: 100% (02 Jun 2021 21:07) (97% - 100%)    PHYSICAL EXAM:  Constitutional:NAD  Eyes:PAPITO, EOMI  Ear/Nose/Throat: no thrush, mucositis.  Moist mucous membranes	  Neck:no JVD, no lymphadenopathy, supple  Respiratory: CTA shivani  Cardiovascular: S1S2 RRR, no murmurs  Gastrointestinal:soft, nontender,  nondistended (+) BS  Extremities:no e/e/c  Skin:  no rashes, open wounds or ulcerations        LABS:                        9.7    16.32 )-----------( 241      ( 02 Jun 2021 02:13 )             32.3     06-02    140  |  93<L>  |  47<H>  ----------------------------<  144<H>  3.8   |  19<L>  |  7.85<H>    Ca    9.0      02 Jun 2021 02:13  Phos  5.4     06-02  Mg     2.3     06-02    TPro  7.6  /  Alb  3.9  /  TBili  0.4  /  DBili  x   /  AST  36<H>  /  ALT  16  /  AlkPhos  69  06-02    PT/INR - ( 01 Jun 2021 06:55 )   PT: 15.2 sec;   INR: 1.34 ratio         PTT - ( 01 Jun 2021 06:55 )  PTT:29.7 sec      Procalcitonin, Serum: 0.87 (06-02 @ 02:13)            Rapid RVP Result: [facility]          MICROBIOLOGY:    Culture - Blood (06.01.21 @ 02:58)   Specimen Source: .Blood Blood   Culture Results:   No growth to date.     Culture - Urine (05.31.21 @ 23:12)   Specimen Source: .Urine Catheterized   Culture Results:   >100,000 CFU/ml Enterobacter cloacae complex Susceptibility to follow.       RADIOLOGY & ADDITIONAL STUDIES:      < from: CT Head No Cont (06.02.21 @ 09:32) >  IMPRESSION:  Acute left frontoparietal MCA distribution infarct.    Evolving right parietal lobe infarct, now subacute with cortical laminar necrosis.    Chronic right cerebellar infarct.    Chronic left basal ganglia lacunae.    < end of copied text >

## 2021-06-02 NOTE — EEG REPORT - NS EEG TEXT BOX
[facility]  Comprehensive Epilepsy Center  Report of Continuous Video EEG    [facility]: [address], [place] [address], Phone [phone]  [facility]: [address] [place], [place] [address] [address], Phone [phone]  [place] Office: [address] [facility], [address], [place] [address] Phone [phone]    [facility]: [address] [place] [address], Phone [phone]  [place] Office: [address] [place] [address], Phone [phone]    Patient Name: VINCENT GROSS  Age: 81 year, : 1940  Patient ID: -, MRN #: -, Caceres: [id number] B [id number] B  Referring Physician: GUMARO STERLING  EEG #: [id number]    Study Time/Date: 10:56:00 AM on 2021  	  End Time/Date: 12:36 PM on 2021		   Duration: 1H 40min    Study Information:    EEG Recording Technique:  The patient underwent continuous Video-EEG monitoring, using Telemetry System hardware on the XLTek Digital System. EEG and video data were stored on a computer hard drive with important events saved in digital archive files. The material was reviewed by a physician (electroencephalographer / epileptologist) on a daily basis. George and seizure detection algorithms were utilized and reviewed. An EEG Technician attended to the patient, and was available throughout daytime work hours.  The epilepsy center neurologist was available in person or on call 24-hours per day.    EEG Placement and Labeling of Electrodes:  The EEG was performed utilizing 20 channel referential EEG connections (coronal over temporal over parasagittal montage) using all standard 10-20 electrode placements with EKG, with additional electrodes placed in the inferior temporal region using the modified 10-10 montage electrode placements for elective admissions, or if deemed necessary. Recording was at a sampling rate of 256 samples per second per channel. Time synchronized digital video recording was done simultaneously with EEG recording. A low light infrared camera was used for low light recording.     History:   ROUTINE EEG AT BEDSIDE 745 B  81 YEAR OLD FEMALE  COR: LETHARGIC, DROWSY  P/W: THROMBOSIS DUE TO VASCULAR DEVICES (IMPLANTS & GRAFTS) INITIAL ENCOUNTER  PMH: CVA/DM/CKD/AFIB/HTN/ANEMIA/HYPERCHOLESTEREMIA  HV: NOT COMPLETED DUE TO PANDEMIC  PHOTIC: NOT COMPLETED  A&OX  CONCERN FOR SEIZURES          Pertinent Medication  Ecotrin  Eliquis  AdmeLOG  Lantus  Hibiclens  Rocephin  Dextrose  Sodium Chloride  Lopressor  DepaCON  PHOSLO  Protonix  Apresoline  Aricept  Lipitor  Seroquel    Interpretation:    Daily EEG Visual Analysis  Findings: The background was continuous, spontaneously variable and reactive.   No posterior dominant rhythm seen.  Background predominantly consisted of theta, delta and faster activities with slower frequency activity more prominent over the right hemisphere.    Sleep Background:  Drowsiness and stage II sleep transients were not recorded.    Other Non-Epileptiform Findings:  None were present.    Interictal Epileptiform Activity:   None were present.    Events:  Clinical events: None recorded.  Seizures: None recorded.    Activation Procedures:   Hyperventilation was not performed.    Photic stimulation was not performed.     Artifacts:  Intermittent myogenic and movement artifacts were noted.    EEG Summary / Classification:  Abnormal EEG   - Moderate generalized slowing, right greater than left.    EEG Impression / Clinical Correlate:  Abnormal EEG study.  Moderate nonspecific diffuse or multifocal cerebral dysfunction, right greater than left.   No epileptiform pattern or seizure seen.    Wayne Sanford MD  Attending Physician, [facility]

## 2021-06-03 LAB
-  AMIKACIN: SIGNIFICANT CHANGE UP
-  AMOXICILLIN/CLAVULANIC ACID: SIGNIFICANT CHANGE UP
-  AMPICILLIN/SULBACTAM: SIGNIFICANT CHANGE UP
-  AMPICILLIN: SIGNIFICANT CHANGE UP
-  AZTREONAM: SIGNIFICANT CHANGE UP
-  CEFAZOLIN: SIGNIFICANT CHANGE UP
-  CEFEPIME: SIGNIFICANT CHANGE UP
-  CEFOXITIN: SIGNIFICANT CHANGE UP
-  CEFTRIAXONE: SIGNIFICANT CHANGE UP
-  CIPROFLOXACIN: SIGNIFICANT CHANGE UP
-  ERTAPENEM: SIGNIFICANT CHANGE UP
-  GENTAMICIN: SIGNIFICANT CHANGE UP
-  IMIPENEM: SIGNIFICANT CHANGE UP
-  LEVOFLOXACIN: SIGNIFICANT CHANGE UP
-  MEROPENEM: SIGNIFICANT CHANGE UP
-  NITROFURANTOIN: SIGNIFICANT CHANGE UP
-  PIPERACILLIN/TAZOBACTAM: SIGNIFICANT CHANGE UP
-  TIGECYCLINE: SIGNIFICANT CHANGE UP
-  TOBRAMYCIN: SIGNIFICANT CHANGE UP
-  TRIMETHOPRIM/SULFAMETHOXAZOLE: SIGNIFICANT CHANGE UP
ALBUMIN SERPL ELPH-MCNC: 3.7 G/DL — SIGNIFICANT CHANGE UP (ref 3.3–5)
ALP SERPL-CCNC: 69 U/L — SIGNIFICANT CHANGE UP (ref 40–120)
ALT FLD-CCNC: 15 U/L — SIGNIFICANT CHANGE UP (ref 4–33)
ANION GAP SERPL CALC-SCNC: 22 MMOL/L — HIGH (ref 7–14)
AST SERPL-CCNC: 34 U/L — HIGH (ref 4–32)
BASOPHILS # BLD AUTO: 0.03 K/UL — SIGNIFICANT CHANGE UP (ref 0–0.2)
BASOPHILS NFR BLD AUTO: 0.3 % — SIGNIFICANT CHANGE UP (ref 0–2)
BILIRUB SERPL-MCNC: 0.4 MG/DL — SIGNIFICANT CHANGE UP (ref 0.2–1.2)
BUN SERPL-MCNC: 70 MG/DL — HIGH (ref 7–23)
CALCIUM SERPL-MCNC: 9.5 MG/DL — SIGNIFICANT CHANGE UP (ref 8.4–10.5)
CHLORIDE SERPL-SCNC: 96 MMOL/L — LOW (ref 98–107)
CO2 SERPL-SCNC: 23 MMOL/L — SIGNIFICANT CHANGE UP (ref 22–31)
CREAT SERPL-MCNC: 10.51 MG/DL — HIGH (ref 0.5–1.3)
CULTURE RESULTS: SIGNIFICANT CHANGE UP
EOSINOPHIL # BLD AUTO: 0.02 K/UL — SIGNIFICANT CHANGE UP (ref 0–0.5)
EOSINOPHIL NFR BLD AUTO: 0.2 % — SIGNIFICANT CHANGE UP (ref 0–6)
GLUCOSE SERPL-MCNC: 161 MG/DL — HIGH (ref 70–99)
HCT VFR BLD CALC: 33.6 % — LOW (ref 34.5–45)
HGB BLD-MCNC: 10 G/DL — LOW (ref 11.5–15.5)
IANC: 9.93 K/UL — HIGH (ref 1.5–8.5)
IMM GRANULOCYTES NFR BLD AUTO: 0.6 % — SIGNIFICANT CHANGE UP (ref 0–1.5)
LYMPHOCYTES # BLD AUTO: 0.88 K/UL — LOW (ref 1–3.3)
LYMPHOCYTES # BLD AUTO: 7.4 % — LOW (ref 13–44)
MAGNESIUM SERPL-MCNC: 2.7 MG/DL — HIGH (ref 1.6–2.6)
MCHC RBC-ENTMCNC: 24.2 PG — LOW (ref 27–34)
MCHC RBC-ENTMCNC: 29.8 GM/DL — LOW (ref 32–36)
MCV RBC AUTO: 81.2 FL — SIGNIFICANT CHANGE UP (ref 80–100)
METHOD TYPE: SIGNIFICANT CHANGE UP
MONOCYTES # BLD AUTO: 1.01 K/UL — HIGH (ref 0–0.9)
MONOCYTES NFR BLD AUTO: 8.5 % — SIGNIFICANT CHANGE UP (ref 2–14)
NEUTROPHILS # BLD AUTO: 9.93 K/UL — HIGH (ref 1.8–7.4)
NEUTROPHILS NFR BLD AUTO: 83 % — HIGH (ref 43–77)
NRBC # BLD: 0 /100 WBCS — SIGNIFICANT CHANGE UP
NRBC # FLD: 0.02 K/UL — HIGH
ORGANISM # SPEC MICROSCOPIC CNT: SIGNIFICANT CHANGE UP
ORGANISM # SPEC MICROSCOPIC CNT: SIGNIFICANT CHANGE UP
PHOSPHATE SERPL-MCNC: 7.1 MG/DL — HIGH (ref 2.5–4.5)
PLATELET # BLD AUTO: 236 K/UL — SIGNIFICANT CHANGE UP (ref 150–400)
POTASSIUM SERPL-MCNC: 3.8 MMOL/L — SIGNIFICANT CHANGE UP (ref 3.5–5.3)
POTASSIUM SERPL-SCNC: 3.8 MMOL/L — SIGNIFICANT CHANGE UP (ref 3.5–5.3)
PROT SERPL-MCNC: 7.8 G/DL — SIGNIFICANT CHANGE UP (ref 6–8.3)
RBC # BLD: 4.14 M/UL — SIGNIFICANT CHANGE UP (ref 3.8–5.2)
RBC # FLD: 17.1 % — HIGH (ref 10.3–14.5)
SODIUM SERPL-SCNC: 141 MMOL/L — SIGNIFICANT CHANGE UP (ref 135–145)
SPECIMEN SOURCE: SIGNIFICANT CHANGE UP
WBC # BLD: 11.94 K/UL — HIGH (ref 3.8–10.5)
WBC # FLD AUTO: 11.94 K/UL — HIGH (ref 3.8–10.5)

## 2021-06-03 PROCEDURE — 70450 CT HEAD/BRAIN W/O DYE: CPT | Mod: 26

## 2021-06-03 PROCEDURE — 99358 PROLONG SERVICE W/O CONTACT: CPT

## 2021-06-03 PROCEDURE — 99497 ADVNCD CARE PLAN 30 MIN: CPT | Mod: 25

## 2021-06-03 PROCEDURE — 99223 1ST HOSP IP/OBS HIGH 75: CPT

## 2021-06-03 PROCEDURE — 99498 ADVNCD CARE PLAN ADDL 30 MIN: CPT | Mod: 25

## 2021-06-03 RX ORDER — CHLORHEXIDINE GLUCONATE 213 G/1000ML
1 SOLUTION TOPICAL DAILY
Refills: 0 | Status: DISCONTINUED | OUTPATIENT
Start: 2021-06-03 | End: 2021-06-08

## 2021-06-03 RX ADMIN — Medication 2.5 MILLIGRAM(S): at 13:35

## 2021-06-03 RX ADMIN — Medication 26.25 MILLIGRAM(S): at 23:51

## 2021-06-03 RX ADMIN — Medication 2.5 MILLIGRAM(S): at 19:08

## 2021-06-03 RX ADMIN — CEFTRIAXONE 100 MILLIGRAM(S): 500 INJECTION, POWDER, FOR SOLUTION INTRAMUSCULAR; INTRAVENOUS at 02:18

## 2021-06-03 RX ADMIN — CHLORHEXIDINE GLUCONATE 1 APPLICATION(S): 213 SOLUTION TOPICAL at 12:52

## 2021-06-03 RX ADMIN — Medication 26.25 MILLIGRAM(S): at 05:23

## 2021-06-03 RX ADMIN — Medication 2.5 MILLIGRAM(S): at 00:30

## 2021-06-03 RX ADMIN — Medication 26.25 MILLIGRAM(S): at 13:33

## 2021-06-03 RX ADMIN — INSULIN GLARGINE 10 UNIT(S): 100 INJECTION, SOLUTION SUBCUTANEOUS at 22:07

## 2021-06-03 RX ADMIN — Medication 2.5 MILLIGRAM(S): at 05:23

## 2021-06-03 RX ADMIN — Medication 1: at 06:22

## 2021-06-03 NOTE — PROGRESS NOTE ADULT - ASSESSMENT
Patient is a 80 Y/O Female with PMHx of CVA recent ischemic stroke, HTN, DM, A fib on Eliquis, ESRD on HD (MWF) brought to ED after malfunctioning AVF for HD today. unable to do HD. was transferred to the hospital. patient at baseline is mildly confused and unable to give history. denies of any discomfort. 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  Pt afebrile.  WBC 14 --> 13.  UA large LE, mod blood, WBC >10.  Covid pcr (-).  Pt for eventual permacath for dialysis access.  ID consult called for elevated WBC.      Leukocytosis:    - WBC elevated on admission.  Pt otherwise afebrile.  Pending eventual permacath.  For temporary Shiley.    - UA (+) LE, mod bld and WBCs.  f/u Ucx - growing Enterobacter.  Pt started on rocephin empirically.   bcx x 2 ngtd.      - Check chest xray - clear.    - Monitor WBC, and temp curve - leukocytosis improving    Acute CVA:    - CT head with acute cva.  On eliquis and ASA.  Repeat CT head remains unchaged,  EEG no seizure activity.  Neuro following.     * Cont rocephin, f/u final UCx sensis      Will follow,    Nahomy Ramsey  [phone]

## 2021-06-03 NOTE — CONSULT NOTE ADULT - REASON FOR ADMISSION
AVF malfunction

## 2021-06-03 NOTE — CONSULT NOTE ADULT - PROBLEM SELECTOR RECOMMENDATION 6
.  In addition to the EM visit, an advance care planning meeting was performed.  Start time: 12:00PM  End time: 12:50PM  Total time: 50min  A face to face meeting to discuss advance care planning was held today regarding: VINCENT GROSS  Primary decision maker: Patient is unable to participate in decision making  Alternate/surrogate: Julius Gross  Discussed advance directives including, but not limited to, healthcare proxy and code status.  Decision regarding code status: Full Code -> Son will call when he is ready to finalize DNR/DNI  Documentation completed today: See GOC note

## 2021-06-03 NOTE — PROGRESS NOTE ADULT - ASSESSMENT
80F hx htn, dm, A fib on eliquis, prior CVA and esrd MWF  present from nursing home with clotted AVF    ESRD on HD MWF  from nursing home    clotted avf f/u vascular   s/p IR for shiely 6/1 s/p HD 6/1 + 700cc. given fluid sec to hypotension  comfortable right now. hd today  monitor electrolyte    AMS  recent CVA  very confused   ct head noted. f/u neuro  GOC with family    Anemia  Hb at goal for ESRD  recent cva no epo   monitor Hb    HTN  controlled  monitor    ckd-mbd  Hectorol 2mcg with HD  monitor phos and calcium daily

## 2021-06-03 NOTE — CONSULT NOTE ADULT - PROBLEM SELECTOR RECOMMENDATION 7
.  Complex medical decision making / symptom management in the setting of advanced illness.    Will continue to follow for ongoing GOC discussion.   Emotional support provided, questions answered.  Active Psychosocial Referrals: [facility]    For new or uncontrolled symptoms, please page the Palliative Medicine team ([facility] #[id number]).   The service is available 24/7 (including nights & weekends) to provide symptom management recommendations over the phone as appropriate

## 2021-06-03 NOTE — PROGRESS NOTE ADULT - SUBJECTIVE AND OBJECTIVE BOX
Neurology Progress Note    S: Patient seen and examined. repeat CTH with no change     Medication:  apixaban 2.5 milliGRAM(s) Oral every 12 hours  aspirin enteric coated 81 milliGRAM(s) Oral daily  atorvastatin 80 milliGRAM(s) Oral at bedtime  calcium acetate 667 milliGRAM(s) Oral three times a day with meals  chlorhexidine 2% Cloths 1 Application(s) Topical daily  dextrose 40% Gel 15 Gram(s) Oral once  dextrose 5% + sodium chloride 0.45%. 1000 milliLiter(s) IV Continuous <Continuous>  dextrose 5%. 1000 milliLiter(s) IV Continuous <Continuous>  dextrose 5%. 1000 milliLiter(s) IV Continuous <Continuous>  dextrose 50% Injectable 25 Gram(s) IV Push once  dextrose 50% Injectable 12.5 Gram(s) IV Push once  dextrose 50% Injectable 25 Gram(s) IV Push once  donepezil 10 milliGRAM(s) Oral at bedtime  glucagon  Injectable 1 milliGRAM(s) IntraMuscular once  hydrALAZINE 25 milliGRAM(s) Oral two times a day  insulin glargine Injectable (LANTUS) 10 Unit(s) SubCutaneous at bedtime  insulin lispro (ADMELOG) corrective regimen sliding scale   SubCutaneous every 6 hours  metoprolol tartrate Injectable 2.5 milliGRAM(s) IV Push every 6 hours  pantoprazole    Tablet 40 milliGRAM(s) Oral before breakfast  QUEtiapine 25 milliGRAM(s) Oral daily  sodium chloride 0.9% lock flush 10 milliLiter(s) IV Push every 1 hour PRN  valproate sodium IVPB 250 milliGRAM(s) IV Intermittent three times a day      Vitals:  Vital Signs Last 24 Hrs  T(C): 36.7 (03 Jun 2021 04:20), Max: 36.9 (02 Jun 2021 21:07)  T(F): 98 (03 Jun 2021 04:20), Max: 98.4 (02 Jun 2021 21:07)  HR: 98 (03 Jun 2021 04:20) (92 - 107)  BP: 120/72 (03 Jun 2021 04:20) (110/57 - 140/72)  BP(mean): --  RR: 16 (03 Jun 2021 04:20) (16 - 18)  SpO2: 100% (03 Jun 2021 04:20) (97% - 100%)    General Exam:   General Appearance: Appropriately dressed confused   Head: Normocephalic, atraumatic and no dysmorphic features  Ear, Nose, and Throat: Moist mucous membranes  CVS: S1S2+  Resp: No SOB, no wheeze or rhonchi  Abd: soft NTND  Extremities: No edema, no cyanosis  Skin: No bruises, no rashes     Neurological Exam:  Mental Status: lethargic, but tracks. not following, mimics, no intelligable verbal output. + dysarthria   Cranial Nerves: PERRL, EOMI, VFFC, sensation V1-V3 intact,  R NLF,   Motor: CUTLER but L>R  Sensation: w/draws L >R  Reflexes: 1+ throughout at biceps, brachioradialis, triceps, patellars and ankles bilaterally and equal. No clonus. R toe and L toe were both downgoing.  Coordination: unable   Gait: unable     I personally reviewed the below data/images/labs:      CBC Full  -  ( 03 Jun 2021 07:19 )  WBC Count : 11.94 K/uL  RBC Count : 4.14 M/uL  Hemoglobin : 10.0 g/dL  Hematocrit : 33.6 %  Platelet Count - Automated : 236 K/uL  Mean Cell Volume : 81.2 fL  Mean Cell Hemoglobin : 24.2 pg  Mean Cell Hemoglobin Concentration : 29.8 gm/dL  Auto Neutrophil # : 9.93 K/uL  Auto Lymphocyte # : 0.88 K/uL  Auto Monocyte # : 1.01 K/uL  Auto Eosinophil # : 0.02 K/uL  Auto Basophil # : 0.03 K/uL  Auto Neutrophil % : 83.0 %  Auto Lymphocyte % : 7.4 %  Auto Monocyte % : 8.5 %  Auto Eosinophil % : 0.2 %  Auto Basophil % : 0.3 %    06-03    141  |  96<L>  |  70<H>  ----------------------------<  161<H>  3.8   |  23  |  10.51<H>    Ca    9.5      03 Jun 2021 07:19  Phos  7.1     06-03  Mg     2.7     06-03    TPro  7.8  /  Alb  3.7  /  TBili  0.4  /  DBili  x   /  AST  34<H>  /  ALT  15  /  AlkPhos  69  06-03    LIVER FUNCTIONS - ( 03 Jun 2021 07:19 )  Alb: 3.7 g/dL / Pro: 7.8 g/dL / ALK PHOS: 69 U/L / ALT: 15 U/L / AST: 34 U/L / GGT: x                 < from: CT Head No Cont (05.08.21 @ 12:32) >  IMPRESSION:    Region of decreased attenuation in the right parietal lobe with associated sulcal effacement suspicious for acute infarction.    No CT evidence for reva hemorrhagic transformation.    < end of copied text >    < from: CT Head No Cont (05.09.21 @ 17:30) >    EXAM:  CT BRAIN        PROCEDURE DATE:  May  9 2021         INTERPRETATION:  .    CLINICAL INFORMATION: Hx CVA , with poss infarct yesterday on CT.    TECHNIQUE: Multiple axial CT images of the head were obtained without contrast. Sagittal and coronal reconstructed images were acquired from the source data.    COMPARISON: Prior CT study of the head from 5/8/2021. Prior brain MRI study from 7/22/2019.    FINDINGS: An evolving acute/subacute infarct is again noted within the right parietal temporal lobes. There is no gross hemorrhagic transformation.    Additional wedge-shaped chronic infarct is again noted within the right cerebellar hemisphere.    Additional chronic lacunar infarcts are noted within the left basal ganglia.    There is no acute intracranial hemorrhage, shift of the midline structures, herniation, or hydrocephalus.    There is diffuse cerebral volume loss with prominence of the sulci, fissures, and cisternal spaces which is normal for the patient's age. 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    The paranasal sinuses and mastoid air cells are clear. The calvarium is intact. There is evidence of bilateral cataract removal.    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    Additional chronic infarcts and similar-appearing moderate severity chronic white matter microvascular type changes, as discussed.        DARA VILLELA MD; Attending Radiologist  This document has been electronically signed. May  9 2021  5:42PM    < end of copied text >    < from: MR Head No Cont (05.10.21 @ 21:38) >    EXAM:  MR BRAIN        PROCEDURE DATE:  May 10 2021         INTERPRETATION:  Clinical indication: Follow-up infarct.    MRI of the brain was performed using sagittal T1, axial T1 T2 T2 FLAIR diffusion and susceptibility weighted sequence.    This exam is compared with prior brain MRI performed on July 22, 2019 an prior head CT performed on May 9, 2021    This exam is of limited quality due to motion.    Extensive parenchymal volume loss is seen.    Abnormal T2 prolongation with restricted diffusion is seen involving the right parietal cortical subcortical region. This is compatible with an acute infarct. No hemorrhagic transformation is seen this time. There is localized mass effect seen consisting of sulcal effacement.    The visualized paranasal sinuses mastoid and middle ear regions appear clear.    IMPRESSION: This exam is somewhat limited diagnostic quality due to motion.    Acute infarct as described above.              MARVIN BARRAZA M.D., ATTENDING RADIOLOGIST  This document hasbeen electronically signed. May 11 2021  8:20AM    < end of copied text >  `< from: CT Angio Neck w/ IV Cont (05.11.21 @ 10:54) >    EXAM:  CT ANGIO BRAIN (W)AW IC      EXAM:  CT ANGIO NECK (W)AW IC        PROCEDURE DATE:  May 11 2021         INTERPRETATION:  Clinical indication: Acute infarct seen on brain MRI.    Following injection of approximately 90 cc of Omnipaque 350 IV CT a of the neck and [employer] [employer] [place] were performed. Coronal and sagittal reconstructions were performed as well.    Abnormal low-attenuation involving the right cerebellar region, right parietal and right basal ganglia region are again seen is well as old lacunar infarcts involving left basal ganglia region.    Calcification is seen involving both proximal internal carotid artery regions.    Evaluation of the distal right common carotid artery region appears normal. Mild narrowing of proximal right internal carotid artery is seen. Evaluation of the proximal right external carotid arteries appear normal. Evaluation of the distal left common carotid artery appears normal. Evaluation of the proximal left internal carotid artery demonstrate mild narrowing. Evaluation of the proximal left external carotid artery appears normal. A normal dominant appearing right vertebral artery is seen. 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 Short segment of stenosis versus hypoplasia is again seen involving the distalmost aspect the left vertebral artery.    Evaluation of both distal internal carotid arteries demonstrates calcified plaques. Mild narrowing of both distal internal carotid artery seen. Hypoplastic right A1 segment is seen. Irregularity seen involving both A2 segments is now seen which could be compatible atherosclerotic change. Evaluation of both middle cerebral arteries appear normal. Both posterior cerebral arteries demonstrate decreased flow related signal distally which could be compatible areas of of stenosis. There is evidence of mild stenosis seen involving the distal aspect of the basilar artery which is new when compared with the prior exam.    IMPRESSION: Mild narrowing of both proximal internal carotid arteries.    Short segment of narrowing involving the distal left vertebral artery is again seen and unchanged.    Irregularity is seen involving both A2 segments which is new when compared prior exam. This could be compatible atherosclerotic change. Mild narrowing of the distal basilar artery is seen which is compatible with mild stenosis. This is new when compared with the prior exam.    Areas of stenosis is suspected involving both distal posterior cerebral arteries.              MARVIN BARRAZA M.D., ATTENDING RADIOLOGIST  This document has been electronically signed. May 11 2021 11:30AM    < end of copied text >  `    < from: CT Head No Cont (06.03.21 @ 09:54) >    EXAM:  CT BRAIN        PROCEDURE DATE:  Scott  3 2021         INTERPRETATION:  Clinical indication: Follow-up CVA.    Multiple axial sections were performed from base of skull to vertex without contrast enhancement. Coronal and sagittal reconstructions were performed as well    Abnormal low-attenuation involving the right cerebellar region is again identified. This is likely compatible with an old infarct.    Abnormal low-attenuation involving the left posterior frontal parietal cortical subcortical region is again identified. This is likely compatible with a subacute left MCA infarct. There is localized mass effect seen consisting sulcal effacement. No significant shift or herniation seen.    Abnormal low-attenuation involving the right frontal subcortical region is identified as well. This could be compatible area of ischemia versus a subacute to chronic infarct.    Subtle area of low-attenuation seen involving left hector. This is best seen on series 2 image 15. This could be compatible with an age-indeterminate lacunar infarct.    Gyriform area of high attenuation is seen involving the right posterior frontal parietal region which is likely compatible with an area of laminar necrosis. This appears unchanged    Parenchymal volume loss and chronic microvascular ischemic changes again seen    Evaluation the osseous structures with the appropriate window appears normal    The visualized paranasal sinuses mastoid and middle ear regions appear clear.    IMPRESSION: Infarcts as described above.                MARVIN BARRAZA M.D., ATTENDING RADIOLOGIST  This document has been electronically signed. Scott  3 2021 10:00AM    < end of copied text >  < from: CT Head No Cont (06.02.21 @ 09:32) >    EXAM:  CT BRAIN        PROCEDURE DATE:  Jun 2 2021         INTERPRETATION:  INDICATIONS:  recent ischemic stroke, worsening AMS  .    TECHNIQUE:  Serial axial images were obtained from the skull base to the vertex without intravenous contrast. Coronal and sagittal reformatted images were obtained.    COMPARISON EXAMINATION: 5/9/2021 CT and 5/10/2021 and MR    FINDINGS:  VENTRICLES AND SULCI:  Prominent in size compatible with age-appropriate volume loss  INTRA-AXIAL:  There is an area of low density with associated sulcal effacement, gyral swelling and loss of gray-white matter differentiation within the left frontoparietal region not seen previously. There is mild effacement of the lateral ventricle and findings are compatible with an acute MCA distribution infarct. There is involvement of the insula with sparing of the basal ganglia. Chronic left basal ganglia lacunae are again seen.    Gyriform areas of hyperdensity are seen in the right parietal lobe compatible with a subacute infarct with associated cortical laminar necrosis. A chronic right cerebellar infarct is again seen.    Moderate microvascular type white matter changes are stable.  EXTRA-AXIAL:  No mass or collection is seen.  VISUALIZED SINUSES:  Right sphenoid foamy secretions.  VISUALIZED MASTOIDS:  Clear.  CALVARIUM:  Normal.  MISCELLANEOUS:  Intraocular lens implants    IMPRESSION:  Acute left frontoparietal MCA distribution infarct.    Evolving right parietal lobe infarct, now subacute with cortical laminar necrosis.    Chronic right cerebellar infarct.    Chronic left basal ganglia lacunae.    Findings were discussed with MALINDA Mariee on 6/2/2021 10:01 AM with read back.              SALOMON JOHNSON MD; Attending Radiologist  This document has been electronically signed. Jun 2 2021 10:03AM    < end of copied text >    EEG Summary / Classification:  Abnormal EEG   - Moderate generalized slowing, right greater than left.    EEG Impression / Clinical Correlate:  Abnormal EEG study.  Moderate nonspecific diffuse or multifocal cerebral dysfunction, right greater than left.   No epileptiform pattern or seizure seen.    Wayne Sanford MD  Attending Physician, [facility]

## 2021-06-03 NOTE — PROGRESS NOTE ADULT - PROBLEM SELECTOR PLAN 1
vascular eval appreciated, not a candidate for nay invasive procedure, appreciate vascular follow up   shiley per IR  haldol for agitation   neuro eval appreciated   worsening confusion, CT head showed acute ischemic stroke, repeat ct head today  palliative eval

## 2021-06-03 NOTE — CONSULT NOTE ADULT - CONSULT REQUESTED DATE/TIME
01-Jun-2021 01:21
02-Jun-2021 11:37
02-Jun-2021 09:39
31-May-2021 18:24
31-May-2021 19:57
03-Jun-2021 11:41
01-Jun-2021 19:00

## 2021-06-03 NOTE — PROGRESS NOTE ADULT - SUBJECTIVE AND OBJECTIVE BOX
Dieter Willson MD  Interventional Cardiology / Advance Heart Failure and Cardiac Transplant Specialist  [place] Office : [address] [place] [address] [place]. [address]  Tel:   [place] Office : [address] [facility] [place] [address]  Tel: [phone]  Cell : [phone]    Subjective/Overnight events: Pt is lying in bed comfortable not in distress  	  MEDICATIONS:  apixaban 2.5 milliGRAM(s) Oral every 12 hours  aspirin enteric coated 81 milliGRAM(s) Oral daily  hydrALAZINE 25 milliGRAM(s) Oral two times a day  metoprolol tartrate Injectable 2.5 milliGRAM(s) IV Push every 6 hours        donepezil 10 milliGRAM(s) Oral at bedtime  QUEtiapine 25 milliGRAM(s) Oral daily  valproate sodium IVPB 250 milliGRAM(s) IV Intermittent three times a day    pantoprazole    Tablet 40 milliGRAM(s) Oral before breakfast    atorvastatin 80 milliGRAM(s) Oral at bedtime  dextrose 40% Gel 15 Gram(s) Oral once  dextrose 50% Injectable 25 Gram(s) IV Push once  dextrose 50% Injectable 12.5 Gram(s) IV Push once  dextrose 50% Injectable 25 Gram(s) IV Push once  glucagon  Injectable 1 milliGRAM(s) IntraMuscular once  insulin glargine Injectable (LANTUS) 10 Unit(s) SubCutaneous at bedtime  insulin lispro (ADMELOG) corrective regimen sliding scale   SubCutaneous every 6 hours    calcium acetate 667 milliGRAM(s) Oral three times a day with meals  chlorhexidine 2% Cloths 1 Application(s) Topical daily  dextrose 5% + sodium chloride 0.45%. 1000 milliLiter(s) IV Continuous <Continuous>  dextrose 5%. 1000 milliLiter(s) IV Continuous <Continuous>  dextrose 5%. 1000 milliLiter(s) IV Continuous <Continuous>  sodium chloride 0.9% lock flush 10 milliLiter(s) IV Push every 1 hour PRN      PAST MEDICAL/SURGICAL HISTORY  PAST MEDICAL & SURGICAL HISTORY:  DM (diabetes mellitus)  TYpe II    HTN (hypertension)    Hypercholesteremia    CKD (chronic kidney disease)  now on HD via R forearm AVF    Anemia    Atrial fibrillation  On Eliquis    Cerebrovascular accident (CVA)    Status post right foot surgery  hammer toe repair    AV fistula  June 2018, 2 ballooning (last one 2 weeks ago), following up on Dec 15th    H/O colonoscopy with polypectomy        SOCIAL HISTORY: Substance Use (street drugs): ( x ) never used  (  ) other:    FAMILY HISTORY:  Family history of hypertension (Sibling)    Family history of diabetes mellitus    Family history of lung cancer (Sibling)    Family history of malignant neoplasm of gastrointestinal tract        REVIEW OF SYSTEMS:  unable to obtain    PHYSICAL EXAM:  T(C): 36.7 (06-03-21 @ 04:20), Max: 36.9 (06-02-21 @ 21:07)  HR: 98 (06-03-21 @ 04:20) (92 - 107)  BP: 120/72 (06-03-21 @ 04:20) (95/42 - 140/72)  RR: 16 (06-03-21 @ 04:20) (16 - 18)  SpO2: 100% (06-03-21 @ 04:20) (97% - 100%)  Wt(kg): --  I&O's Summary      EYES: onjunctiva and sclera clear  ENMT: No tonsillar erythema, exudates, or enlargement  Cardiovascular: Normal S1 S2, No JVD, No murmurs, No edema  Respiratory: Lungs clear to auscultation	  Gastrointestinal:  Soft, Non-tender, + BS	  Extremities:no edema                                    10.0   11.94 )-----------( 236      ( 03 Jun 2021 07:19 )             33.6     06-03    141  |  96<L>  |  70<H>  ----------------------------<  161<H>  3.8   |  23  |  10.51<H>    Ca    9.5      03 Jun 2021 07:19  Phos  7.1     06-03  Mg     2.7     06-03    TPro  7.8  /  Alb  3.7  /  TBili  0.4  /  DBili  x   /  AST  34<H>  /  ALT  15  /  AlkPhos  69  06-03    proBNP:   Lipid Profile:   HgA1c:   TSH:     Consultant(s) Notes Reviewed:  [x ] YES  [ ] NO    Care Discussed with Consultants/Other Providers [ x] YES  [ ] NO    Imaging Personally Reviewed independently:  [x] YES  [ ] NO    All labs, radiologic studies, vitals, orders and medications list reviewed. Patient is seen and examined at bedside. Case discussed with medical team.

## 2021-06-03 NOTE — CONSULT NOTE ADULT - CONVERSATION DETAILS
Reviewed patient's medical course and recent developments. Educated on expected disease trajectory and presented the plan of care questions that face the surrogate decision maker. Counselled that a decision needs to be made re: code status, whether to continue HD, pursuing PEG, and hospice referral. Son understands that DNR/DNI is in patient's best interest but he is just struggling with accepting the designation. Son will discuss code status Reviewed patient's medical course and recent developments. Educated on expected disease trajectory and presented the plan of care questions that face the surrogate decision maker. Counselled that a decision needs to be made re: code status, whether to continue HD, pursuing PEG, and hospice referral. Son understands that DNR/DNI is in patient's best interest but he is just struggling with accepting the designation. Discussed expected disease trajectory with son, he is understanding of the situation and does not want the patient to suffer but before finalizing any decisions he has to discuss with his family as he does not want to cause added strife. If any emergent occurs with the patient while we await son's callback, notify him and he will likely accept DNR/DNI w/ comfort measures. Son is struggling with the idea of withholding HD from the patient but he understands that it may be in the patient's best interest to stop HD if no meaningful recovery is expected as it would prolong suffering.

## 2021-06-03 NOTE — CONSULT NOTE ADULT - SUBJECTIVE AND OBJECTIVE BOX
Meeting with son at noon. [facility] Geriatrics and Palliative Care  Raúl Guido, Palliative Care Attending  Contact Info: Page [phone] (including Nights/Weekend), message on Microsoft Teams (Raúl Guido), or leave VM at Palliative Office [phone] (Non-Urgent)    HPI:   Patient is a 82 Y/O Female with PMHx of CVA recent ischemic stroke, HTN, DM, A fib on Eliquis, ESRD on HD (MWF) brought to ED after malfunctioning AVF for HD today. unable to do HD. was transferred to the hospital. patient at baseline is mildly confused and unable to give history. denies of any discomfort. (31 May 2021 17:29) Patient unable to participate in interview. Appears overtly comfortable at this time. Collateral obtained from extensive chart review and conversation with patient's son as noted below.    PERTINENT PM/SXH:   DM (diabetes mellitus)  HTN (hypertension)  Hypercholesteremia  CKD (chronic kidney disease)  Anemia  Atrial fibrillation  Cerebrovascular accident (CVA)  Status post right foot surgery  AV fistula  H/O colonoscopy with polypectomy    FAMILY HISTORY:  Family history of hypertension (Sibling)  Family history of diabetes mellitus  Family history of lung cancer (Sibling)  Family history of malignant neoplasm of gastrointestinal tract    ITEMS NOT CHECKED ARE NOT PRESENT    SOCIAL HISTORY:   Significant other/partner:  []  Children:  [x]  Yarsani/Spirituality:  Substance hx:  []   Tobacco hx:  []   Alcohol hx: []   Home Opioid hx:  [] I-Stop Reference No: [id number]  03/04/2021 03/10/2021 clonazepam 1 mg tablet 30 30 Magdy Kothari MD [id number] Medicare   12/19/2020 12/22/2020 clonazepam 1 mg tablet 30 30 TayeMagdy ryan MD [id number] Medicare   11/30/2020 12/02/2020 clonazepam 0.5 mg tablet 30 30 TayeMagdy ryan MD [id number] Medicare   11/02/2020 11/02/2020 clonazepam 0.5 mg tablet 30 30 Magdy Kothari MD [id number] Medicare   08/01/2020 08/07/2020 clonazepam 0.5 mg tablet 30 30 TayeMagdy ryan MD [id number] Medicare   05/30/2020 06/08/2020 clonazepam 0.5 mg tablet 30 30 Magdy Kothari MD [id number] Medicare  Living Situation: []Home  []Long term care  [x]Rehab []Other    ADVANCE DIRECTIVES:    DNR []MOLST  []Living Will  DECISION MAKER(s):  [] Health Care Proxy(s)  [x] Surrogate(s)  [] Guardian           Name(s): Julius Livingston              Phone Number(s): [phone]    BASELINE (I)ADL(s) (prior to admission):  [place]: []Total  [x] Moderate []Dependent    ALLERGIES:  No Known Allergies    MEDICATIONS  (STANDING):  apixaban 2.5 milliGRAM(s) Oral every 12 hours  aspirin enteric coated 81 milliGRAM(s) Oral daily  atorvastatin 80 milliGRAM(s) Oral at bedtime  calcium acetate 667 milliGRAM(s) Oral three times a day with meals  chlorhexidine 2% Cloths 1 Application(s) Topical daily  dextrose 40% Gel 15 Gram(s) Oral once  dextrose 5% + sodium chloride 0.45%. 1000 milliLiter(s) (40 mL/Hr) IV Continuous <Continuous>  dextrose 5%. 1000 milliLiter(s) (100 mL/Hr) IV Continuous <Continuous>  dextrose 5%. 1000 milliLiter(s) (50 mL/Hr) IV Continuous <Continuous>  dextrose 50% Injectable 25 Gram(s) IV Push once  dextrose 50% Injectable 12.5 Gram(s) IV Push once  dextrose 50% Injectable 25 Gram(s) IV Push once  donepezil 10 milliGRAM(s) Oral at bedtime  glucagon  Injectable 1 milliGRAM(s) IntraMuscular once  hydrALAZINE 25 milliGRAM(s) Oral two times a day  insulin glargine Injectable (LANTUS) 10 Unit(s) SubCutaneous at bedtime  insulin lispro (ADMELOG) corrective regimen sliding scale   SubCutaneous every 6 hours  metoprolol tartrate Injectable 2.5 milliGRAM(s) IV Push every 6 hours  pantoprazole    Tablet 40 milliGRAM(s) Oral before breakfast  QUEtiapine 25 milliGRAM(s) Oral daily  valproate sodium IVPB 250 milliGRAM(s) IV Intermittent three times a day    MEDICATIONS  (PRN):  sodium chloride 0.9% lock flush 10 milliLiter(s) IV Push every 1 hour PRN Pre/post blood products, medications, blood draw, and to maintain line patency    PRESENT SYMPTOMS: [x]Unable to obtain due to poor mentation/encephalopathy  Source if other than patient:  []Family   []Team     Pain: [ ] yes [ ] no  QOL impact -   Location -                    Aggravating Factors -  Quality -  Radiation -  Timing -  Severity (0-10 scale) -   Minimal Acceptable Level (0-10 scale) -    PAIN AD Score: 1  [email or web address] (press ctrl +  left click to view)    Dyspnea:                           []Mild  []Moderate []Severe  Anxiety:                             []Mild []Moderate []Severe  Fatigue:                             []Mild []Moderate []Severe  Nausea:                             []Mild []Moderate []Severe  Loss of Appetite:              []Mild []Moderate []Severe  Constipation:                    []Mild []Moderate []Severe    Other Symptoms:  []All other review of systems negative     Palliative Performance Status Version 2:  20%    [email or web address]    PHYSICAL EXAM:  GENERAL:  []Alert  []Oriented x   [x]Lethargic  []Cachexia  []Unarousable  []Verbal  [x]Non-Verbal  Behavioral:   [] Anxiety  [x] Delirium [x] Agitation [] Other  HEENT:  []Normal   [x]Dry mouth   []ET Tube/Trach  []Oral lesions  PULMONARY:   []Clear [x]Tachypnea  []Audible excessive secretions   []Rhonchi        []Right []Left []Bilateral  [x]Crackles        []Right []Left [x]Bilateral  []Wheezing     []Right []Left []Bilateral  CARDIOVASCULAR:    [x]Regular []Irregular []Tachy  []Ravi []Murmur []Other  GASTROINTESTINAL:  [x]Soft  [x]Distended   []+BS  [x]Non tender []Tender  []PEG []OGT/ NGT  Last BM: 6/2  GENITOURINARY:  []Normal [] Incontinent   []Oliguria/Anuria   []Murray  MUSCULOSKELETAL:   []Normal   []Weakness  [x]Bed/Wheelchair bound []Edema  NEUROLOGIC:   []No focal deficits  [x] Cognitive impairment  [x] Dysphagia [x]Dysarthria [] Paresis []Other   SKIN:   [x]Normal   []Pressure ulcer(s)  []Rash    CRITICAL CARE:  [ ] Shock Present  [ ]Septic [ ]Cardiogenic [ ]Neurologic [ ]Hypovolemic  [ ]  Vasopressors [ ]  Inotropes   [ ] Respiratory failure present [ ] Mechanical Ventilation [ ] Non-invasive ventilatory support [ ] High-Flow  [ ] Acute  [ ] Chronic [ ] Hypoxic  [ ] Hypercarbic [ ] Other  [ ] Other organ failure    Vital Signs Last 24 Hrs  T(C): 36.2 (03 Jun 2021 13:06), Max: 36.9 (02 Jun 2021 21:07)  T(F): 97.2 (03 Jun 2021 13:06), Max: 98.4 (02 Jun 2021 21:07)  HR: 97 (03 Jun 2021 13:06) (95 - 107)  BP: 125/79 (03 Jun 2021 13:06) (120/72 - 140/72)  BP(mean): --  RR: 17 (03 Jun 2021 13:06) (16 - 18)  SpO2: 100% (03 Jun 2021 13:06) (98% - 100%) I&O's Summary    LABS:                        10.0   11.94 )-----------( 236      ( 03 Jun 2021 07:19 )             33.6   06-03    141  |  96<L>  |  70<H>  ----------------------------<  161<H>  3.8   |  23  |  10.51<H>    Ca    9.5      03 Jun 2021 07:19  Phos  7.1     06-03  Mg     2.7     06-03    TPro  7.8  /  Alb  3.7  /  TBili  0.4  /  DBili  x   /  AST  34<H>  /  ALT  15  /  AlkPhos  69  06-03    RADIOLOGY & ADDITIONAL STUDIES:  `< from: CT Head No Cont (06.03.21 @ 09:54) >  Abnormal low-attenuation involving the right cerebellar region is again identified. This is likely compatible with an old infarct.  Abnormal low-attenuation involving the left posterior frontal parietal cortical subcortical region is again identified. This is likely compatible with a subacute left MCA infarct. There is localized mass effect seen consisting sulcal effacement. No significant shift or herniation seen.  Abnormal low-attenuation involving the right frontal subcortical region is identified as well. This could be compatible area of ischemia versus a subacute to chronic infarct.  Subtle area of low-attenuation seen involving left hector. This is best seen on series 2 image 15. This could be compatible with an age-indeterminate lacunar infarct.  Gyriform area of high attenuation is seen involving the right posterior frontal parietal region which is likely compatible with an area of laminar necrosis. This appears unchanged  Parenchymal volume loss and chronic microvascular ischemic changes again seen  Evaluation the osseous structures with the appropriate window appears normal  The visualized paranasal sinuses mastoid and middle ear regions appear clear.    PROTEIN CALORIE MALNUTRITION PRESENT: [ ]mild [ ]moderate [ ]severe [ ]underweight [ ]morbid obesity  []PPSV2 < or = to 30% []significant weight loss  []poor nutritional intake []catabolic state []anasarca     Albumin, Serum: 3.7 g/dL (06-03-21 @ 07:19)  Artificial Nutrition []     REFERRALS:   []Chaplaincy  []Hospice  []Child Life  [x]Social Work  []Case management []Holistic Therapy       PALLIATIVE MEDICINE COORDINATION OF CARE DOCUMENTATION: [x] Inpatient Consult   Non-Face-to-Face prolonged service provided that relates to (face-to-face) care that has or will occur and ongoing patient management, including one or more of the following: - Reviewed documentation from other physicians and other health care professional services - Reviewed medical records and diagnostic / radiology study results - Coordination with patient's support system  ************************************************************************  MEDICATION REVIEW:  apixaban 2.5 milliGRAM(s) Oral every 12 hours  aspirin enteric coated 81 milliGRAM(s) Oral daily  atorvastatin 80 milliGRAM(s) Oral at bedtime  calcium acetate 667 milliGRAM(s) Oral three times a day with meals  chlorhexidine 2% Cloths 1 Application(s) Topical daily  dextrose 40% Gel 15 Gram(s) Oral once  dextrose 5% + sodium chloride 0.45%. 1000 milliLiter(s) IV Continuous <Continuous>  dextrose 5%. 1000 milliLiter(s) IV Continuous <Continuous>  dextrose 5%. 1000 milliLiter(s) IV Continuous <Continuous>  dextrose 50% Injectable 25 Gram(s) IV Push once  dextrose 50% Injectable 12.5 Gram(s) IV Push once  dextrose 50% Injectable 25 Gram(s) IV Push once  donepezil 10 milliGRAM(s) Oral at bedtime  glucagon  Injectable 1 milliGRAM(s) IntraMuscular once  hydrALAZINE 25 milliGRAM(s) Oral two times a day  insulin glargine Injectable (LANTUS) 10 Unit(s) SubCutaneous at bedtime  insulin lispro (ADMELOG) corrective regimen sliding scale   SubCutaneous every 6 hours  metoprolol tartrate Injectable 2.5 milliGRAM(s) IV Push every 6 hours  pantoprazole    Tablet 40 milliGRAM(s) Oral before breakfast  QUEtiapine 25 milliGRAM(s) Oral daily  sodium chloride 0.9% lock flush 10 milliLiter(s) IV Push every 1 hour PRN  valproate sodium IVPB 250 milliGRAM(s) IV Intermittent three times a day    ISTOP REFERENCE: [id number]  03/04/2021 03/10/2021 clonazepam 1 mg tablet 30 30 Magdy Kothari MD [id number] Medicare   12/19/2020 12/22/2020 clonazepam 1 mg tablet 30 30 TayeMagdy MD [id number] Medicare   11/30/2020 12/02/2020 clonazepam 0.5 mg tablet 30 30 TayeMagdy MD [id number] Medicare   11/02/2020 11/02/2020 clonazepam 0.5 mg tablet 30 30 TayeMagdy MD [id number] Medicare   08/01/2020 08/07/2020 clonazepam 0.5 mg tablet 30 30 TayeMagdy MD [id number] Medicare   05/30/2020 06/08/2020 clonazepam 0.5 mg tablet 30 30 TayeMagdy MD [id number] Medicare  - PRN usage: NO PRN'S  ------------------------------------------------------------------------  COORDINATION OF CARE:  - Palliative Care consulted for: [facility]  - Patient assessed:  - Patient previously seen by Palliative Care service: NO    ADVANCE CARE PLANNING  - Code status: FULL  - MOLST reviewed in chart: NONE  - HCP/ Surrogate: Julius Livingston, [phone]  - [facility] document found in Alpha: NONE  - HCP/ Living will/ Other advanced directives in Alpha: NONE  ------------------------------------------------------------------------  CARE PROVIDER DOCUMENTATION:  - SW/CM notes: Remains medically active  - ID notes: Cont rocephin, f/u final UCx sensis  - Renal notes: s/p IR for shiley s/p HD 6/1; given fluid sec to hypotension. plan HD today  - Neuro notes: etiology of confusion likely toxic metabolic encephalopathy and L MCA infarct. appropriate for palliative at this time. OK for continuation of DOAC  - Cards notes: echo from previous admission with normal LV function. Agitated saline injection is inconclusive regarding the presence of a patent foramen ovale  - Vasc Sx notes: Patient would not be a candidate for invasive procedures such as fistulogram. If decision is made to proceed with continued treatment such as dialysis, recommend using tunneled dialysis catheter     PLAN OF CARE  - Known admissions in past year: 1  - Current admit date: 5/31/21  - LOS: 3  - LACE score: 12  - Current dispo plan: TO BE DETERMINED -> LTC vs Inpatient Hospice  ------------------------------------------------------------------------  - Time Spent/Chart reviewed: 32 Minutes [including time used to gather, review and transfer data]  - Start: 10:00AM  - End: 10:32AM    Prolonged services rendered, as part of this patient's care provided by Palliative Medicine, include: i. chart review for provider and ancillary service documentation, ii. pertinent diagnostics including laboratory and imaging studies, iii. medication review including PRN use, iv. admission history including previous palliative care encounters and [facility] notes, v. advance care planning documents including HCP and MOLST forms in Alpha. Part of Palliative Medicine extended evaluation and management also involves coordination of care with our IDT, the primary and consulting josephine, and unit CM/SW and Hospice if eligible. Recommendations based on the information gathered and discussed are outline in the AP of our notes.  [facility] Geriatrics and Palliative Care  Raúl Guido, Palliative Care Attending  Contact Info: Page [phone] (including Nights/Weekend), message on Microsoft Teams (Raúl Guido), or leave VM at Palliative Office [phone] (Non-Urgent)    HPI:   Patient is a 82 Y/O Female with PMHx of CVA recent ischemic stroke, HTN, DM, A fib on Eliquis, ESRD on HD (MWF) brought to ED after malfunctioning AVF for HD today. unable to do HD. was transferred to the hospital. patient at baseline is mildly confused and unable to give history. denies of any discomfort. (31 May 2021 17:29) Patient unable to participate in interview. Appears overtly comfortable at this time. Collateral obtained from extensive chart review and conversation with patient's son as noted below.    PERTINENT PM/SXH:   DM (diabetes mellitus)  HTN (hypertension)  Hypercholesteremia  CKD (chronic kidney disease)  Anemia  Atrial fibrillation  Cerebrovascular accident (CVA)  Status post right foot surgery  AV fistula  H/O colonoscopy with polypectomy    FAMILY HISTORY:  Family history of hypertension (Sibling)  Family history of diabetes mellitus  Family history of lung cancer (Sibling)  Family history of malignant neoplasm of gastrointestinal tract    ITEMS NOT CHECKED ARE NOT PRESENT    SOCIAL HISTORY:   Significant other/partner:  []  Children:  [x]  Jew/Spirituality:  Substance hx:  []   Tobacco hx:  []   Alcohol hx: []   Home Opioid hx:  [] I-Stop Reference No: [id number]  03/04/2021 03/10/2021 clonazepam 1 mg tablet 30 30 Magdy Kothari MD [id number] Medicare   12/19/2020 12/22/2020 clonazepam 1 mg tablet 30 30 TayeMagdy ryna MD [id number] Medicare   11/30/2020 12/02/2020 clonazepam 0.5 mg tablet 30 30 TayeMagdy ryan MD [id number] Medicare   11/02/2020 11/02/2020 clonazepam 0.5 mg tablet 30 30 Magdy Kothari MD [id number] Medicare   08/01/2020 08/07/2020 clonazepam 0.5 mg tablet 30 30 TayeMagdy ryan MD [id number] Medicare   05/30/2020 06/08/2020 clonazepam 0.5 mg tablet 30 30 Magdy Kothari MD [id number] Medicare  Living Situation: []Home  []Long term care  [x]Rehab []Other    ADVANCE DIRECTIVES:    DNR []MOLST  []Living Will  DECISION MAKER(s):  [] Health Care Proxy(s)  [x] Surrogate(s)  [] Guardian           Name(s): Julius Livingston              Phone Number(s): [phone]    BASELINE (I)ADL(s) (prior to admission):  [place]: []Total  [x] Moderate []Dependent    ALLERGIES:  No Known Allergies    MEDICATIONS  (STANDING):  apixaban 2.5 milliGRAM(s) Oral every 12 hours  aspirin enteric coated 81 milliGRAM(s) Oral daily  atorvastatin 80 milliGRAM(s) Oral at bedtime  calcium acetate 667 milliGRAM(s) Oral three times a day with meals  chlorhexidine 2% Cloths 1 Application(s) Topical daily  dextrose 40% Gel 15 Gram(s) Oral once  dextrose 5% + sodium chloride 0.45%. 1000 milliLiter(s) (40 mL/Hr) IV Continuous <Continuous>  dextrose 5%. 1000 milliLiter(s) (100 mL/Hr) IV Continuous <Continuous>  dextrose 5%. 1000 milliLiter(s) (50 mL/Hr) IV Continuous <Continuous>  dextrose 50% Injectable 25 Gram(s) IV Push once  dextrose 50% Injectable 12.5 Gram(s) IV Push once  dextrose 50% Injectable 25 Gram(s) IV Push once  donepezil 10 milliGRAM(s) Oral at bedtime  glucagon  Injectable 1 milliGRAM(s) IntraMuscular once  hydrALAZINE 25 milliGRAM(s) Oral two times a day  insulin glargine Injectable (LANTUS) 10 Unit(s) SubCutaneous at bedtime  insulin lispro (ADMELOG) corrective regimen sliding scale   SubCutaneous every 6 hours  metoprolol tartrate Injectable 2.5 milliGRAM(s) IV Push every 6 hours  pantoprazole    Tablet 40 milliGRAM(s) Oral before breakfast  QUEtiapine 25 milliGRAM(s) Oral daily  valproate sodium IVPB 250 milliGRAM(s) IV Intermittent three times a day    MEDICATIONS  (PRN):  sodium chloride 0.9% lock flush 10 milliLiter(s) IV Push every 1 hour PRN Pre/post blood products, medications, blood draw, and to maintain line patency    PRESENT SYMPTOMS: [x]Unable to obtain due to poor mentation/encephalopathy  Source if other than patient:  []Family   []Team     Pain: [ ] yes [ ] no  QOL impact -   Location -                    Aggravating Factors -  Quality -  Radiation -  Timing -  Severity (0-10 scale) -   Minimal Acceptable Level (0-10 scale) -    PAIN AD Score: 1  [email or web address] (press ctrl +  left click to view)    Dyspnea:                           []Mild  []Moderate []Severe  Anxiety:                             []Mild []Moderate []Severe  Fatigue:                             []Mild []Moderate []Severe  Nausea:                             []Mild []Moderate []Severe  Loss of Appetite:              []Mild []Moderate []Severe  Constipation:                    []Mild []Moderate []Severe    Other Symptoms:  []All other review of systems negative     Palliative Performance Status Version 2:  20%    [email or web address]    PHYSICAL EXAM:  GENERAL:  []Alert  []Oriented x   [x]Lethargic  []Cachexia  []Unarousable  []Verbal  [x]Non-Verbal  Behavioral:   [] Anxiety  [x] Delirium [x] Agitation [] Other  HEENT:  []Normal   [x]Dry mouth   []ET Tube/Trach  []Oral lesions  PULMONARY:   []Clear [x]Tachypnea  []Audible excessive secretions   []Rhonchi        []Right []Left []Bilateral  [x]Crackles        []Right []Left [x]Bilateral  []Wheezing     []Right []Left []Bilateral  CARDIOVASCULAR:    [x]Regular []Irregular []Tachy  []Ravi []Murmur []Other  GASTROINTESTINAL:  [x]Soft  [x]Distended   []+BS  [x]Non tender []Tender  []PEG []OGT/ NGT  Last BM: 6/2  GENITOURINARY:  []Normal [] Incontinent   []Oliguria/Anuria   []Murray  MUSCULOSKELETAL:   []Normal   []Weakness  [x]Bed/Wheelchair bound []Edema  NEUROLOGIC:   []No focal deficits  [x] Cognitive impairment  [x] Dysphagia [x]Dysarthria [] Paresis []Other   SKIN:   [x]Normal   []Pressure ulcer(s)  []Rash    CRITICAL CARE:  [ ] Shock Present  [ ]Septic [ ]Cardiogenic [ ]Neurologic [ ]Hypovolemic  [ ]  Vasopressors [ ]  Inotropes   [ ] Respiratory failure present [ ] Mechanical Ventilation [ ] Non-invasive ventilatory support [ ] High-Flow  [ ] Acute  [ ] Chronic [ ] Hypoxic  [ ] Hypercarbic [ ] Other  [ ] Other organ failure    Vital Signs Last 24 Hrs  T(C): 36.2 (03 Jun 2021 13:06), Max: 36.9 (02 Jun 2021 21:07)  T(F): 97.2 (03 Jun 2021 13:06), Max: 98.4 (02 Jun 2021 21:07)  HR: 97 (03 Jun 2021 13:06) (95 - 107)  BP: 125/79 (03 Jun 2021 13:06) (120/72 - 140/72)  BP(mean): --  RR: 17 (03 Jun 2021 13:06) (16 - 18)  SpO2: 100% (03 Jun 2021 13:06) (98% - 100%) I&O's Summary    LABS:                        10.0   11.94 )-----------( 236      ( 03 Jun 2021 07:19 )             33.6   06-03    141  |  96<L>  |  70<H>  ----------------------------<  161<H>  3.8   |  23  |  10.51<H>    Ca    9.5      03 Jun 2021 07:19  Phos  7.1     06-03  Mg     2.7     06-03    TPro  7.8  /  Alb  3.7  /  TBili  0.4  /  DBili  x   /  AST  34<H>  /  ALT  15  /  AlkPhos  69  06-03    RADIOLOGY & ADDITIONAL STUDIES:  `< from: CT Head No Cont (06.03.21 @ 09:54) >  Abnormal low-attenuation involving the right cerebellar region is again identified. This is likely compatible with an old infarct.  Abnormal low-attenuation involving the left posterior frontal parietal cortical subcortical region is again identified. This is likely compatible with a subacute left MCA infarct. There is localized mass effect seen consisting sulcal effacement. No significant shift or herniation seen.  Abnormal low-attenuation involving the right frontal subcortical region is identified as well. This could be compatible area of ischemia versus a subacute to chronic infarct.  Subtle area of low-attenuation seen involving left hector. This is best seen on series 2 image 15. This could be compatible with an age-indeterminate lacunar infarct.  Gyriform area of high attenuation is seen involving the right posterior frontal parietal region which is likely compatible with an area of laminar necrosis. This appears unchanged  Parenchymal volume loss and chronic microvascular ischemic changes again seen  Evaluation the osseous structures with the appropriate window appears normal  The visualized paranasal sinuses mastoid and middle ear regions appear clear.    PROTEIN CALORIE MALNUTRITION PRESENT: [ ]mild [ ]moderate [ ]severe [ ]underweight [ ]morbid obesity  []PPSV2 < or = to 30% []significant weight loss  []poor nutritional intake []catabolic state []anasarca     Albumin, Serum: 3.7 g/dL (06-03-21 @ 07:19)  Artificial Nutrition []     REFERRALS:   []Chaplaincy  []Hospice  []Child Life  [x]Social Work  []Case management []Holistic Therapy       PALLIATIVE MEDICINE COORDINATION OF CARE DOCUMENTATION: [x] Inpatient Consult   Non-Face-to-Face prolonged service provided that relates to (face-to-face) care that has or will occur and ongoing patient management, including one or more of the following: - Reviewed documentation from other physicians and other health care professional services - Reviewed medical records and diagnostic / radiology study results - Coordination with patient's support system  ************************************************************************  MEDICATION REVIEW:  apixaban 2.5 milliGRAM(s) Oral every 12 hours  aspirin enteric coated 81 milliGRAM(s) Oral daily  atorvastatin 80 milliGRAM(s) Oral at bedtime  calcium acetate 667 milliGRAM(s) Oral three times a day with meals  chlorhexidine 2% Cloths 1 Application(s) Topical daily  dextrose 40% Gel 15 Gram(s) Oral once  dextrose 5% + sodium chloride 0.45%. 1000 milliLiter(s) IV Continuous <Continuous>  dextrose 5%. 1000 milliLiter(s) IV Continuous <Continuous>  dextrose 5%. 1000 milliLiter(s) IV Continuous <Continuous>  dextrose 50% Injectable 25 Gram(s) IV Push once  dextrose 50% Injectable 12.5 Gram(s) IV Push once  dextrose 50% Injectable 25 Gram(s) IV Push once  donepezil 10 milliGRAM(s) Oral at bedtime  glucagon  Injectable 1 milliGRAM(s) IntraMuscular once  hydrALAZINE 25 milliGRAM(s) Oral two times a day  insulin glargine Injectable (LANTUS) 10 Unit(s) SubCutaneous at bedtime  insulin lispro (ADMELOG) corrective regimen sliding scale   SubCutaneous every 6 hours  metoprolol tartrate Injectable 2.5 milliGRAM(s) IV Push every 6 hours  pantoprazole    Tablet 40 milliGRAM(s) Oral before breakfast  QUEtiapine 25 milliGRAM(s) Oral daily  sodium chloride 0.9% lock flush 10 milliLiter(s) IV Push every 1 hour PRN  valproate sodium IVPB 250 milliGRAM(s) IV Intermittent three times a day    ISTOP REFERENCE: [id number]  03/04/2021 03/10/2021 clonazepam 1 mg tablet 30 30 Magdy Kothari MD [id number] Medicare   12/19/2020 12/22/2020 clonazepam 1 mg tablet 30 30 TayeMagdy MD [id number] Medicare   11/30/2020 12/02/2020 clonazepam 0.5 mg tablet 30 30 TayeMagdy MD [id number] Medicare   11/02/2020 11/02/2020 clonazepam 0.5 mg tablet 30 30 TayeMagdy MD [id number] Medicare   08/01/2020 08/07/2020 clonazepam 0.5 mg tablet 30 30 TayeMagdy MD [id number] Medicare   05/30/2020 06/08/2020 clonazepam 0.5 mg tablet 30 30 TayeMagdy MD [id number] Medicare  - PRN usage: NO PRN'S  ------------------------------------------------------------------------  COORDINATION OF CARE:  - Palliative Care consulted for: [facility]  - Patient assessed:  - Patient previously seen by Palliative Care service: NO    ADVANCE CARE PLANNING  - Code status: FULL  - MOLST reviewed in chart: NONE  - HCP/ Surrogate: Julius Livingston, [phone]  - [facility] document found in Alpha: NONE  - HCP/ Living will/ Other advanced directives in Alpha: NONE  ------------------------------------------------------------------------  CARE PROVIDER DOCUMENTATION:  - SW/CM notes: Remains medically active  - ID notes: Cont rocephin, f/u final UCx sensis  - Renal notes: s/p IR for shiley s/p HD 6/1; given fluid sec to hypotension. plan HD today  - Neuro notes: etiology of confusion likely toxic metabolic encephalopathy and L MCA infarct. appropriate for palliative at this time. OK for continuation of DOAC  - Cards notes: echo from previous admission with normal LV function. Agitated saline injection is inconclusive regarding the presence of a patent foramen ovale  - Vasc Sx notes: Patient would not be a candidate for invasive procedures such as fistulogram. If decision is made to proceed with continued treatment such as dialysis, recommend using tunneled dialysis catheter     PLAN OF CARE  - Known admissions in past year: 1  - Current admit date: 5/31/21  - LOS: 3  - LACE score: 12  - Current dispo plan: TO BE DETERMINED -> LTC vs Inpatient Hospice  ------------------------------------------------------------------------  - Time Spent/Chart reviewed: 32 Minutes [including time used to gather, review and transfer data]  - Start: 10:00AM  - End: 10:32AM    Prolonged services rendered, as part of this patient's care provided by Palliative Medicine, include: i. chart review for provider and ancillary service documentation, ii. pertinent diagnostics including laboratory and imaging studies, iii. medication review including PRN use, iv. admission history including previous palliative care encounters and [facility] notes, v. advance care planning documents including HCP and MOLST forms in Alpha. Part of Palliative Medicine extended evaluation and management also involves coordination of care with our IDT, the primary and consulting josephine, and unit CM/SW and Hospice if eligible. Recommendations based on the information gathered and discussed are outline in the AP of our notes.

## 2021-06-03 NOTE — PROGRESS NOTE ADULT - ASSESSMENT
82 Y/O Female with PMHx of CVA recent ischemic stroke, HTN, DM, A fib on Eliquis, ESRD on HD (MWF) brought to ED after malfunctioning AVF for HD today and worsening AMS    EKG: None in chart    1. AMS  -CT head with acute MCA infarct  -echo from previous admission with normal LV function. Agitated saline injection is inconclusive regarding the  presence of a patent foramen ovale.  -f/u neuro   -f/u palliative for GOC    2. Afib  -on eliquis  -c/w metoprolol    3. HTN  -controlled  -c/w hydralazine and metoprolol  -continue to monitor BP    4. ESRD  -on HD  -f/u renal

## 2021-06-03 NOTE — CONSULT NOTE ADULT - ASSESSMENT
·	extensive ACP meeting with patient's son to establish next steps: he understands the importance of DNR/DNI to protect the patient but wants to speak to patient's sister before finalizing MOLST -> he will call when its ok to place DNR order. He is struggling with the idea of withholding HD from the patient but he understands that continuing HD if no meaningful recovery is expected would likely prolong suffering  ·	introduced the role of hospice if the decision was made to stop HD, patient would likely become a candidate for inpatient hospice  ·	discussed expected disease trajectory with son, he is understanding of the situation and does not want the patient to suffer but before finalizing any decisions he has to discuss with his family as he does not want to cause added strife  ·	if any emergent occurs with the patient while we await son's callback, notify him and he will likely accept DNR/DNI w/ comfort measures 82yo F with PMH of HTN, T2DM, AFib, ESRD, and R MCA CVA p/w malfunctioning AVF c/b new L MCA CVA. Palliative consulted for complex medical decision making in the setting of life-limiting illness.    ·	extensive ACP meeting with patient's son to establish next steps: he understands the importance of DNR/DNI to protect the patient but wants to speak to patient's sister before finalizing MOLST -> he will call when its ok to place DNR order. He is struggling with the idea of withholding HD from the patient but he understands that continuing HD would prolong suffering if no meaningful recovery is expected   ·	introduced the role of hospice if the decision was made to stop HD, patient would likely become a candidate for inpatient hospice  ·	discussed expected disease trajectory with son, he is understanding of the situation and does not want the patient to suffer but before finalizing any decisions he has to discuss with his family as he does not want to cause added strife  ·	if any emergent occurs with the patient while we await son's callback, notify him and he will likely accept DNR/DNI w/ comfort measures

## 2021-06-03 NOTE — PROGRESS NOTE ADULT - SUBJECTIVE AND OBJECTIVE BOX
Infectious Diseases progress note:    Subjective:  NAD, afebrile.  WBC downtrending.  Repeat CTH unchanged.  EEG w/o seizures    ROS:  Pt nonverbal, eyes closed.  Unable to assess    Allergies    No Known Allergies    Intolerances        ANTIBIOTICS/RELEVANT:  antimicrobials    immunologic:    OTHER:  apixaban 2.5 milliGRAM(s) Oral every 12 hours  aspirin enteric coated 81 milliGRAM(s) Oral daily  atorvastatin 80 milliGRAM(s) Oral at bedtime  calcium acetate 667 milliGRAM(s) Oral three times a day with meals  chlorhexidine 2% Cloths 1 Application(s) Topical daily  dextrose 40% Gel 15 Gram(s) Oral once  dextrose 5% + sodium chloride 0.45%. 1000 milliLiter(s) IV Continuous <Continuous>  dextrose 5%. 1000 milliLiter(s) IV Continuous <Continuous>  dextrose 5%. 1000 milliLiter(s) IV Continuous <Continuous>  dextrose 50% Injectable 25 Gram(s) IV Push once  dextrose 50% Injectable 12.5 Gram(s) IV Push once  dextrose 50% Injectable 25 Gram(s) IV Push once  donepezil 10 milliGRAM(s) Oral at bedtime  glucagon  Injectable 1 milliGRAM(s) IntraMuscular once  hydrALAZINE 25 milliGRAM(s) Oral two times a day  insulin glargine Injectable (LANTUS) 10 Unit(s) SubCutaneous at bedtime  insulin lispro (ADMELOG) corrective regimen sliding scale   SubCutaneous every 6 hours  metoprolol tartrate Injectable 2.5 milliGRAM(s) IV Push every 6 hours  pantoprazole    Tablet 40 milliGRAM(s) Oral before breakfast  QUEtiapine 25 milliGRAM(s) Oral daily  sodium chloride 0.9% lock flush 10 milliLiter(s) IV Push every 1 hour PRN  valproate sodium IVPB 250 milliGRAM(s) IV Intermittent three times a day      Objective:  Vital Signs Last 24 Hrs  T(C): 36.2 (03 Jun 2021 13:06), Max: 36.9 (02 Jun 2021 21:07)  T(F): 97.2 (03 Jun 2021 13:06), Max: 98.4 (02 Jun 2021 21:07)  HR: 97 (03 Jun 2021 13:06) (92 - 107)  BP: 125/79 (03 Jun 2021 13:06) (110/57 - 140/72)  BP(mean): --  RR: 17 (03 Jun 2021 13:06) (16 - 18)  SpO2: 100% (03 Jun 2021 13:06) (97% - 100%)    PHYSICAL EXAM:  Constitutional:NAD  Eyes:PAPITO, EOMI  Ear/Nose/Throat: no thrush, mucositis.  Moist mucous membranes	  Neck:no JVD, no lymphadenopathy, supple  Respiratory: CTA shivani  Cardiovascular: S1S2 RRR, no murmurs  Gastrointestinal:soft, nontender,  nondistended (+) BS  Extremities:no e/e/c  Skin:  no rashes, open wounds or ulcerations        LABS:                        10.0   11.94 )-----------( 236      ( 03 Jun 2021 07:19 )             33.6     06-03    141  |  96<L>  |  70<H>  ----------------------------<  161<H>  3.8   |  23  |  10.51<H>    Ca    9.5      03 Jun 2021 07:19  Phos  7.1     06-03  Mg     2.7     06-03    TPro  7.8  /  Alb  3.7  /  TBili  0.4  /  DBili  x   /  AST  34<H>  /  ALT  15  /  AlkPhos  69  06-03          Procalcitonin, Serum: 0.87 (06-02 @ 02:13)            Rapid RVP Result: [facility]          MICROBIOLOGY:    Culture - Blood (06.01.21 @ 02:58)   Specimen Source: .Blood Blood   Culture Results:   No growth to date.     Culture - Urine (05.31.21 @ 23:12)   Specimen Source: .Urine Catheterized   Culture Results:   >100,000 CFU/ml Enterobacter cloacae complex Susceptibility to follow.     RADIOLOGY & ADDITIONAL STUDIES:    < from: CT Head No Cont (06.03.21 @ 09:54) >  EXAM:  CT BRAIN        PROCEDURE DATE:  Scott  3 2021         INTERPRETATION:  Clinical indication: Follow-up CVA.    Multiple axial sections were performed from base of skull to vertex without contrast enhancement. Coronal and sagittal reconstructions were performed as well    Abnormal low-attenuation involving the right cerebellar region is again identified. This is likely compatible with an old infarct.    Abnormal low-attenuation involving the left posterior frontal parietal cortical subcortical region is again identified. This is likely compatible with a subacute left MCA infarct. There is localized mass effect seen consisting sulcal effacement. No significant shift or herniation seen.    Abnormal low-attenuation involving the right frontal subcortical region is identified as well. This could be compatible area of ischemia versus a subacute to chronic infarct.    Subtle area of low-attenuation seen involving left hector. This is best seen on series 2 image 15. This could be compatible with an age-indeterminate lacunar infarct.    Gyriform area of high attenuation is seen involving the right posterior frontal parietal region which is likely compatible with an area of laminar necrosis. This appears unchanged    Parenchymal volume loss and chronic microvascular ischemic changes again seen    Evaluation the osseous structures with the appropriate window appears normal    The visualized paranasal sinuses mastoid and middle ear regions appear clear.    IMPRESSION: Infarcts as described above.    < end of copied text >

## 2021-06-03 NOTE — CHART NOTE - NSCHARTNOTEFT_GEN_A_CORE
Patient noted to have L MCA infarct in setting of previous R MCA infarct. Ongoing goals of care discussions with family and likely move to comfort measures and hospice care.    Vascular Surgery was previously consulted for clotted fistula and patient was pending arterial duplex and fistulogram. Patient would not be a candidate for invasive procedures such as fistulogram. If decision is made to proceed with continued treatment such as dialysis, recommend using tunneled dialysis catheter     Vascular Surgery [id number]

## 2021-06-03 NOTE — CONSULT NOTE ADULT - PROBLEM SELECTOR RECOMMENDATION 9
.  In the setting of CVA and Delirium  -DepaCon will help manage agitation  -can use Haldol 0.5mg IV q6h PRN for Agitation if necessary

## 2021-06-03 NOTE — CONSULT NOTE ADULT - CONSULT REASON
Sepsis
Symptom Management and Complex Medical Decision Making/GOC in the setting of Advanced Illness
ESRD
clotted AVF
AMS
Hemodialysis access.
AMS

## 2021-06-03 NOTE — PROGRESS NOTE ADULT - SUBJECTIVE AND OBJECTIVE BOX
Name of Patient : VINCENT GROSS  MRN: [id number]  DATE OF SERVICE: 06-03-21 @ 10:50    Subjective: Patient seen and examined. No new events except as noted.   confused, poor verbal     MEDICATIONS:  MEDICATIONS  (STANDING):  apixaban 2.5 milliGRAM(s) Oral every 12 hours  aspirin enteric coated 81 milliGRAM(s) Oral daily  atorvastatin 80 milliGRAM(s) Oral at bedtime  calcium acetate 667 milliGRAM(s) Oral three times a day with meals  chlorhexidine 2% Cloths 1 Application(s) Topical daily  dextrose 40% Gel 15 Gram(s) Oral once  dextrose 5% + sodium chloride 0.45%. 1000 milliLiter(s) (40 mL/Hr) IV Continuous <Continuous>  dextrose 5%. 1000 milliLiter(s) (100 mL/Hr) IV Continuous <Continuous>  dextrose 5%. 1000 milliLiter(s) (50 mL/Hr) IV Continuous <Continuous>  dextrose 50% Injectable 25 Gram(s) IV Push once  dextrose 50% Injectable 12.5 Gram(s) IV Push once  dextrose 50% Injectable 25 Gram(s) IV Push once  donepezil 10 milliGRAM(s) Oral at bedtime  glucagon  Injectable 1 milliGRAM(s) IntraMuscular once  hydrALAZINE 25 milliGRAM(s) Oral two times a day  insulin glargine Injectable (LANTUS) 10 Unit(s) SubCutaneous at bedtime  insulin lispro (ADMELOG) corrective regimen sliding scale   SubCutaneous every 6 hours  metoprolol tartrate Injectable 2.5 milliGRAM(s) IV Push every 6 hours  pantoprazole    Tablet 40 milliGRAM(s) Oral before breakfast  QUEtiapine 25 milliGRAM(s) Oral daily  valproate sodium IVPB 250 milliGRAM(s) IV Intermittent three times a day      PHYSICAL EXAM:  T(C): 36.7 (06-03-21 @ 04:20), Max: 36.9 (06-02-21 @ 21:07)  HR: 98 (06-03-21 @ 04:20) (92 - 107)  BP: 120/72 (06-03-21 @ 04:20) (95/42 - 140/72)  RR: 16 (06-03-21 @ 04:20) (16 - 18)  SpO2: 100% (06-03-21 @ 04:20) (97% - 100%)  Wt(kg): --  I&O's Summary        Appearance: lethargic 	  HEENT: dry OM   Lymphatic: No lymphadenopathy   Cardiovascular: Normal S1 S2   Respiratory: normal effort , clear  Gastrointestinal:  Soft, Non-tender  Skin: No rashes,  warm to touch  Psychiatry:  lethargic   Musculuskeletal: No edema      All labs, Imaging and EKGs personally reviewed                           10.0   11.94 )-----------( 236      ( 03 Jun 2021 07:19 )             33.6               06-03    141  |  96<L>  |  70<H>  ----------------------------<  161<H>  3.8   |  23  |  10.51<H>    Ca    9.5      03 Jun 2021 07:19  Phos  7.1     06-03  Mg     2.7     06-03    TPro  7.8  /  Alb  3.7  /  TBili  0.4  /  DBili  x   /  AST  34<H>  /  ALT  15  /  AlkPhos  69  06-03                     ABG - ( 01 Jun 2021 23:54 )  pH, Arterial: 7.57  pH, Blood: x     /  pCO2: 21    /  pO2: 234   / HCO3: 23    / Base Excess: -2.7  /  SaO2: 99.8      < from: CT Head No Cont (06.03.21 @ 09:54) >  Multiple axial sections were performed from base of skull to vertex without contrast enhancement. Coronal and sagittal reconstructions were performed as well    Abnormal low-attenuation involving the right cerebellar region is again identified. This is likely compatible with an old infarct.    Abnormal low-attenuation involving the left posterior frontal parietal cortical subcortical region is again identified. This is likely compatible with a subacute left MCA infarct. There is localized mass effect seen consisting sulcal effacement. No significant shift or herniation seen.    Abnormal low-attenuation involving the right frontal subcortical region is identified as well. This could be compatible area of ischemia versus a subacute to chronic infarct.    Subtle area of low-attenuation seen involving left hector. This is best seen on series 2 image 15. This could be compatible with an age-indeterminate lacunar infarct.    Gyriform area of high attenuation is seen involving the right posterior frontal parietal region which is likely compatible with an area of laminar necrosis. This appears unchanged    Parenchymal volume loss and chronic microvascular ischemic changes again seen    Evaluation the osseous structures with the appropriate window appears normal    The visualized paranasal sinuses mastoid and middle ear regions appear clear.    IMPRESSION: Infarcts as described above.

## 2021-06-03 NOTE — PROGRESS NOTE ADULT - ASSESSMENT
80 yo RH AA F with multiple old stroke R AICA and R parietal (recent), B/L BG and thalami, afib on eliquis, HTN, DM, ESRD on HD p/w malfunctioning AVF for HD. confused.  last admission, A1c 7.1, . MRI brain R parietal infarct, CTA with mild to mod atherosclerotic disease noted. LE doppler neg for DVT. Etiology for stroke Embolic from Afib. called for AMS. RRT last night for hypoxia to 80s improved on NRB.  + leukocytosis CXR neg.   + UA, CTH with chronic infarcts but new L MCA territory. repeat CTH stable. eeg w/o seizures   ETiology of confusion likely toxic metabolic encephalopathy and L MCA infarct.    - appropriate for palliative at this time.   - ceftriaxone for UTI  - okay for continuation of DOAC, eliquis  2.5mg bid. also on ASA 81  - pulmo eval hypoxia   - High dose statin therapy - atorvastatin 40mg PO daily. LDL goal <70mg/dL.  - need better DM and cholesterol control  - telemetry  - PT/OT/SS/SLP, OOBC  - check FS, glucose control <180  - GI/DVT ppx  - Counseling on diet, exercise, and medication adherence was done  - Counseling on smoking cessation and alcohol consumption offered when appropriate.  - Pain assessed and judicious use of narcotics when appropriate was discussed.    - Stroke education given when appropriate.  - Importance of fall prevention discussed.   - Differential diagnosis and plan of care discussed with patient and/or family and primary team  - Thank you for allowing me to participate in the care of this patient. Call with questions.    José Miguel Renee MD  Vascular Neurology  Office: [phone]

## 2021-06-03 NOTE — PROGRESS NOTE ADULT - SUBJECTIVE AND OBJECTIVE BOX
[facility] [place] [facility]   MD JAJA SMITH DO ANAM SIDDIQUI ANGELA WONG, PA    TEL:  OFFICE: [phone]  DR CASTILLO CELL: [phone]  DR. HARRISON CELL: [phone]  DR. RILEY CELL: [phone]  LIZZY DHILLON CELL: [phone]    From 5pm-7am Answering Service [phone]    -- RENAL FOLLOW UP NOTE ---Date of Service 06-03-21 @ 14:43    Patient is a 81y old  Female who presents with a chief complaint of AVF malfunction (03 Jun 2021 12:02)      Patient seen and examined at bedside.     VITALS:  T(F): 97.2 (06-03-21 @ 13:06), Max: 98.4 (06-02-21 @ 21:07)  HR: 97 (06-03-21 @ 13:06)  BP: 125/79 (06-03-21 @ 13:06)  RR: 17 (06-03-21 @ 13:06)  SpO2: 100% (06-03-21 @ 13:06)  Wt(kg): --        PHYSICAL EXAM:  Constitutional: letharbid  Neck: No JVD  Respiratory: CTAB, no wheezes, rales or rhonchi  Cardiovascular: S1, S2, RRR  Gastrointestinal: BS+, soft, NT/ND  Extremities: No peripheral edema    Hospital Medications:   MEDICATIONS  (STANDING):  apixaban 2.5 milliGRAM(s) Oral every 12 hours  aspirin enteric coated 81 milliGRAM(s) Oral daily  atorvastatin 80 milliGRAM(s) Oral at bedtime  calcium acetate 667 milliGRAM(s) Oral three times a day with meals  chlorhexidine 2% Cloths 1 Application(s) Topical daily  dextrose 40% Gel 15 Gram(s) Oral once  dextrose 5% + sodium chloride 0.45%. 1000 milliLiter(s) (40 mL/Hr) IV Continuous <Continuous>  dextrose 5%. 1000 milliLiter(s) (100 mL/Hr) IV Continuous <Continuous>  dextrose 5%. 1000 milliLiter(s) (50 mL/Hr) IV Continuous <Continuous>  dextrose 50% Injectable 25 Gram(s) IV Push once  dextrose 50% Injectable 12.5 Gram(s) IV Push once  dextrose 50% Injectable 25 Gram(s) IV Push once  donepezil 10 milliGRAM(s) Oral at bedtime  glucagon  Injectable 1 milliGRAM(s) IntraMuscular once  hydrALAZINE 25 milliGRAM(s) Oral two times a day  insulin glargine Injectable (LANTUS) 10 Unit(s) SubCutaneous at bedtime  insulin lispro (ADMELOG) corrective regimen sliding scale   SubCutaneous every 6 hours  metoprolol tartrate Injectable 2.5 milliGRAM(s) IV Push every 6 hours  pantoprazole    Tablet 40 milliGRAM(s) Oral before breakfast  QUEtiapine 25 milliGRAM(s) Oral daily  valproate sodium IVPB 250 milliGRAM(s) IV Intermittent three times a day      LABS:  06-03    141  |  96<L>  |  70<H>  ----------------------------<  161<H>  3.8   |  23  |  10.51<H>    Ca    9.5      03 Jun 2021 07:19  Phos  7.1     06-03  Mg     2.7     06-03    TPro  7.8  /  Alb  3.7  /  TBili  0.4  /  DBili      /  AST  34<H>  /  ALT  15  /  AlkPhos  69  06-03    Creatinine Trend: 10.51 <--, 7.85 <--, 13.22 <--, 11.85 <--    Albumin, Serum: 3.7 g/dL (06-03 @ 07:19)  Phosphorus Level, Serum: 7.1 mg/dL (06-03 @ 07:19)                              10.0   11.94 )-----------( 236      ( 03 Jun 2021 07:19 )             33.6     Urine Studies:  Urinalysis - [05-31-21 @ 19:13]      Color Dark Brown / Appearance Turbid / SG 1.019 / pH 7.5      Gluc Negative / Ketone Negative  / Bili Negative / Urobili <2 mg/dL       Blood Moderate / Protein 300 mg/dL / Leuk Est Large / Nitrite Negative      RBC 5 / WBC >10 / Hyaline  / Gran  / Sq Epi  / Non Sq Epi 4 / Bacteria Many      PTH -- (Ca --)      [05-09-21 @ 06:54]   421  HbA1c 5.8      [12-10-19 @ 06:41]  TSH 3.72      [05-14-21 @ 07:12]  Lipid: chol 219, , HDL 65, LDL --      [05-09-21 @ 06:54]    HBsAb <3.0      [05-11-21 @ 00:01]  HBsAg Nonreact      [05-10-21 @ 09:26]  HBcAb Nonreact      [05-10-21 @ 09:26]  HCV 0.17, Nonreact      [05-10-21 @ 09:26]    Syphilis Screen (Treponema Pallidum Ab) Negative      [05-15-21 @ 10:20]    RADIOLOGY & ADDITIONAL STUDIES:

## 2021-06-03 NOTE — CONSULT NOTE ADULT - PROBLEM SELECTOR RECOMMENDATION 2
.  In the setting of metabolic/neurologic encephalopathy  -patient would be a poor PEG candidate as recovery is expected to be limited

## 2021-06-03 NOTE — CONSULT NOTE ADULT - PROBLEM SELECTOR RECOMMENDATION 5
.  Chronically on HD for a 3-4yrs  -HD would need to be stopped for patient to proceed with hospice care  -discussed potential disease trajectory, life expectancy would generally be 7-14 days after last HD session  -son does not think he will have a decision until next week, would want to continue HD as the patient tolerates for now

## 2021-06-03 NOTE — CONSULT NOTE ADULT - PROVIDER SPECIALTY LIST ADULT
Infectious Disease
Neurology
Intervent Radiology
Cardiology
Nephrology
Palliative Care
Vascular Surgery

## 2021-06-04 DIAGNOSIS — R45.1 RESTLESSNESS AND AGITATION: ICD-10-CM

## 2021-06-04 DIAGNOSIS — Z71.89 OTHER SPECIFIED COUNSELING: ICD-10-CM

## 2021-06-04 DIAGNOSIS — R53.2 FUNCTIONAL QUADRIPLEGIA: ICD-10-CM

## 2021-06-04 DIAGNOSIS — N18.6 END STAGE RENAL DISEASE: ICD-10-CM

## 2021-06-04 DIAGNOSIS — R13.10 DYSPHAGIA, UNSPECIFIED: ICD-10-CM

## 2021-06-04 DIAGNOSIS — I63.412 CEREBRAL INFARCTION DUE TO EMBOLISM OF LEFT MIDDLE CEREBRAL ARTERY: ICD-10-CM

## 2021-06-04 DIAGNOSIS — Z51.5 ENCOUNTER FOR PALLIATIVE CARE: ICD-10-CM

## 2021-06-04 LAB
ANION GAP SERPL CALC-SCNC: 23 MMOL/L — HIGH (ref 7–14)
BUN SERPL-MCNC: 35 MG/DL — HIGH (ref 7–23)
CALCIUM SERPL-MCNC: 8.9 MG/DL — SIGNIFICANT CHANGE UP (ref 8.4–10.5)
CHLORIDE SERPL-SCNC: 99 MMOL/L — SIGNIFICANT CHANGE UP (ref 98–107)
CO2 SERPL-SCNC: 23 MMOL/L — SIGNIFICANT CHANGE UP (ref 22–31)
CREAT SERPL-MCNC: 5.81 MG/DL — HIGH (ref 0.5–1.3)
GLUCOSE SERPL-MCNC: 117 MG/DL — HIGH (ref 70–99)
HCT VFR BLD CALC: 34.3 % — LOW (ref 34.5–45)
HGB BLD-MCNC: 10.2 G/DL — LOW (ref 11.5–15.5)
MAGNESIUM SERPL-MCNC: 2.3 MG/DL — SIGNIFICANT CHANGE UP (ref 1.6–2.6)
MCHC RBC-ENTMCNC: 24.6 PG — LOW (ref 27–34)
MCHC RBC-ENTMCNC: 29.7 GM/DL — LOW (ref 32–36)
MCV RBC AUTO: 82.9 FL — SIGNIFICANT CHANGE UP (ref 80–100)
NRBC # BLD: 0 /100 WBCS — SIGNIFICANT CHANGE UP
NRBC # FLD: 0.02 K/UL — HIGH
PHOSPHATE SERPL-MCNC: 4.8 MG/DL — HIGH (ref 2.5–4.5)
PLATELET # BLD AUTO: 236 K/UL — SIGNIFICANT CHANGE UP (ref 150–400)
POTASSIUM SERPL-MCNC: 3.8 MMOL/L — SIGNIFICANT CHANGE UP (ref 3.5–5.3)
POTASSIUM SERPL-SCNC: 3.8 MMOL/L — SIGNIFICANT CHANGE UP (ref 3.5–5.3)
RBC # BLD: 4.14 M/UL — SIGNIFICANT CHANGE UP (ref 3.8–5.2)
RBC # FLD: 17.2 % — HIGH (ref 10.3–14.5)
SODIUM SERPL-SCNC: 145 MMOL/L — SIGNIFICANT CHANGE UP (ref 135–145)
WBC # BLD: 11.73 K/UL — HIGH (ref 3.8–10.5)
WBC # FLD AUTO: 11.73 K/UL — HIGH (ref 3.8–10.5)

## 2021-06-04 RX ORDER — GENTAMICIN SULFATE 40 MG/ML
120 VIAL (ML) INJECTION ONCE
Refills: 0 | Status: COMPLETED | OUTPATIENT
Start: 2021-06-04 | End: 2021-06-04

## 2021-06-04 RX ADMIN — INSULIN GLARGINE 10 UNIT(S): 100 INJECTION, SOLUTION SUBCUTANEOUS at 21:50

## 2021-06-04 RX ADMIN — Medication 2.5 MILLIGRAM(S): at 00:45

## 2021-06-04 RX ADMIN — SODIUM CHLORIDE 40 MILLILITER(S): 9 INJECTION, SOLUTION INTRAVENOUS at 21:50

## 2021-06-04 RX ADMIN — Medication 200 MILLIGRAM(S): at 12:36

## 2021-06-04 RX ADMIN — Medication 26.25 MILLIGRAM(S): at 04:50

## 2021-06-04 RX ADMIN — Medication 2.5 MILLIGRAM(S): at 12:36

## 2021-06-04 RX ADMIN — Medication 26.25 MILLIGRAM(S): at 12:36

## 2021-06-04 RX ADMIN — CHLORHEXIDINE GLUCONATE 1 APPLICATION(S): 213 SOLUTION TOPICAL at 11:30

## 2021-06-04 RX ADMIN — Medication 2.5 MILLIGRAM(S): at 17:38

## 2021-06-04 RX ADMIN — Medication 2.5 MILLIGRAM(S): at 05:30

## 2021-06-04 RX ADMIN — Medication 26.25 MILLIGRAM(S): at 21:50

## 2021-06-04 NOTE — PROGRESS NOTE ADULT - ASSESSMENT
80 yo RH AA F with multiple old stroke R AICA and R parietal (recent), B/L BG and thalami, afib on eliquis, HTN, DM, ESRD on HD p/w malfunctioning AVF for HD. confused.  last admission, A1c 7.1, . MRI brain R parietal infarct, CTA with mild to mod atherosclerotic disease noted. LE doppler neg for DVT. Etiology for stroke Embolic from Afib. called for AMS. RRT last night for hypoxia to 80s improved on NRB.  + leukocytosis CXR neg.   + UA, CTH with chronic infarcts but new L MCA territory. repeat CTH stable. eeg w/o seizures   ETiology of confusion likely toxic metabolic encephalopathy and L MCA infarct.  a bit more alert today with family at bedside.   - appropriate for palliative at this time.   - gentamycin per ID   - okay for continuation of DOAC, eliquis  2.5mg bid. also on ASA 81  - pulmo eval hypoxia   - High dose statin therapy - atorvastatin 40mg PO daily. LDL goal <70mg/dL.  - need better DM and cholesterol control  - telemetry  - PT/OT/SS/SLP, OOBC  - check FS, glucose control <180  - GI/DVT ppx  - Counseling on diet, exercise, and medication adherence was done  - Counseling on smoking cessation and alcohol consumption offered when appropriate.  - Pain assessed and judicious use of narcotics when appropriate was discussed.    - Stroke education given when appropriate.  - Importance of fall prevention discussed.   - Differential diagnosis and plan of care discussed with patient and/or family and primary team  - Thank you for allowing me to participate in the care of this patient. Call with questions.    José Miguel Renee MD  Vascular Neurology  Office: [phone]

## 2021-06-04 NOTE — PROGRESS NOTE ADULT - SUBJECTIVE AND OBJECTIVE BOX
Name of Patient : VINCENT GROSS  MRN: [id number]  DATE OF SERVICE: 06-04-21       Subjective: Patient seen and examined. No new events except as noted.     REVIEW OF SYSTEMS:    CONSTITUTIONAL: No weakness, fevers or chills  EYES/ENT: No visual changes;  No vertigo or throat pain   NECK: No pain or stiffness  RESPIRATORY: No cough, wheezing, hemoptysis; No shortness of breath  CARDIOVASCULAR: No chest pain or palpitations  GASTROINTESTINAL: No abdominal or epigastric pain. No nausea, vomiting, or hematemesis; No diarrhea or constipation. No melena or hematochezia.  GENITOURINARY: No dysuria, frequency or hematuria  NEUROLOGICAL: No numbness or weakness  SKIN: No itching, burning, rashes, or lesions   All other review of systems is negative unless indicated above.    MEDICATIONS:  MEDICATIONS  (STANDING):  apixaban 2.5 milliGRAM(s) Oral every 12 hours  aspirin enteric coated 81 milliGRAM(s) Oral daily  atorvastatin 80 milliGRAM(s) Oral at bedtime  calcium acetate 667 milliGRAM(s) Oral three times a day with meals  chlorhexidine 2% Cloths 1 Application(s) Topical daily  dextrose 40% Gel 15 Gram(s) Oral once  dextrose 5% + sodium chloride 0.45%. 1000 milliLiter(s) (40 mL/Hr) IV Continuous <Continuous>  dextrose 5%. 1000 milliLiter(s) (100 mL/Hr) IV Continuous <Continuous>  dextrose 5%. 1000 milliLiter(s) (50 mL/Hr) IV Continuous <Continuous>  dextrose 50% Injectable 25 Gram(s) IV Push once  dextrose 50% Injectable 12.5 Gram(s) IV Push once  dextrose 50% Injectable 25 Gram(s) IV Push once  donepezil 10 milliGRAM(s) Oral at bedtime  glucagon  Injectable 1 milliGRAM(s) IntraMuscular once  hydrALAZINE 25 milliGRAM(s) Oral two times a day  insulin glargine Injectable (LANTUS) 10 Unit(s) SubCutaneous at bedtime  insulin lispro (ADMELOG) corrective regimen sliding scale   SubCutaneous every 6 hours  metoprolol tartrate Injectable 2.5 milliGRAM(s) IV Push every 6 hours  pantoprazole    Tablet 40 milliGRAM(s) Oral before breakfast  QUEtiapine 25 milliGRAM(s) Oral daily  valproate sodium IVPB 250 milliGRAM(s) IV Intermittent three times a day      PHYSICAL EXAM:  T(C): 36.6 (06-04-21 @ 12:30), Max: 37.6 (06-03-21 @ 20:20)  HR: 80 (06-04-21 @ 12:30) (80 - 108)  BP: 126/53 (06-04-21 @ 17:30) (112/72 - 158/78)  RR: 16 (06-04-21 @ 17:30) (16 - 17)  SpO2: 100% (06-04-21 @ 17:30) (100% - 100%)  Wt(kg): --  I&O's Summary    03 Jun 2021 07:01  -  04 Jun 2021 07:00  --------------------------------------------------------  IN: 500 mL / OUT: 350 mL / NET: 150 mL          Appearance: Normal	  HEENT:  PERRLA   Lymphatic: No lymphadenopathy   Cardiovascular: Normal S1 S2, no JVD  Respiratory: normal effort , clear  Gastrointestinal:  Soft, Non-tender  Skin: No rashes,  warm to touch  Psychiatry:  Mood & affect appropriate  Musculuskeletal: No edema      All labs, Imaging and EKGs personally reviewed                 06-03-21 @ 07:01  -  06-04-21 @ 07:00  --------------------------------------------------------  IN: 500 mL / OUT: 350 mL / NET: 150 mL             Name of Patient : VINCENT GROSS  MRN: [id number]  DATE OF SERVICE: 06-04-21       Subjective: Patient seen and examined. No new events except as noted.   letahrgic  confused         MEDICATIONS:  MEDICATIONS  (STANDING):  apixaban 2.5 milliGRAM(s) Oral every 12 hours  aspirin enteric coated 81 milliGRAM(s) Oral daily  atorvastatin 80 milliGRAM(s) Oral at bedtime  calcium acetate 667 milliGRAM(s) Oral three times a day with meals  chlorhexidine 2% Cloths 1 Application(s) Topical daily  dextrose 40% Gel 15 Gram(s) Oral once  dextrose 5% + sodium chloride 0.45%. 1000 milliLiter(s) (40 mL/Hr) IV Continuous <Continuous>  dextrose 5%. 1000 milliLiter(s) (100 mL/Hr) IV Continuous <Continuous>  dextrose 5%. 1000 milliLiter(s) (50 mL/Hr) IV Continuous <Continuous>  dextrose 50% Injectable 25 Gram(s) IV Push once  dextrose 50% Injectable 12.5 Gram(s) IV Push once  dextrose 50% Injectable 25 Gram(s) IV Push once  donepezil 10 milliGRAM(s) Oral at bedtime  glucagon  Injectable 1 milliGRAM(s) IntraMuscular once  hydrALAZINE 25 milliGRAM(s) Oral two times a day  insulin glargine Injectable (LANTUS) 10 Unit(s) SubCutaneous at bedtime  insulin lispro (ADMELOG) corrective regimen sliding scale   SubCutaneous every 6 hours  metoprolol tartrate Injectable 2.5 milliGRAM(s) IV Push every 6 hours  pantoprazole    Tablet 40 milliGRAM(s) Oral before breakfast  QUEtiapine 25 milliGRAM(s) Oral daily  valproate sodium IVPB 250 milliGRAM(s) IV Intermittent three times a day      PHYSICAL EXAM:  T(C): 36.6 (06-04-21 @ 12:30), Max: 37.6 (06-03-21 @ 20:20)  HR: 80 (06-04-21 @ 12:30) (80 - 108)  BP: 126/53 (06-04-21 @ 17:30) (112/72 - 158/78)  RR: 16 (06-04-21 @ 17:30) (16 - 17)  SpO2: 100% (06-04-21 @ 17:30) (100% - 100%)  Wt(kg): --  I&O's Summary    03 Jun 2021 07:01  -  04 Jun 2021 07:00  --------------------------------------------------------  IN: 500 mL / OUT: 350 mL / NET: 150 mL          Appearance: letahrgic, confused  HEENT: dry OM   Lymphatic: No lymphadenopathy   Cardiovascular: Normal S1 S2, no JVD  Respiratory: normal effort , clear  Gastrointestinal:  Soft, Non-tender  Skin: No rashes,  warm to touch  Psychiatry:  letahrgic   Musculuskeletal: No edema      All labs, Imaging and EKGs personally reviewed         06-03-21 @ 07:01  -  06-04-21 @ 07:00  --------------------------------------------------------  IN: 500 mL / OUT: 350 mL / NET: 150 mL                          10.2   11.73 )-----------( 236      ( 04 Jun 2021 07:01 )             34.3               06-04    145  |  99  |  35<H>  ----------------------------<  117<H>  3.8   |  23  |  5.81<H>    Ca    8.9      04 Jun 2021 07:01  Phos  4.8     06-04  Mg     2.3     06-04    TPro  7.8  /  Alb  3.7  /  TBili  0.4  /  DBili  x   /  AST  34<H>  /  ALT  15  /  AlkPhos  69  06-03

## 2021-06-04 NOTE — PROGRESS NOTE ADULT - SUBJECTIVE AND OBJECTIVE BOX
[facility]   MD JAJA SMITH DO ANAM SIDDIQUI ANGELA WONG, PA    TEL:  OFFICE: [phone]  DR CASTILLO CELL: [phone]  DR. HARRISON CELL: [phone]  DR. RILEY CELL: [phone]  LIZZY DHILLON CELL: [phone]    From 5pm-7am Answering Service [phone]    -- RENAL FOLLOW UP NOTE ---Date of Service 06-04-21 @ 12:49    Patient is a 81y old  Female who presents with a chief complaint of AVF malfunction (04 Jun 2021 10:54)      Patient seen and examined at bedside.     VITALS:  T(F): 97.8 (06-04-21 @ 12:30), Max: 99.7 (06-03-21 @ 20:20)  HR: 80 (06-04-21 @ 12:30)  BP: 112/72 (06-04-21 @ 12:30)  RR: 17 (06-04-21 @ 12:30)  SpO2: 100% (06-04-21 @ 12:30)  Wt(kg): --    06-03 @ 07:01  -  06-04 @ 07:00  --------------------------------------------------------  IN: 500 mL / OUT: 350 mL / NET: 150 mL          PHYSICAL EXAM:  Constitutional: lethargic  Neck: No JVD  Respiratory: CTAB, no wheezes, rales or rhonchi  Cardiovascular: S1, S2, RRR  Gastrointestinal: BS+, soft, NT/ND  Extremities: No peripheral edema    Hospital Medications:   MEDICATIONS  (STANDING):  apixaban 2.5 milliGRAM(s) Oral every 12 hours  aspirin enteric coated 81 milliGRAM(s) Oral daily  atorvastatin 80 milliGRAM(s) Oral at bedtime  calcium acetate 667 milliGRAM(s) Oral three times a day with meals  chlorhexidine 2% Cloths 1 Application(s) Topical daily  dextrose 40% Gel 15 Gram(s) Oral once  dextrose 5% + sodium chloride 0.45%. 1000 milliLiter(s) (40 mL/Hr) IV Continuous <Continuous>  dextrose 5%. 1000 milliLiter(s) (100 mL/Hr) IV Continuous <Continuous>  dextrose 5%. 1000 milliLiter(s) (50 mL/Hr) IV Continuous <Continuous>  dextrose 50% Injectable 25 Gram(s) IV Push once  dextrose 50% Injectable 12.5 Gram(s) IV Push once  dextrose 50% Injectable 25 Gram(s) IV Push once  donepezil 10 milliGRAM(s) Oral at bedtime  glucagon  Injectable 1 milliGRAM(s) IntraMuscular once  hydrALAZINE 25 milliGRAM(s) Oral two times a day  insulin glargine Injectable (LANTUS) 10 Unit(s) SubCutaneous at bedtime  insulin lispro (ADMELOG) corrective regimen sliding scale   SubCutaneous every 6 hours  metoprolol tartrate Injectable 2.5 milliGRAM(s) IV Push every 6 hours  pantoprazole    Tablet 40 milliGRAM(s) Oral before breakfast  QUEtiapine 25 milliGRAM(s) Oral daily  valproate sodium IVPB 250 milliGRAM(s) IV Intermittent three times a day      LABS:  06-04    145  |  99  |  35<H>  ----------------------------<  117<H>  3.8   |  23  |  5.81<H>    Ca    8.9      04 Jun 2021 07:01  Phos  4.8     06-04  Mg     2.3     06-04    TPro  7.8  /  Alb  3.7  /  TBili  0.4  /  DBili      /  AST  34<H>  /  ALT  15  /  AlkPhos  69  06-03    Creatinine Trend: 5.81 <--, 10.51 <--, 7.85 <--, 13.22 <--, 11.85 <--    Phosphorus Level, Serum: 4.8 mg/dL (06-04 @ 07:01)                              10.2   11.73 )-----------( 236      ( 04 Jun 2021 07:01 )             34.3     Urine Studies:  Urinalysis - [05-31-21 @ 19:13]      Color Dark Brown / Appearance Turbid / SG 1.019 / pH 7.5      Gluc Negative / Ketone Negative  / Bili Negative / Urobili <2 mg/dL       Blood Moderate / Protein 300 mg/dL / Leuk Est Large / Nitrite Negative      RBC 5 / WBC >10 / Hyaline  / Gran  / Sq Epi  / Non Sq Epi 4 / Bacteria Many      PTH -- (Ca --)      [05-09-21 @ 06:54]   421  HbA1c 5.8      [12-10-19 @ 06:41]  TSH 3.72      [05-14-21 @ 07:12]  Lipid: chol 219, , HDL 65, LDL --      [05-09-21 @ 06:54]    HBsAb <3.0      [05-11-21 @ 00:01]  HBsAg Nonreact      [05-10-21 @ 09:26]  HBcAb Nonreact      [05-10-21 @ 09:26]  HCV 0.17, Nonreact      [05-10-21 @ 09:26]    Syphilis Screen (Treponema Pallidum Ab) Negative      [05-15-21 @ 10:20]    RADIOLOGY & ADDITIONAL STUDIES:

## 2021-06-04 NOTE — GOALS OF CARE CONVERSATION - ADVANCED CARE PLANNING - CONVERSATION DETAILS
Spoke to patients son, Julius Livingston, regarding advanced care planning. Son had extensive conversation with Dr. Guido yesterday regarding the risk and benefits of resuscitative measures such as CPR and mechanical ventilation at the end of life. At this time son does not feel his mother would benefit from CPR or mechanical ventilation and prefers a natural passing. Son in agreement with DNR/DNI.   MOLST completed and placed upon pt's medical record.  Son still considering the option of transitioning his mother's care to full comfort. He is struggling with the cessation of his mothers hemodialysis however does acknowledge that she never liked going for dialysis and feels at this time continuing HD may not be instrumental in improving her overall quality of life.  Son requested more time in considering  transitioning his mother to hospice/comfort care. At this time he still wants his mother to continue HD as tolerated.  Support provided to son.  Palliative will continue to follow.

## 2021-06-04 NOTE — PROGRESS NOTE ADULT - ASSESSMENT
Patient is a 82 Y/O Female with PMHx of CVA recent ischemic stroke, HTN, DM, A fib on Eliquis, ESRD on HD (MWF) brought to ED after malfunctioning AVF for HD today. unable to do HD. was transferred to the hospital. patient at baseline is mildly confused and unable to give history. denies of any discomfort. 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  Pt afebrile.  WBC 14 --> 13.  UA large LE, mod blood, WBC >10.  Covid pcr (-).  Pt for eventual permacath for dialysis access.  ID consult called for elevated WBC.      Leukocytosis:    - WBC elevated on admission.  Pt otherwise afebrile.  Pending eventual permacath.  For temporary Shiley.    - UA (+) LE, mod bld and WBCs.  f/u Ucx - growing Enterobacter.  Pt started on rocephin empirically, completed 3 days.  Grew CRE, will give gentamicin.   bcx x 2 ngtd.      - Check chest xray - clear.    - Monitor WBC, and temp curve - leukocytosis improving    Acute CVA:    - CT head with acute cva.  On eliquis and ASA.  Repeat CT head remains unchaged,  EEG no seizure activity.  Neuro following.     * Ucx growing CRE Enterobacter, will give gentamicin 120mg (2gm/kg) x 1 dose.  Pt's last dialysis was on 6/3.        Will follow,    Nahomy Ramsey  [phone]

## 2021-06-04 NOTE — PROGRESS NOTE ADULT - SUBJECTIVE AND OBJECTIVE BOX
Dieter Willson MD  Interventional Cardiology / Advance Heart Failure and Cardiac Transplant Specialist  [place] Office : [phone] [place] [address] [place]. [address]  Tel:   [place] Office : [address] [facility] [place] [address]  Tel: [phone]  Cell : [phone]    pt is lying in bed confused  	  MEDICATIONS:  apixaban 2.5 milliGRAM(s) Oral every 12 hours  aspirin enteric coated 81 milliGRAM(s) Oral daily  hydrALAZINE 25 milliGRAM(s) Oral two times a day  metoprolol tartrate Injectable 2.5 milliGRAM(s) IV Push every 6 hours        donepezil 10 milliGRAM(s) Oral at bedtime  QUEtiapine 25 milliGRAM(s) Oral daily  valproate sodium IVPB 250 milliGRAM(s) IV Intermittent three times a day    pantoprazole    Tablet 40 milliGRAM(s) Oral before breakfast    atorvastatin 80 milliGRAM(s) Oral at bedtime  dextrose 40% Gel 15 Gram(s) Oral once  dextrose 50% Injectable 25 Gram(s) IV Push once  dextrose 50% Injectable 12.5 Gram(s) IV Push once  dextrose 50% Injectable 25 Gram(s) IV Push once  glucagon  Injectable 1 milliGRAM(s) IntraMuscular once  insulin glargine Injectable (LANTUS) 10 Unit(s) SubCutaneous at bedtime  insulin lispro (ADMELOG) corrective regimen sliding scale   SubCutaneous every 6 hours    calcium acetate 667 milliGRAM(s) Oral three times a day with meals  chlorhexidine 2% Cloths 1 Application(s) Topical daily  dextrose 5% + sodium chloride 0.45%. 1000 milliLiter(s) IV Continuous <Continuous>  dextrose 5%. 1000 milliLiter(s) IV Continuous <Continuous>  dextrose 5%. 1000 milliLiter(s) IV Continuous <Continuous>  sodium chloride 0.9% lock flush 10 milliLiter(s) IV Push every 1 hour PRN      PAST MEDICAL/SURGICAL HISTORY  PAST MEDICAL & SURGICAL HISTORY:  DM (diabetes mellitus)  TYpe II    HTN (hypertension)    Hypercholesteremia    CKD (chronic kidney disease)  now on HD via R forearm AVF    Anemia    Atrial fibrillation  On Eliquis    Cerebrovascular accident (CVA)    Status post right foot surgery  hammer toe repair    AV fistula  June 2018, 2 ballooning (last one 2 weeks ago), following up on Dec 15th    H/O colonoscopy with polypectomy        SOCIAL HISTORY: Substance Use (street drugs): ( x ) never used  (  ) other:    FAMILY HISTORY:  Family history of hypertension (Sibling)    Family history of diabetes mellitus    Family history of lung cancer (Sibling)    Family history of malignant neoplasm of gastrointestinal tract           PHYSICAL EXAM:  T(C): 36.6 (06-04-21 @ 12:30), Max: 37.6 (06-03-21 @ 20:20)  HR: 80 (06-04-21 @ 12:30) (80 - 108)  BP: 112/72 (06-04-21 @ 12:30) (112/72 - 158/78)  RR: 17 (06-04-21 @ 12:30) (16 - 17)  SpO2: 100% (06-04-21 @ 12:30) (100% - 100%)  Wt(kg): --  I&O's Summary    03 Jun 2021 07:01  -  04 Jun 2021 07:00  --------------------------------------------------------  IN: 500 mL / OUT: 350 mL / NET: 150 mL          GENERAL: NAD   EYES: EOMI, PERRLA, conjunctiva and sclera clear  ENMT: No tonsillar erythema, exudates, or enlargement; Moist mucous membranes, Good dentition, No lesions  Cardiovascular: Normal S1 S2, No JVD, No murmurs, No edema  Respiratory: Lungs clear to auscultation	  Gastrointestinal:  Soft, Non-tender, + BS	  Extremities: no edema                                    10.2   11.73 )-----------( 236      ( 04 Jun 2021 07:01 )             34.3     06-04    145  |  99  |  35<H>  ----------------------------<  117<H>  3.8   |  23  |  5.81<H>    Ca    8.9      04 Jun 2021 07:01  Phos  4.8     06-04  Mg     2.3     06-04    TPro  7.8  /  Alb  3.7  /  TBili  0.4  /  DBili  x   /  AST  34<H>  /  ALT  15  /  AlkPhos  69  06-03    proBNP:   Lipid Profile:   HgA1c:   TSH:     Consultant(s) Notes Reviewed:  [x ] YES  [ ] NO    Care Discussed with Consultants/Other Providers [ x] YES  [ ] NO    Imaging Personally Reviewed independently:  [x] YES  [ ] NO    All labs, radiologic studies, vitals, orders and medications list reviewed. Patient is seen and examined at bedside. Case discussed with medical team.

## 2021-06-04 NOTE — PROGRESS NOTE ADULT - SUBJECTIVE AND OBJECTIVE BOX
Infectious Diseases progress note:    Subjective:  NAD, afebrile.  Ucx (+) Enterobacter CRE.      ROS:  nonverbal, unable to assess    Allergies    No Known Allergies    Intolerances        ANTIBIOTICS/RELEVANT:  antimicrobials  gentamicin   IVPB 120 milliGRAM(s) IV Intermittent once    immunologic:    OTHER:  apixaban 2.5 milliGRAM(s) Oral every 12 hours  aspirin enteric coated 81 milliGRAM(s) Oral daily  atorvastatin 80 milliGRAM(s) Oral at bedtime  calcium acetate 667 milliGRAM(s) Oral three times a day with meals  chlorhexidine 2% Cloths 1 Application(s) Topical daily  dextrose 40% Gel 15 Gram(s) Oral once  dextrose 5% + sodium chloride 0.45%. 1000 milliLiter(s) IV Continuous <Continuous>  dextrose 5%. 1000 milliLiter(s) IV Continuous <Continuous>  dextrose 5%. 1000 milliLiter(s) IV Continuous <Continuous>  dextrose 50% Injectable 25 Gram(s) IV Push once  dextrose 50% Injectable 12.5 Gram(s) IV Push once  dextrose 50% Injectable 25 Gram(s) IV Push once  donepezil 10 milliGRAM(s) Oral at bedtime  glucagon  Injectable 1 milliGRAM(s) IntraMuscular once  hydrALAZINE 25 milliGRAM(s) Oral two times a day  insulin glargine Injectable (LANTUS) 10 Unit(s) SubCutaneous at bedtime  insulin lispro (ADMELOG) corrective regimen sliding scale   SubCutaneous every 6 hours  metoprolol tartrate Injectable 2.5 milliGRAM(s) IV Push every 6 hours  pantoprazole    Tablet 40 milliGRAM(s) Oral before breakfast  QUEtiapine 25 milliGRAM(s) Oral daily  sodium chloride 0.9% lock flush 10 milliLiter(s) IV Push every 1 hour PRN  valproate sodium IVPB 250 milliGRAM(s) IV Intermittent three times a day      Objective:  Vital Signs Last 24 Hrs  T(C): 36.4 (04 Jun 2021 04:30), Max: 37.6 (03 Jun 2021 20:20)  T(F): 97.6 (04 Jun 2021 04:30), Max: 99.7 (03 Jun 2021 20:20)  HR: 96 (04 Jun 2021 04:30) (96 - 108)  BP: 130/68 (04 Jun 2021 04:30) (117/62 - 158/78)  BP(mean): --  RR: 17 (04 Jun 2021 04:30) (16 - 17)  SpO2: 100% (04 Jun 2021 04:30) (100% - 100%)    PHYSICAL EXAM:  Constitutional:NAD  Eyes:PAPITO, EOMI  Ear/Nose/Throat: no thrush, mucositis.  Moist mucous membranes	  Neck:no JVD, no lymphadenopathy, supple  Respiratory: CTA shivani  Cardiovascular: S1S2 RRR, no murmurs  Gastrointestinal:soft, nontender,  nondistended (+) BS  Extremities:no e/e/c  Skin:  no rashes, open wounds or ulcerations        LABS:                        10.2   11.73 )-----------( 236      ( 04 Jun 2021 07:01 )             34.3     06-04    145  |  99  |  35<H>  ----------------------------<  117<H>  3.8   |  23  |  5.81<H>    Ca    8.9      04 Jun 2021 07:01  Phos  4.8     06-04  Mg     2.3     06-04    TPro  7.8  /  Alb  3.7  /  TBili  0.4  /  DBili  x   /  AST  34<H>  /  ALT  15  /  AlkPhos  69  06-03          Procalcitonin, Serum: 0.87 (06-02 @ 02:13)            Rapid RVP Result: [facility]          MICROBIOLOGY:      Culture - Blood (06.01.21 @ 02:58)   Specimen Source: .Blood Blood   Culture Results:   No growth to date.     Culture - Urine (05.31.21 @ 23:12)   - Amikacin: S <=16   - Amoxicillin/Clavulanic Acid: R >16/8   - Ampicillin: R >16 These ampicillin results predict results for amoxicillin   - Ampicillin/Sulbactam: R >16/8 Enterobacter, Citrobacter, and Serratia may develop resistance during prolonged therapy (3-4 days)   - Cefepime: R >16   - Cefoxitin: R >16   - Ceftriaxone: R >32 Enterobacter, Citrobacter, and Serratia may develop resistance during prolonged therapy   - Ciprofloxacin: I 0.5   - Aztreonam: R >16   - Cefazolin: R >16   - Ertapenem: R >1   - Gentamicin: S <=2   - Imipenem: R 8   - Levofloxacin: S <=0.5   - Meropenem: R 8   - Nitrofurantoin: I 64 Should not be used to treat pyelonephritis   - Tigecycline: S <=2   - Tobramycin: S <=2   - Trimethoprim/Sulfamethoxazole: R >2/38   - Piperacillin/Tazobactam: R >64   Specimen Source: .Urine Catheterized   Culture Results:   >100,000 CFU/ml Enterobacter cloacae (Carbapenem Resistant)   Organism Identification: Enterobacter cloacae (Carbapenem Resistant)   Organism: Enterobacter cloacae (Carbapenem Resistant)   Method Type: ELINA     RADIOLOGY & ADDITIONAL STUDIES:    < from: CT Head No Cont (06.03.21 @ 09:54) >    EXAM:  CT BRAIN        PROCEDURE DATE:  Scott  3 2021         INTERPRETATION:  Clinical indication: Follow-up CVA.    Multiple axial sections were performed from base of skull to vertex without contrast enhancement. Coronal and sagittal reconstructions were performed as well    Abnormal low-attenuation involving the right cerebellar region is again identified. This is likely compatible with an old infarct.    Abnormal low-attenuation involving the left posterior frontal parietal cortical subcortical region is again identified. This is likely compatible with a subacute left MCA infarct. There is localized mass effect seen consisting sulcal effacement. No significant shift or herniation seen.    Abnormal low-attenuation involving the right frontal subcortical region is identified as well. This could be compatible area of ischemia versus a subacute to chronic infarct.    Subtle area of low-attenuation seen involving left hector. This is best seen on series 2 image 15. This could be compatible with an age-indeterminate lacunar infarct.    Gyriform area of high attenuation is seen involving the right posterior frontal parietal region which is likely compatible with an area of laminar necrosis. This appears unchanged    Parenchymal volume loss and chronic microvascular ischemic changes again seen    Evaluation the osseous structures with the appropriate window appears normal    The visualized paranasal sinuses mastoid and middle ear regions appear clear.    IMPRESSION: Infarcts as described above.    < end of copied text >      < from: Xray Chest 1 View AP/PA (06.02.21 @ 00:09) >  IMPRESSION:  Right-sided dialysis-typecatheter with tip at cavoatrial junction.  The heart is top normal in size. No focal consolidations. No pleural effusion or pneumothorax.    < end of copied text >

## 2021-06-04 NOTE — PROGRESS NOTE ADULT - ASSESSMENT
80F hx htn, dm, A fib on eliquis, prior CVA and esrd MWF  present from nursing home with clotted AVF    ESRD on HD MWF  from nursing home    clotted avf f/u vascular   s/p IR for shiely 6/1   s/p HD 6/3 unable to UF sec to hypotension  pending GOC. currently family still not decided on stopping hd. had discussed with son and sister risk and benefit of continuing dialysis. family expressed understanding but will continue hd for now.  HD tmr  monitor electrolyte    AMS  recent CVA  very confused   ct head noted. f/u neuro  GOC with family    Anemia  Hb at goal for ESRD  recent cva no epo   monitor Hb    HTN  controlled  monitor    ckd-mbd  Hectorol 2mcg with HD  monitor phos and calcium daily

## 2021-06-04 NOTE — PROGRESS NOTE ADULT - PROBLEM SELECTOR PLAN 1
vascular eval appreciated, not a candidate for nay invasive procedure, appreciate vascular follow up   shiley per IR  haldol for agitation   neuro eval appreciated   worsening confusion, CT head showed acute ischemic stroke, repeat ct head today  palliative eval, DNR-DNI  son deciding regarding hospice placement

## 2021-06-04 NOTE — PROGRESS NOTE ADULT - SUBJECTIVE AND OBJECTIVE BOX
Neurology Progress Note    S: Patient seen and examined. repeat CTH with no change, sister at bedside. patient a bit more responsive     Medication:  MEDICATIONS  (STANDING):  apixaban 2.5 milliGRAM(s) Oral every 12 hours  aspirin enteric coated 81 milliGRAM(s) Oral daily  atorvastatin 80 milliGRAM(s) Oral at bedtime  calcium acetate 667 milliGRAM(s) Oral three times a day with meals  chlorhexidine 2% Cloths 1 Application(s) Topical daily  dextrose 40% Gel 15 Gram(s) Oral once  dextrose 5% + sodium chloride 0.45%. 1000 milliLiter(s) (40 mL/Hr) IV Continuous <Continuous>  dextrose 5%. 1000 milliLiter(s) (100 mL/Hr) IV Continuous <Continuous>  dextrose 5%. 1000 milliLiter(s) (50 mL/Hr) IV Continuous <Continuous>  dextrose 50% Injectable 25 Gram(s) IV Push once  dextrose 50% Injectable 12.5 Gram(s) IV Push once  dextrose 50% Injectable 25 Gram(s) IV Push once  donepezil 10 milliGRAM(s) Oral at bedtime  gentamicin   IVPB 120 milliGRAM(s) IV Intermittent once  glucagon  Injectable 1 milliGRAM(s) IntraMuscular once  hydrALAZINE 25 milliGRAM(s) Oral two times a day  insulin glargine Injectable (LANTUS) 10 Unit(s) SubCutaneous at bedtime  insulin lispro (ADMELOG) corrective regimen sliding scale   SubCutaneous every 6 hours  metoprolol tartrate Injectable 2.5 milliGRAM(s) IV Push every 6 hours  pantoprazole    Tablet 40 milliGRAM(s) Oral before breakfast  QUEtiapine 25 milliGRAM(s) Oral daily  valproate sodium IVPB 250 milliGRAM(s) IV Intermittent three times a day    MEDICATIONS  (PRN):  sodium chloride 0.9% lock flush 10 milliLiter(s) IV Push every 1 hour PRN Pre/post blood products, medications, blood draw, and to maintain line patency      Vitals:  Vital Signs Last 24 Hrs  T(C): 36.4 (04 Jun 2021 04:30), Max: 37.6 (03 Jun 2021 20:20)  T(F): 97.6 (04 Jun 2021 04:30), Max: 99.7 (03 Jun 2021 20:20)  HR: 96 (04 Jun 2021 04:30) (96 - 108)  BP: 130/68 (04 Jun 2021 04:30) (117/62 - 158/78)  BP(mean): --  RR: 17 (04 Jun 2021 04:30) (16 - 17)  SpO2: 100% (04 Jun 2021 04:30) (100% - 100%)    General Exam:   General Appearance: Appropriately dressed confused   Head: Normocephalic, atraumatic and no dysmorphic features  Ear, Nose, and Throat: Moist mucous membranes  CVS: S1S2+  Resp: No SOB, no wheeze or rhonchi  Abd: soft NTND  Extremities: No edema, no cyanosis  Skin: No bruises, no rashes     Neurological Exam:  Mental Status: lethargic, but tracks, mimics, no intelligable verbal output. + dysarthria aware that family at bedside and follows at times   Cranial Nerves: PERRL, EOMI, VFFC, sensation V1-V3 intact,  R NLF,   Motor: CUTLER but L>R  Sensation: w/draws L >R  Reflexes: 1+ throughout at biceps, brachioradialis, triceps, patellars and ankles bilaterally and equal. No clonus. R toe and L toe were both downgoing.  Coordination: unable   Gait: unable     I personally reviewed the below data/images/labs:    CBC Full  -  ( 04 Jun 2021 07:01 )  WBC Count : 11.73 K/uL  RBC Count : 4.14 M/uL  Hemoglobin : 10.2 g/dL  Hematocrit : 34.3 %  Platelet Count - Automated : 236 K/uL  Mean Cell Volume : 82.9 fL  Mean Cell Hemoglobin : 24.6 pg  Mean Cell Hemoglobin Concentration : 29.7 gm/dL  Auto Neutrophil # : x  Auto Lymphocyte # : x  Auto Monocyte # : x  Auto Eosinophil # : x  Auto Basophil # : x  Auto Neutrophil % : x  Auto Lymphocyte % : x  Auto Monocyte % : x  Auto Eosinophil % : x  Auto Basophil % : x    06-04    145  |  99  |  35<H>  ----------------------------<  117<H>  3.8   |  23  |  5.81<H>    Ca    8.9      04 Jun 2021 07:01  Phos  4.8     06-04  Mg     2.3     06-04    TPro  7.8  /  Alb  3.7  /  TBili  0.4  /  DBili  x   /  AST  34<H>  /  ALT  15  /  AlkPhos  69  06-03      < from: CT Head No Cont (05.08.21 @ 12:32) >  IMPRESSION:    Region of decreased attenuation in the right parietal lobe with associated sulcal effacement suspicious for acute infarction.    No CT evidence for reva hemorrhagic transformation.    < end of copied text >    < from: CT Head No Cont (05.09.21 @ 17:30) >    EXAM:  CT BRAIN        PROCEDURE DATE:  May  9 2021         INTERPRETATION:  .    CLINICAL INFORMATION: Hx CVA , with poss infarct yesterday on CT.    TECHNIQUE: Multiple axial CT images of the head were obtained without contrast. Sagittal and coronal reconstructed images were acquired from the source data.    COMPARISON: Prior CT study of the head from 5/8/2021. Prior brain MRI study from 7/22/2019.    FINDINGS: An evolving acute/subacute infarct is again noted within the right parietal temporal lobes. There is no gross hemorrhagic transformation.    Additional wedge-shaped chronic infarct is again noted within the right cerebellar hemisphere.    Additional chronic lacunar infarcts are noted within the left basal ganglia.    There is no acute intracranial hemorrhage, shift of the midline structures, herniation, or hydrocephalus.    There is diffuse cerebral volume loss with prominence of the sulci, fissures, and cisternal spaces which is normal for the patient's age. 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    The paranasal sinuses and mastoid air cells are clear. The calvarium is intact. There is evidence of bilateral cataract removal.    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    Additional chronic infarcts and similar-appearing moderate severity chronic white matter microvascular type changes, as discussed.        DARA VILLELA MD; Attending Radiologist  This document has been electronically signed. May  9 2021  5:42PM    < end of copied text >    < from: MR Head No Cont (05.10.21 @ 21:38) >    EXAM:  MR BRAIN        PROCEDURE DATE:  May 10 2021         INTERPRETATION:  Clinical indication: Follow-up infarct.    MRI of the brain was performed using sagittal T1, axial T1 T2 T2 FLAIR diffusion and susceptibility weighted sequence.    This exam is compared with prior brain MRI performed on July 22, 2019 an prior head CT performed on May 9, 2021    This exam is of limited quality due to motion.    Extensive parenchymal volume loss is seen.    Abnormal T2 prolongation with restricted diffusion is seen involving the right parietal cortical subcortical region. This is compatible with an acute infarct. No hemorrhagic transformation is seen this time. There is localized mass effect seen consisting of sulcal effacement.    The visualized paranasal sinuses mastoid and middle ear regions appear clear.    IMPRESSION: This exam is somewhat limited diagnostic quality due to motion.    Acute infarct as described above.              MARVIN BARRAZA M.D., ATTENDING RADIOLOGIST  This document hasbeen electronically signed. May 11 2021  8:20AM    < end of copied text >  `< from: CT Angio Neck w/ IV Cont (05.11.21 @ 10:54) >    EXAM:  CT ANGIO BRAIN (W)AW IC      EXAM:  CT ANGIO NECK (W)AW IC        PROCEDURE DATE:  May 11 2021         INTERPRETATION:  Clinical indication: Acute infarct seen on brain MRI.    Following injection of approximately 90 cc of Omnipaque 350 IV CT a of the neck and [place] were performed. Coronal and sagittal reconstructions were performed as well.    Abnormal low-attenuation involving the right cerebellar region, right parietal and right basal ganglia region are again seen is well as old lacunar infarcts involving left basal ganglia region.    Calcification is seen involving both proximal internal carotid artery regions.    Evaluation of the distal right common carotid artery region appears normal. Mild narrowing of proximal right internal carotid artery is seen. Evaluation of the proximal right external carotid arteries appear normal. Evaluation of the distal left common carotid artery appears normal. Evaluation of the proximal left internal carotid artery demonstrate mild narrowing. Evaluation of the proximal left external carotid artery appears normal. A normal dominant appearing right vertebral artery is seen. 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 Short segment of stenosis versus hypoplasia is again seen involving the distalmost aspect the left vertebral artery.    Evaluation of both distal internal carotid arteries demonstrates calcified plaques. Mild narrowing of both distal internal carotid artery seen. Hypoplastic right A1 segment is seen. Irregularity seen involving both A2 segments is now seen which could be compatible atherosclerotic change. Evaluation of both middle cerebral arteries appear normal. Both posterior cerebral arteries demonstrate decreased flow related signal distally which could be compatible areas of of stenosis. There is evidence of mild stenosis seen involving the distal aspect of the basilar artery which is new when compared with the prior exam.    IMPRESSION: Mild narrowing of both proximal internal carotid arteries.    Short segment of narrowing involving the distal left vertebral artery is again seen and unchanged.    Irregularity is seen involving both A2 segments which is new when compared prior exam. This could be compatible atherosclerotic change. Mild narrowing of the distal basilar artery is seen which is compatible with mild stenosis. This is new when compared with the prior exam.    Areas of stenosis is suspected involving both distal posterior cerebral arteries.              MARVIN BARRAZA M.D., ATTENDING RADIOLOGIST  This document has been electronically signed. May 11 2021 11:30AM    < end of copied text >  `    < from: CT Head No Cont (06.03.21 @ 09:54) >    EXAM:  CT BRAIN        PROCEDURE DATE:  Scott  3 2021         INTERPRETATION:  Clinical indication: Follow-up CVA.    Multiple axial sections were performed from base of skull to vertex without contrast enhancement. Coronal and sagittal reconstructions were performed as well    Abnormal low-attenuation involving the right cerebellar region is again identified. This is likely compatible with an old infarct.    Abnormal low-attenuation involving the left posterior frontal parietal cortical subcortical region is again identified. This is likely compatible with a subacute left MCA infarct. There is localized mass effect seen consisting sulcal effacement. No significant shift or herniation seen.    Abnormal low-attenuation involving the right frontal subcortical region is identified as well. This could be compatible area of ischemia versus a subacute to chronic infarct.    Subtle area of low-attenuation seen involving left hector. This is best seen on series 2 image 15. This could be compatible with an age-indeterminate lacunar infarct.    Gyriform area of high attenuation is seen involving the right posterior frontal parietal region which is likely compatible with an area of laminar necrosis. This appears unchanged    Parenchymal volume loss and chronic microvascular ischemic changes again seen    Evaluation the osseous structures with the appropriate window appears normal    The visualized paranasal sinuses mastoid and middle ear regions appear clear.    IMPRESSION: Infarcts as described above.                MARVIN BARRAZA M.D., ATTENDING RADIOLOGIST  This document has been electronically signed. Scott  3 2021 10:00AM    < end of copied text >  < from: CT Head No Cont (06.02.21 @ 09:32) >    EXAM:  CT BRAIN        PROCEDURE DATE:  Jun 2 2021         INTERPRETATION:  INDICATIONS:  recent ischemic stroke, worsening AMS  .    TECHNIQUE:  Serial axial images were obtained from the skull base to the vertex without intravenous contrast. Coronal and sagittal reformatted images were obtained.    COMPARISON EXAMINATION: 5/9/2021 CT and 5/10/2021 and MR    FINDINGS:  VENTRICLES AND SULCI:  Prominent in size compatible with age-appropriate volume loss  INTRA-AXIAL:  There is an area of low density with associated sulcal effacement, gyral swelling and loss of gray-white matter differentiation within the left frontoparietal region not seen previously. There is mild effacement of the lateral ventricle and findings are compatible with an acute MCA distribution infarct. There is involvement of the insula with sparing of the basal ganglia. Chronic left basal ganglia lacunae are again seen.    Gyriform areas of hyperdensity are seen in the right parietal lobe compatible with a subacute infarct with associated cortical laminar necrosis. A chronic right cerebellar infarct is again seen.    Moderate microvascular type white matter changes are stable.  EXTRA-AXIAL:  No mass or collection is seen.  VISUALIZED SINUSES:  Right sphenoid foamy secretions.  VISUALIZED MASTOIDS:  Clear.  CALVARIUM:  Normal.  MISCELLANEOUS:  Intraocular lens implants    IMPRESSION:  Acute left frontoparietal MCA distribution infarct.    Evolving right parietal lobe infarct, now subacute with cortical laminar necrosis.    Chronic right cerebellar infarct.    Chronic left basal ganglia lacunae.    Findings were discussed with MALINDA Mariee on 6/2/2021 10:01 AM with read back.              SALOMON JOHNSON MD; Attending Radiologist  This document has been electronically signed. Jun 2 2021 10:03AM    < end of copied text >    EEG Summary / Classification:  Abnormal EEG   - Moderate generalized slowing, right greater than left.    EEG Impression / Clinical Correlate:  Abnormal EEG study.  Moderate nonspecific diffuse or multifocal cerebral dysfunction, right greater than left.   No epileptiform pattern or seizure seen.    Wayne Sanford MD  Attending Physician, [facility] [place]

## 2021-06-04 NOTE — PROGRESS NOTE ADULT - ASSESSMENT
80 Y/O Female with PMHx of CVA recent ischemic stroke, HTN, DM, A fib on Eliquis, ESRD on HD (MWF) brought to ED after malfunctioning AVF for HD today and worsening AMS    EKG: None in chart    1. AMS  -CT head with acute MCA infarct  -echo from previous admission with normal LV function. Agitated saline injection is inconclusive regarding the  presence of a patent foramen ovale.  -f/u neuro   -f/u palliative for GOC    2. Afib  -on eliquis  -c/w metoprolol    3. HTN  -controlled  -c/w hydralazine and metoprolol  -continue to monitor BP    4. ESRD  -on HD  -f/u renal

## 2021-06-05 LAB
ANION GAP SERPL CALC-SCNC: 23 MMOL/L — HIGH (ref 7–14)
ANION GAP SERPL CALC-SCNC: 23 MMOL/L — HIGH (ref 7–14)
BUN SERPL-MCNC: 44 MG/DL — HIGH (ref 7–23)
BUN SERPL-MCNC: 50 MG/DL — HIGH (ref 7–23)
CALCIUM SERPL-MCNC: 8.1 MG/DL — LOW (ref 8.4–10.5)
CALCIUM SERPL-MCNC: 8.4 MG/DL — SIGNIFICANT CHANGE UP (ref 8.4–10.5)
CHLORIDE SERPL-SCNC: 102 MMOL/L — SIGNIFICANT CHANGE UP (ref 98–107)
CHLORIDE SERPL-SCNC: 97 MMOL/L — LOW (ref 98–107)
CO2 SERPL-SCNC: 18 MMOL/L — LOW (ref 22–31)
CO2 SERPL-SCNC: 20 MMOL/L — LOW (ref 22–31)
CREAT SERPL-MCNC: 6.88 MG/DL — HIGH (ref 0.5–1.3)
CREAT SERPL-MCNC: 7.93 MG/DL — HIGH (ref 0.5–1.3)
GLUCOSE SERPL-MCNC: 121 MG/DL — HIGH (ref 70–99)
GLUCOSE SERPL-MCNC: 475 MG/DL — CRITICAL HIGH (ref 70–99)
HCT VFR BLD CALC: 30.3 % — LOW (ref 34.5–45)
HGB BLD-MCNC: 9 G/DL — LOW (ref 11.5–15.5)
MAGNESIUM SERPL-MCNC: 2.3 MG/DL — SIGNIFICANT CHANGE UP (ref 1.6–2.6)
MAGNESIUM SERPL-MCNC: 2.4 MG/DL — SIGNIFICANT CHANGE UP (ref 1.6–2.6)
MCHC RBC-ENTMCNC: 24.7 PG — LOW (ref 27–34)
MCHC RBC-ENTMCNC: 29.7 GM/DL — LOW (ref 32–36)
MCV RBC AUTO: 83.2 FL — SIGNIFICANT CHANGE UP (ref 80–100)
NRBC # BLD: 0 /100 WBCS — SIGNIFICANT CHANGE UP
NRBC # FLD: 0 K/UL — SIGNIFICANT CHANGE UP
PHOSPHATE SERPL-MCNC: 5.1 MG/DL — HIGH (ref 2.5–4.5)
PHOSPHATE SERPL-MCNC: 5.4 MG/DL — HIGH (ref 2.5–4.5)
PLATELET # BLD AUTO: 165 K/UL — SIGNIFICANT CHANGE UP (ref 150–400)
POTASSIUM SERPL-MCNC: 3.7 MMOL/L — SIGNIFICANT CHANGE UP (ref 3.5–5.3)
POTASSIUM SERPL-MCNC: 3.9 MMOL/L — SIGNIFICANT CHANGE UP (ref 3.5–5.3)
POTASSIUM SERPL-SCNC: 3.7 MMOL/L — SIGNIFICANT CHANGE UP (ref 3.5–5.3)
POTASSIUM SERPL-SCNC: 3.9 MMOL/L — SIGNIFICANT CHANGE UP (ref 3.5–5.3)
RBC # BLD: 3.64 M/UL — LOW (ref 3.8–5.2)
RBC # FLD: 16.8 % — HIGH (ref 10.3–14.5)
SODIUM SERPL-SCNC: 138 MMOL/L — SIGNIFICANT CHANGE UP (ref 135–145)
SODIUM SERPL-SCNC: 145 MMOL/L — SIGNIFICANT CHANGE UP (ref 135–145)
WBC # BLD: 8.07 K/UL — SIGNIFICANT CHANGE UP (ref 3.8–10.5)
WBC # FLD AUTO: 8.07 K/UL — SIGNIFICANT CHANGE UP (ref 3.8–10.5)

## 2021-06-05 PROCEDURE — 99447 NTRPROF PH1/NTRNET/EHR 11-20: CPT

## 2021-06-05 RX ADMIN — Medication 26.25 MILLIGRAM(S): at 13:00

## 2021-06-05 RX ADMIN — Medication 2.5 MILLIGRAM(S): at 12:45

## 2021-06-05 RX ADMIN — SODIUM CHLORIDE 40 MILLILITER(S): 9 INJECTION, SOLUTION INTRAVENOUS at 08:08

## 2021-06-05 RX ADMIN — Medication 2.5 MILLIGRAM(S): at 00:49

## 2021-06-05 RX ADMIN — Medication 26.25 MILLIGRAM(S): at 04:23

## 2021-06-05 RX ADMIN — Medication 1: at 08:08

## 2021-06-05 RX ADMIN — Medication 2.5 MILLIGRAM(S): at 05:11

## 2021-06-05 RX ADMIN — CHLORHEXIDINE GLUCONATE 1 APPLICATION(S): 213 SOLUTION TOPICAL at 12:47

## 2021-06-05 RX ADMIN — INSULIN GLARGINE 10 UNIT(S): 100 INJECTION, SOLUTION SUBCUTANEOUS at 23:14

## 2021-06-05 NOTE — PROGRESS NOTE ADULT - SUBJECTIVE AND OBJECTIVE BOX
[facility]   MD Nehemias Busch MD, D.O. Ruoru Wong, PA    From 7 AM - 5 PM:  OFFICE: [phone]  Dr. Jacobson cell: [phone]  Dr. Sidhu cell: [phone]  Dr. Ross cell: [phone]  MALINDA Shepard cell: [phone]    From 5 PM - 7 AM: Answering Service: [phone]  Date of service: 06-05-21 @ 13:58    RENAL FOLLOW UP NOTE  --------------------------------------------------------------------------------  HPI:  Pt seen and examined at bedside.   Denies SOB, chest pain     PAST HISTORY  --------------------------------------------------------------------------------  No significant changes to PMH, PSH, FHx, SHx, unless otherwise noted    ALLERGIES & MEDICATIONS  --------------------------------------------------------------------------------  Allergies    No Known Allergies    Intolerances      Standing Inpatient Medications  apixaban 2.5 milliGRAM(s) Oral every 12 hours  aspirin enteric coated 81 milliGRAM(s) Oral daily  atorvastatin 80 milliGRAM(s) Oral at bedtime  calcium acetate 667 milliGRAM(s) Oral three times a day with meals  chlorhexidine 2% Cloths 1 Application(s) Topical daily  dextrose 40% Gel 15 Gram(s) Oral once  dextrose 5% + sodium chloride 0.45%. 1000 milliLiter(s) IV Continuous <Continuous>  dextrose 5%. 1000 milliLiter(s) IV Continuous <Continuous>  dextrose 5%. 1000 milliLiter(s) IV Continuous <Continuous>  dextrose 50% Injectable 25 Gram(s) IV Push once  dextrose 50% Injectable 12.5 Gram(s) IV Push once  dextrose 50% Injectable 25 Gram(s) IV Push once  donepezil 10 milliGRAM(s) Oral at bedtime  glucagon  Injectable 1 milliGRAM(s) IntraMuscular once  hydrALAZINE 25 milliGRAM(s) Oral two times a day  insulin glargine Injectable (LANTUS) 10 Unit(s) SubCutaneous at bedtime  insulin lispro (ADMELOG) corrective regimen sliding scale   SubCutaneous every 6 hours  metoprolol tartrate Injectable 2.5 milliGRAM(s) IV Push every 6 hours  pantoprazole    Tablet 40 milliGRAM(s) Oral before breakfast  QUEtiapine 25 milliGRAM(s) Oral daily  valproate sodium IVPB 250 milliGRAM(s) IV Intermittent three times a day    PRN Inpatient Medications  sodium chloride 0.9% lock flush 10 milliLiter(s) IV Push every 1 hour PRN      REVIEW OF SYSTEMS  --------------------------------------------------------------------------------  General: no fever  CVS: no chest pain  RESP: no sob, no cough  ABD: no abdominal pain  : no dysuria,  MSK: no edema     VITALS/PHYSICAL EXAM  --------------------------------------------------------------------------------  T(C): 36.3 (06-05-21 @ 09:50), Max: 36.7 (06-04-21 @ 21:39)  HR: 84 (06-05-21 @ 09:50) (84 - 101)  BP: 128/66 (06-05-21 @ 09:50) (125/71 - 139/64)  RR: 17 (06-05-21 @ 09:50) (16 - 17)  SpO2: 99% (06-05-21 @ 09:50) (96% - 100%)  Wt(kg): --        Physical Exam:  	Gen: NAD  	HEENT: MMM  	Pulm: CTA B/L  	CV: S1S2  	Abd: Soft, +BS  	Ext: No LE edema B/L                      Neuro: Awake   	Skin: Warm and Dry   	Vascular access:avf    LABS/STUDIES  --------------------------------------------------------------------------------              9.0    8.07  >-----------<  165      [06-05-21 @ 04:28]              30.3     145  |  102  |  50  ----------------------------<  121      [06-05-21 @ 07:24]  3.7   |  20  |  7.93        Ca     8.4     [06-05-21 @ 07:24]      Mg     2.4     [06-05-21 @ 07:24]      Phos  5.4     [06-05-21 @ 07:24]    Creatinine Trend:  SCr 7.93 [06-05 @ 07:24]  SCr 6.88 [06-05 @ 04:28]  SCr 5.81 [06-04 @ 07:01]  SCr 10.51 [06-03 @ 07:19]  SCr 7.85 [06-02 @ 02:13]    Urinalysis - [05-31-21 @ 19:13]      Color Dark Brown / Appearance Turbid / SG 1.019 / pH 7.5      Gluc Negative / Ketone Negative  / Bili Negative / Urobili <2 mg/dL       Blood Moderate / Protein 300 mg/dL / Leuk Est Large / Nitrite Negative      RBC 5 / WBC >10 / Hyaline  / Gran  / Sq Epi  / Non Sq Epi 4 / Bacteria Many      PTH -- (Ca --)      [05-09-21 @ 06:54]   421  HbA1c 5.8      [12-10-19 @ 06:41]  TSH 3.72      [05-14-21 @ 07:12]  Lipid: chol 219, , HDL 65, LDL --      [05-09-21 @ 06:54]    HBsAb <3.0      [05-11-21 @ 00:01]  HBsAg Nonreact      [05-10-21 @ 09:26]  HBcAb Nonreact      [05-10-21 @ 09:26]  HCV 0.17, Nonreact      [05-10-21 @ 09:26]    Syphilis Screen (Treponema Pallidum Ab) Negative      [05-15-21 @ 10:20]

## 2021-06-05 NOTE — PROGRESS NOTE ADULT - PROBLEM SELECTOR PLAN 1
vascular eval appreciated, not a candidate for nay invasive procedure, appreciate vascular follow up   shiley per IR  haldol for agitation   neuro eval appreciated   worsening confusion, CT head showed acute ischemic stroke, repeat ct head today  palliative eval, DNR-DNI  son deciding regarding hospice placement vascular eval appreciated, not a candidate for nay invasive procedure, appreciate vascular follow up   shiley per IR  haldol for agitation   neuro eval appreciated   worsening confusion, CT head showed acute ischemic stroke, repeat ct head today  palliative eval, DNR-DNI  son deciding regarding hospice placement  NPO, ? feeding tube vascular eval appreciated, not a candidate for nay invasive procedure, appreciate vascular follow up   charles per IR  haldol for agitation   neuro eval appreciated   worsening confusion, CT head showed acute ischemic stroke, repeat ct head noted   palliative eval, DNR-DNI  son deciding regarding hospice placement  NPO, ? feeding tube

## 2021-06-05 NOTE — PROGRESS NOTE ADULT - SUBJECTIVE AND OBJECTIVE BOX
Name of Patient : VINCENT GROSS  MRN: [id number]  DATE OF SERVICE: 06-05-21     Subjective: Patient seen and examined. No new events except as noted.         MEDICATIONS:  MEDICATIONS  (STANDING):  apixaban 2.5 milliGRAM(s) Oral every 12 hours  aspirin enteric coated 81 milliGRAM(s) Oral daily  atorvastatin 80 milliGRAM(s) Oral at bedtime  calcium acetate 667 milliGRAM(s) Oral three times a day with meals  chlorhexidine 2% Cloths 1 Application(s) Topical daily  dextrose 40% Gel 15 Gram(s) Oral once  dextrose 5% + sodium chloride 0.45%. 1000 milliLiter(s) (40 mL/Hr) IV Continuous <Continuous>  dextrose 5%. 1000 milliLiter(s) (100 mL/Hr) IV Continuous <Continuous>  dextrose 5%. 1000 milliLiter(s) (50 mL/Hr) IV Continuous <Continuous>  dextrose 50% Injectable 25 Gram(s) IV Push once  dextrose 50% Injectable 12.5 Gram(s) IV Push once  dextrose 50% Injectable 25 Gram(s) IV Push once  donepezil 10 milliGRAM(s) Oral at bedtime  glucagon  Injectable 1 milliGRAM(s) IntraMuscular once  hydrALAZINE 25 milliGRAM(s) Oral two times a day  insulin glargine Injectable (LANTUS) 10 Unit(s) SubCutaneous at bedtime  insulin lispro (ADMELOG) corrective regimen sliding scale   SubCutaneous every 6 hours  metoprolol tartrate Injectable 2.5 milliGRAM(s) IV Push every 6 hours  pantoprazole    Tablet 40 milliGRAM(s) Oral before breakfast  QUEtiapine 25 milliGRAM(s) Oral daily  valproate sodium IVPB 250 milliGRAM(s) IV Intermittent three times a day      PHYSICAL EXAM:  T(C): 36.3 (06-05-21 @ 17:24), Max: 36.7 (06-04-21 @ 21:39)  HR: 97 (06-05-21 @ 17:24) (84 - 101)  BP: 128/54 (06-05-21 @ 17:24) (125/71 - 139/64)  RR: 18 (06-05-21 @ 17:24) (16 - 18)  SpO2: 98% (06-05-21 @ 17:24) (96% - 100%)  Wt(kg): --  I&O's Summary        Appearance: confused  HEENT: dry OM   Lymphatic: No lymphadenopathy   Cardiovascular: Normal S1 S2  Respiratory: normal effort , clear  Gastrointestinal:  Soft, Non-tender  Skin: No rashes,  warm to touch  Psychiatry: letahrgic   Musculuskeletal: No edema      All labs, Imaging and EKGs personally reviewed                             9.0    8.07  )-----------( 165      ( 05 Jun 2021 04:28 )             30.3               06-05    145  |  102  |  50<H>  ----------------------------<  121<H>  3.7   |  20<L>  |  7.93<H>    Ca    8.4      05 Jun 2021 07:24  Phos  5.4     06-05  Mg     2.4     06-05

## 2021-06-05 NOTE — PROGRESS NOTE ADULT - SUBJECTIVE AND OBJECTIVE BOX
Dieter Willson MD  Interventional Cardiology / Advance Heart Failure and Cardiac Transplant Specialist  [place] Office : [address] [place] [address] [place]. [address]  Tel:   [place] Office : [address] [facility] [place] [address]  Tel: [phone]  Cell : [phone]    Pt is lying in bed confused  	  MEDICATIONS:  apixaban 2.5 milliGRAM(s) Oral every 12 hours  aspirin enteric coated 81 milliGRAM(s) Oral daily  hydrALAZINE 25 milliGRAM(s) Oral two times a day  metoprolol tartrate Injectable 2.5 milliGRAM(s) IV Push every 6 hours        donepezil 10 milliGRAM(s) Oral at bedtime  QUEtiapine 25 milliGRAM(s) Oral daily  valproate sodium IVPB 250 milliGRAM(s) IV Intermittent three times a day    pantoprazole    Tablet 40 milliGRAM(s) Oral before breakfast    atorvastatin 80 milliGRAM(s) Oral at bedtime  dextrose 40% Gel 15 Gram(s) Oral once  dextrose 50% Injectable 25 Gram(s) IV Push once  dextrose 50% Injectable 12.5 Gram(s) IV Push once  dextrose 50% Injectable 25 Gram(s) IV Push once  glucagon  Injectable 1 milliGRAM(s) IntraMuscular once  insulin glargine Injectable (LANTUS) 10 Unit(s) SubCutaneous at bedtime  insulin lispro (ADMELOG) corrective regimen sliding scale   SubCutaneous every 6 hours    calcium acetate 667 milliGRAM(s) Oral three times a day with meals  chlorhexidine 2% Cloths 1 Application(s) Topical daily  dextrose 5% + sodium chloride 0.45%. 1000 milliLiter(s) IV Continuous <Continuous>  dextrose 5%. 1000 milliLiter(s) IV Continuous <Continuous>  dextrose 5%. 1000 milliLiter(s) IV Continuous <Continuous>  sodium chloride 0.9% lock flush 10 milliLiter(s) IV Push every 1 hour PRN      PAST MEDICAL/SURGICAL HISTORY  PAST MEDICAL & SURGICAL HISTORY:  DM (diabetes mellitus)  TYpe II    HTN (hypertension)    Hypercholesteremia    CKD (chronic kidney disease)  now on HD via R forearm AVF    Anemia    Atrial fibrillation  On Eliquis    Cerebrovascular accident (CVA)    Status post right foot surgery  hammer toe repair    AV fistula  June 2018, 2 ballooning (last one 2 weeks ago), following up on Dec 15th    H/O colonoscopy with polypectomy        SOCIAL HISTORY: Substance Use (street drugs): ( x ) never used  (  ) other:    FAMILY HISTORY:  Family history of hypertension (Sibling)    Family history of diabetes mellitus    Family history of lung cancer (Sibling)    Family history of malignant neoplasm of gastrointestinal tract        PHYSICAL EXAM:  T(C): 36.3 (06-05-21 @ 17:24), Max: 36.7 (06-04-21 @ 21:39)  HR: 97 (06-05-21 @ 17:24) (84 - 101)  BP: 128/54 (06-05-21 @ 17:24) (125/71 - 139/64)  RR: 18 (06-05-21 @ 17:24) (16 - 18)  SpO2: 98% (06-05-21 @ 17:24) (96% - 100%)  Wt(kg): --  I&O's Summary        GENERAL: NAD  EYES: EOMI, PERRLA, conjunctiva and sclera clear  ENMT: No tonsillar erythema, exudates, or enlargement; Moist mucous membranes, Good dentition, No lesions  Cardiovascular: Normal S1 S2, No JVD, No murmurs, No edema  Respiratory: Lungs clear to auscultation	  Gastrointestinal:  Soft, Non-tender, + BS	  Extremities: no edema                                  9.0    8.07  )-----------( 165      ( 05 Jun 2021 04:28 )             30.3     06-05    145  |  102  |  50<H>  ----------------------------<  121<H>  3.7   |  20<L>  |  7.93<H>    Ca    8.4      05 Jun 2021 07:24  Phos  5.4     06-05  Mg     2.4     06-05      proBNP:   Lipid Profile:   HgA1c:   TSH:     Consultant(s) Notes Reviewed:  [x ] YES  [ ] NO    Care Discussed with Consultants/Other Providers [ x] YES  [ ] NO    Imaging Personally Reviewed independently:  [x] YES  [ ] NO    All labs, radiologic studies, vitals, orders and medications list reviewed. Patient is seen and examined at bedside. Case discussed with medical team.

## 2021-06-05 NOTE — PROGRESS NOTE ADULT - ASSESSMENT
80F hx htn, dm, A fib on eliquis, prior CVA and esrd MWF  present from nursing home with clotted AVF    ESRD on HD MWF  from nursing home    clotted avf f/u vascular   s/p IR for shiely 6/1   s/p HD 6/3 unable to UF sec to hypotension  pending GOC. currently family still not decided on stopping hd. had discussed with son and sister risk and benefit of continuing dialysis. family expressed understanding but will continue hd for now.  HD today  monitor electrolyte    AMS  recent CVA  very confused   ct head noted. f/u neuro  GOC with family    Anemia  Hb at goal for ESRD  recent cva no epo   monitor Hb    HTN  controlled  monitor    ckd-mbd  Hectorol 2mcg with HD  monitor phos and calcium daily

## 2021-06-06 LAB
ALBUMIN SERPL ELPH-MCNC: 2.3 G/DL — LOW (ref 3.3–5)
ALP SERPL-CCNC: 47 U/L — SIGNIFICANT CHANGE UP (ref 40–120)
ALT FLD-CCNC: 13 U/L — SIGNIFICANT CHANGE UP (ref 4–33)
ANION GAP SERPL CALC-SCNC: 22 MMOL/L — HIGH (ref 7–14)
ANION GAP SERPL CALC-SCNC: 23 MMOL/L — HIGH (ref 7–14)
APTT BLD: 27.9 SEC — SIGNIFICANT CHANGE UP (ref 27–36.3)
AST SERPL-CCNC: 37 U/L — HIGH (ref 4–32)
BASOPHILS # BLD AUTO: 0.02 K/UL — SIGNIFICANT CHANGE UP (ref 0–0.2)
BASOPHILS NFR BLD AUTO: 0.2 % — SIGNIFICANT CHANGE UP (ref 0–2)
BILIRUB SERPL-MCNC: 0.4 MG/DL — SIGNIFICANT CHANGE UP (ref 0.2–1.2)
BLD GP AB SCN SERPL QL: NEGATIVE — SIGNIFICANT CHANGE UP
BLOOD GAS ARTERIAL - LYTES,HGB,ICA,LACT RESULT: SIGNIFICANT CHANGE UP
BUN SERPL-MCNC: 20 MG/DL — SIGNIFICANT CHANGE UP (ref 7–23)
BUN SERPL-MCNC: 29 MG/DL — HIGH (ref 7–23)
CALCIUM SERPL-MCNC: 6.2 MG/DL — CRITICAL LOW (ref 8.4–10.5)
CALCIUM SERPL-MCNC: 8 MG/DL — LOW (ref 8.4–10.5)
CHLORIDE SERPL-SCNC: 100 MMOL/L — SIGNIFICANT CHANGE UP (ref 98–107)
CHLORIDE SERPL-SCNC: 106 MMOL/L — SIGNIFICANT CHANGE UP (ref 98–107)
CO2 SERPL-SCNC: 11 MMOL/L — LOW (ref 22–31)
CO2 SERPL-SCNC: 18 MMOL/L — LOW (ref 22–31)
CREAT SERPL-MCNC: 3.64 MG/DL — HIGH (ref 0.5–1.3)
CREAT SERPL-MCNC: 5.24 MG/DL — HIGH (ref 0.5–1.3)
CULTURE RESULTS: SIGNIFICANT CHANGE UP
CULTURE RESULTS: SIGNIFICANT CHANGE UP
EOSINOPHIL # BLD AUTO: 0.07 K/UL — SIGNIFICANT CHANGE UP (ref 0–0.5)
EOSINOPHIL NFR BLD AUTO: 0.7 % — SIGNIFICANT CHANGE UP (ref 0–6)
GLUCOSE SERPL-MCNC: 103 MG/DL — HIGH (ref 70–99)
GLUCOSE SERPL-MCNC: 410 MG/DL — HIGH (ref 70–99)
HCT VFR BLD CALC: 28.6 % — LOW (ref 34.5–45)
HCT VFR BLD CALC: 34.4 % — LOW (ref 34.5–45)
HGB BLD-MCNC: 10.1 G/DL — LOW (ref 11.5–15.5)
HGB BLD-MCNC: 8 G/DL — LOW (ref 11.5–15.5)
IANC: 8.18 K/UL — SIGNIFICANT CHANGE UP (ref 1.5–8.5)
IMM GRANULOCYTES NFR BLD AUTO: 0.9 % — SIGNIFICANT CHANGE UP (ref 0–1.5)
INR BLD: 1.6 RATIO — HIGH (ref 0.88–1.16)
LYMPHOCYTES # BLD AUTO: 0.65 K/UL — LOW (ref 1–3.3)
LYMPHOCYTES # BLD AUTO: 6.8 % — LOW (ref 13–44)
MAGNESIUM SERPL-MCNC: 1.7 MG/DL — SIGNIFICANT CHANGE UP (ref 1.6–2.6)
MAGNESIUM SERPL-MCNC: 2.2 MG/DL — SIGNIFICANT CHANGE UP (ref 1.6–2.6)
MCHC RBC-ENTMCNC: 23.9 PG — LOW (ref 27–34)
MCHC RBC-ENTMCNC: 24.5 PG — LOW (ref 27–34)
MCHC RBC-ENTMCNC: 28 GM/DL — LOW (ref 32–36)
MCHC RBC-ENTMCNC: 29.4 GM/DL — LOW (ref 32–36)
MCV RBC AUTO: 83.3 FL — SIGNIFICANT CHANGE UP (ref 80–100)
MCV RBC AUTO: 85.4 FL — SIGNIFICANT CHANGE UP (ref 80–100)
MONOCYTES # BLD AUTO: 0.6 K/UL — SIGNIFICANT CHANGE UP (ref 0–0.9)
MONOCYTES NFR BLD AUTO: 6.2 % — SIGNIFICANT CHANGE UP (ref 2–14)
NEUTROPHILS # BLD AUTO: 8.18 K/UL — HIGH (ref 1.8–7.4)
NEUTROPHILS NFR BLD AUTO: 85.2 % — HIGH (ref 43–77)
NRBC # BLD: 0 /100 WBCS — SIGNIFICANT CHANGE UP
NRBC # BLD: 0 /100 WBCS — SIGNIFICANT CHANGE UP
NRBC # FLD: 0 K/UL — SIGNIFICANT CHANGE UP
NRBC # FLD: 0.04 K/UL — HIGH
PHOSPHATE SERPL-MCNC: 3.6 MG/DL — SIGNIFICANT CHANGE UP (ref 2.5–4.5)
PHOSPHATE SERPL-MCNC: 4.3 MG/DL — SIGNIFICANT CHANGE UP (ref 2.5–4.5)
PLATELET # BLD AUTO: 165 K/UL — SIGNIFICANT CHANGE UP (ref 150–400)
PLATELET # BLD AUTO: 174 K/UL — SIGNIFICANT CHANGE UP (ref 150–400)
POTASSIUM SERPL-MCNC: 3 MMOL/L — LOW (ref 3.5–5.3)
POTASSIUM SERPL-MCNC: 4.8 MMOL/L — SIGNIFICANT CHANGE UP (ref 3.5–5.3)
POTASSIUM SERPL-SCNC: 3 MMOL/L — LOW (ref 3.5–5.3)
POTASSIUM SERPL-SCNC: 4.8 MMOL/L — SIGNIFICANT CHANGE UP (ref 3.5–5.3)
PROT SERPL-MCNC: 5 G/DL — LOW (ref 6–8.3)
PROTHROM AB SERPL-ACNC: 18 SEC — HIGH (ref 10.6–13.6)
RBC # BLD: 3.35 M/UL — LOW (ref 3.8–5.2)
RBC # BLD: 4.13 M/UL — SIGNIFICANT CHANGE UP (ref 3.8–5.2)
RBC # FLD: 16.7 % — HIGH (ref 10.3–14.5)
RBC # FLD: 16.9 % — HIGH (ref 10.3–14.5)
RH IG SCN BLD-IMP: POSITIVE — SIGNIFICANT CHANGE UP
SODIUM SERPL-SCNC: 139 MMOL/L — SIGNIFICANT CHANGE UP (ref 135–145)
SODIUM SERPL-SCNC: 141 MMOL/L — SIGNIFICANT CHANGE UP (ref 135–145)
SPECIMEN SOURCE: SIGNIFICANT CHANGE UP
SPECIMEN SOURCE: SIGNIFICANT CHANGE UP
WBC # BLD: 13.16 K/UL — HIGH (ref 3.8–10.5)
WBC # BLD: 9.61 K/UL — SIGNIFICANT CHANGE UP (ref 3.8–10.5)
WBC # FLD AUTO: 13.16 K/UL — HIGH (ref 3.8–10.5)
WBC # FLD AUTO: 9.61 K/UL — SIGNIFICANT CHANGE UP (ref 3.8–10.5)

## 2021-06-06 PROCEDURE — 93010 ELECTROCARDIOGRAM REPORT: CPT

## 2021-06-06 RX ORDER — HYDROCORTISONE 20 MG
50 TABLET ORAL ONCE
Refills: 0 | Status: COMPLETED | OUTPATIENT
Start: 2021-06-06 | End: 2021-06-06

## 2021-06-06 RX ORDER — SODIUM CHLORIDE 9 MG/ML
250 INJECTION INTRAMUSCULAR; INTRAVENOUS; SUBCUTANEOUS ONCE
Refills: 0 | Status: COMPLETED | OUTPATIENT
Start: 2021-06-06 | End: 2021-06-06

## 2021-06-06 RX ORDER — SODIUM CHLORIDE 9 MG/ML
500 INJECTION INTRAMUSCULAR; INTRAVENOUS; SUBCUTANEOUS ONCE
Refills: 0 | Status: COMPLETED | OUTPATIENT
Start: 2021-06-06 | End: 2021-06-06

## 2021-06-06 RX ORDER — POTASSIUM CHLORIDE 20 MEQ
10 PACKET (EA) ORAL ONCE
Refills: 0 | Status: COMPLETED | OUTPATIENT
Start: 2021-06-06 | End: 2021-06-06

## 2021-06-06 RX ADMIN — Medication 2.5 MILLIGRAM(S): at 12:23

## 2021-06-06 RX ADMIN — Medication 2.5 MILLIGRAM(S): at 23:11

## 2021-06-06 RX ADMIN — SODIUM CHLORIDE 40 MILLILITER(S): 9 INJECTION, SOLUTION INTRAVENOUS at 03:47

## 2021-06-06 RX ADMIN — Medication 26.25 MILLIGRAM(S): at 23:30

## 2021-06-06 RX ADMIN — Medication 2.5 MILLIGRAM(S): at 18:01

## 2021-06-06 RX ADMIN — SODIUM CHLORIDE 40 MILLILITER(S): 9 INJECTION, SOLUTION INTRAVENOUS at 08:38

## 2021-06-06 RX ADMIN — Medication 26.25 MILLIGRAM(S): at 04:55

## 2021-06-06 RX ADMIN — Medication 100 MILLIEQUIVALENT(S): at 04:55

## 2021-06-06 RX ADMIN — SODIUM CHLORIDE 500 MILLILITER(S): 9 INJECTION INTRAMUSCULAR; INTRAVENOUS; SUBCUTANEOUS at 01:32

## 2021-06-06 RX ADMIN — Medication 26.25 MILLIGRAM(S): at 13:34

## 2021-06-06 RX ADMIN — Medication 50 MILLIGRAM(S): at 01:55

## 2021-06-06 RX ADMIN — SODIUM CHLORIDE 500 MILLILITER(S): 9 INJECTION INTRAMUSCULAR; INTRAVENOUS; SUBCUTANEOUS at 04:00

## 2021-06-06 RX ADMIN — Medication 1: at 05:10

## 2021-06-06 RX ADMIN — CHLORHEXIDINE GLUCONATE 1 APPLICATION(S): 213 SOLUTION TOPICAL at 12:14

## 2021-06-06 RX ADMIN — Medication 2.5 MILLIGRAM(S): at 05:07

## 2021-06-06 NOTE — RAPID RESPONSE TEAM SUMMARY - NSSITUATIONBACKGROUNDRRT_GEN_ALL_CORE
Patient is a 82 Y/O Female with PMHx of CVA recent ischemic stroke, HTN, DM, A fib on Eliquis, ESRD on HD (MWF) brought to ED after malfunctioning AVF for HD today. unable to do HD. was transferred to the hospital. patient at baseline is mildly confused and unable to give history. denies of any discomfort.    RRT called for hypoxia. Patient is AxO 0-1 at baseline but able to follow commands. Her O2 sat was in the 80s. Otherwise, HD stable. Afebrile. Placed on NRB with subsequent improvement in her O2 status. Obtained STAT blood work including ABG and CXR. Decision made to put the patient with continuous pulse oximetry and monitor off oxygen. She was satting in the 90s without oxygen supplementation. 
 80 Y/O Female with PMHx of CVA recent ischemic stroke, HTN, DM, A fib on Eliquis, ESRD on HD (MWF) brought to ED after malfunctioning AVF, RRT called for hypotention.

## 2021-06-06 NOTE — RAPID RESPONSE TEAM SUMMARY - NSADDTLFINDINGSRRT_GEN_ALL_CORE
Patient recently had returned from HD following 300 cc removal during the evening. During RRT found to have been AoX0 as per her baseline. Hypotensive, hypothermic, and hypoglycemic. Patient given back 500CC IVF with improvement of BP MAP> 65 . Given 1 amp of d50.  Raised concern for adrenal crisis component vs excess fluid removal in patient with decreased vascular integrity. Patient  given 50 mg of solucortef. Son informed of patient reaffirmed DNR/ DNI and is on his way to see his mother ( patient).

## 2021-06-06 NOTE — CHART NOTE - NSCHARTNOTEFT_GEN_A_CORE
Called by RN on this 82 Y/O Female with PMHx of CVA recent ischemic stroke, HTN, DM, A fib on Eliquis, ESRD on HD (MWF) brought to ED after malfunctioning AVF for hypotension s/p dialysis for 300 cc removed. Ordered a 500 cc NS bolus. Upon arrival to the room, a rapid response was called.      Pt was nonverbal, not following commands, alert and oriented x 0 which is her baseline. Pt was hypoglycemic, hypothermic as well - treated with dextrose.      Called Son to discuss goals of care. Son and daughter -in law came to the hospital and now at the bedside. Further goals of care discussed with the son. He stated that he was going to speak with palliative on Monday about hospice. He stated that he does not want an NGT, and he does not want pressors.  He will accept IVF for blood pressure control and to continue with whatever meds the patient was getting through her IV. Pt DNR/DNI.       500 cc IVF   Labs sent  Pt to remain on floor Called by RN on this 80 Y/O Female with PMHx of CVA recent ischemic stroke, HTN, DM, A fib on Eliquis, ESRD on HD (MWF) brought to ED after malfunctioning AVF for hypotension s/p dialysis for 300 cc removed. Ordered a 500 cc NS bolus. Upon arrival to the room, a rapid response was called.      Pt was nonverbal, not following commands, alert and oriented x 0 which is her baseline. Pt was hypoglycemic, hypothermic as well - treated with dextrose.      Called Son to discuss goals of care. Son and daughter -in law came to the hospital and now at the bedside. Further goals of care discussed with the son. He stated that he was going to speak with palliative on Monday about hospice. He stated that he does not want an NGT, and he does not want pressors.  He will accept IVF for blood pressure control and to continue with whatever meds the patient was getting through her IV. Pt DNR/DNI.       500 cc IVF   Labs sent  Pt to remain on floor  K+ 3.0 - Kcl 10meq IV x 1  Lactate 4.0 - gentle NS 250cc IVF bolus given her renal status

## 2021-06-06 NOTE — PROVIDER CONTACT NOTE (CRITICAL VALUE NOTIFICATION) - TEST AND RESULT REPORTED:
Vital signs
blood glucose 475
Calcium- 6.2
ABG: Hemoglobin - 4.0, HCT- 12.8, PO2- 28, Lactate- 4.0, K- 1.7, Ionized Ca - .62

## 2021-06-06 NOTE — PROVIDER CONTACT NOTE (CRITICAL VALUE NOTIFICATION) - ASSESSMENT
See 2:30 VS. Pt at baseline mental status, AxO 0.
Rectal Temp: 96.5 , HR: 150, BP 66/32, RR: 31 with retraction. Pt lethargic and cold to touch.
Lab was draw while patients fluids where running, as per PCA. FS taken and reads 155
See 2:30 VS. Pt at baseline mental status, AxO 0.

## 2021-06-06 NOTE — PROGRESS NOTE ADULT - ASSESSMENT
80F hx htn, dm, A fib on eliquis, prior CVA and esrd MWF  present from nursing home with clotted AVF    ESRD on HD MWF  from nursing home    clotted avf f/u vascular   s/p IR for shiely 6/1   s/p HD 6/3 unable to UF sec to hypotension  s/p HD 6/5 with 300cc UF  pending GOC. currently family still not decided on stopping hd. had discussed with son and sister risk and benefit of continuing dialysis. family expressed understanding but will continue hd for now.  monitor electrolyte    AMS  recent CVA  very confused   ct head noted. f/u neuro  GOC with family    Anemia  Hb at goal for ESRD  recent cva no epo   monitor Hb    HTN  controlled  monitor    ckd-mbd  Hectorol 2mcg with HD  monitor phos and calcium daily

## 2021-06-06 NOTE — PROGRESS NOTE ADULT - SUBJECTIVE AND OBJECTIVE BOX
[facility] [place] [facility]   MD Nehemias Busch MD, D.O. Ruoru Wong, PA    From 7 AM - 5 PM:  OFFICE: [phone]  Dr. Jacobson cell: [phone]  Dr. Sidhu cell: [phone]  Dr. Ross cell: [phone]  MALINDA Shepard cell: [phone]    From 5 PM - 7 AM: Answering Service: [phone]  Date of service: 06-06-21 @ 17:41    RENAL FOLLOW UP NOTE  --------------------------------------------------------------------------------  HPI:  Pt seen and examined at bedside.   [place] SOB, chest pain     PAST HISTORY  --------------------------------------------------------------------------------  No significant changes to PMH, PSH, FHx, SHx, unless otherwise noted    ALLERGIES & MEDICATIONS  --------------------------------------------------------------------------------  Allergies    No Known Allergies    Intolerances      Standing Inpatient Medications  apixaban 2.5 milliGRAM(s) Oral every 12 hours  aspirin enteric coated 81 milliGRAM(s) Oral daily  atorvastatin 80 milliGRAM(s) Oral at bedtime  calcium acetate 667 milliGRAM(s) Oral three times a day with meals  chlorhexidine 2% Cloths 1 Application(s) Topical daily  dextrose 40% Gel 15 Gram(s) Oral once  dextrose 5% + sodium chloride 0.45%. 1000 milliLiter(s) IV Continuous <Continuous>  dextrose 5%. 1000 milliLiter(s) IV Continuous <Continuous>  dextrose 5%. 1000 milliLiter(s) IV Continuous <Continuous>  dextrose 50% Injectable 25 Gram(s) IV Push once  dextrose 50% Injectable 12.5 Gram(s) IV Push once  dextrose 50% Injectable 25 Gram(s) IV Push once  donepezil 10 milliGRAM(s) Oral at bedtime  glucagon  Injectable 1 milliGRAM(s) IntraMuscular once  hydrALAZINE 25 milliGRAM(s) Oral two times a day  insulin glargine Injectable (LANTUS) 10 Unit(s) SubCutaneous at bedtime  insulin lispro (ADMELOG) corrective regimen sliding scale   SubCutaneous every 6 hours  metoprolol tartrate Injectable 2.5 milliGRAM(s) IV Push every 6 hours  pantoprazole    Tablet 40 milliGRAM(s) Oral before breakfast  QUEtiapine 25 milliGRAM(s) Oral daily  valproate sodium IVPB 250 milliGRAM(s) IV Intermittent three times a day    PRN Inpatient Medications  sodium chloride 0.9% lock flush 10 milliLiter(s) IV Push every 1 hour PRN      REVIEW OF SYSTEMS  --------------------------------------------------------------------------------  General: no fever  CVS: no chest pain  RESP: no sob, no cough  ABD: no abdominal pain  : no dysuria,  MSK: no edema     VITALS/PHYSICAL EXAM  --------------------------------------------------------------------------------  T(C): 36.5 (06-06-21 @ 08:27), Max: 36.5 (06-05-21 @ 23:40)  HR: 110 (06-06-21 @ 12:22) (50 - 150)  BP: 130/60 (06-06-21 @ 12:22) (66/32 - 138/73)  RR: 20 (06-06-21 @ 12:22) (17 - 31)  SpO2: 100% (06-06-21 @ 12:22) (94% - 100%)  Wt(kg): --        06-05-21 @ 07:01  -  06-06-21 @ 07:00  --------------------------------------------------------  IN: 400 mL / OUT: 700 mL / NET: -300 mL      Physical Exam:  	Gen: NAD  	HEENT: MMM  	Pulm: CTA B/L  	CV: S1S2  	Abd: Soft, +BS  	Ext: No LE edema B/L                      Neuro: Awake   	Skin: Warm and Dry   	Vascular access:avf    LABS/STUDIES  --------------------------------------------------------------------------------              10.1   13.16 >-----------<  174      [06-06-21 @ 07:40]              34.4     141  |  100  |  29  ----------------------------<  103      [06-06-21 @ 07:40]  4.8   |  18  |  5.24        Ca     8.0     [06-06-21 @ 07:40]      Mg     2.2     [06-06-21 @ 07:40]      Phos  4.3     [06-06-21 @ 07:40]    TPro  5.0  /  Alb  2.3  /  TBili  0.4  /  DBili  x   /  AST  37  /  ALT  13  /  AlkPhos  47  [06-06-21 @ 02:16]    PT/INR: PT 18.0 , INR 1.60       [06-06-21 @ 02:16]  PTT: 27.9       [06-06-21 @ 02:16]      Creatinine Trend:  SCr 5.24 [06-06 @ 07:40]  SCr 3.64 [06-06 @ 02:16]  SCr 7.93 [06-05 @ 07:24]  SCr 6.88 [06-05 @ 04:28]  SCr 5.81 [06-04 @ 07:01]    Urinalysis - [05-31-21 @ 19:13]      Color Dark Brown / Appearance Turbid / SG 1.019 / pH 7.5      Gluc Negative / Ketone Negative  / Bili Negative / Urobili <2 mg/dL       Blood Moderate / Protein 300 mg/dL / Leuk Est Large / Nitrite Negative      RBC 5 / WBC >10 / Hyaline  / Gran  / Sq Epi  / Non Sq Epi 4 / Bacteria Many      PTH -- (Ca --)      [05-09-21 @ 06:54]   421  HbA1c 5.8      [12-10-19 @ 06:41]  TSH 3.72      [05-14-21 @ 07:12]  Lipid: chol 219, , HDL 65, LDL --      [05-09-21 @ 06:54]    HBsAb <3.0      [05-11-21 @ 00:01]  HBsAg Nonreact      [05-10-21 @ 09:26]  HBcAb Nonreact      [05-10-21 @ 09:26]  HCV 0.17, Nonreact      [05-10-21 @ 09:26]    Syphilis Screen (Treponema Pallidum Ab) Negative      [05-15-21 @ 10:20]

## 2021-06-06 NOTE — PROGRESS NOTE ADULT - SUBJECTIVE AND OBJECTIVE BOX
Name of Patient : VINCENT GROSS  MRN: [id number]  DATE OF SERVICE: 06-06-21 @ 13:12    Subjective: Patient seen and examined. No new events except as noted.   confused  RRT for hypotension todaywith HD   Patient pulled out shiley         MEDICATIONS:  MEDICATIONS  (STANDING):  apixaban 2.5 milliGRAM(s) Oral every 12 hours  aspirin enteric coated 81 milliGRAM(s) Oral daily  atorvastatin 80 milliGRAM(s) Oral at bedtime  calcium acetate 667 milliGRAM(s) Oral three times a day with meals  chlorhexidine 2% Cloths 1 Application(s) Topical daily  dextrose 40% Gel 15 Gram(s) Oral once  dextrose 5% + sodium chloride 0.45%. 1000 milliLiter(s) (40 mL/Hr) IV Continuous <Continuous>  dextrose 5%. 1000 milliLiter(s) (100 mL/Hr) IV Continuous <Continuous>  dextrose 5%. 1000 milliLiter(s) (50 mL/Hr) IV Continuous <Continuous>  dextrose 50% Injectable 25 Gram(s) IV Push once  dextrose 50% Injectable 12.5 Gram(s) IV Push once  dextrose 50% Injectable 25 Gram(s) IV Push once  donepezil 10 milliGRAM(s) Oral at bedtime  glucagon  Injectable 1 milliGRAM(s) IntraMuscular once  hydrALAZINE 25 milliGRAM(s) Oral two times a day  insulin glargine Injectable (LANTUS) 10 Unit(s) SubCutaneous at bedtime  insulin lispro (ADMELOG) corrective regimen sliding scale   SubCutaneous every 6 hours  metoprolol tartrate Injectable 2.5 milliGRAM(s) IV Push every 6 hours  pantoprazole    Tablet 40 milliGRAM(s) Oral before breakfast  QUEtiapine 25 milliGRAM(s) Oral daily  valproate sodium IVPB 250 milliGRAM(s) IV Intermittent three times a day      PHYSICAL EXAM:  T(C): 36.5 (06-06-21 @ 08:27), Max: 36.5 (06-05-21 @ 23:40)  HR: 110 (06-06-21 @ 12:22) (50 - 150)  BP: 130/60 (06-06-21 @ 12:22) (66/32 - 138/73)  RR: 20 (06-06-21 @ 12:22) (17 - 31)  SpO2: 100% (06-06-21 @ 12:22) (94% - 100%)  Wt(kg): --  I&O's Summary    05 Jun 2021 07:01  -  06 Jun 2021 07:00  --------------------------------------------------------  IN: 400 mL / OUT: 700 mL / NET: -300 mL          Appearance: confused, agitated  HEENT:  PERRLA   Lymphatic: No lymphadenopathy   Cardiovascular: Normal S1 S2  Respiratory: normal effort , clear  Gastrointestinal:  Soft, Non-tender  Skin: No rashes,  warm to touch  Psychiatry:  confused   Musculuskeletal: No edema      All labs, Imaging and EKGs personally reviewed       06-05-21 @ 07:01  -  06-06-21 @ 07:00  --------------------------------------------------------  IN: 400 mL / OUT: 700 mL / NET: -300 mL                          10.1   13.16 )-----------( 174      ( 06 Jun 2021 07:40 )             34.4               06-06    141  |  100  |  29<H>  ----------------------------<  103<H>  4.8   |  18<L>  |  5.24<H>    Ca    8.0<L>      06 Jun 2021 07:40  Phos  4.3     06-06  Mg     2.2     06-06    TPro  5.0<L>  /  Alb  2.3<L>  /  TBili  0.4  /  DBili  x   /  AST  37<H>  /  ALT  13  /  AlkPhos  47  06-06    PT/INR - ( 06 Jun 2021 02:16 )   PT: 18.0 sec;   INR: 1.60 ratio         PTT - ( 06 Jun 2021 02:16 )  PTT:27.9 sec                 ABG - ( 06 Jun 2021 02:16 )  pH, Arterial: 7.30  pH, Blood: x     /  pCO2: 17    /  pO2: 28    / HCO3: 10    / Base Excess: -17.3 /  SaO2: 36.5

## 2021-06-06 NOTE — CHART NOTE - NSCHARTNOTEFT_GEN_A_CORE
81F h/o recent CVA, HTN, DM, AFib on Eliquis, ESRD on HD (MWF) brought to ED after malfunctioning AVF unable to do HD patient at baseline is mildly confused.  Hospital course complicated by new infarct seen on CTHead, s/p RRT early this morning for hypotension/hypothermic/hypoglycemic.    Notified by RN, patient found in bed restless with shiley catheter lying on floor at side of bed.  Patient seen and examined at bedside.  Patient restless, AOx0. No bleeding or hematoma noted at shiley site.  Clean/dry dressing applied.    No further intervention required at this time.  Primary team to follow up tomorrow, per son planning a family meeting with Palliative Team to discuss potential Hospice Eval.    Will continue to monitor.    Mae Zarate NP-BC  Department of Medicine  In House Pager #[phone]

## 2021-06-06 NOTE — PROVIDER CONTACT NOTE (CRITICAL VALUE NOTIFICATION) - ACTION/TREATMENT ORDERED:
ACP Carmen notified and aware. Stated to continue monitoring pt. Awaiting family decision for pt plan of care.
will reorder BMP to clarify glucose
Provider contacted. Advised to start NS bolus and proceed with RRT.
ACP Carmen notified and aware. Stated to continue monitoring pt. Awaiting family decision for pt plan of care.

## 2021-06-06 NOTE — PROVIDER CONTACT NOTE (CRITICAL VALUE NOTIFICATION) - BACKGROUND
81 yr old female admitted for thrombosis of fistula, currently NPO receiving dextrose and 0.45 NS
Pt admitted for thrombosis d/t vascular fistula. Pt had shiley placed 6/1.  Pt was a previous RRT after dialysis. Pt has PMH of left sided CVA and new CVA this admission.

## 2021-06-06 NOTE — PROGRESS NOTE ADULT - SUBJECTIVE AND OBJECTIVE BOX
Dieter Willson MD  Interventional Cardiology / Advance Heart Failure and Cardiac Transplant Specialist  [place] Office : [address] [place] [address] [place] [address]  Tel:   [place] Office : [address] [facility] [place] [address]  Tel: [phone]  Cell : [phone]    Pt is lying in bed confused pulled out shiley  	  MEDICATIONS:  apixaban 2.5 milliGRAM(s) Oral every 12 hours  aspirin enteric coated 81 milliGRAM(s) Oral daily  hydrALAZINE 25 milliGRAM(s) Oral two times a day  metoprolol tartrate Injectable 2.5 milliGRAM(s) IV Push every 6 hours        donepezil 10 milliGRAM(s) Oral at bedtime  QUEtiapine 25 milliGRAM(s) Oral daily  valproate sodium IVPB 250 milliGRAM(s) IV Intermittent three times a day    pantoprazole    Tablet 40 milliGRAM(s) Oral before breakfast    atorvastatin 80 milliGRAM(s) Oral at bedtime  dextrose 40% Gel 15 Gram(s) Oral once  dextrose 50% Injectable 25 Gram(s) IV Push once  dextrose 50% Injectable 12.5 Gram(s) IV Push once  dextrose 50% Injectable 25 Gram(s) IV Push once  glucagon  Injectable 1 milliGRAM(s) IntraMuscular once  insulin glargine Injectable (LANTUS) 10 Unit(s) SubCutaneous at bedtime  insulin lispro (ADMELOG) corrective regimen sliding scale   SubCutaneous every 6 hours    calcium acetate 667 milliGRAM(s) Oral three times a day with meals  chlorhexidine 2% Cloths 1 Application(s) Topical daily  dextrose 5% + sodium chloride 0.45%. 1000 milliLiter(s) IV Continuous <Continuous>  dextrose 5%. 1000 milliLiter(s) IV Continuous <Continuous>  dextrose 5%. 1000 milliLiter(s) IV Continuous <Continuous>  sodium chloride 0.9% lock flush 10 milliLiter(s) IV Push every 1 hour PRN      PAST MEDICAL/SURGICAL HISTORY  PAST MEDICAL & SURGICAL HISTORY:  DM (diabetes mellitus)  TYpe II    HTN (hypertension)    Hypercholesteremia    CKD (chronic kidney disease)  now on HD via R forearm AVF    Anemia    Atrial fibrillation  On Eliquis    Cerebrovascular accident (CVA)    Status post right foot surgery  hammer toe repair    AV fistula  June 2018, 2 ballooning (last one 2 weeks ago), following up on Dec 15th    H/O colonoscopy with polypectomy        SOCIAL HISTORY: Substance Use (street drugs): ( x ) never used  (  ) other:    FAMILY HISTORY:  Family history of hypertension (Sibling)    Family history of diabetes mellitus    Family history of lung cancer (Sibling)    Family history of malignant neoplasm of gastrointestinal tract         PHYSICAL EXAM:  T(C): 36.5 (06-06-21 @ 08:27), Max: 36.5 (06-05-21 @ 23:40)  HR: 110 (06-06-21 @ 12:22) (50 - 150)  BP: 130/60 (06-06-21 @ 12:22) (66/32 - 138/73)  RR: 20 (06-06-21 @ 12:22) (17 - 31)  SpO2: 100% (06-06-21 @ 12:22) (94% - 100%)  Wt(kg): --  I&O's Summary    05 Jun 2021 07:01  -  06 Jun 2021 07:00  --------------------------------------------------------  IN: 400 mL / OUT: 700 mL / NET: -300 mL          GENERAL: NAD  EYES: EOMI, PERRLA, conjunctiva and sclera clear  ENMT: No tonsillar erythema, exudates, or enlargement; Moist mucous membranes, Good dentition, No lesions  Cardiovascular: Normal S1 S2, No JVD, No murmurs, No edema  Respiratory: Lungs clear to auscultation	  Gastrointestinal:  Soft, Non-tender, + BS	  Extremities: no edema                                    10.1   13.16 )-----------( 174      ( 06 Jun 2021 07:40 )             34.4     06-06    141  |  100  |  29<H>  ----------------------------<  103<H>  4.8   |  18<L>  |  5.24<H>    Ca    8.0<L>      06 Jun 2021 07:40  Phos  4.3     06-06  Mg     2.2     06-06    TPro  5.0<L>  /  Alb  2.3<L>  /  TBili  0.4  /  DBili  x   /  AST  37<H>  /  ALT  13  /  AlkPhos  47  06-06    proBNP:   Lipid Profile:   HgA1c:   TSH:     Consultant(s) Notes Reviewed:  [x ] YES  [ ] NO    Care Discussed with Consultants/Other Providers [ x] YES  [ ] NO    Imaging Personally Reviewed independently:  [x] YES  [ ] NO    All labs, radiologic studies, vitals, orders and medications list reviewed. Patient is seen and examined at bedside. Case discussed with medical team.

## 2021-06-06 NOTE — PROGRESS NOTE ADULT - PROBLEM SELECTOR PLAN 1
vascular eval appreciated, not a candidate for nay invasive procedure, appreciate vascular follow up   charles per IR, patient pulled out   haldol for agitation   neuro eval appreciated   worsening confusion, CT head showed acute ischemic stroke, repeat ct head noted   palliative eval, DNR-DNI  son deciding regarding hospice placement  NPO, ? feeding tube  discussion for hospice placement for tomorrow

## 2021-06-06 NOTE — PROVIDER CONTACT NOTE (CRITICAL VALUE NOTIFICATION) - RECOMMENDATIONS
Call RRT.
Contact provider for further orders.
Contact provider for further orders.
reorder BMP to clarify if reading correct

## 2021-06-06 NOTE — PROVIDER CONTACT NOTE (CRITICAL VALUE NOTIFICATION) - PERSON GIVING RESULT:
Laura Lam, Toxicology
[facility]
MICHELLE De Leon, Toxicology
Dedra Carrera RN

## 2021-06-06 NOTE — PROVIDER CONTACT NOTE (CRITICAL VALUE NOTIFICATION) - SITUATION
blood glucose 475, f/s completed afterwards reading 155
Labs drawn from RRT (6/6, 1:32) after pt returned from dialysis. Results from toxicology received.
Pt VS unstable upon arrival back to unit from dialysis. Rectal Temp: 96.5 , HR: 150, BP 66/32, RR: 31 with retraction. Pt lethargic and cold to touch.
Labs drawn from RRT (6/6, 1:32) after pt returned from dialysis. Results from toxicology received.

## 2021-06-07 LAB
ANION GAP SERPL CALC-SCNC: 19 MMOL/L — HIGH (ref 7–14)
BUN SERPL-MCNC: 36 MG/DL — HIGH (ref 7–23)
CALCIUM SERPL-MCNC: 7.8 MG/DL — LOW (ref 8.4–10.5)
CHLORIDE SERPL-SCNC: 102 MMOL/L — SIGNIFICANT CHANGE UP (ref 98–107)
CO2 SERPL-SCNC: 22 MMOL/L — SIGNIFICANT CHANGE UP (ref 22–31)
CREAT SERPL-MCNC: 6.89 MG/DL — HIGH (ref 0.5–1.3)
GENTAMICIN SERPL-MCNC: 1.2 UG/ML — SIGNIFICANT CHANGE UP
GLUCOSE SERPL-MCNC: 98 MG/DL — SIGNIFICANT CHANGE UP (ref 70–99)
HCT VFR BLD CALC: 30.6 % — LOW (ref 34.5–45)
HGB BLD-MCNC: 8.9 G/DL — LOW (ref 11.5–15.5)
MAGNESIUM SERPL-MCNC: 2.1 MG/DL — SIGNIFICANT CHANGE UP (ref 1.6–2.6)
MCHC RBC-ENTMCNC: 24.4 PG — LOW (ref 27–34)
MCHC RBC-ENTMCNC: 29.1 GM/DL — LOW (ref 32–36)
MCV RBC AUTO: 83.8 FL — SIGNIFICANT CHANGE UP (ref 80–100)
NRBC # BLD: 0 /100 WBCS — SIGNIFICANT CHANGE UP
NRBC # FLD: 0 K/UL — SIGNIFICANT CHANGE UP
PHOSPHATE SERPL-MCNC: 4.1 MG/DL — SIGNIFICANT CHANGE UP (ref 2.5–4.5)
PLATELET # BLD AUTO: 141 K/UL — LOW (ref 150–400)
POTASSIUM SERPL-MCNC: 3.8 MMOL/L — SIGNIFICANT CHANGE UP (ref 3.5–5.3)
POTASSIUM SERPL-SCNC: 3.8 MMOL/L — SIGNIFICANT CHANGE UP (ref 3.5–5.3)
RBC # BLD: 3.65 M/UL — LOW (ref 3.8–5.2)
RBC # FLD: 16.7 % — HIGH (ref 10.3–14.5)
SODIUM SERPL-SCNC: 143 MMOL/L — SIGNIFICANT CHANGE UP (ref 135–145)
WBC # BLD: 8.66 K/UL — SIGNIFICANT CHANGE UP (ref 3.8–10.5)
WBC # FLD AUTO: 8.66 K/UL — SIGNIFICANT CHANGE UP (ref 3.8–10.5)

## 2021-06-07 RX ORDER — GENTAMICIN SULFATE 40 MG/ML
120 VIAL (ML) INJECTION ONCE
Refills: 0 | Status: COMPLETED | OUTPATIENT
Start: 2021-06-07 | End: 2021-06-07

## 2021-06-07 RX ADMIN — Medication 26.25 MILLIGRAM(S): at 04:51

## 2021-06-07 RX ADMIN — Medication 200 MILLIGRAM(S): at 18:13

## 2021-06-07 RX ADMIN — Medication 26.25 MILLIGRAM(S): at 12:32

## 2021-06-07 RX ADMIN — Medication 2.5 MILLIGRAM(S): at 17:12

## 2021-06-07 RX ADMIN — CHLORHEXIDINE GLUCONATE 1 APPLICATION(S): 213 SOLUTION TOPICAL at 11:40

## 2021-06-07 NOTE — PROVIDER CONTACT NOTE (OTHER) - RECOMMENDATIONS
Contact provider if pt is stable enough to go to dialysis. Retrieving SP02 slow and difficult to read.
notify provider
Provider at the bedside, stiches were completely removed and area cleaned, covered with 4x4 and secured with tape

## 2021-06-07 NOTE — PROVIDER CONTACT NOTE (OTHER) - SITUATION
ok to draw labs early?
Patient 97% on RA, patient pulling NC off.
Patient transferred to unit from dialysis. Patient respirations 27, O2 85% roomair.
Patient NPO, holding PO eliquis at this time. Patient failed Speech and Swallow.
pt blood pressure 95/42
Jenny Catheter access for HD displaced. Catheter was found on the floor.
Pt is tachycardic on tele 110-115. Pt is restless, confused.

## 2021-06-07 NOTE — PROVIDER CONTACT NOTE (OTHER) - REASON
Patient 97% on RA
blood pressure 95/42
Patient NPO, holding PO eliquis at this time
Shiley catheter displaced
ok to draw labs early?
Patient respirations 27, O2 85% roomair
Pt tachycardic

## 2021-06-07 NOTE — PROGRESS NOTE ADULT - ASSESSMENT
Patient is a 80 Y/O Female with PMHx of CVA recent ischemic stroke, HTN, DM, A fib on Eliquis, ESRD on HD (MWF) brought to ED after malfunctioning AVF for HD today. unable to do HD. was transferred to the hospital. patient at baseline is mildly confused and unable to give history. denies of any discomfort.

## 2021-06-07 NOTE — PROVIDER CONTACT NOTE (OTHER) - BACKGROUND
Pt admitted for thrombosis d/t vascular fistula. Pt had shiley placed 6/1.  Pt was a previous RRT after dialysis. Pt has PMH of left sided CVA and new CVA this admission.
AVF thrombus
Pt was restless, unable to administer any sedative due to pt's status (hypotension and severe confusion). Pt had a Shiley catheter for HD access which was found on the floor.
AV fistula thrombosis
AVF thrombus
pt admitted with CVA

## 2021-06-07 NOTE — PROVIDER CONTACT NOTE (OTHER) - ASSESSMENT
Insertion site is clean, dry with no s/s of bleeding or hematoma. Area reinforced with 4x4. Provider notified
Patient 97% on RA.
pt stable. other vital signs stable.
AVF thrombus
Patient respirations 27, O2 85% roomair, /48.
T: 97.2, HR: 110, BP: 128/54, RR:18 , O2 SAT: 94 RA

## 2021-06-07 NOTE — PROGRESS NOTE ADULT - SUBJECTIVE AND OBJECTIVE BOX
Name of Patient : VINCENT GROSS  MRN: [id number]  DATE OF SERVICE: 06-07-21     Subjective: Patient seen and examined. No new events except as noted.   confused  family meetign today  patient pulled out shiley       MEDICATIONS:  MEDICATIONS  (STANDING):  apixaban 2.5 milliGRAM(s) Oral every 12 hours  aspirin enteric coated 81 milliGRAM(s) Oral daily  atorvastatin 80 milliGRAM(s) Oral at bedtime  calcium acetate 667 milliGRAM(s) Oral three times a day with meals  chlorhexidine 2% Cloths 1 Application(s) Topical daily  dextrose 40% Gel 15 Gram(s) Oral once  dextrose 5% + sodium chloride 0.45%. 1000 milliLiter(s) (40 mL/Hr) IV Continuous <Continuous>  dextrose 5%. 1000 milliLiter(s) (100 mL/Hr) IV Continuous <Continuous>  dextrose 5%. 1000 milliLiter(s) (50 mL/Hr) IV Continuous <Continuous>  dextrose 50% Injectable 25 Gram(s) IV Push once  dextrose 50% Injectable 12.5 Gram(s) IV Push once  dextrose 50% Injectable 25 Gram(s) IV Push once  donepezil 10 milliGRAM(s) Oral at bedtime  glucagon  Injectable 1 milliGRAM(s) IntraMuscular once  hydrALAZINE 25 milliGRAM(s) Oral two times a day  insulin lispro (ADMELOG) corrective regimen sliding scale   SubCutaneous every 6 hours  metoprolol tartrate Injectable 2.5 milliGRAM(s) IV Push every 6 hours  pantoprazole    Tablet 40 milliGRAM(s) Oral before breakfast  QUEtiapine 25 milliGRAM(s) Oral daily  valproate sodium IVPB 250 milliGRAM(s) IV Intermittent three times a day      PHYSICAL EXAM:  T(C): 36.7 (06-07-21 @ 16:25), Max: 36.7 (06-07-21 @ 16:25)  HR: 85 (06-07-21 @ 16:25) (85 - 94)  BP: 139/61 (06-07-21 @ 16:25) (94/65 - 139/61)  RR: 16 (06-07-21 @ 16:25) (16 - 18)  SpO2: 99% (06-07-21 @ 16:25) (96% - 99%)  Wt(kg): --  I&O's Summary        Appearance:confused  HEENT:  PERRLA   Lymphatic: No lymphadenopathy   Cardiovascular: Normal S1 S2, no JVD  Respiratory: normal effort , clear  Gastrointestinal:  Soft, Non-tender  Skin: No rashes,  warm to touch  Psychiatry:  Mood & affect appropriate  Musculuskeletal: No edema      All labs, Imaging and EKGs personally reviewed                           8.9    8.66  )-----------( 141      ( 07 Jun 2021 07:39 )             30.6               06-07    143  |  102  |  36<H>  ----------------------------<  98  3.8   |  22  |  6.89<H>    Ca    7.8<L>      07 Jun 2021 07:39  Phos  4.1     06-07  Mg     2.1     06-07    TPro  5.0<L>  /  Alb  2.3<L>  /  TBili  0.4  /  DBili  x   /  AST  37<H>  /  ALT  13  /  AlkPhos  47  06-06    PT/INR - ( 06 Jun 2021 02:16 )   PT: 18.0 sec;   INR: 1.60 ratio         PTT - ( 06 Jun 2021 02:16 )  PTT:27.9 sec                 ABG - ( 06 Jun 2021 02:16 )  pH, Arterial: 7.30  pH, Blood: x     /  pCO2: 17    /  pO2: 28    / HCO3: 10    / Base Excess: -17.3 /  SaO2: 36.5

## 2021-06-07 NOTE — PROGRESS NOTE ADULT - SUBJECTIVE AND OBJECTIVE BOX
Dieter Willson MD  Interventional Cardiology / Endovascular Specialist  [place] Office : [phone] [place] [address] [place]. [address]  Tel:   [place] Office : [address] [facility] [place] [address]  Tel: [phone]  Cell : [phone]    Subjective/Overnight events: Patient lying in bed, lethargic  	  MEDICATIONS:  apixaban 2.5 milliGRAM(s) Oral every 12 hours  aspirin enteric coated 81 milliGRAM(s) Oral daily  hydrALAZINE 25 milliGRAM(s) Oral two times a day  metoprolol tartrate Injectable 2.5 milliGRAM(s) IV Push every 6 hours    gentamicin   IVPB 120 milliGRAM(s) IV Intermittent once      donepezil 10 milliGRAM(s) Oral at bedtime  QUEtiapine 25 milliGRAM(s) Oral daily  valproate sodium IVPB 250 milliGRAM(s) IV Intermittent three times a day    pantoprazole    Tablet 40 milliGRAM(s) Oral before breakfast    atorvastatin 80 milliGRAM(s) Oral at bedtime  dextrose 40% Gel 15 Gram(s) Oral once  dextrose 50% Injectable 25 Gram(s) IV Push once  dextrose 50% Injectable 12.5 Gram(s) IV Push once  dextrose 50% Injectable 25 Gram(s) IV Push once  glucagon  Injectable 1 milliGRAM(s) IntraMuscular once  insulin lispro (ADMELOG) corrective regimen sliding scale   SubCutaneous every 6 hours    calcium acetate 667 milliGRAM(s) Oral three times a day with meals  chlorhexidine 2% Cloths 1 Application(s) Topical daily  dextrose 5% + sodium chloride 0.45%. 1000 milliLiter(s) IV Continuous <Continuous>  dextrose 5%. 1000 milliLiter(s) IV Continuous <Continuous>  dextrose 5%. 1000 milliLiter(s) IV Continuous <Continuous>  sodium chloride 0.9% lock flush 10 milliLiter(s) IV Push every 1 hour PRN      PAST MEDICAL/SURGICAL HISTORY  PAST MEDICAL & SURGICAL HISTORY:  DM (diabetes mellitus)  TYpe II    HTN (hypertension)    Hypercholesteremia    CKD (chronic kidney disease)  now on HD via R forearm AVF    Anemia    Atrial fibrillation  On Eliquis    Cerebrovascular accident (CVA)    Status post right foot surgery  hammer toe repair    AV fistula  June 2018, 2 ballooning (last one 2 weeks ago), following up on Dec 15th    H/O colonoscopy with polypectomy        SOCIAL HISTORY: Substance Use (street drugs): ( x ) never used  (  ) other:    FAMILY HISTORY:  Family history of hypertension (Sibling)    Family history of diabetes mellitus    Family history of lung cancer (Sibling)    Family history of malignant neoplasm of gastrointestinal tract        REVIEW OF SYSTEMS:  unable to obtain    PHYSICAL EXAM:  T(C): 36.6 (06-07-21 @ 12:28), Max: 36.7 (06-06-21 @ 23:10)  HR: 90 (06-07-21 @ 12:28) (90 - 99)  BP: 94/65 (06-07-21 @ 12:28) (94/65 - 110/70)  RR: 17 (06-07-21 @ 12:28) (17 - 18)  SpO2: 99% (06-07-21 @ 12:28) (96% - 99%)  Wt(kg): --  I&O's Summary      GENERAL: NAD  EYES: conjunctiva and sclera clear  ENMT: No tonsillar erythema, exudates, or enlargement;  Cardiovascular: Normal S1 S2, No JVD, No murmurs, No edema  Respiratory: Lungs clear to auscultation	  Gastrointestinal:  Soft	  Extremities: no edema                                    8.9    8.66  )-----------( 141      ( 07 Jun 2021 07:39 )             30.6     06-07    143  |  102  |  36<H>  ----------------------------<  98  3.8   |  22  |  6.89<H>    Ca    7.8<L>      07 Jun 2021 07:39  Phos  4.1     06-07  Mg     2.1     06-07    TPro  5.0<L>  /  Alb  2.3<L>  /  TBili  0.4  /  DBili  x   /  AST  37<H>  /  ALT  13  /  AlkPhos  47  06-06    proBNP:   Lipid Profile:   HgA1c:   TSH:     Consultant(s) Notes Reviewed:  [x ] YES  [ ] NO    Care Discussed with Consultants/Other Providers [ x] YES  [ ] NO    Imaging Personally Reviewed independently:  [x] YES  [ ] NO    All labs, radiologic studies, vitals, orders and medications list reviewed. Patient is seen and examined at bedside. Case discussed with medical team.

## 2021-06-07 NOTE — PROGRESS NOTE ADULT - ASSESSMENT
80F hx htn, dm, A fib on eliquis, prior CVA and esrd MWF  present from nursing home with clotted AVF    ESRD on HD MWF  from nursing home    clotted avf f/u vascular   s/p IR for shiely 6/1   s/p HD 6/3 unable to UF sec to hypotension  s/p HD 6/5 with 300cc UF. had RRT post HD with IVF 500cc given  palliative on board on discussion of GOC  spoke with son today at bedside. patient had pull out IJ shiley currently without access. lab reviewed no urgent need for hd today. pt also not tolerating HD well given IHD hypotension. patient unlikely to be benefiting from further dialysis. high risk of again pull out access. risk > benefit. Son expressed understanding, he likely would not want to continue dialysis. however he would like patient to go to inpatient hospice, if unable to he want to still thinking about stopping hd.   current plan is to continue monitor bmp, pending family decision. f/u palliative care.   monitor electrolyte    AMS  recent CVA  very confused   ct head noted. f/u neuro  GOC with family    Anemia  Hb at goal for ESRD  recent cva no epo   monitor Hb    HTN  controlled  monitor    ckd-mbd  Hectorol 2mcg with HD  monitor phos and calcium daily

## 2021-06-07 NOTE — PROGRESS NOTE ADULT - SUBJECTIVE AND OBJECTIVE BOX
Infectious Diseases progress note:    Subjective:  Events noted.  No new fevers, no leukocytosis. RRT over the weekend for hypotension/hypoglycemia, pt had pulled out Jenny IJ, not tolerating HD.      ROS:  nonverbal, unable to assess    Allergies    No Known Allergies    Intolerances        ANTIBIOTICS/RELEVANT:  antimicrobials    immunologic:    OTHER:  apixaban 2.5 milliGRAM(s) Oral every 12 hours  aspirin enteric coated 81 milliGRAM(s) Oral daily  atorvastatin 80 milliGRAM(s) Oral at bedtime  calcium acetate 667 milliGRAM(s) Oral three times a day with meals  chlorhexidine 2% Cloths 1 Application(s) Topical daily  dextrose 40% Gel 15 Gram(s) Oral once  dextrose 5% + sodium chloride 0.45%. 1000 milliLiter(s) IV Continuous <Continuous>  dextrose 5%. 1000 milliLiter(s) IV Continuous <Continuous>  dextrose 5%. 1000 milliLiter(s) IV Continuous <Continuous>  dextrose 50% Injectable 25 Gram(s) IV Push once  dextrose 50% Injectable 12.5 Gram(s) IV Push once  dextrose 50% Injectable 25 Gram(s) IV Push once  donepezil 10 milliGRAM(s) Oral at bedtime  glucagon  Injectable 1 milliGRAM(s) IntraMuscular once  hydrALAZINE 25 milliGRAM(s) Oral two times a day  insulin lispro (ADMELOG) corrective regimen sliding scale   SubCutaneous every 6 hours  metoprolol tartrate Injectable 2.5 milliGRAM(s) IV Push every 6 hours  pantoprazole    Tablet 40 milliGRAM(s) Oral before breakfast  QUEtiapine 25 milliGRAM(s) Oral daily  sodium chloride 0.9% lock flush 10 milliLiter(s) IV Push every 1 hour PRN  valproate sodium IVPB 250 milliGRAM(s) IV Intermittent three times a day      Objective:  Vital Signs Last 24 Hrs  T(C): 36.7 (07 Jun 2021 16:25), Max: 36.7 (06 Jun 2021 23:10)  T(F): 98.1 (07 Jun 2021 16:25), Max: 98.1 (07 Jun 2021 16:25)  HR: 85 (07 Jun 2021 16:25) (85 - 99)  BP: 139/61 (07 Jun 2021 16:25) (94/65 - 139/61)  BP(mean): --  RR: 16 (07 Jun 2021 16:25) (16 - 18)  SpO2: 99% (07 Jun 2021 16:25) (96% - 99%)    PHYSICAL EXAM:  Constitutional:NAD  Eyes:PAPITO, EOMI  Ear/Nose/Throat: no thrush, mucositis.  Moist mucous membranes	  Neck:no JVD, no lymphadenopathy, supple  Respiratory: CTA shivani  Cardiovascular: S1S2 RRR, no murmurs  Gastrointestinal:soft, nontender,  nondistended (+) BS  Extremities:no e/e/c  Skin:  rt IJ dsg c/d/i        LABS:                        8.9    8.66  )-----------( 141      ( 07 Jun 2021 07:39 )             30.6     06-07    143  |  102  |  36<H>  ----------------------------<  98  3.8   |  22  |  6.89<H>    Ca    7.8<L>      07 Jun 2021 07:39  Phos  4.1     06-07  Mg     2.1     06-07    TPro  5.0<L>  /  Alb  2.3<L>  /  TBili  0.4  /  DBili  x   /  AST  37<H>  /  ALT  13  /  AlkPhos  47  06-06    PT/INR - ( 06 Jun 2021 02:16 )   PT: 18.0 sec;   INR: 1.60 ratio         PTT - ( 06 Jun 2021 02:16 )  PTT:27.9 sec      Procalcitonin, Serum: 0.87 (06-02 @ 02:13)                    MICROBIOLOGY:          RADIOLOGY & ADDITIONAL STUDIES:    < from: CT Head No Cont (06.03.21 @ 09:54) >    IMPRESSION: Infarcts as described above.    < end of copied text >

## 2021-06-07 NOTE — PROVIDER CONTACT NOTE (OTHER) - ACTION/TREATMENT ORDERED:
Mikayla Reyna notified. Ok to hold PO eliquis. Continue to hold IVF, d/t . Will continue to monitor.
Rapid response called. Patient placed on NRB mask. Chest xray and labs ordered. Patient placed on . Will continue to monitor.
Provider gave OK for pt to receive dialysis. As per provider, continue to hold Metoprolol. Advised to continue to monitor after dialysis.
will order a bolus.
Mikayla Vaughn notified. Draw labs at 2am. Do not need to draw 6am labs. Will continue to monitor.
Provider at the bedside, stiches were completely removed and area cleaned, covered with 4x4 and secured with tape
Mikayla Nash notified. No new orders. Will continue to monitor.

## 2021-06-07 NOTE — PROGRESS NOTE ADULT - SUBJECTIVE AND OBJECTIVE BOX
[facility]   MD JAJA SMITH DO ANAM SIDDIQUI ANGELA WONG, PA    TEL:  OFFICE: [phone]  DR CASTILLO CELL: [phone]  DR. HARRISON CELL: [phone]  DR. RILEY CELL: [phone]  LIZZY DHILLON CELL: [phone]    From 5pm-7am Answering Service [phone]    -- RENAL FOLLOW UP NOTE ---Date of Service 06-07-21 @ 15:13    Patient is a 81y old  Female who presents with a chief complaint of AVF malfunction (07 Jun 2021 12:36)      Patient seen and examined at bedside.   VITALS:  T(F): 97.9 (06-07-21 @ 12:28), Max: 98 (06-06-21 @ 23:10)  HR: 90 (06-07-21 @ 12:28)  BP: 94/65 (06-07-21 @ 12:28)  RR: 17 (06-07-21 @ 12:28)  SpO2: 99% (06-07-21 @ 12:28)  Wt(kg): --        PHYSICAL EXAM:  Constitutional: lethargic  Neck: No JVD  Respiratory: CTAB, no wheezes, rales or rhonchi  Cardiovascular: S1, S2, RRR  Gastrointestinal: BS+, soft, NT/ND  Extremities: No peripheral edema    Hospital Medications:   MEDICATIONS  (STANDING):  apixaban 2.5 milliGRAM(s) Oral every 12 hours  aspirin enteric coated 81 milliGRAM(s) Oral daily  atorvastatin 80 milliGRAM(s) Oral at bedtime  calcium acetate 667 milliGRAM(s) Oral three times a day with meals  chlorhexidine 2% Cloths 1 Application(s) Topical daily  dextrose 40% Gel 15 Gram(s) Oral once  dextrose 5% + sodium chloride 0.45%. 1000 milliLiter(s) (40 mL/Hr) IV Continuous <Continuous>  dextrose 5%. 1000 milliLiter(s) (100 mL/Hr) IV Continuous <Continuous>  dextrose 5%. 1000 milliLiter(s) (50 mL/Hr) IV Continuous <Continuous>  dextrose 50% Injectable 25 Gram(s) IV Push once  dextrose 50% Injectable 12.5 Gram(s) IV Push once  dextrose 50% Injectable 25 Gram(s) IV Push once  donepezil 10 milliGRAM(s) Oral at bedtime  gentamicin   IVPB 120 milliGRAM(s) IV Intermittent once  glucagon  Injectable 1 milliGRAM(s) IntraMuscular once  hydrALAZINE 25 milliGRAM(s) Oral two times a day  insulin lispro (ADMELOG) corrective regimen sliding scale   SubCutaneous every 6 hours  metoprolol tartrate Injectable 2.5 milliGRAM(s) IV Push every 6 hours  pantoprazole    Tablet 40 milliGRAM(s) Oral before breakfast  QUEtiapine 25 milliGRAM(s) Oral daily  valproate sodium IVPB 250 milliGRAM(s) IV Intermittent three times a day      LABS:  06-07    143  |  102  |  36<H>  ----------------------------<  98  3.8   |  22  |  6.89<H>    Ca    7.8<L>      07 Jun 2021 07:39  Phos  4.1     06-07  Mg     2.1     06-07    TPro  5.0<L>  /  Alb  2.3<L>  /  TBili  0.4  /  DBili      /  AST  37<H>  /  ALT  13  /  AlkPhos  47  06-06    Creatinine Trend: 6.89 <--, 5.24 <--, 3.64 <--, 7.93 <--, 6.88 <--, 5.81 <--, 10.51 <--, 7.85 <--, 13.22 <--, 11.85 <--    Phosphorus Level, Serum: 4.1 mg/dL (06-07 @ 07:39)                              8.9    8.66  )-----------( 141      ( 07 Jun 2021 07:39 )             30.6     Urine Studies:  Urinalysis - [05-31-21 @ 19:13]      Color Dark Brown / Appearance Turbid / SG 1.019 / pH 7.5      Gluc Negative / Ketone Negative  / Bili Negative / Urobili <2 mg/dL       Blood Moderate / Protein 300 mg/dL / Leuk Est Large / Nitrite Negative      RBC 5 / WBC >10 / Hyaline  / Gran  / Sq Epi  / Non Sq Epi 4 / Bacteria Many      PTH -- (Ca --)      [05-09-21 @ 06:54]   421  HbA1c 5.8      [12-10-19 @ 06:41]  TSH 3.72      [05-14-21 @ 07:12]  Lipid: chol 219, , HDL 65, LDL --      [05-09-21 @ 06:54]    HBsAb <3.0      [05-11-21 @ 00:01]  HBsAg Nonreact      [05-10-21 @ 09:26]  HBcAb Nonreact      [05-10-21 @ 09:26]  HCV 0.17, Nonreact      [05-10-21 @ 09:26]    Syphilis Screen (Treponema Pallidum Ab) Negative      [05-15-21 @ 10:20]    RADIOLOGY & ADDITIONAL STUDIES:

## 2021-06-07 NOTE — PROVIDER CONTACT NOTE (OTHER) - DATE AND TIME:
01-Jun-2021 23:30
02-Jun-2021 02:00
02-Jun-2021 12:30
02-Jun-2021 05:30
02-Jun-2021 05:59
06-Jun-2021 13:00
06-Jun-2021 20:22

## 2021-06-07 NOTE — PROGRESS NOTE ADULT - ASSESSMENT
82 yo RH AA F with multiple old stroke R AICA and R parietal (recent), B/L BG and thalami, afib on eliquis, HTN, DM, ESRD on HD p/w malfunctioning AVF for HD. confused.  last admission, A1c 7.1, . MRI brain R parietal infarct, CTA with mild to mod atherosclerotic disease noted. LE doppler neg for DVT. Etiology for stroke Embolic from Afib. called for AMS. RRT last night for hypoxia to 80s improved on NRB.  + leukocytosis CXR neg.   + UA, CTH with chronic infarcts but new L MCA territory. repeat CTH stable. eeg w/o seizures   ETiology of confusion likely toxic metabolic encephalopathy and L MCA infarct.    - appropriate for palliative at this time.   - gentamycin per ID , on contact   - okay for continuation of DOAC, eliquis  2.5mg bid. also on ASA 81  - pulmo eval hypoxia   - High dose statin therapy - atorvastatin 40mg PO daily. LDL goal <70mg/dL.  - need better DM and cholesterol control  - telemetry  - PT/OT/SS/SLP, OOBC  - check FS, glucose control <180  - GI/DVT ppx  - Counseling on diet, exercise, and medication adherence was done  - Counseling on smoking cessation and alcohol consumption offered when appropriate.  - Pain assessed and judicious use of narcotics when appropriate was discussed.    - Stroke education given when appropriate.  - Importance of fall prevention discussed.   - Differential diagnosis and plan of care discussed with patient and/or family and primary team  - Thank you for allowing me to participate in the care of this patient. Call with questions.    - dispo planning hospice?  José Miguel Renee MD  Vascular Neurology  Office: [phone]

## 2021-06-07 NOTE — PROGRESS NOTE ADULT - PROBLEM SELECTOR PLAN 1
vascular eval appreciated, not a candidate for nay invasive procedure, appreciate vascular follow up   charles per IR, patient pulled out   haldol for agitation   neuro eval appreciated   worsening confusion, CT head showed acute ischemic stroke, repeat ct head noted   palliative eval, DNR-DNI  son deciding regarding hospice placement  NPO, ? feeding tube  discussion for hospice placement  cant take any oral meds

## 2021-06-07 NOTE — PROGRESS NOTE ADULT - ASSESSMENT
82 Y/O Female with PMHx of CVA recent ischemic stroke, HTN, DM, A fib on Eliquis, ESRD on HD (MWF) brought to ED after malfunctioning AVF for HD today and worsening AMS    EKG: None in chart    1. AMS  -CT head with acute MCA infarct  -echo from previous admission with normal LV function. Agitated saline injection is inconclusive regarding the  presence of a patent foramen ovale.  -f/u neuro   -f/u palliative for GOC    2. Afib  -on eliquis  -c/w metoprolol    3. HTN  -BP soft  -not taking PO meds  -continue to monitor BP    4. ESRD  -on HD  -f/u renal

## 2021-06-07 NOTE — PROGRESS NOTE ADULT - SUBJECTIVE AND OBJECTIVE BOX
Neurology Progress Note    S: Patient seen and examined.  hospice planning     Medication:  MEDICATIONS  (STANDING):  apixaban 2.5 milliGRAM(s) Oral every 12 hours  aspirin enteric coated 81 milliGRAM(s) Oral daily  atorvastatin 80 milliGRAM(s) Oral at bedtime  calcium acetate 667 milliGRAM(s) Oral three times a day with meals  chlorhexidine 2% Cloths 1 Application(s) Topical daily  dextrose 40% Gel 15 Gram(s) Oral once  dextrose 5% + sodium chloride 0.45%. 1000 milliLiter(s) (40 mL/Hr) IV Continuous <Continuous>  dextrose 5%. 1000 milliLiter(s) (100 mL/Hr) IV Continuous <Continuous>  dextrose 5%. 1000 milliLiter(s) (50 mL/Hr) IV Continuous <Continuous>  dextrose 50% Injectable 25 Gram(s) IV Push once  dextrose 50% Injectable 12.5 Gram(s) IV Push once  dextrose 50% Injectable 25 Gram(s) IV Push once  donepezil 10 milliGRAM(s) Oral at bedtime  glucagon  Injectable 1 milliGRAM(s) IntraMuscular once  hydrALAZINE 25 milliGRAM(s) Oral two times a day  insulin lispro (ADMELOG) corrective regimen sliding scale   SubCutaneous every 6 hours  metoprolol tartrate Injectable 2.5 milliGRAM(s) IV Push every 6 hours  pantoprazole    Tablet 40 milliGRAM(s) Oral before breakfast  QUEtiapine 25 milliGRAM(s) Oral daily  valproate sodium IVPB 250 milliGRAM(s) IV Intermittent three times a day    MEDICATIONS  (PRN):  sodium chloride 0.9% lock flush 10 milliLiter(s) IV Push every 1 hour PRN Pre/post blood products, medications, blood draw, and to maintain line patency      Vitals:  Vital Signs Last 24 Hrs  T(C): 36.7 (07 Jun 2021 16:25), Max: 36.7 (06 Jun 2021 23:10)  T(F): 98.1 (07 Jun 2021 16:25), Max: 98.1 (07 Jun 2021 16:25)  HR: 85 (07 Jun 2021 16:25) (85 - 99)  BP: 139/61 (07 Jun 2021 16:25) (94/65 - 139/61)  BP(mean): --  RR: 16 (07 Jun 2021 16:25) (16 - 18)  SpO2: 99% (07 Jun 2021 16:25) (96% - 99%)    General Exam:   General Appearance: Appropriately dressed confused   Head: Normocephalic, atraumatic and no dysmorphic features  Ear, Nose, and Throat: Moist mucous membranes  CVS: S1S2+  Resp: No SOB, no wheeze or rhonchi  Abd: soft NTND  Extremities: No edema, no cyanosis  Skin: No bruises, no rashes     Neurological Exam:  Mental Status: lethargic, but tracks, mimics, no intelligable verbal output. + dysarthria aware that family at bedside and follows at times   Cranial Nerves: PERRL, EOMI, VFFC, sensation V1-V3 intact,  R NLF,   Motor: CUTLER but L>R  Sensation: w/draws L >R  Reflexes: 1+ throughout at biceps, brachioradialis, triceps, patellars and ankles bilaterally and equal. No clonus. R toe and L toe were both downgoing.  Coordination: unable   Gait: unable     I personally reviewed the below data/images/labs:    CBC Full  -  ( 07 Jun 2021 07:39 )  WBC Count : 8.66 K/uL  RBC Count : 3.65 M/uL  Hemoglobin : 8.9 g/dL  Hematocrit : 30.6 %  Platelet Count - Automated : 141 K/uL  Mean Cell Volume : 83.8 fL  Mean Cell Hemoglobin : 24.4 pg  Mean Cell Hemoglobin Concentration : 29.1 gm/dL  Auto Neutrophil # : x  Auto Lymphocyte # : x  Auto Monocyte # : x  Auto Eosinophil # : x  Auto Basophil # : x  Auto Neutrophil % : x  Auto Lymphocyte % : x  Auto Monocyte % : x  Auto Eosinophil % : x  Auto Basophil % : x    06-07    143  |  102  |  36<H>  ----------------------------<  98  3.8   |  22  |  6.89<H>    Ca    7.8<L>      07 Jun 2021 07:39  Phos  4.1     06-07  Mg     2.1     06-07    TPro  5.0<L>  /  Alb  2.3<L>  /  TBili  0.4  /  DBili  x   /  AST  37<H>  /  ALT  13  /  AlkPhos  47  06-06        < from: CT Head No Cont (05.08.21 @ 12:32) >  IMPRESSION:    Region of decreased attenuation in the right parietal lobe with associated sulcal effacement suspicious for acute infarction.    No CT evidence for reva hemorrhagic transformation.    < end of copied text >    < from: CT Head No Cont (05.09.21 @ 17:30) >    EXAM:  CT BRAIN        PROCEDURE DATE:  May  9 2021         INTERPRETATION:  .    CLINICAL INFORMATION: Hx CVA , with poss infarct yesterday on CT.    TECHNIQUE: Multiple axial CT images of the head were obtained without contrast. Sagittal and coronal reconstructed images were acquired from the source data.    COMPARISON: Prior CT study of the head from 5/8/2021. Prior brain MRI study from 7/22/2019.    FINDINGS: An evolving acute/subacute infarct is again noted within the right parietal temporal lobes. There is no gross hemorrhagic transformation.    Additional wedge-shaped chronic infarct is again noted within the right cerebellar hemisphere.    Additional chronic lacunar infarcts are noted within the left basal ganglia.    There is no acute intracranial hemorrhage, shift of the midline structures, herniation, or hydrocephalus.    There is diffuse cerebral volume loss with prominence of the sulci, fissures, and cisternal spaces which is normal for the patient's age. 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    The paranasal sinuses and mastoid air cells are clear. The calvarium is intact. There is evidence of bilateral cataract removal.    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    Additional chronic infarcts and similar-appearing moderate severity chronic white matter microvascular type changes, as discussed.        DARA VILLELA MD; Attending Radiologist  This document has been electronically signed. May  9 2021  5:42PM    < end of copied text >    < from: MR Head No Cont (05.10.21 @ 21:38) >    EXAM:  MR BRAIN        PROCEDURE DATE:  May 10 2021         INTERPRETATION:  Clinical indication: Follow-up infarct.    MRI of the brain was performed using sagittal T1, axial T1 T2 T2 FLAIR diffusion and susceptibility weighted sequence.    This exam is compared with prior brain MRI performed on July 22, 2019 an prior head CT performed on May 9, 2021    This exam is of limited quality due to motion.    Extensive parenchymal volume loss is seen.    Abnormal T2 prolongation with restricted diffusion is seen involving the right parietal cortical subcortical region. This is compatible with an acute infarct. No hemorrhagic transformation is seen this time. There is localized mass effect seen consisting of sulcal effacement.    The visualized paranasal sinuses mastoid and middle ear regions appear clear.    IMPRESSION: This exam is somewhat limited diagnostic quality due to motion.    Acute infarct as described above.              MARVIN BARRAZA M.D., ATTENDING RADIOLOGIST  This document hasbeen electronically signed. May 11 2021  8:20AM    < end of copied text >  `< from: CT Angio Neck w/ IV Cont (05.11.21 @ 10:54) >    EXAM:  CT ANGIO BRAIN (W)AW IC      EXAM:  CT ANGIO NECK (W)AW IC        PROCEDURE DATE:  May 11 2021         INTERPRETATION:  Clinical indication: Acute infarct seen on brain MRI.    Following injection of approximately 90 cc of Omnipaque 350 IV CT a of the neck and [place] were performed. Coronal and sagittal reconstructions were performed as well.    Abnormal low-attenuation involving the right cerebellar region, right parietal and right basal ganglia region are again seen is well as old lacunar infarcts involving left basal ganglia region.    Calcification is seen involving both proximal internal carotid artery regions.    Evaluation of the distal right common carotid artery region appears normal. Mild narrowing of proximal right internal carotid artery is seen. Evaluation of the proximal right external carotid arteries appear normal. Evaluation of the distal left common carotid artery appears normal. Evaluation of the proximal left internal carotid artery demonstrate mild narrowing. Evaluation of the proximal left external carotid artery appears normal. A normal dominant appearing right vertebral artery is seen. 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 Short segment of stenosis versus hypoplasia is again seen involving the distalmost aspect the left vertebral artery.    Evaluation of both distal internal carotid arteries demonstrates calcified plaques. Mild narrowing of both distal internal carotid artery seen. Hypoplastic right A1 segment is seen. Irregularity seen involving both A2 segments is now seen which could be compatible atherosclerotic change. Evaluation of both middle cerebral arteries appear normal. Both posterior cerebral arteries demonstrate decreased flow related signal distally which could be compatible areas of of stenosis. There is evidence of mild stenosis seen involving the distal aspect of the basilar artery which is new when compared with the prior exam.    IMPRESSION: Mild narrowing of both proximal internal carotid arteries.    Short segment of narrowing involving the distal left vertebral artery is again seen and unchanged.    Irregularity is seen involving both A2 segments which is new when compared prior exam. This could be compatible atherosclerotic change. Mild narrowing of the distal basilar artery is seen which is compatible with mild stenosis. This is new when compared with the prior exam.    Areas of stenosis is suspected involving both distal posterior cerebral arteries.              MARVIN BARRAZA M.D., ATTENDING RADIOLOGIST  This document has been electronically signed. May 11 2021 11:30AM    < end of copied text >  `    < from: CT Head No Cont (06.03.21 @ 09:54) >    EXAM:  CT BRAIN        PROCEDURE DATE:  Scott  3 2021         INTERPRETATION:  Clinical indication: Follow-up CVA.    Multiple axial sections were performed from base of skull to vertex without contrast enhancement. Coronal and sagittal reconstructions were performed as well    Abnormal low-attenuation involving the right cerebellar region is again identified. This is likely compatible with an old infarct.    Abnormal low-attenuation involving the left posterior frontal parietal cortical subcortical region is again identified. This is likely compatible with a subacute left MCA infarct. There is localized mass effect seen consisting sulcal effacement. No significant shift or herniation seen.    Abnormal low-attenuation involving the right frontal subcortical region is identified as well. This could be compatible area of ischemia versus a subacute to chronic infarct.    Subtle area of low-attenuation seen involving left hector. This is best seen on series 2 image 15. This could be compatible with an age-indeterminate lacunar infarct.    Gyriform area of high attenuation is seen involving the right posterior frontal parietal region which is likely compatible with an area of laminar necrosis. This appears unchanged    Parenchymal volume loss and chronic microvascular ischemic changes again seen    Evaluation the osseous structures with the appropriate window appears normal    The visualized paranasal sinuses mastoid and middle ear regions appear clear.    IMPRESSION: Infarcts as described above.                MARVIN BARRAZA M.D., ATTENDING RADIOLOGIST  This document has been electronically signed. Scott  3 2021 10:00AM    < end of copied text >  < from: CT Head No Cont (06.02.21 @ 09:32) >    EXAM:  CT BRAIN        PROCEDURE DATE:  Jun 2 2021         INTERPRETATION:  INDICATIONS:  recent ischemic stroke, worsening AMS  .    TECHNIQUE:  Serial axial images were obtained from the skull base to the vertex without intravenous contrast. Coronal and sagittal reformatted images were obtained.    COMPARISON EXAMINATION: 5/9/2021 CT and 5/10/2021 and MR    FINDINGS:  VENTRICLES AND SULCI:  Prominent in size compatible with age-appropriate volume loss  INTRA-AXIAL:  There is an area of low density with associated sulcal effacement, gyral swelling and loss of gray-white matter differentiation within the left frontoparietal region not seen previously. There is mild effacement of the lateral ventricle and findings are compatible with an acute MCA distribution infarct. There is involvement of the insula with sparing of the basal ganglia. Chronic left basal ganglia lacunae are again seen.    Gyriform areas of hyperdensity are seen in the right parietal lobe compatible with a subacute infarct with associated cortical laminar necrosis. A chronic right cerebellar infarct is again seen.    Moderate microvascular type white matter changes are stable.  EXTRA-AXIAL:  No mass or collection is seen.  VISUALIZED SINUSES:  Right sphenoid foamy secretions.  VISUALIZED MASTOIDS:  Clear.  CALVARIUM:  Normal.  MISCELLANEOUS:  Intraocular lens implants    IMPRESSION:  Acute left frontoparietal MCA distribution infarct.    Evolving right parietal lobe infarct, now subacute with cortical laminar necrosis.    Chronic right cerebellar infarct.    Chronic left basal ganglia lacunae.    Findings were discussed with MALINDA Mariee on 6/2/2021 10:01 AM with read back.              SALOMON JOHNSON MD; Attending Radiologist  This document has been electronically signed. Jun 2 2021 10:03AM    < end of copied text >    EEG Summary / Classification:  Abnormal EEG   - Moderate generalized slowing, right greater than left.    EEG Impression / Clinical Correlate:  Abnormal EEG study.  Moderate nonspecific diffuse or multifocal cerebral dysfunction, right greater than left.   No epileptiform pattern or seizure seen.    Wayne Sanford MD  Attending Physician, [facility]

## 2021-06-08 LAB
ANION GAP SERPL CALC-SCNC: 21 MMOL/L — HIGH (ref 7–14)
BUN SERPL-MCNC: 46 MG/DL — HIGH (ref 7–23)
CALCIUM SERPL-MCNC: 7.9 MG/DL — LOW (ref 8.4–10.5)
CHLORIDE SERPL-SCNC: 102 MMOL/L — SIGNIFICANT CHANGE UP (ref 98–107)
CO2 SERPL-SCNC: 17 MMOL/L — LOW (ref 22–31)
CREAT SERPL-MCNC: 7.72 MG/DL — HIGH (ref 0.5–1.3)
GLUCOSE SERPL-MCNC: 88 MG/DL — SIGNIFICANT CHANGE UP (ref 70–99)
HCT VFR BLD CALC: 32.9 % — LOW (ref 34.5–45)
HGB BLD-MCNC: 9.7 G/DL — LOW (ref 11.5–15.5)
MAGNESIUM SERPL-MCNC: 2.3 MG/DL — SIGNIFICANT CHANGE UP (ref 1.6–2.6)
MCHC RBC-ENTMCNC: 24.7 PG — LOW (ref 27–34)
MCHC RBC-ENTMCNC: 29.5 GM/DL — LOW (ref 32–36)
MCV RBC AUTO: 83.7 FL — SIGNIFICANT CHANGE UP (ref 80–100)
NRBC # BLD: 0 /100 WBCS — SIGNIFICANT CHANGE UP
NRBC # FLD: 0 K/UL — SIGNIFICANT CHANGE UP
PHOSPHATE SERPL-MCNC: 5.3 MG/DL — HIGH (ref 2.5–4.5)
PLATELET # BLD AUTO: 160 K/UL — SIGNIFICANT CHANGE UP (ref 150–400)
POTASSIUM SERPL-MCNC: 5.9 MMOL/L — HIGH (ref 3.5–5.3)
POTASSIUM SERPL-SCNC: 5.9 MMOL/L — HIGH (ref 3.5–5.3)
RBC # BLD: 3.93 M/UL — SIGNIFICANT CHANGE UP (ref 3.8–5.2)
RBC # FLD: 16.9 % — HIGH (ref 10.3–14.5)
SODIUM SERPL-SCNC: 140 MMOL/L — SIGNIFICANT CHANGE UP (ref 135–145)
WBC # BLD: 8.53 K/UL — SIGNIFICANT CHANGE UP (ref 3.8–10.5)
WBC # FLD AUTO: 8.53 K/UL — SIGNIFICANT CHANGE UP (ref 3.8–10.5)

## 2021-06-08 PROCEDURE — 99233 SBSQ HOSP IP/OBS HIGH 50: CPT

## 2021-06-08 RX ORDER — SODIUM POLYSTYRENE SULFONATE 4.1 MEQ/G
30 POWDER, FOR SUSPENSION ORAL ONCE
Refills: 0 | Status: COMPLETED | OUTPATIENT
Start: 2021-06-08 | End: 2021-06-08

## 2021-06-08 RX ORDER — HYDROMORPHONE HYDROCHLORIDE 2 MG/ML
0.3 INJECTION INTRAMUSCULAR; INTRAVENOUS; SUBCUTANEOUS
Refills: 0 | Status: DISCONTINUED | OUTPATIENT
Start: 2021-06-08 | End: 2021-06-09

## 2021-06-08 RX ORDER — FLUCONAZOLE 150 MG/1
100 TABLET ORAL DAILY
Refills: 0 | Status: DISCONTINUED | OUTPATIENT
Start: 2021-06-09 | End: 2021-06-09

## 2021-06-08 RX ORDER — HALOPERIDOL DECANOATE 100 MG/ML
1 INJECTION INTRAMUSCULAR EVERY 6 HOURS
Refills: 0 | Status: DISCONTINUED | OUTPATIENT
Start: 2021-06-08 | End: 2021-06-09

## 2021-06-08 RX ORDER — FLUCONAZOLE 150 MG/1
100 TABLET ORAL ONCE
Refills: 0 | Status: DISCONTINUED | OUTPATIENT
Start: 2021-06-08 | End: 2021-06-08

## 2021-06-08 RX ORDER — HALOPERIDOL DECANOATE 100 MG/ML
2 INJECTION INTRAMUSCULAR ONCE
Refills: 0 | Status: COMPLETED | OUTPATIENT
Start: 2021-06-08 | End: 2021-06-08

## 2021-06-08 RX ORDER — FLUCONAZOLE 150 MG/1
200 TABLET ORAL ONCE
Refills: 0 | Status: COMPLETED | OUTPATIENT
Start: 2021-06-08 | End: 2021-06-08

## 2021-06-08 RX ORDER — HALOPERIDOL DECANOATE 100 MG/ML
1 INJECTION INTRAMUSCULAR AT BEDTIME
Refills: 0 | Status: DISCONTINUED | OUTPATIENT
Start: 2021-06-08 | End: 2021-06-09

## 2021-06-08 RX ADMIN — CHLORHEXIDINE GLUCONATE 1 APPLICATION(S): 213 SOLUTION TOPICAL at 12:17

## 2021-06-08 RX ADMIN — HALOPERIDOL DECANOATE 2 MILLIGRAM(S): 100 INJECTION INTRAMUSCULAR at 09:55

## 2021-06-08 RX ADMIN — Medication 26.25 MILLIGRAM(S): at 06:52

## 2021-06-08 RX ADMIN — SODIUM POLYSTYRENE SULFONATE 30 GRAM(S): 4.1 POWDER, FOR SUSPENSION ORAL at 09:55

## 2021-06-08 RX ADMIN — Medication 2.5 MILLIGRAM(S): at 06:51

## 2021-06-08 RX ADMIN — Medication 26.25 MILLIGRAM(S): at 22:12

## 2021-06-08 RX ADMIN — Medication 26.25 MILLIGRAM(S): at 00:06

## 2021-06-08 RX ADMIN — Medication 26.25 MILLIGRAM(S): at 12:18

## 2021-06-08 RX ADMIN — SODIUM CHLORIDE 40 MILLILITER(S): 9 INJECTION, SOLUTION INTRAVENOUS at 08:34

## 2021-06-08 RX ADMIN — FLUCONAZOLE 100 MILLIGRAM(S): 150 TABLET ORAL at 14:55

## 2021-06-08 RX ADMIN — Medication 2.5 MILLIGRAM(S): at 00:06

## 2021-06-08 RX ADMIN — HALOPERIDOL DECANOATE 1 MILLIGRAM(S): 100 INJECTION INTRAMUSCULAR at 22:11

## 2021-06-08 RX ADMIN — Medication 2.5 MILLIGRAM(S): at 12:33

## 2021-06-08 RX ADMIN — SODIUM CHLORIDE 40 MILLILITER(S): 9 INJECTION, SOLUTION INTRAVENOUS at 12:55

## 2021-06-08 NOTE — PROGRESS NOTE ADULT - SUBJECTIVE AND OBJECTIVE BOX
Infectious Diseases progress note:    Subjective: Afebrile, no leukocytosis.  Fecal incontinence.  (+) thrush    ROS:  nonverbal, unable to assess    Allergies    No Known Allergies    Intolerances        ANTIBIOTICS/RELEVANT:  antimicrobials  fluconAZOLE IVPB 200 milliGRAM(s) IV Intermittent once    immunologic:    OTHER:  apixaban 2.5 milliGRAM(s) Oral every 12 hours  aspirin enteric coated 81 milliGRAM(s) Oral daily  atorvastatin 80 milliGRAM(s) Oral at bedtime  calcium acetate 667 milliGRAM(s) Oral three times a day with meals  chlorhexidine 2% Cloths 1 Application(s) Topical daily  dextrose 40% Gel 15 Gram(s) Oral once  dextrose 5% + sodium chloride 0.45%. 1000 milliLiter(s) IV Continuous <Continuous>  dextrose 5%. 1000 milliLiter(s) IV Continuous <Continuous>  dextrose 5%. 1000 milliLiter(s) IV Continuous <Continuous>  dextrose 50% Injectable 25 Gram(s) IV Push once  dextrose 50% Injectable 12.5 Gram(s) IV Push once  dextrose 50% Injectable 25 Gram(s) IV Push once  donepezil 10 milliGRAM(s) Oral at bedtime  glucagon  Injectable 1 milliGRAM(s) IntraMuscular once  hydrALAZINE 25 milliGRAM(s) Oral two times a day  insulin lispro (ADMELOG) corrective regimen sliding scale   SubCutaneous every 6 hours  metoprolol tartrate Injectable 2.5 milliGRAM(s) IV Push every 6 hours  pantoprazole    Tablet 40 milliGRAM(s) Oral before breakfast  QUEtiapine 25 milliGRAM(s) Oral daily  sodium chloride 0.9% lock flush 10 milliLiter(s) IV Push every 1 hour PRN  valproate sodium IVPB 250 milliGRAM(s) IV Intermittent three times a day      Objective:  Vital Signs Last 24 Hrs  T(C): 36.7 (08 Jun 2021 12:30), Max: 36.7 (07 Jun 2021 16:25)  T(F): 98.1 (08 Jun 2021 12:30), Max: 98.1 (07 Jun 2021 16:25)  HR: 87 (08 Jun 2021 12:30) (82 - 89)  BP: 147/57 (08 Jun 2021 12:30) (120/57 - 147/57)  BP(mean): --  RR: 18 (08 Jun 2021 12:30) (16 - 18)  SpO2: 99% (08 Jun 2021 12:30) (99% - 99%)    PHYSICAL EXAM:  Constitutional:NAD  Eyes:PAPITO, EOMI  Ear/Nose/Throat: oral thrush.  Moist mucous membranes	  Neck:no JVD, no lymphadenopathy, supple  Respiratory: CTA shivani  Cardiovascular: S1S2 RRR, no murmurs  Gastrointestinal:soft, nontender,  nondistended (+) BS  Extremities:no e/e/c  Skin:  no rashes, open wounds or ulcerations        LABS:                        9.7    8.53  )-----------( 160      ( 08 Jun 2021 05:45 )             32.9     06-08    140  |  102  |  46<H>  ----------------------------<  88  5.9<H>   |  17<L>  |  7.72<H>    Ca    7.9<L>      08 Jun 2021 05:45  Phos  5.3     06-08  Mg     2.3     06-08            Procalcitonin, Serum: 0.87 (06-02 @ 02:13)                    MICROBIOLOGY:          RADIOLOGY & ADDITIONAL STUDIES:

## 2021-06-08 NOTE — PROGRESS NOTE ADULT - PROBLEM SELECTOR PLAN 1
.  ordered PRNs for symptom management  -Dilaudid 0.3mg IV q3h PRN for Pain and/or Dyspnea  -Ativan 0.5mg IV q6h PRN for Anxiety/Agitation  -Haldol 1mg IV q6h PRN for Agitation  -Haldol 1mg IV qhs

## 2021-06-08 NOTE — CHART NOTE - NSCHARTNOTEFT_GEN_A_CORE
Notified by RN patient restless, agitated, pulling at lines concern for hitting head against bed- will give Lorazepam 1 mg IV x 1.

## 2021-06-08 NOTE — PROGRESS NOTE ADULT - ASSESSMENT
80F hx htn, dm, A fib on eliquis, prior CVA and esrd MWF  present from nursing home with clotted AVF    ESRD on HD MWF  from nursing home    clotted avf f/u vascular   s/p IR for shiely 6/1   s/p HD 6/3 unable to UF sec to hypotension  s/p HD 6/5 with 300cc UF. had RRT post HD with IVF 500cc given  palliative on board now comfort care   s/p pulled shiley. no further blood draw or hd  monitor electrolyte    hyperkalemia  s/p kayexlate  no more blood draw or hd per GOC  f/u palliative     AMS  recent CVA  very confused   ct head noted. f/u neuro  GOC with family    Anemia  Hb at goal for ESRD  recent cva no epo   monitor Hb    HTN  controlled  monitor    ckd-mbd  no longer want to have hd per family  monitor phos and calcium daily 80F hx htn, dm, A fib on eliquis, prior CVA and esrd MWF  present from nursing home with clotted AVF    ESRD on HD MWF  from nursing home    clotted avf f/u vascular   s/p IR for shiely 6/1   s/p HD 6/3 unable to UF sec to hypotension  s/p HD 6/5 with 300cc UF. had RRT post HD with IVF 500cc given  palliative on board now comfort care   s/p pulled shiley. no further blood draw per GOC or hd  monitor electrolyte    hyperkalemia  s/p kayexlate  no more blood draw or hd per GOC  f/u palliative     AMS  recent CVA  very confused   ct head noted. f/u neuro  GOC with family    Anemia  Hb at goal for ESRD  recent cva no epo   monitor Hb    HTN  controlled  monitor    ckd-mbd  no longer want to have hd per family  monitor phos and calcium daily

## 2021-06-08 NOTE — PROGRESS NOTE ADULT - PROBLEM SELECTOR PLAN 1
vascular eval appreciated, not a candidate for nay invasive procedure, appreciate vascular follow up   charles per IR, patient pulled out   neuro eval appreciated   worsening confusion, CT head showed acute ischemic stroke, repeat ct head noted   palliative eval, DNR-DNI  son deciding regarding hospice placement  NPO,  palliative measures, comfort care

## 2021-06-08 NOTE — CHART NOTE - NSCHARTNOTEFT_GEN_A_CORE
Notified by RN patient noted with oropharyngeal thrush. She is NPO for now. Will order Diflucan IVPB x 1 dose.

## 2021-06-08 NOTE — PROGRESS NOTE ADULT - ASSESSMENT
Patient is a 80 Y/O Female with PMHx of CVA recent ischemic stroke, HTN, DM, A fib on Eliquis, ESRD on HD (MWF) brought to ED after malfunctioning AVF for HD today. unable to do HD. was transferred to the hospital. patient at baseline is mildly confused and unable to give history. denies of any discomfort. 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  Pt afebrile.  WBC 14 --> 13.  UA large LE, mod blood, WBC >10.  Covid pcr (-).  Pt for eventual permacath for dialysis access.  ID consult called for elevated WBC.      Leukocytosis:    - WBC elevated on admission.  Pt otherwise afebrile.  Pending eventual permacath.  For temporary Shiley.    - UA (+) LE, mod bld and WBCs.  f/u Ucx - growing Enterobacter.  Pt started on rocephin empirically, completed 3 days.  Grew CRE, will give gentamicin.   bcx x 2 ngtd.      - Check chest xray - clear.    - Monitor WBC, and temp curve - leukocytosis improving    Acute CVA:    - CT head with acute cva.  On eliquis and ASA.  Repeat CT head remains unchaged,  EEG no seizure activity.  Neuro following.     UTI:    * Ucx growing CRE Enterobacter, will give gentamicin 120mg (2gm/kg) x 1 dose.  Gent level on 6/7 is 1.2.  No further gentamicin dosing, s/p completion of abx for UTI.      ESRD on HD:    - Pt with cloted AVF, s/p shiley placement.  Pt not tolerating HD due to hypotension.  Pt pulled out shiley on 6/5, now w/o access.  Pt remains at high risk for pulling out access again.     -  Palliative f/u regarding further GOC.  Pt not eligible for inpatient hospice.  from nursing home      Oral thrush:    - Pt started on IV diflucan, recommend 7 day course (100mg IV qdaily).      - Check HIV    Will follow,    Nahomy Ramsey  [phone]

## 2021-06-08 NOTE — HOSPICE CARE NOTE - CONVESATION DETAILS
Case reviewed [facility] MD for in-pt hospice this am. Pt denied for in-pt hospice as the pt is without symptom burden. Please re-refer if pt's condition changes.

## 2021-06-08 NOTE — PROGRESS NOTE ADULT - ASSESSMENT
80 Y/O Female with PMHx of CVA recent ischemic stroke, HTN, DM, A fib on Eliquis, ESRD on HD (MWF) brought to ED after malfunctioning AVF for HD today and worsening AMS    EKG: None in chart    1. AMS  -CT head with acute MCA infarct  -echo from previous admission with normal LV function. Agitated saline injection is inconclusive regarding the  presence of a patent foramen ovale.  -f/u neuro   -f/u palliative for GOC    2. Afib  -on eliquis  -c/w metoprolol    3. HTN  -BP controlled  -not taking PO meds  -continue to monitor BP    4. ESRD  -on HD  -f/u renal

## 2021-06-08 NOTE — PROGRESS NOTE ADULT - ASSESSMENT
Discussed plan of care with son:  ·	comfort measures only / symptom directed care: no FS, IVF, blood draws  ·	no further HD, do not re-establish access  ·	ordered PRNs for symptom management  -Dilaudid 0.3mg IV q3h PRN for Pain and/or Dyspnea  -Ativan 0.5mg IV q6h PRN for Anxiety/Agitation  -Haldol 1mg IV q6h PRN for Agitation  ·	d  ·	d   Discussed plan of care with son:  ·	comfort measures only / symptom directed care: no FS, IVF, blood draws  ·	no further HD, do not re-establish access  ·	ordered PRNs for symptom management  -Dilaudid 0.3mg IV q3h PRN for Pain and/or Dyspnea  -Ativan 0.5mg IV q6h PRN for Anxiety/Agitation  -Haldol 1mg IV q6h PRN for Agitation  ·	patient was deemed not a candidate for inpatient hospice yet but this could change quickly, will re-evaluate tomorrow  ·	if patient is not inpatient hospice appropriate over the next 1-2 days, then son will reconsider [place] referral 82yo F with PMH of HTN, T2DM, AFib, ESRD, and R MCA CVA p/w malfunctioning AVF c/b new L MCA CVA. Palliative consulted for complex medical decision making in the setting of life-limiting illness.    Discussed plan of care with son:  comfort measures only / symptom directed care: no FS, IVF, blood draws  no further HD, do not re-establish access  ordered PRNs for symptom management  -Dilaudid 0.3mg IV q3h PRN for Pain and/or Dyspnea  -Ativan 0.5mg IV q6h PRN for Anxiety/Agitation  -Haldol 1mg IV q6h PRN for Agitation  patient was deemed not a candidate for inpatient hospice yet but this could change quickly, will re-evaluate tomorrow  if patient is not inpatient hospice appropriate over the next 1-2 days, then son will reconsider [place] referral

## 2021-06-08 NOTE — PROGRESS NOTE ADULT - SUBJECTIVE AND OBJECTIVE BOX
[facility] Geriatrics and Palliative Care  Raúl Guido, Palliative Care Attending  Contact Info: Page [phone] (including Nights/Weekend), message on Microsoft Teams (Raúl Guido), or leave VM at Palliative Office [phone] (Non-Urgent)    SUBJECTIVE AND OBJECTIVE:  INTERVAL HPI/OVERNIGHT EVENTS: Interval events noted. Patient restless in bed, unable to participate in interview. Further GOC discussion with son: comfort measures, no more HD. Patient required PRNs of Haldol x1 in past 24hrs.     Allergies    No Known Allergies    Intolerances    MEDICATIONS  (STANDING):  haloperidol    Injectable 1 milliGRAM(s) IV Push at bedtime  valproate sodium IVPB 250 milliGRAM(s) IV Intermittent three times a day    MEDICATIONS  (PRN):  haloperidol    Injectable 1 milliGRAM(s) IV Push every 6 hours PRN Agitation  HYDROmorphone  Injectable 0.3 milliGRAM(s) IV Push every 3 hours PRN Moderate/Severe Pain and/or Dyspnea  LORazepam   Injectable 0.5 milliGRAM(s) IV Push every 6 hours PRN Anxiety/Agitation  sodium chloride 0.9% lock flush 10 milliLiter(s) IV Push every 1 hour PRN Pre/post blood products, medications, blood draw, and to maintain line patency      ITEMS UNCHECKED ARE NOT PRESENT    PRESENT SYMPTOMS: [x]Unable to obtain due to poor mentation   Source if other than patient:  []Family   []Team     PAINAD = 2    PPSV = 20%      GENERAL:  []Alert  []Oriented x   [x]Lethargic  []Cachexia  []Unarousable  []Verbal  [x]Non-Verbal  Behavioral:   [] Anxiety  [x] Delirium [x] Agitation [] Other  HEENT:  []Normal   [x]Dry mouth   []ET Tube/Trach  []Oral lesions  PULMONARY:   []Clear [x]Tachypnea  []Audible excessive secretions   []Rhonchi        []Right []Left []Bilateral  [x]Crackles        []Right []Left [x]Bilateral  []Wheezing     []Right []Left []Bilateral  CARDIOVASCULAR:    [x]Regular []Irregular []Tachy  []Ravi []Murmur []Other  GASTROINTESTINAL:  [x]Soft  [x]Distended   []+BS  [x]Non tender []Tender  []PEG []OGT/ NGT  Last BM: 6/2  GENITOURINARY:  []Normal [] Incontinent   []Oliguria/Anuria   []Murray  MUSCULOSKELETAL:   []Normal   []Weakness  [x]Bed/Wheelchair bound []Edema  NEUROLOGIC:   []No focal deficits  [x] Cognitive impairment  [x] Dysphagia [x]Dysarthria [] Paresis []Other   SKIN:   [x]Normal   []Pressure ulcer(s)  []Rash    Vital Signs Last 24 Hrs  T(C): 36.7 (08 Jun 2021 12:30), Max: 36.7 (07 Jun 2021 21:00)  T(F): 98.1 (08 Jun 2021 12:30), Max: 98.1 (08 Jun 2021 10:00)  HR: 87 (08 Jun 2021 12:30) (82 - 89)  BP: 147/57 (08 Jun 2021 12:30) (120/57 - 147/57)  BP(mean): --  RR: 18 (08 Jun 2021 12:30) (16 - 18)  SpO2: 99% (08 Jun 2021 12:30) (99% - 99%) I&O's Summary      LABS:                         9.7    8.53  )-----------( 160      ( 08 Jun 2021 05:45 )             32.9   06-08    140  |  102  |  46<H>  ----------------------------<  88  5.9<H>   |  17<L>  |  7.72<H>    Ca    7.9<L>      08 Jun 2021 05:45  Phos  5.3     06-08  Mg     2.3     06-08          RADIOLOGY & ADDITIONAL STUDIES: None new    PROTEIN CALORIE MALNUTRITION PRESENT: [ ]mild [ ]moderate [ ]severe [ ]underweight [ ]morbid obesity  [] PPSV2 < or = 30% [] significant weight loss [] poor nutritional intake [] anasarca [] catabolic state   Albumin, Serum: 2.3 g/dL (06-06-21 @ 02:16)   Artificial Nutrition []     REFERRALS:   []Chaplaincy  [x]Hospice  []Child Life  [x]Social Work  []Case management []Holistic Therapy []Physical Therapy []Dietary     Goals of Care Document: CESAR Sue (06-04-21 @ 10:39)  Goals of Care Conversation:   Participants:  · Participants  Family; Staff  · Child(namrata)  Julius Livingston  · Provider  Dr. Rodriguez  ·   Annabelle Sue    Advance Directives:  · Caregiver:  information could not be obtained    Conversation Discussion:  · Conversation  MOLST Discussed  · Conversation Details  Spoke to patients son, Julius Livingston, regarding advanced care planning. Son had extensive conversation with Dr. Guido yesterday regarding the risk and benefits of resuscitative measures such as CPR and mechanical ventilation at the end of life. At this time son does not feel his mother would benefit from CPR or mechanical ventilation and prefers a natural passing. Son in agreement with DNR/DNI.   MOLST completed and placed upon pt's medical record.  Son still considering the option of transitioning his mother's care to full comfort. He is struggling with the cessation of his mothers hemodialysis however does acknowledge that she never liked going for dialysis and feels at this time continuing HD may not be instrumental in improving her overall quality of life.  Son requested more time in considering  transitioning his mother to hospice/comfort care. At this time he still wants his mother to continue HD as tolerated.  Support provided to son.  Palliative will continue to follow.    Personal Advance Directives Treatment Guidelines:   Treatment Guidelines:  · Decision Maker  Surrogate  · Treatment Guidelines  DNR Order    MOLST:  · Completed  04-Jun-2021      Electronic Signatures:  Annabelle Sue ([facility] [place])  (Signed 04-Jun-2021 10:57)  	Authored: Goals of Care Conversation, Personal Advance Directives Treatment Guidelines     [facility] Geriatrics and Palliative Care  Raúl Guido, Palliative Care Attending  Contact Info: Page [phone] (including Nights/Weekend), message on Microsoft Teams (Raúl Guido), or leave VM at Palliative Office [phone] (Non-Urgent)    SUBJECTIVE AND OBJECTIVE:  INTERVAL HPI/OVERNIGHT EVENTS: Interval events noted. Patient restless in bed, unable to participate in interview. Further GOC discussion with son: comfort measures, no more HD. Patient required PRNs of Haldol x1 in past 24hrs.     Allergies    No Known Allergies    Intolerances    MEDICATIONS  (STANDING):  haloperidol    Injectable 1 milliGRAM(s) IV Push at bedtime  valproate sodium IVPB 250 milliGRAM(s) IV Intermittent three times a day    MEDICATIONS  (PRN):  haloperidol    Injectable 1 milliGRAM(s) IV Push every 6 hours PRN Agitation  HYDROmorphone  Injectable 0.3 milliGRAM(s) IV Push every 3 hours PRN Moderate/Severe Pain and/or Dyspnea  LORazepam   Injectable 0.5 milliGRAM(s) IV Push every 6 hours PRN Anxiety/Agitation  sodium chloride 0.9% lock flush 10 milliLiter(s) IV Push every 1 hour PRN Pre/post blood products, medications, blood draw, and to maintain line patency      ITEMS UNCHECKED ARE NOT PRESENT    PRESENT SYMPTOMS: [x]Unable to obtain due to poor mentation   Source if other than patient:  []Family   []Team     PAINAD = 2    PPSV = 20%      GENERAL:  []Alert  []Oriented x   [x]Lethargic  []Cachexia  []Unarousable  []Verbal  [x]Non-Verbal  Behavioral:   [] Anxiety  [x] Delirium [x] Agitation [] Other  HEENT:  []Normal   [x]Dry mouth   []ET Tube/Trach  []Oral lesions  PULMONARY:   []Clear [x]Tachypnea  []Audible excessive secretions   []Rhonchi        []Right []Left []Bilateral  [x]Crackles        []Right []Left [x]Bilateral  []Wheezing     []Right []Left []Bilateral  CARDIOVASCULAR:    [x]Regular []Irregular []Tachy  []Ravi []Murmur []Other  GASTROINTESTINAL:  [x]Soft  [x]Distended   []+BS  [x]Non tender []Tender  []PEG []OGT/ NGT  Last BM: 6/2  GENITOURINARY:  []Normal [] Incontinent   []Oliguria/Anuria   []Murray  MUSCULOSKELETAL:   []Normal   []Weakness  [x]Bed/Wheelchair bound []Edema  NEUROLOGIC:   []No focal deficits  [x] Cognitive impairment  [x] Dysphagia [x]Dysarthria [] Paresis []Other   SKIN:   [x]Normal   []Pressure ulcer(s)  []Rash    Vital Signs Last 24 Hrs  T(C): 36.7 (08 Jun 2021 12:30), Max: 36.7 (07 Jun 2021 21:00)  T(F): 98.1 (08 Jun 2021 12:30), Max: 98.1 (08 Jun 2021 10:00)  HR: 87 (08 Jun 2021 12:30) (82 - 89)  BP: 147/57 (08 Jun 2021 12:30) (120/57 - 147/57)  BP(mean): --  RR: 18 (08 Jun 2021 12:30) (16 - 18)  SpO2: 99% (08 Jun 2021 12:30) (99% - 99%) I&O's Summary      LABS:                         9.7    8.53  )-----------( 160      ( 08 Jun 2021 05:45 )             32.9   06-08    140  |  102  |  46<H>  ----------------------------<  88  5.9<H>   |  17<L>  |  7.72<H>    Ca    7.9<L>      08 Jun 2021 05:45  Phos  5.3     06-08  Mg     2.3     06-08          RADIOLOGY & ADDITIONAL STUDIES: None new    PROTEIN CALORIE MALNUTRITION PRESENT: [ ]mild [ ]moderate [ ]severe [ ]underweight [ ]morbid obesity  [] PPSV2 < or = 30% [] significant weight loss [] poor nutritional intake [] anasarca [] catabolic state   Albumin, Serum: 2.3 g/dL (06-06-21 @ 02:16)   Artificial Nutrition []     REFERRALS:   []Chaplaincy  [x]Hospice  []Child Life  [x]Social Work  []Case management []Holistic Therapy []Physical Therapy []Dietary     Goals of Care Document: CESAR Sue (06-04-21 @ 10:39)  Goals of Care Conversation:   Participants:  · Participants  Family; Staff  · Child(namrata)  Julius Livingston  · Provider  Dr. Rodriguez  ·   Annabelle Sue    Advance Directives:  · Caregiver:  information could not be obtained    Conversation Discussion:  · Conversation  MOLST Discussed  · Conversation Details  Spoke to patients son, Julius Livingston, regarding advanced care planning. Son had extensive conversation with Dr. Guido yesterday regarding the risk and benefits of resuscitative measures such as CPR and mechanical ventilation at the end of life. At this time son does not feel his mother would benefit from CPR or mechanical ventilation and prefers a natural passing. Son in agreement with DNR/DNI.   MOLST completed and placed upon pt's medical record.  Son still considering the option of transitioning his mother's care to full comfort. He is struggling with the cessation of his mothers hemodialysis however does acknowledge that she never liked going for dialysis and feels at this time continuing HD may not be instrumental in improving her overall quality of life.  Son requested more time in considering  transitioning his mother to hospice/comfort care. At this time he still wants his mother to continue HD as tolerated.  Support provided to son.  Palliative will continue to follow.    Personal Advance Directives Treatment Guidelines:   Treatment Guidelines:  · Decision Maker  Surrogate  · Treatment Guidelines  DNR Order    MOLST:  · Completed  04-Jun-2021      Electronic Signatures:  Annabelle Sue ([facility] [place]  (Signed 04-Jun-2021 10:57)  	Authored: Goals of Care Conversation, Personal Advance Directives Treatment Guidelines

## 2021-06-08 NOTE — PROGRESS NOTE ADULT - SUBJECTIVE AND OBJECTIVE BOX
Dieter Willson MD  Interventional Cardiology / Endovascular Specialist  [place] Office : [address] [place] [address] [place] [address]  Tel:   [place] Office : [address] [facility] [place] [address]  Tel: [phone]  Cell : [phone]    Subjective/Overnight events: Patient lying in bed   	  MEDICATIONS:  apixaban 2.5 milliGRAM(s) Oral every 12 hours  aspirin enteric coated 81 milliGRAM(s) Oral daily  hydrALAZINE 25 milliGRAM(s) Oral two times a day  metoprolol tartrate Injectable 2.5 milliGRAM(s) IV Push every 6 hours    fluconAZOLE IVPB 200 milliGRAM(s) IV Intermittent once      donepezil 10 milliGRAM(s) Oral at bedtime  QUEtiapine 25 milliGRAM(s) Oral daily  valproate sodium IVPB 250 milliGRAM(s) IV Intermittent three times a day    pantoprazole    Tablet 40 milliGRAM(s) Oral before breakfast    atorvastatin 80 milliGRAM(s) Oral at bedtime  dextrose 40% Gel 15 Gram(s) Oral once  dextrose 50% Injectable 25 Gram(s) IV Push once  dextrose 50% Injectable 12.5 Gram(s) IV Push once  dextrose 50% Injectable 25 Gram(s) IV Push once  glucagon  Injectable 1 milliGRAM(s) IntraMuscular once  insulin lispro (ADMELOG) corrective regimen sliding scale   SubCutaneous every 6 hours    calcium acetate 667 milliGRAM(s) Oral three times a day with meals  chlorhexidine 2% Cloths 1 Application(s) Topical daily  dextrose 5% + sodium chloride 0.45%. 1000 milliLiter(s) IV Continuous <Continuous>  dextrose 5%. 1000 milliLiter(s) IV Continuous <Continuous>  dextrose 5%. 1000 milliLiter(s) IV Continuous <Continuous>  sodium chloride 0.9% lock flush 10 milliLiter(s) IV Push every 1 hour PRN      PAST MEDICAL/SURGICAL HISTORY  PAST MEDICAL & SURGICAL HISTORY:  DM (diabetes mellitus)  TYpe II    HTN (hypertension)    Hypercholesteremia    CKD (chronic kidney disease)  now on HD via R forearm AVF    Anemia    Atrial fibrillation  On Eliquis    Cerebrovascular accident (CVA)    Status post right foot surgery  hammer toe repair    AV fistula  June 2018, 2 ballooning (last one 2 weeks ago), following up on Dec 15th    H/O colonoscopy with polypectomy        SOCIAL HISTORY: Substance Use (street drugs): ( x ) never used  (  ) other:    FAMILY HISTORY:  Family history of hypertension (Sibling)    Family history of diabetes mellitus    Family history of lung cancer (Sibling)    Family history of malignant neoplasm of gastrointestinal tract        REVIEW OF SYSTEMS:  unable to obtain    PHYSICAL EXAM:  T(C): 36.7 (06-08-21 @ 10:00), Max: 36.7 (06-07-21 @ 16:25)  HR: 88 (06-08-21 @ 10:00) (82 - 90)  BP: 120/57 (06-08-21 @ 10:00) (94/65 - 139/61)  RR: 18 (06-08-21 @ 10:00) (16 - 18)  SpO2: 99% (06-08-21 @ 10:00) (99% - 99%)  Wt(kg): --  I&O's Summary        GENERAL: NAD  EYES: conjunctiva and sclera clear  ENMT: No tonsillar erythema, exudates, or enlargement;  Cardiovascular: Normal S1 S2, No JVD, No murmurs, No edema  Respiratory: Lungs clear to auscultation	  Gastrointestinal:  Soft	  Extremities: no edema                                    9.7    8.53  )-----------( 160      ( 08 Jun 2021 05:45 )             32.9     06-08    140  |  102  |  46<H>  ----------------------------<  88  5.9<H>   |  17<L>  |  7.72<H>    Ca    7.9<L>      08 Jun 2021 05:45  Phos  5.3     06-08  Mg     2.3     06-08      proBNP:   Lipid Profile:   HgA1c:   TSH:     Consultant(s) Notes Reviewed:  [x ] YES  [ ] NO    Care Discussed with Consultants/Other Providers [ x] YES  [ ] NO    Imaging Personally Reviewed independently:  [x] YES  [ ] NO    All labs, radiologic studies, vitals, orders and medications list reviewed. Patient is seen and examined at bedside. Case discussed with medical team.

## 2021-06-08 NOTE — PROGRESS NOTE ADULT - SUBJECTIVE AND OBJECTIVE BOX
[facility] [place]   MD JAJA SMITH DO ANAM SIDDIQUI ANGELA WONG, PA    TEL:  OFFICE: [phone]  DR CASTILLO CELL: [phone]  DR. HARRISON CELL: [phone]  DR. RILEY CELL: [phone]  LIZZY DHILLON CELL: [phone]    From 5pm-7am Answering Service [phone]    -- RENAL FOLLOW UP NOTE ---Date of Service 06-08-21 @ 15:37    Patient is a 81y old  Female who presents with a chief complaint of AVF malfunction (08 Jun 2021 14:35)      Patient seen and examined at bedside.    VITALS:  T(F): 98.1 (06-08-21 @ 12:30), Max: 98.1 (06-07-21 @ 16:25)  HR: 87 (06-08-21 @ 12:30)  BP: 147/57 (06-08-21 @ 12:30)  RR: 18 (06-08-21 @ 12:30)  SpO2: 99% (06-08-21 @ 12:30)  Wt(kg): --        PHYSICAL EXAM:  Constitutional: lethargic  Neck: No JVD  Respiratory: CTAB, no wheezes, rales or rhonchi  Cardiovascular: S1, S2, RRR  Gastrointestinal: BS+, soft, NT/ND  Extremities: No peripheral edema    Hospital Medications:   MEDICATIONS  (STANDING):  haloperidol    Injectable 1 milliGRAM(s) IV Push at bedtime  valproate sodium IVPB 250 milliGRAM(s) IV Intermittent three times a day      LABS:  06-08    140  |  102  |  46<H>  ----------------------------<  88  5.9<H>   |  17<L>  |  7.72<H>    Ca    7.9<L>      08 Jun 2021 05:45  Phos  5.3     06-08  Mg     2.3     06-08      Creatinine Trend: 7.72 <--, 6.89 <--, 5.24 <--, 3.64 <--, 7.93 <--, 6.88 <--, 5.81 <--, 10.51 <--, 7.85 <--    Phosphorus Level, Serum: 5.3 mg/dL (06-08 @ 05:45)                              9.7    8.53  )-----------( 160      ( 08 Jun 2021 05:45 )             32.9     Urine Studies:  Urinalysis - [05-31-21 @ 19:13]      Color Dark Brown / Appearance Turbid / SG 1.019 / pH 7.5      Gluc Negative / Ketone Negative  / Bili Negative / Urobili <2 mg/dL       Blood Moderate / Protein 300 mg/dL / Leuk Est Large / Nitrite Negative      RBC 5 / WBC >10 / Hyaline  / Gran  / Sq Epi  / Non Sq Epi 4 / Bacteria Many      PTH -- (Ca --)      [05-09-21 @ 06:54]   421  HbA1c 5.8      [12-10-19 @ 06:41]  TSH 3.72      [05-14-21 @ 07:12]  Lipid: chol 219, , HDL 65, LDL --      [05-09-21 @ 06:54]    HBsAb <3.0      [05-11-21 @ 00:01]  HBsAg Nonreact      [05-10-21 @ 09:26]  HBcAb Nonreact      [05-10-21 @ 09:26]  HCV 0.17, Nonreact      [05-10-21 @ 09:26]    Syphilis Screen (Treponema Pallidum Ab) Negative      [05-15-21 @ 10:20]    RADIOLOGY & ADDITIONAL STUDIES:

## 2021-06-08 NOTE — PROGRESS NOTE ADULT - SUBJECTIVE AND OBJECTIVE BOX
Name of Patient : VINCENT GROSS  MRN: [id number]  DATE OF SERVICE: 06-08-21 @ 16:15    Subjective: Patient seen and examined. No new events except as noted.   worsneinf mental stat           MEDICATIONS:  MEDICATIONS  (STANDING):  haloperidol    Injectable 1 milliGRAM(s) IV Push at bedtime  valproate sodium IVPB 250 milliGRAM(s) IV Intermittent three times a day      PHYSICAL EXAM:  T(C): 36.7 (06-08-21 @ 12:30), Max: 36.7 (06-07-21 @ 16:25)  HR: 87 (06-08-21 @ 12:30) (82 - 89)  BP: 147/57 (06-08-21 @ 12:30) (120/57 - 147/57)  RR: 18 (06-08-21 @ 12:30) (16 - 18)  SpO2: 99% (06-08-21 @ 12:30) (99% - 99%)  Wt(kg): --  I&O's Summary        Appearance: confused, lethargic   HEENT: dry om   Lymphatic: No lymphadenopathy   Cardiovascular: Normal S1 S2  Respiratory: normal effort  Gastrointestinal:  Soft,  Skin: No rashes  Psychiatry:  confused   Musculuskeletal: No edema      All labs, Imaging and EKGs personally reviewed                             9.7    8.53  )-----------( 160      ( 08 Jun 2021 05:45 )             32.9               06-08    140  |  102  |  46<H>  ----------------------------<  88  5.9<H>   |  17<L>  |  7.72<H>    Ca    7.9<L>      08 Jun 2021 05:45  Phos  5.3     06-08  Mg     2.3     06-08

## 2021-06-09 VITALS
SYSTOLIC BLOOD PRESSURE: 131 MMHG | DIASTOLIC BLOOD PRESSURE: 69 MMHG | HEART RATE: 94 BPM | OXYGEN SATURATION: 99 % | RESPIRATION RATE: 18 BRPM | TEMPERATURE: 98 F

## 2021-06-09 LAB — SARS-COV-2 RNA SPEC QL NAA+PROBE: SIGNIFICANT CHANGE UP

## 2021-06-09 PROCEDURE — 99233 SBSQ HOSP IP/OBS HIGH 50: CPT

## 2021-06-09 RX ORDER — VALPROIC ACID (AS SODIUM SALT) 250 MG/5ML
2.5 SOLUTION, ORAL ORAL
Qty: 0 | Refills: 0 | DISCHARGE
Start: 2021-06-09

## 2021-06-09 RX ORDER — HALOPERIDOL DECANOATE 100 MG/ML
1 INJECTION INTRAMUSCULAR
Qty: 0 | Refills: 0 | DISCHARGE
Start: 2021-06-09

## 2021-06-09 RX ORDER — DONEPEZIL HYDROCHLORIDE 10 MG/1
1 TABLET, FILM COATED ORAL
Qty: 0 | Refills: 0 | DISCHARGE

## 2021-06-09 RX ORDER — ATORVASTATIN CALCIUM 80 MG/1
1 TABLET, FILM COATED ORAL
Qty: 0 | Refills: 0 | DISCHARGE

## 2021-06-09 RX ORDER — FLUCONAZOLE 150 MG/1
100 TABLET ORAL DAILY
Refills: 0 | Status: DISCONTINUED | OUTPATIENT
Start: 2021-06-09 | End: 2021-06-09

## 2021-06-09 RX ORDER — HYDROMORPHONE HYDROCHLORIDE 2 MG/ML
0.3 INJECTION INTRAMUSCULAR; INTRAVENOUS; SUBCUTANEOUS
Qty: 0 | Refills: 0 | DISCHARGE
Start: 2021-06-09

## 2021-06-09 RX ORDER — ENOXAPARIN SODIUM 100 MG/ML
10 INJECTION SUBCUTANEOUS
Qty: 0 | Refills: 0 | DISCHARGE

## 2021-06-09 RX ORDER — CALCIUM ACETATE 667 MG
1 TABLET ORAL
Qty: 0 | Refills: 0 | DISCHARGE

## 2021-06-09 RX ORDER — FLUCONAZOLE 150 MG/1
100 TABLET ORAL
Qty: 0 | Refills: 0 | DISCHARGE
Start: 2021-06-09

## 2021-06-09 RX ADMIN — Medication 26.25 MILLIGRAM(S): at 04:30

## 2021-06-09 RX ADMIN — Medication 26.25 MILLIGRAM(S): at 12:00

## 2021-06-09 RX ADMIN — FLUCONAZOLE 100 MILLIGRAM(S): 150 TABLET ORAL at 13:51

## 2021-06-09 NOTE — PROGRESS NOTE ADULT - PROBLEM SELECTOR PLAN 8
dvt and gi ppx
dvt and gi ppx    palliative measures: plan for d/c to in-patient hospice today
dvt and gi ppx
dvt and gi ppx    palliative measures
dvt and gi ppx

## 2021-06-09 NOTE — PROGRESS NOTE ADULT - PROBLEM SELECTOR PLAN 3
ischemic CVA  ASA and statin unable to sisi , no oral meds  fall precautions   palliative  speech and swallow
ischemic CVA  ASA and statin  fall precautions   repeat ct head acute CVA  BP control  CVA W/U    pallaitive  speecha dn swallow  NPO for now
ischemic CVA  ASA and statin  fall precautions   repeat ct head acute CVA  BP control  CVA W/U    palliative  speech and swallow
ischemic CVA  ASA and statin unable to sisi , no oral meds  fall precautions   repeat ct head acute CVA  BP control  CVA W/U    palliative  speech and swallow
ischemic CVA  ASA and statin unable to sisi , no oral meds  fall precautions   palliative  speech and swallow
.  PPSV = 20%, requires total assistance for all ADLs  -c/w supportive care
ischemic CVA  ASA and statin  fall precautions   repeat ct head acute CVA  BP control  CVA W/U    palliative  speech and swallow
ischemic CVA  ASA and statin  fall precautions     BP control
.  PPSV = 20%, requires total assistance for all ADLs  -c/w supportive care

## 2021-06-09 NOTE — PROGRESS NOTE ADULT - PROBLEM SELECTOR PROBLEM 7
Encounter for palliative care
HTN (hypertension)
Encounter for palliative care
HTN (hypertension)

## 2021-06-09 NOTE — DISCHARGE NOTE PROVIDER - NSDCCPCAREPLAN_GEN_ALL_CORE_FT
PRINCIPAL DISCHARGE DIAGNOSIS  Diagnosis: AV fistula thrombosis, initial encounter  Assessment and Plan of Treatment: COMFORT CARE.  Follow up with Hospice Care MD at Hospice facility      SECONDARY DISCHARGE DIAGNOSES  Diagnosis: ESRD (end stage renal disease)  Assessment and Plan of Treatment: No further Hemodialysis  COMFORT CARE.  Follow up with Hospice Care MD at [facility] facility    Diagnosis: DM (diabetes mellitus)  Assessment and Plan of Treatment: COMFORT CARE.  Follow up with Hospice Care MD at [facility] facility    Diagnosis: Atrial fibrillation  Assessment and Plan of Treatment: COMFORT CARE.  Follow up with Hospice Care MD at [facility] facility    Diagnosis: Cerebrovascular accident (CVA)  Assessment and Plan of Treatment: COMFORT CARE.  Follow up with Hospice Care MD at [facility] facility

## 2021-06-09 NOTE — PROGRESS NOTE ADULT - PROBLEM SELECTOR PLAN 6
rate control   eliquis 2.5 BID
rate control   eliquis 2.5 BID
.  Patient is DNR/DNI, MOLST in chart  -see GOC note from 6/3 & 6/4 re: hospice and code status  -pending transfer to inpatient hospice
rate control   eliquis 2.5 BID
.  Patient is DNR/DNI, MOLST in chart  -see GOC note from 6/3 & 6/4 re: hospice and code status  -son is hopeful for inpatient hospice, patient will likely become appropriate in coming days but if not son will be willing to explore [place] referral

## 2021-06-09 NOTE — DISCHARGE NOTE PROVIDER - NSDCMRMEDTOKEN_GEN_ALL_CORE_FT
fluconazole: 100 milligram(s) intravenous once a day x 5days  haloperidol: 1 milligram(s) intravenous every 6 hours, As Needed  haloperidol: 1 milligram(s) intravenous every 6 hours, As Needed  HYDROmorphone: 0.3 milligram(s) intravenous every 3 hours, As Needed pain, SOB, Dyspnea  LORazepam: 0.5 milligram(s) intravenous every 6 hours, As Needed  valproic acid (as valproate sodium) 100 mg/mL intravenous solution: 2.5 milliliter(s) intravenous 3 times a day   fluconazole: 100 milligram(s) intravenous once a day x 5days  haloperidol: 1 milligram(s) intravenous every 6 hours, As Needed  haloperidol: 1 milligram(s) intravenous once a day (at bedtime)  HYDROmorphone: 0.3 milligram(s) intravenous every 3 hours, As Needed pain, SOB, Dyspnea  LORazepam: 0.5 milligram(s) intravenous every 6 hours, As Needed  valproic acid (as valproate sodium) 100 mg/mL intravenous solution: 2.5 milliliter(s) intravenous 3 times a day

## 2021-06-09 NOTE — PROGRESS NOTE ADULT - PROVIDER SPECIALTY LIST ADULT
Cardiology
Nephrology
Palliative Care
Cardiology
Infectious Disease
Infectious Disease
Nephrology
Nephrology
Neurology
Cardiology
Internal Medicine
Nephrology
Nephrology
Neurology
Vascular Surgery
Cardiology
Cardiology
Infectious Disease
Infectious Disease
Nephrology
Neurology
Infectious Disease
Internal Medicine
Internal Medicine
Nephrology
Internal Medicine
Palliative Care
Internal Medicine

## 2021-06-09 NOTE — PROGRESS NOTE ADULT - PROBLEM SELECTOR PLAN 4
renal eval HD as per renal   Monitor electrolytes
.  Already on AC, ?hypercoaguable state  -recovery is expected to be limited at best  -Neuro, Cards following
.  Already on AC, ?hypercoaguable state  -recovery is expected to be limited at best  -Neuro, Cards following
renal eval HD as per renal   Monitor electrolytes

## 2021-06-09 NOTE — PROGRESS NOTE ADULT - NSICDXPILOT_GEN_ALL_CORE
[place]

## 2021-06-09 NOTE — PROGRESS NOTE ADULT - PROBLEM SELECTOR PROBLEM 4
ESRD (end stage renal disease)
ESRD (end stage renal disease)
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
ESRD (end stage renal disease)
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
ESRD (end stage renal disease)

## 2021-06-09 NOTE — DISCHARGE NOTE NURSING/CASE MANAGEMENT/SOCIAL WORK - PATIENT PORTAL LINK FT
You can access the FollowMyHealth Patient Portal offered by [facility] by registering at the following website: [email or web address]. By joining [employer]’s FollowMyHealth portal, you will also be able to view your health information using other applications (apps) compatible with our system.

## 2021-06-09 NOTE — CHART NOTE - NSCHARTNOTEFT_GEN_A_CORE
Pt identified via Length of stay screening.     Pt 82 yo female with PMHx of HTN, T2DM, AFib, ESRD, and R MCA CVA presented with malfunctioning AVF c/b new L MCA CVA - per chart review. Of note, Palliative consulted for complex medical decision making in the setting of life-limiting illness.    Case discussed with nurse. Plan of care includes comfort measures only/symptom directed care: no FS, IVF, blood draws  no further HD. Of note Pt NPO now; plan for Pt -> inpatient hospice.    Aggressive nutrition interventions may not be appropriate at present.   Consult nutrition if warranted; RDN remains available.

## 2021-06-09 NOTE — PROGRESS NOTE ADULT - ASSESSMENT
82yo F with PMH of HTN, T2DM, AFib, ESRD, and R MCA CVA p/w malfunctioning AVF c/b new L MCA CVA. Palliative consulted for complex medical decision making in the setting of life-limiting illness.    c/w symptom PRNs as ordered  -Dilaudid 0.3mg IV q3h PRN for Pain and/or Dyspnea  -Ativan 0.5mg IV q6h PRN for Anxiety/Agitation  -Haldol 1mg IV q6h PRN for Agitation  patient pending transition to inpatient hospice, son in agreement with plan

## 2021-06-09 NOTE — PROGRESS NOTE ADULT - ASSESSMENT
82 Y/O Female with PMHx of CVA recent ischemic stroke, HTN, DM, A fib on Eliquis, ESRD on HD (MWF) brought to ED after malfunctioning AVF for HD today and worsening AMS    EKG: None in chart    1. AMS  -CT head with acute MCA infarct  -echo from previous admission with normal LV function. Agitated saline injection is inconclusive regarding the  presence of a patent foramen ovale.  -f/u neuro   -f/u palliative for GOC    2. Afib  -on eliquis  -c/w metoprolol    3. HTN  -BP controlled  -not taking PO meds  -continue to monitor BP    4. ESRD  -on HD  -f/u renal

## 2021-06-09 NOTE — PROGRESS NOTE ADULT - PROBLEM SELECTOR PLAN 1
.  Continue with ordered medications for symptom management  -Dilaudid 0.3mg IV q3h PRN for Pain and/or Dyspnea  -Ativan 0.5mg IV q6h PRN for Anxiety/Agitation  -Haldol 1mg IV q6h PRN for Agitation  -Haldol 1mg IV qhs

## 2021-06-09 NOTE — PROGRESS NOTE ADULT - PROBLEM SELECTOR PROBLEM 1
AV fistula thrombosis, initial encounter
Agitation
AV fistula thrombosis, initial encounter
Agitation
AV fistula thrombosis, initial encounter

## 2021-06-09 NOTE — DISCHARGE NOTE NURSING/CASE MANAGEMENT/SOCIAL WORK - NSDCCRNAME_GEN_ALL_CORE_FT
Hospice [address] via [facility] [phone]

## 2021-06-09 NOTE — PROGRESS NOTE ADULT - SUBJECTIVE AND OBJECTIVE BOX
[facility] Geriatrics and Palliative Care  Raúl Guido, Palliative Care Attending  Contact Info: Page [phone] (including Nights/Weekend), message on Microsoft Teams (Raúl Guido), or leave VM at Palliative Office [phone] (Non-Urgent)    SUBJECTIVE AND OBJECTIVE:  INTERVAL HPI/OVERNIGHT EVENTS: Patient seen earlier today. Interval events noted. Accepted to inpatient hospice, pending transportation. Unable to participate in interview due to encephalopathy. Patient required PRNs of Haldol x1 in past 24hrs.     Allergies  No Known Allergies    MEDICATIONS  (STANDING):  fluconAZOLE IVPB 100 milliGRAM(s) IV Intermittent daily  haloperidol    Injectable 1 milliGRAM(s) IV Push at bedtime  valproate sodium IVPB 250 milliGRAM(s) IV Intermittent three times a day    MEDICATIONS  (PRN):  haloperidol    Injectable 1 milliGRAM(s) IV Push every 6 hours PRN Agitation  HYDROmorphone  Injectable 0.3 milliGRAM(s) IV Push every 3 hours PRN Moderate/Severe Pain and/or Dyspnea  LORazepam   Injectable 0.5 milliGRAM(s) IV Push every 6 hours PRN Anxiety/Agitation  sodium chloride 0.9% lock flush 10 milliLiter(s) IV Push every 1 hour PRN Pre/post blood products, medications, blood draw, and to maintain line patency      ITEMS UNCHECKED ARE NOT PRESENT    PRESENT SYMPTOMS: [x]Unable to obtain due to poor mentation   Source if other than patient:  []Family   []Team     PAINAD = 3    PPSV = 20%      GENERAL:  []Alert  []Oriented x   [x]Lethargic  []Cachexia  []Unarousable  []Verbal  [x]Non-Verbal  Behavioral:   [] Anxiety  [x] Delirium [x] Agitation [] Other  HEENT:  []Normal   [x]Dry mouth   []ET Tube/Trach  []Oral lesions  PULMONARY:   []Clear [x]Tachypnea  []Audible excessive secretions   []Rhonchi        []Right []Left []Bilateral  [x]Crackles        []Right []Left [x]Bilateral  []Wheezing     []Right []Left []Bilateral  CARDIOVASCULAR:    [x]Regular []Irregular []Tachy  []Ravi []Murmur []Other  GASTROINTESTINAL:  [x]Soft  [x]Distended   []+BS  [x]Non tender []Tender  []PEG []OGT/ NGT  Last BM: 6/2  GENITOURINARY:  []Normal [] Incontinent   []Oliguria/Anuria   []Murray  MUSCULOSKELETAL:   []Normal   []Weakness  [x]Bed/Wheelchair bound []Edema  NEUROLOGIC:   []No focal deficits  [x] Cognitive impairment  [x] Dysphagia [x]Dysarthria [] Paresis []Other   SKIN:   [x]Normal   []Pressure ulcer(s)  []Rash        Vital Signs Last 24 Hrs  T(C): 36.6 (09 Jun 2021 12:56), Max: 36.6 (09 Jun 2021 12:56)  T(F): 97.8 (09 Jun 2021 12:56), Max: 97.8 (09 Jun 2021 12:56)  HR: 89 (09 Jun 2021 12:56) (89 - 91)  BP: 146/70 (09 Jun 2021 12:56) (146/70 - 151/71)  BP(mean): --  RR: 18 (09 Jun 2021 12:56) (17 - 18)  SpO2: 100% (09 Jun 2021 12:56) (100% - 100%) I&O's Summary      LABS:                         9.7    8.53  )-----------( 160      ( 08 Jun 2021 05:45 )             32.9   06-08    140  |  102  |  46<H>  ----------------------------<  88  5.9<H>   |  17<L>  |  7.72<H>    Ca    7.9<L>      08 Jun 2021 05:45  Phos  5.3     06-08  Mg     2.3     06-08          RADIOLOGY & ADDITIONAL STUDIES: None new    PROTEIN CALORIE MALNUTRITION PRESENT: [ ]mild [ ]moderate [ ]severe [ ]underweight [ ]morbid obesity  [] PPSV2 < or = 30% [] significant weight loss [] poor nutritional intake [] anasarca [] catabolic state   Albumin, Serum: 2.3 g/dL (06-06-21 @ 02:16)   Artificial Nutrition []     REFERRALS:   [x]Chaplaincy  [x]Hospice  []Child Life  [x]Social Work  []Case management []Holistic Therapy []Physical Therapy []Dietary     Care Coordination Document: Progress Notes - Care Coordination [C. Provider] (06-09-21 @ 12:08)                                       Progress Notes    PROGRESS NOTE  Date & Time of Note   2021-06-09 12:08    Notes    Notes: SW informed by [facility].  SW contacted [facility] [phone] s/w [place] to arrange ambulance p/u for  2pm; trip #371A. Pt's son Julius in agreement with plan. Team made aware. No  further sw needs at this time.       Electronically signed by:  Candida DENNISW  Electronically signed on:  2021-06-09  12:08

## 2021-06-09 NOTE — PROGRESS NOTE ADULT - SUBJECTIVE AND OBJECTIVE BOX
Dieter Willson MD  Interventional Cardiology / Endovascular Specialist  [place] Office : [phone] [place] [address]. [phone]  Tel:   [place] Office : [address] [facility] [place] [address]  Tel: [phone]  Cell : [phone]    Subjective/Overnight events: Patient lying in bed  	  MEDICATIONS:    fluconAZOLE IVPB 100 milliGRAM(s) IV Intermittent daily      haloperidol    Injectable 1 milliGRAM(s) IV Push at bedtime  haloperidol    Injectable 1 milliGRAM(s) IV Push every 6 hours PRN  HYDROmorphone  Injectable 0.3 milliGRAM(s) IV Push every 3 hours PRN  LORazepam   Injectable 0.5 milliGRAM(s) IV Push every 6 hours PRN  valproate sodium IVPB 250 milliGRAM(s) IV Intermittent three times a day        sodium chloride 0.9% lock flush 10 milliLiter(s) IV Push every 1 hour PRN      PAST MEDICAL/SURGICAL HISTORY  PAST MEDICAL & SURGICAL HISTORY:  DM (diabetes mellitus)  TYpe II    HTN (hypertension)    Hypercholesteremia    CKD (chronic kidney disease)  now on HD via R forearm AVF    Anemia    Atrial fibrillation  On Eliquis    Cerebrovascular accident (CVA)    Status post right foot surgery  hammer toe repair    AV fistula  June 2018, 2 ballooning (last one 2 weeks ago), following up on Dec 15th    H/O colonoscopy with polypectomy        SOCIAL HISTORY: Substance Use (street drugs): ( x ) never used  (  ) other:    FAMILY HISTORY:  Family history of hypertension (Sibling)    Family history of diabetes mellitus    Family history of lung cancer (Sibling)    Family history of malignant neoplasm of gastrointestinal tract        REVIEW OF SYSTEMS:  unable to obtain    PHYSICAL EXAM:  T(C): 36.4 (06-08-21 @ 22:10), Max: 36.7 (06-08-21 @ 12:30)  HR: 91 (06-08-21 @ 22:10) (87 - 91)  BP: 151/71 (06-08-21 @ 22:10) (147/57 - 151/71)  RR: 17 (06-08-21 @ 22:10) (17 - 18)  SpO2: 100% (06-08-21 @ 22:10) (99% - 100%)  Wt(kg): --  I&O's Summary        GENERAL: NAD  EYES: conjunctiva and sclera clear  ENMT: No tonsillar erythema, exudates, or enlargement;  Cardiovascular: Normal S1 S2, No JVD, No murmurs, No edema  Respiratory: Lungs clear to auscultation	  Gastrointestinal:  Soft	  Extremities: no edema                                9.7    8.53  )-----------( 160      ( 08 Jun 2021 05:45 )             32.9     06-08    140  |  102  |  46<H>  ----------------------------<  88  5.9<H>   |  17<L>  |  7.72<H>    Ca    7.9<L>      08 Jun 2021 05:45  Phos  5.3     06-08  Mg     2.3     06-08      proBNP:   Lipid Profile:   HgA1c:   TSH:     Consultant(s) Notes Reviewed:  [x ] YES  [ ] NO    Care Discussed with Consultants/Other Providers [ x] YES  [ ] NO    Imaging Personally Reviewed independently:  [x] YES  [ ] NO    All labs, radiologic studies, vitals, orders and medications list reviewed. Patient is seen and examined at bedside. Case discussed with medical team.

## 2021-06-09 NOTE — PROGRESS NOTE ADULT - PROBLEM SELECTOR PLAN 2
.  In the setting of metabolic/neurologic encephalopathy  -patient would be not be an appropriate PEG candidate, son in agreement
noted  Pan Cx  antibiotics per renal   monitor for fever  ID eval appreciated
noted  Velázquez Cx  started on rocephin , cont fo rnow   monitor for fever  ID eval
noted  Pan Cx  monitor for fever  ID eval appreciated
noted  Pan Cx  monitor for fever  ID eval appreciated
noted  Pan Cx  antibiotics per renal   monitor for fever  ID eval appreciated
.  In the setting of metabolic/neurologic encephalopathy  -patient would be not be an appropriate PEG candidate, son in agreement

## 2021-06-09 NOTE — PROGRESS NOTE ADULT - ASSESSMENT
80F hx htn, dm, A fib on eliquis, prior CVA and esrd MWF  present from nursing home with clotted AVF    ESRD on HD MWF  from nursing home    clotted avf f/u vascular   s/p IR for shiely 6/1   s/p HD 6/3 unable to UF sec to hypotension  s/p HD 6/5 with 300cc UF. had RRT post HD with IVF 500cc given  palliative on board now comfort care pending inpatient hospice   s/p pulled shiley. no further blood draw per GOC or hd  monitor electrolyte  renal will sign off please call if any question    hyperkalemia  s/p kayexlate  no more blood draw or hd per GOC  f/u palliative     AMS  recent CVA  very confused   ct head noted. f/u neuro  GOC with family    Anemia  Hb at goal for ESRD  recent cva no epo   monitor Hb    HTN  controlled  monitor    ckd-mbd  no longer want to have hd per family  monitor phos and calcium daily

## 2021-06-09 NOTE — PROGRESS NOTE ADULT - PROBLEM SELECTOR PLAN 7
resume BP meds
resume BP meds
.  Complex medical decision making / symptom management in the setting of advanced illness.    Emotional support provided, questions answered.  Active Psychosocial Referrals: [facility]    For new or uncontrolled symptoms, please page the Palliative Medicine team ([facility] #[phone]).   The service is available 24/7 (including nights & weekends) to provide symptom management recommendations over the phone as appropriate
resume BP meds
off all meds now
resume BP meds
.  Complex medical decision making / symptom management in the setting of advanced illness.    Will continue to follow for ongoing GOC discussion.   Emotional support provided, questions answered.  Active Psychosocial Referrals: [facility]    For new or uncontrolled symptoms, please page the Palliative Medicine team ([facility] #[phone]).   The service is available 24/7 (including nights & weekends) to provide symptom management recommendations over the phone as appropriate

## 2021-06-09 NOTE — PROGRESS NOTE ADULT - PROBLEM SELECTOR PROBLEM 3
Functional quadriplegia
Cerebrovascular accident (CVA)
Functional quadriplegia
Cerebrovascular accident (CVA)

## 2021-06-09 NOTE — PROGRESS NOTE ADULT - PROBLEM SELECTOR PROBLEM 2
Leukocytosis
Leukocytosis
Dysphagia
Dysphagia
Leukocytosis

## 2021-06-09 NOTE — PROGRESS NOTE ADULT - SUBJECTIVE AND OBJECTIVE BOX
[facility]   MD JAJA SMITH DO ANAM SIDDIQUI ANGELA WONG, PA    TEL:  OFFICE: [phone]  DR CASTILLO CELL: [phone]  DR. HARRISON CELL: [phone]  DR. RILEY CELL: [phone]  LIZZY DHILLON CELL: [phone]    From 5pm-7am Answering Service [phone]    -- RENAL FOLLOW UP NOTE ---Date of Service 06-09-21 @ 15:17    Patient is a 81y old  Female who presents with a chief complaint of AVF malfunction (09 Jun 2021 15:08)      Patient seen and examined at bedside.  VITALS:  T(F): 97.8 (06-09-21 @ 12:56), Max: 97.8 (06-09-21 @ 12:56)  HR: 89 (06-09-21 @ 12:56)  BP: 146/70 (06-09-21 @ 12:56)  RR: 18 (06-09-21 @ 12:56)  SpO2: 100% (06-09-21 @ 12:56)  Wt(kg): --        PHYSICAL EXAM:  Constitutional: restless  Neck: No JVD  Respiratory: CTAB, no wheezes, rales or rhonchi  Cardiovascular: S1, S2, RRR  Gastrointestinal: BS+, soft, NT/ND  Extremities: No peripheral edema    Hospital Medications:   MEDICATIONS  (STANDING):  fluconAZOLE IVPB 100 milliGRAM(s) IV Intermittent daily  haloperidol    Injectable 1 milliGRAM(s) IV Push at bedtime  valproate sodium IVPB 250 milliGRAM(s) IV Intermittent three times a day      LABS:  06-08    140  |  102  |  46<H>  ----------------------------<  88  5.9<H>   |  17<L>  |  7.72<H>    Ca    7.9<L>      08 Jun 2021 05:45  Phos  5.3     06-08  Mg     2.3     06-08      Creatinine Trend: 7.72 <--, 6.89 <--, 5.24 <--, 3.64 <--, 7.93 <--, 6.88 <--, 5.81 <--, 10.51 <--                                9.7    8.53  )-----------( 160      ( 08 Jun 2021 05:45 )             32.9     Urine Studies:  Urinalysis - [05-31-21 @ 19:13]      Color Dark Brown / Appearance Turbid / SG 1.019 / pH 7.5      Gluc Negative / Ketone Negative  / Bili Negative / Urobili <2 mg/dL       Blood Moderate / Protein 300 mg/dL / Leuk Est Large / Nitrite Negative      RBC 5 / WBC >10 / Hyaline  / Gran  / Sq Epi  / Non Sq Epi 4 / Bacteria Many      PTH -- (Ca --)      [05-09-21 @ 06:54]   421  HbA1c 5.8      [12-10-19 @ 06:41]  TSH 3.72      [05-14-21 @ 07:12]  Lipid: chol 219, , HDL 65, LDL --      [05-09-21 @ 06:54]    HBsAb <3.0      [05-11-21 @ 00:01]    Syphilis Screen (Treponema Pallidum Ab) Negative      [05-15-21 @ 10:20]    RADIOLOGY & ADDITIONAL STUDIES:

## 2021-06-09 NOTE — DISCHARGE NOTE PROVIDER - HOSPITAL COURSE
81F h/o recent CVA, HTN, DM, AFib on Eliquis, ESRD on HD (MWF) brought to ED after malfunctioning AVF unable to do HD patient at baseline is mildly confused    AV fistula thrombosis  - In setting of h/o ESRD on HD  - Renal and vascular consulted  - IR consulted for cathleen placement done 6/, Cathleen pulled out by Pt 6/6  - Monitor BMP daily trend electrolytes  -Palliative consulted, GOC, Pt son agreed to Comfort care, Hospice. No further HD, blood draws, IVF    Leukocytosis  - In setting of presumed UTI urine culture w/ Enterobacter started on CTX  - ID consulted  - CXR clear; Blood Cx neg  -No further w/u, not c/w GOC    Cerebrovascular accident (CVA)  - ASA and statin  - Noted with change in mental status now AOx0 and sedated intermittently restless CT head performed reveals NEW STROKE; Neurology following  - Palliative consulted for GOC,  Pt's son decided on Comfort care, Hospice    DM2 A1C 6.5%  -Initially managed on Basl insulin, ISS, d/c'ed due to Poor Po intake, hypoglycemia    Atrial fibrillation  - Rate controlled; AC w/ Eliquis 2.5mg BID  -Eliquis d/c'ed, pt not taking orals    HTN  - Home Hydralazine resumed, however, using Lopressor IV as needed while NPO  -Lopressor d/c'ed, pt not taking PO. B/p at goal.    Pt  was evaluated by Hospice and was deemed In-patient hospice eligible. Pt to be transfer to Inpatient hosp[facility] at [facility]

## 2021-06-09 NOTE — PROGRESS NOTE ADULT - PROBLEM SELECTOR PROBLEM 5
DM (diabetes mellitus)
ESRD (end stage renal disease) on dialysis
ESRD (end stage renal disease) on dialysis
DM (diabetes mellitus)

## 2021-06-09 NOTE — PROGRESS NOTE ADULT - PROBLEM SELECTOR PROBLEM 6
Atrial fibrillation
Advanced care planning/counseling discussion
Advanced care planning/counseling discussion
Atrial fibrillation

## 2021-06-09 NOTE — PROGRESS NOTE ADULT - PROBLEM SELECTOR PLAN 5
sliding scale  speech and swallow   lantus  1/2NS D5 for now
sliding scale  speech and swallow   lantus  1/2NS D5 for now
.  Chronically on HD for a 3-4yrs, last HD on 6/5  -discussed potential disease trajectory, life expectancy would generally be 7-14 days after last HD session  -son in agreement not to pursue further HD
sliding scale  speech and swallow   lantus  1/2NS D5 for now
sliding scale  speech and swallow   lantus
sliding scale  speech and swallow   lantus  1/2NS D5 for now
sliding scale  speech and swallow   lantus
sliding scale  speech and swallow   lantus  1/2NS D5 for now
.  Chronically on HD for a 3-4yrs, last HD on 6/5  -discussed potential disease trajectory, life expectancy would generally be 7-14 days after last HD session  -son in agreement not to pursue further HD

## 2021-06-09 NOTE — PROGRESS NOTE ADULT - ATTENDING COMMENTS
ESRD: HD as planned
ESRD: Planned for no HD, no blood draw. Will sign off-please call with any question or if anything changes
ESRD: no more HD per GOC
IF for HD catheter   HD after catheter
agree with above
agree with above
pt seen and examined agree with plan above
ESRD: HD as planned
agree with above
as above
HD today

## 2021-06-09 NOTE — CHART NOTE - NSCHARTNOTESELECT_GEN_ALL_CORE
ACP/Event Note
Event Note
Nutrition Services
Pre-procedure IR Note/Event Note
Vascular Surgery
negative...

## 2021-06-09 NOTE — PROGRESS NOTE ADULT - REASON FOR ADMISSION
AVF malfunction

## 2021-11-15 NOTE — PATIENT PROFILE ADULT - DO YOU FEEL UNSAFE AT SCHOOL?
[FreeTextEntry1] : Patient returns for followup today. There have been no cardiac symptoms. The patient describes no symptoms of chest pain shortness of breath dizziness or lightheadedness. He does describe a syndrome of discomfort involving the left the right lower extremity radiating from the solid down toward the foot with some slight motor clumsiness of the foot. This problem will be evaluated shortly by a neurologist. He has had a recent episode of bronchitis but normally engages in regular exercise including swimming. no

## 2021-11-23 NOTE — DISCHARGE NOTE PROVIDER - NSDCHC_MEDRECSTATUS_GEN_ALL_CORE
Admission Reconciliation is Completed  Discharge Reconciliation is Not Complete Admission Reconciliation is Completed  Discharge Reconciliation is Completed Bactrim Counseling:  I discussed with the patient the risks of sulfa antibiotics including but not limited to GI upset, allergic reaction, drug rash, diarrhea, dizziness, photosensitivity, and yeast infections.  Rarely, more serious reactions can occur including but not limited to aplastic anemia, agranulocytosis, methemoglobinemia, blood dyscrasias, liver or kidney failure, lung infiltrates or desquamative/blistering drug rashes.

## 2022-07-14 NOTE — H&P ADULT - NS MD HP PULSE POSTERIOR
Chief complaint  This is a 81y.o. year old male  who comes to see me with a chief complaint of   Chief Complaint   Patient presents with    COPD    Sleep Apnea    Follow-up       HPI  Here with follow up on JOANN and COPD      Machine:  Patient is using a APAP machine. Average usage is 4 hrs 57 min 00 sec. He is meeting 4 or more hours per night 67% of the time. Average AHI is 0.6. Falls asleep sometimes without the machine. Sleeps in chair for the most part    Still just using prn xopenex. Does notice more struggling in the heat and longer walks but tires to stay active. Nebs helps more than the inhalers and he admits to being lazy with the nebs. Mucus is clear for the most part.   He does not feel sick     On singulair and flonase      Sleep Medicine 7/14/2022 3/1/2021 2/20/2020 10/17/2019 2/25/2019 10/23/2018   Sitting and reading 1 1 1 1 1 0   Watching TV 1 1 1 1 2 1   Sitting, inactive in a public place (e.g. a theatre or a meeting) 0 0 0 1 1 0   As a passenger in a car for an hour without a break 0 1 0 1 1 0   Lying down to rest in the afternoon when circumstances permit 1 0 1 2 2 2   Sitting and talking to someone 0 0 0 0 1 0   Sitting quietly after a lunch without alcohol 0 0 0 1 1 1   In a car, while stopped for a few minutes in traffic 0 0 0 0 0 0   Total score 3 3 3 7 9 4   Neck circumference (Inches) - - - - - 18.5         Current Outpatient Medications   Medication Sig Dispense Refill    atorvastatin (LIPITOR) 40 MG tablet TAKE 1 TABLET BY MOUTH EVERY DAY 90 tablet 3    alfuzosin (UROXATRAL) 10 MG extended release tablet TAKE 1 TABLET BY MOUTH EVERY DAY 90 tablet 3    [facility] PURELAX 17 GM/SCOOP powder TAKE 17G BY MOUTH DAILY MIX WITH 8 OZ OF WATER 510 g 3    LINZESS 145 MCG capsule TAKE 1 CAPSULE BY MOUTH EVERY DAY IN THE MORNING BEFORE BREAKFAST 30 capsule 11    verapamil (CALAN SR) 240 MG extended release tablet TAKE 1 TABLET BY MOUTH TWICE A  tablet 3    valsartan-hydroCHLOROthiazide (DIOVAN-HCT) 320-25 MG per tablet TAKE 1 TABLET BY MOUTH EVERY DAY 90 tablet 3    cloNIDine (CATAPRES) 0.1 MG tablet TAKE 1 TABLET BY MOUTH TWICE A  tablet 3    pantoprazole (PROTONIX) 40 MG tablet TAKE 1 TABLET BY MOUTH EVERY DAY 90 tablet 1    albuterol sulfate  (90 Base) MCG/ACT inhaler INHALE 2 PUFFS BY MOUTH EVERY 4 HOURS AS NEEDED FOR WHEEZING 18 each 11    insulin glargine (BASAGLAR KWIKPEN) 100 UNIT/ML injection pen Inject 50 Units into the skin 2 times daily (or as directed) 10 pen 5    montelukast (SINGULAIR) 10 MG tablet TAKE 1 TABLET BY MOUTH EVERY DAY 90 tablet 3    furosemide (LASIX) 20 MG tablet TAKE 40MG EVERY OTHER DAY FOR ONE WEEK AND 20MG ON THE DAYS THAT PT IS NOT TAKING 40MG. 135 tablet 2    B-D UF III MINI PEN NEEDLES 31G X 5 MM MISC USE AS DIRECTED 3 TIMES A  each 11    amiodarone (CORDARONE) 200 MG tablet TAKE 1 TABLET BY MOUTH EVERY OTHER DAY 90 tablet 2    levalbuterol (XOPENEX) 1.25 MG/3ML nebulizer solution USE 1 VIAL VIA NEBULIZER FOUR TIMES DAILY 360 mL 11    ZINC PO Take by mouth      CPAP Machine MISC by Does not apply route      Ascorbic Acid (SUPER C COMPLEX PO) Take by mouth daily      Multiple Vitamins-Minerals (MULTIVITAMIN ADULT PO) Take by mouth daily      Lactobacillus Rhamnosus, GG, ([facility]S PROBIOTIC, LACTOBACILLUS,) CAPS TAKE 1 CAPSULE BY MOUTH 2 TIMES DAILY (WITH MEALS) 60 capsule 11    Docusate Sodium (COLACE PO) Take by mouth daily      Cyanocobalamin (VITAMIN B-12 PO) Take 500 mcg by mouth every other day       Cholecalciferol (VITAMIN D3 PO) Take 2,000 Units by mouth daily       aspirin EC 81 MG EC tablet Take 2 tablets by mouth daily.  30 tablet 3    ferrous sulfate 325 (65 FE) MG tablet Take 325 mg by mouth daily       ACCU-CHEK GUIDE strip TEST AS DIRECTED 3 TIMES A  strip 5    Accu-Chek FastClix Lancets MISC USE TO TEST 3 TIMES A DAY DX CODE E11.9 102 each 17     No current facility-administered medications for this visit. PHYSICAL EXAM:  Vitals:    07/14/22 1255   BP: 130/60   Pulse: 96   Resp: 16   Temp: 97.1 °F (36.2 °C)   SpO2: 96%       GENERAL:  Well nourished, alert, appears stated age, no distress  HEENT:  No scleral icterus, no conjunctival irritation; Mallampati IV, elongated uvula  NECK:  No thyromegaly, no bruits  LYMPH:  No cervical or supraclavicular adenopathy  HEART:  Regular rate and rhythm, no murmurs. Distant heart sounds   LUNGS:  Expiratory wheezing in right lung   ABDOMEN:  No distention, no organomegaly  EXTREMITIES:  Trace edema  no digital clubbing  NEURO:  No localizing deficits    Pulmonary Function Testing 8/2012  FEV1 1.87 L at 69% predicted (reduced)  FVC 3.36 L at 90% predicted (normal)  FEV1/FVC ratio at 56 (reduced)  TLC 6.07 L at 110% predicted (normal)  DLCO 23.5 at 98% predicted (normal)  Overall Mild obstruction    Chest imaging from 12/2018 is reviewed. My impression is scarring in the lower lobes bilaterally. More so on the right     Alpha-1 Anti-trypsin levels:  92, Phenotype:  M1S       I reviewed the above labs and images      Assessment/Plan:  1. COPD with chronic bronchitis ([address]  Seems to be at baseline. More SOB due to weather. Recommend using nebs more often as he will likely have a better inhalation and better effect from meds    2. JOANN on CPAP  Benefiting and compliant. Benefiting from therapy by a low [place] score, good energy levels, low fatigue, and control of chronic medical problems    3. Rhinosinusitis  Singulair and flonase       Pulmonary Rehab:  Completed a portion in the past     Lung Cancer Screening CT:  Does not qualify    History of pseudomonas infection.   Needs pseudomonal coverage when he has bronchitis     Avoid ICS inhalers due to repeated infections    Follow up in 6 months right normal/left normal

## 2022-08-08 NOTE — PROGRESS NOTE ADULT - SUBJECTIVE AND OBJECTIVE BOX
Abdomen , soft, nontender, nondistended , no guarding or rigidity , no masses palpable , normal bowel sounds , Liver and Spleen , no hepatomegaly present , no hepatosplenomegaly , liver nontender , spleen not palpable Patient is a 81y old  Female who presents with a chief complaint of AVF malfunction (09 Jun 2021 14:51)      INTERVAL HPI/OVERNIGHT EVENTS: being d/c to in-patient hospice   T(C): 36.6 (06-09-21 @ 12:56), Max: 36.6 (06-09-21 @ 12:56)  HR: 89 (06-09-21 @ 12:56) (89 - 91)  BP: 146/70 (06-09-21 @ 12:56) (146/70 - 151/71)  RR: 18 (06-09-21 @ 12:56) (17 - 18)  SpO2: 100% (06-09-21 @ 12:56) (100% - 100%)  Wt(kg): --  I&O's Summary      PAST MEDICAL & SURGICAL HISTORY:  DM (diabetes mellitus)  TYpe II    HTN (hypertension)    Hypercholesteremia    CKD (chronic kidney disease)  now on HD via R forearm AVF    Anemia    Atrial fibrillation  On Eliquis    Cerebrovascular accident (CVA)    Status post right foot surgery  hammer toe repair    AV fistula  June 2018, 2 ballooning (last one 2 weeks ago), following up on Dec 15th    H/O colonoscopy with polypectomy        SOCIAL HISTORY  Alcohol:  Tobacco:  Illicit substance use:    FAMILY HISTORY:    REVIEW OF SYSTEMS:  CONSTITUTIONAL: No fever, weight loss, or fatigue  EYES: No eye pain, visual disturbances, or discharge  ENMT:  No difficulty hearing, tinnitus, vertigo; No sinus or throat pain  NECK: No pain or stiffness  RESPIRATORY: No cough, wheezing, chills or hemoptysis; No shortness of breath  CARDIOVASCULAR: No chest pain, palpitations, dizziness, or leg swelling  GASTROINTESTINAL: No abdominal or epigastric pain. No nausea, vomiting, or hematemesis; No diarrhea or constipation. No melena or hematochezia.  GENITOURINARY: No dysuria, frequency, hematuria, or incontinence  NEUROLOGICAL: No headaches, memory loss, loss of strength, numbness, or tremors  SKIN: No itching, burning, rashes, or lesions   LYMPH NODES: No enlarged glands  ENDOCRINE: No heat or cold intolerance; No hair loss  MUSCULOSKELETAL: No joint pain or swelling; No muscle, back, or extremity pain  PSYCHIATRIC: No depression, anxiety, mood swings, or difficulty sleeping  HEME/LYMPH: No easy bruising, or bleeding gums  ALLERY AND IMMUNOLOGIC: No hives or eczema    RADIOLOGY & ADDITIONAL TESTS:    Imaging Personally Reviewed:  [ ] YES  [ ] NO    Consultant(s) Notes Reviewed:  [ ] YES  [ ] NO    PHYSICAL EXAM:  GENERAL: NAD, well-groomed, well-developed  HEAD:  Atraumatic, Normocephalic  EYES: EOMI, PERRLA, conjunctiva and sclera clear  ENMT: No tonsillar erythema, exudates, or enlargement; Moist mucous membranes, Good dentition, No lesions  NECK: Supple, No JVD, Normal thyroid  NERVOUS SYSTEM:  Alert & Oriented X3, Good concentration; Motor Strength 5/5 B/L upper and lower extremities; DTRs 2+ intact and symmetric  CHEST/LUNG: Clear to percussion bilaterally; No rales, rhonchi, wheezing, or rubs  HEART: Regular rate and rhythm; No murmurs, rubs, or gallops  ABDOMEN: Soft, Nontender, Nondistended; Bowel sounds present  EXTREMITIES:  2+ Peripheral Pulses, No clubbing, cyanosis, or edema  LYMPH: No lymphadenopathy noted  SKIN: No rashes or lesions    LABS:                        9.7    8.53  )-----------( 160      ( 08 Jun 2021 05:45 )             32.9     06-08    140  |  102  |  46<H>  ----------------------------<  88  5.9<H>   |  17<L>  |  7.72<H>    Ca    7.9<L>      08 Jun 2021 05:45  Phos  5.3     06-08  Mg     2.3     06-08          CAPILLARY BLOOD GLUCOSE      POCT Blood Glucose.: 81 mg/dL (09 Jun 2021 11:46)  POCT Blood Glucose.: 80 mg/dL (09 Jun 2021 07:39)  POCT Blood Glucose.: 101 mg/dL (08 Jun 2021 22:25)  POCT Blood Glucose.: 97 mg/dL (08 Jun 2021 15:38)            MEDICATIONS  (STANDING):  fluconAZOLE IVPB 100 milliGRAM(s) IV Intermittent daily  haloperidol    Injectable 1 milliGRAM(s) IV Push at bedtime  valproate sodium IVPB 250 milliGRAM(s) IV Intermittent three times a day    MEDICATIONS  (PRN):  haloperidol    Injectable 1 milliGRAM(s) IV Push every 6 hours PRN Agitation  HYDROmorphone  Injectable 0.3 milliGRAM(s) IV Push every 3 hours PRN Moderate/Severe Pain and/or Dyspnea  LORazepam   Injectable 0.5 milliGRAM(s) IV Push every 6 hours PRN Anxiety/Agitation  sodium chloride 0.9% lock flush 10 milliLiter(s) IV Push every 1 hour PRN Pre/post blood products, medications, blood draw, and to maintain line patency      Care Discussed with Consultants/Other Providers [ ] YES  [ ] NO

## 2022-09-15 NOTE — DISCHARGE NOTE NURSING/CASE MANAGEMENT/SOCIAL WORK - NSDCPEPTCAREGIVEDUMATLIST _GEN_ALL_CORE
Stroke/Diabetes Gabapentin Counseling: I discussed with the patient the risks of gabapentin including but not limited to dizziness, somnolence, fatigue and ataxia.

## 2023-01-10 NOTE — ED PROVIDER NOTE - INPATIENT RESIDENT/ACP NOTIFIED DATE
BRONCHOSPASM/BRONCHOCONSTRICTION     [x]         IMPROVE AERATION/BREATH SOUNDS  [x]   ADMINISTER BRONCHODILATOR THERAPY AS APPROPRIATE  [x]   ASSESS BREATH SOUNDS  []   IMPLEMENT AEROSOL/MDI PROTOCOL  [x]   PATIENT EDUCATION AS NEEDED None known 19-Jul-2019 17:29

## 2023-01-20 NOTE — ED PROVIDER NOTE - CCCP TRG CHIEF CMPLNT
Problem: Potential for Falls  Goal: Patient will remain free of falls  Description: INTERVENTIONS:  - Educate patient/family on patient safety including physical limitations  - Instruct patient to call for assistance with activity   - Consult OT/PT to assist with strengthening/mobility   - Keep Call bell within reach  - Keep bed low and locked with side rails adjusted as appropriate  - Keep care items and personal belongings within reach  - Initiate and maintain comfort rounds  - Make Fall Risk Sign visible to staff  - Offer Toileting every 2 Hours, in advance of need  - Initiate/Maintain bed alarm  - Obtain necessary fall risk management equipment:   - Apply yellow socks and bracelet for high fall risk patients  - Consider moving patient to room near nurses station  Outcome: Progressing HTN, vomiting with nausea

## 2023-02-23 NOTE — DISCHARGE NOTE PROVIDER - HOSPITAL COURSE
79F with history as above who presents to the hospital with sudden onset of dizziness with associated n/v and hyperkalemia. CTH demonstrates Acute R. Cerebellar infarct likely 2/2 to cardioembolic etiology in the setting of previously documented Afib off anti-coagulation. Pt transf to tele. Awaiting CTA head/neck 79F with history as above who presents to the hospital with sudden onset of dizziness with associated n/v and hyperkalemia. CTH demonstrates Acute R. Cerebellar infarct likely 2/2 to cardioembolic etiology in the setting of previously documented Afib off anti-coagulation. Pt transf to tele. Awaiting CTA head/neck         1. Subacute CVA    - CT- Acute R. Cerebellar infarct likely 2/2 A fib    - 7/21 CT brain: Unchanged age-indeterminate right cerebellar infarct. There is no evidence of acute intracranial hemorrhage, extra-axial collection, vasogenic edema, mass, mass effect, midline shift, central herniation, hydrocephalus.    - 7/21 CT angiography neck: No evidence of hemodynamically significant stenosis     by NASCET criteria.     - 7/ 21 CT angiography brain: Irregular narrowing of the distal left vertebral artery which may represent stenosis and/or hypoplasia. Mild narrowing of the basilar artery. No other significant vascular stenosis, occlusion or other vascular     abnormality identified.    - ASA 81, Atorvastatin 80mg     - TTE w/ Bubble pending    MRI Brain: No acute or subacute infarction.    Chronic right AICA territory infarction. Chronic lacunar infarctions in     the basal ganglia and thalami.         2. A fib    - Cards cs- likely embolic. Old ekg with documented AF. Will need to start A/C once cleared by neuro. Continue asa and statin. No indication for DAYAN or loop as patient will start a/c with known hx of PAF and now cva. CHADS=5    - On Metoprolol 100mg BID        3. ESRD on dialysis    - c/w HD as per renal.         4. Essential hypertension    - c/w home BP meds.         5. Type 2 diabetes mellitus     - Patient's son states that she transitioned herself off of insulin at home, currently on no meds for DM    - Her initial glucose was low 100s    - Will place on HISS and monitor FS.        7/22 Echo CONCLUSIONS: 1. Mitral annular calcification, otherwise normal mitral    valve. Mild mitral regurgitation. 2. Moderately dilated left atrium.  LA volume index = 45    cc/m2. 3. Normal left ventricular internal dimensions and wall thicknesses. 4. Normal left ventricular systolic function. No segmental wall motion abnormalities. 5. Normal right ventricular size and function. 6. Estimated right ventricular systolic pressure equals 44    mm Hg, assuming right atrial pressure equals 10 mm Hg, consistent with mild pulmonary hypertension. 7. Small pericardial effusion posterior and lateral to the left ventricle.    8. A bubble study was performed with the intravenous injection of agitated saline contrast and was inconclusive regarding the presence of an interatrial shunt. No obvious cardiac source of embolus was identified on this transthoracic study.  If clinical suspicion is high,    consider DAYAN for further evaluation. 79F with history as above who presents to the hospital with sudden onset of dizziness with associated n/v and hyperkalemia. CTH demonstrates Acute R. Cerebellar infarct likely 2/2 to cardioembolic etiology in the setting of previously documented Afib off anti-coagulation. Pt transf to tele. Awaiting CTA head/neck         1. Subacute CVA    - CT- Acute R. Cerebellar infarct likely 2/2 A fib    - 7/21 CT brain: Unchanged age-indeterminate right cerebellar infarct. There is no evidence of acute intracranial hemorrhage, extra-axial collection, vasogenic edema, mass, mass effect, midline shift, central herniation, hydrocephalus.    - 7/21 CT angiography neck: No evidence of hemodynamically significant stenosis     by NASCET criteria.     - 7/ 21 CT angiography brain: Irregular narrowing of the distal left vertebral artery which may represent stenosis and/or hypoplasia. Mild narrowing of the basilar artery. No other significant vascular stenosis, occlusion or other vascular     abnormality identified.    - ASA 81, Atorvastatin 80mg     - TTE w/ Bubble- EF 69%     Normal left ventricular systolic function. No segmental wall motion abnormalities.  Small pericardial effusion posterior and lateral to the    left ventricle.  A bubble study was performed with the intravenous injection of agitated saline contrast and was inconclusive regarding the presence of an interatrial shunt.    No obvious cardiac source of embolus was identified on this transthoracic study.      MRI Brain: No acute or subacute infarction.    Chronic right AICA territory infarction. Chronic lacunar infarctions in the basal ganglia and thalami.         2. A fib    - Cards cs- likely embolic. Old ekg with documented AF. Will need to start A/C once cleared by neuro. Continue asa and statin. No indication for DAYAN or loop as patient will start a/c with known hx of PAF and now cva. CHADS=5    - On Metoprolol 100mg BID        3. ESRD on dialysis    - c/w HD as per renal.         4. Essential hypertension    - c/w home BP meds.         5. Type 2 diabetes mellitus     - Patient's son states that she transitioned herself off of insulin at home, currently on no meds for DM    - Her initial glucose was low 100s    - Will place on HISS and monitor FS.        7/22 Echo CONCLUSIONS: 1. Mitral annular calcification, otherwise normal mitral    valve. Mild mitral regurgitation. 2. Moderately dilated left atrium.  LA volume index = 45    cc/m2. 3. Normal left ventricular internal dimensions and wall thicknesses. 4. Normal left ventricular systolic function. No segmental wall motion abnormalities. 5. Normal right ventricular size and function. 6. Estimated right ventricular systolic pressure equals 44    mm Hg, assuming right atrial pressure equals 10 mm Hg, consistent with mild pulmonary hypertension. 7. Small pericardial effusion posterior and lateral to the left ventricle.    8. A bubble study was performed with the intravenous injection of agitated saline contrast and was inconclusive regarding the presence of an interatrial shunt. No obvious cardiac source of embolus was identified on this transthoracic study.  If clinical suspicion is high,    consider DAYAN for further evaluation.        Case reviewed with attending who cleared for discharge. MILD

## 2023-06-24 NOTE — PATIENT PROFILE ADULT - BRADEN NUTRITION
Pt c/o feeling cold  Temperature checked 98 0   Warm blankets provided     Misty Walker RN  06/23/23 7428 (3) adequate

## 2023-09-20 NOTE — ED ADULT NURSE NOTE - HOW OFTEN DO YOU HAVE A DRINK CONTAINING ALCOHOL?
Refill request Vyvanse 30mg    Last seen: 03/08/2023    Next appointment: 10/13/2023    Last refill: 07/28/2023    Last dispensed: 07/28/2023  PDMP reviewed   Never

## 2024-01-02 NOTE — PROGRESS NOTE ADULT - SUBJECTIVE AND OBJECTIVE BOX
Detail Level: Generalized When Should The Patient Follow-Up For Their Next Full-Body Skin Exam?: 3 Months Quality 137: Melanoma: Continuity Of Care - Recall System: Patient information entered into a recall system that includes: target date for the next exam specified AND a process to follow up with patients regarding missed or unscheduled appointments Detail Level: Detailed [facility]   MD JAJA SMITH DO ANAM SIDDIQUI ANGELA WONG, PA    TEL:  OFFICE: [phone]  DR CASTILLO CELL: [phone]  DR. HARRISON CELL: [phone]  DR. RILEY CELL: [phone]  LIZZY DHILLON CELL: [phone]    From 5pm-7am Answering Service [phone]    -- RENAL FOLLOW UP NOTE ---Date of Service 06-01-21 @ 12:31    Patient is a 81y old  Female who presents with a chief complaint of AVF malfunction (01 Jun 2021 07:36)      Patient seen and examined at bedside.     VITALS:  T(F): 97.4 (06-01-21 @ 05:33), Max: 98.3 (05-31-21 @ 20:32)  HR: 98 (06-01-21 @ 05:33)  BP: 105/73 (06-01-21 @ 05:33)  RR: 17 (06-01-21 @ 05:33)  SpO2: 97% (06-01-21 @ 05:33)  Wt(kg): --    Height (cm): 170.2 (05-31 @ 14:00)  Weight (kg): 57.9 (06-01 @ 00:55)  BMI (kg/m2): 20 (06-01 @ 00:55)  BSA (m2): 1.67 (06-01 @ 00:55)    PHYSICAL EXAM:  Constitutional: pt is confused, restless  Neck: No JVD  Respiratory: CTAB, no wheezes, rales or rhonchi  Cardiovascular: S1, S2, RRR  Gastrointestinal: BS+, soft, NT/ND  Extremities: No peripheral edema    Hospital Medications:   MEDICATIONS  (STANDING):  apixaban 2.5 milliGRAM(s) Oral every 12 hours  aspirin  chewable 81 milliGRAM(s) Oral daily  atorvastatin 80 milliGRAM(s) Oral at bedtime  calcium acetate 667 milliGRAM(s) Oral three times a day with meals  cefTRIAXone   IVPB 1000 milliGRAM(s) IV Intermittent every 24 hours  dextrose 40% Gel 15 Gram(s) Oral once  dextrose 5%. 1000 milliLiter(s) (50 mL/Hr) IV Continuous <Continuous>  dextrose 5%. 1000 milliLiter(s) (100 mL/Hr) IV Continuous <Continuous>  dextrose 50% Injectable 25 Gram(s) IV Push once  dextrose 50% Injectable 12.5 Gram(s) IV Push once  dextrose 50% Injectable 25 Gram(s) IV Push once  donepezil 10 milliGRAM(s) Oral at bedtime  glucagon  Injectable 1 milliGRAM(s) IntraMuscular once  hydrALAZINE 25 milliGRAM(s) Oral two times a day  insulin glargine Injectable (LANTUS) 10 Unit(s) SubCutaneous at bedtime  insulin lispro (ADMELOG) corrective regimen sliding scale   SubCutaneous three times a day before meals  metoprolol tartrate Injectable 2.5 milliGRAM(s) IV Push every 6 hours  pantoprazole    Tablet 40 milliGRAM(s) Oral before breakfast  QUEtiapine 25 milliGRAM(s) Oral daily  valproate sodium IVPB 250 milliGRAM(s) IV Intermittent three times a day      LABS:  06-01    143  |  93<L>  |  98<H>  ----------------------------<  213<H>  4.4   |  21<L>  |  13.22<H>    Ca    9.2      01 Jun 2021 06:55    TPro  8.0  /  Alb  3.7  /  TBili  0.4  /  DBili      /  AST  41<H>  /  ALT  15  /  AlkPhos  75  06-01    Creatinine Trend: 13.22 <--, 11.85 <--    Albumin, Serum: 3.7 g/dL (06-01 @ 06:55)  Albumin, Serum: 4.0 g/dL (05-31 @ 15:39)                              10.5   13.95 )-----------( 240      ( 01 Jun 2021 06:55 )             36.0     Urine Studies:  Urinalysis - [05-31-21 @ 19:13]      Color Dark Brown / Appearance Turbid / SG 1.019 / pH 7.5      Gluc Negative / Ketone Negative  / Bili Negative / Urobili <2 mg/dL       Blood Moderate / Protein 300 mg/dL / Leuk Est Large / Nitrite Negative      RBC 5 / WBC >10 / Hyaline  / Gran  / Sq Epi  / Non Sq Epi 4 / Bacteria Many      PTH -- (Ca --)      [05-09-21 @ 06:54]   421  HbA1c 5.8      [12-10-19 @ 06:41]  TSH 3.72      [05-14-21 @ 07:12]  Lipid: chol 219, , HDL 65, LDL --      [05-09-21 @ 06:54]    HBsAb <3.0      [05-11-21 @ 00:01]  HBsAg Nonreact      [05-10-21 @ 09:26]  HBcAb Nonreact      [05-10-21 @ 09:26]  HCV 0.17, Nonreact      [05-10-21 @ 09:26]    Syphilis Screen (Treponema Pallidum Ab) Negative      [05-15-21 @ 10:20]    RADIOLOGY & ADDITIONAL STUDIES:

## 2024-10-05 NOTE — DISCHARGE NOTE ADULT - NSTOBACCOHOTLINE_GEN_A_NCS
[facility] Smokers Quitline ([id number])

## 2024-10-18 NOTE — ED ADULT NURSE NOTE - NAME OF PERSON WHO ASSISTED
self [FreeTextEntry1] : annual with pap/hpv  B/L mammo due 12/24 Ca with Vit D / weight bearing exercises  U/A C/S / increase po fluids  rto annually and as needed

## 2024-12-07 NOTE — ED PROVIDER NOTE - PROGRESS NOTE DETAILS
Communicate Risk of Fall with Harm to all staff/Reinforce activity limits and safety measures with patient and family/Tailored Fall Risk Interventions/Visual Cue: Yellow wristband and red socks/Bed in lowest position, wheels locked, appropriate side rails in place/Call bell, personal items and telephone in reach/Instruct patient to call for assistance before getting out of bed or chair/Non-slip footwear when patient is out of bed/[place] to call system/Physically safe environment - no spills, clutter or unnecessary equipment/Purposeful Proactive Rounding/Room/bathroom lighting operational, light cord in reach
Wendy Meyer PGY2  labs neg, xray neg, still unable to bear weight on RLE. has doppler DP pulses BL. will admit to medicine for inability to walk/pain

## 2024-12-27 NOTE — H&P ADULT - PROBLEM SELECTOR PROBLEM 3
\"Have you been to the ER, urgent care clinic since your last visit?  Hospitalized since your last visit?\"    Yes NESTOR - in media     “Have you seen or consulted any other health care providers outside of [facility] since your last visit?”    NO            Click Here for Release of Records Request     Health Maintenance Due   Topic Date Due    DTaP/Tdap/Td vaccine (1 - Tdap) Never done    DEXA (modify frequency per FRAX score)  Never done    Diabetic retinal exam  11/12/2014    Respiratory Syncytial Virus (RSV) Pregnant or age 60 yrs+ (1 - 1-dose 75+ series) Never done    Diabetic foot exam  08/31/2024    COVID-19 Vaccine (3 - 2023-24 season) 09/01/2024    Depression Screen  01/10/2025      Atrial fibrillation, unspecified type Dispositions    Return in about 3 months (around 3/27/2025).           I have discussed the diagnosis with the patient and the intended plan as seen in the above orders.  Social history, medical history, and labs were reviewed.  The patient has received an after-visit summary and questions were answered concerning future plans.  I have discussed medication side effects and warnings with the patient as well.    Boo Winston MD  [facility]  12/27/24       UTI (urinary tract infection)

## 2025-01-27 NOTE — ED PROVIDER NOTE - DATE/TIME 1
Hpi Title: Evaluation of Skin Lesions
How Severe Are Your Spot(S)?: mild
Have Your Spot(S) Been Treated In The Past?: has not been treated
26-Jan-2020 03:27

## 2025-05-27 NOTE — PHYSICAL THERAPY INITIAL EVALUATION ADULT - SIT-TO-STAND BALANCE
Awaiting pre-op paperwork. Faxed request to Dr. Guzman as well. Awaiting paperwork.   
Future Appointments   Date Time Provider Department Center   6/20/2025  9:00 AM Quiana Arellano APRN [facility] [place]   6/20/2025 10:15 AM SW XR RM 1 SW XRAY [place]       
Need Pre Operative appointment for surgery 6-26-25 with Dr Guzman, call patient back  
Pre op paperwork received and reviewed by Dr Lopez    Patient notified and appointment scheduled:  Future Appointments   Date Time Provider Department Center   6/20/2025  9:00 AM Quiana Arellano APRN [facility] [place]         Can we put in order for Chest xray so can be scheduled for that day.      
fair balance

## 2025-06-17 NOTE — CONSULT NOTE ADULT - SUBJECTIVE AND OBJECTIVE BOX
S/w pt and informed him of OVOX order sent 6- to [employer]. Provided vendor's number, pt acknowledged.   [facility]   MD JAJA SMITH DO ANAM SIDDIQUI ANGELA WONG, PA        TEL:  OFFICE: [phone]  DR CASTILLO CELL: [phone]  DR. HARRISON CELL: [phone]  DR. RILEY CELL: [phone]  LIZZY DHILLON CELL: [phone]    From 5pm-7am answering service [phone]    --- INITIAL RENAL CONSULT NOTE ---date of service 21 @ 13:49    HPI:  80F hx htn, dm, A fib on eliquis, prior CVA and esrd MWF last dialyzed Friday but missed Wednesday presents with AMS since Tuesday worsening today. Has been shouting "nonsensical things" according to family. baseline aaox3. Patient denies chest pain, SOB, f/c, cough, abd pain, N/V/D/C, focal weakness, HA, dizziness, urinary symptoms.  pt seen in ED. A+O x2, unable to tell me date. Per patient she came to the hospital because "im dying at home, and going crazy" however pt unable to elaborate more.       Allergies:  No Known Allergies      PAST MEDICAL & SURGICAL HISTORY:  DM (diabetes mellitus)  TYpe II    HTN (hypertension)    Hypercholesteremia    CKD (chronic kidney disease)  now on HD via R forearm AVF    Anemia    Atrial fibrillation  On Eliquis    Cerebrovascular accident (CVA)    Status post right foot surgery  hammer toe repair    AV fistula  2018, 2 ballooning (last one 2 weeks ago), following up on Dec 15th    H/O colonoscopy with polypectomy        Home Medications Reviewed    Hospital Medications:   MEDICATIONS  (STANDING):      SOCIAL HISTORY:  Denies ETOh, Smoking,     FAMILY HISTORY:  Family history of hypertension (Sibling)    Family history of diabetes mellitus    Family history of lung cancer (Sibling)    Family history of malignant neoplasm of gastrointestinal tract        REVIEW OF SYSTEMS:  per hpi    VITALS:  T(F): 98.4 (21 @ 13:00), Max: 99.5 (21 @ 11:06)  HR: 60 (21 @ 13:00)  BP: 145/55 (21 @ 13:00)  RR: 16 (21 @ 13:00)  SpO2: 100% (21 @ 13:00)  Wt(kg): --    Height (cm): 170.2 ( @ 10:12)    PHYSICAL EXAM:  Constitutional: restless  HEENT: anicteric sclera, oropharynx clear, MMM  Neck: No JVD  Respiratory: CTAB, no wheezes, rales or rhonchi  Cardiovascular: S1, S2, RRR  Gastrointestinal: BS+, soft, NT/ND  Extremities: No cyanosis or clubbing. No peripheral edema  Neurological: A/O x 2  Psychiatric: agitated  : No CVA tenderness. No calvin.   Skin: No rashes  Vascular Access: right AVF    LABS:      138  |  97<L>  |  18  ----------------------------<  135<H>  4.4   |  28  |  7.45<H>    Ca    8.8      08 May 2021 11:09    TPro  7.8  /  Alb  3.7  /  TBili  0.4  /  DBili      /  AST  17  /  ALT  6   /  AlkPhos  66      Creatinine Trend: 7.45 <--                        9.7    4.71  )-----------( 208      ( 08 May 2021 11:09 )             33.2     Urine Studies:  Urinalysis Basic - ( 08 May 2021 11:19 )    Color: Yellow / Appearance: Slightly Turbid / S.017 / pH:   Gluc:  / Ketone: Negative  / Bili: Negative / Urobili: <2 mg/dL   Blood:  / Protein: >600 mg/dL / Nitrite: Negative   Leuk Esterase: Moderate / RBC: 1 /HPF / WBC 61 /HPF   Sq Epi:  / Non Sq Epi: 9 /HPF / Bacteria: Few          RADIOLOGY & ADDITIONAL STUDIES: